# Patient Record
Sex: FEMALE | Race: BLACK OR AFRICAN AMERICAN | NOT HISPANIC OR LATINO | Employment: FULL TIME | URBAN - METROPOLITAN AREA
[De-identification: names, ages, dates, MRNs, and addresses within clinical notes are randomized per-mention and may not be internally consistent; named-entity substitution may affect disease eponyms.]

---

## 2017-06-22 ENCOUNTER — HOSPITAL ENCOUNTER (INPATIENT)
Facility: HOSPITAL | Age: 42
LOS: 1 days | Discharge: HOME | DRG: 175 | End: 2017-06-23
Attending: INTERNAL MEDICINE | Admitting: INTERNAL MEDICINE
Payer: COMMERCIAL

## 2017-06-22 DIAGNOSIS — E11.10 TYPE 2 DIABETES MELLITUS WITH KETOACIDOSIS WITHOUT COMA, WITHOUT LONG-TERM CURRENT USE OF INSULIN: Primary | ICD-10-CM

## 2017-06-22 DIAGNOSIS — R73.9 HYPERGLYCEMIA: ICD-10-CM

## 2017-06-22 DIAGNOSIS — N93.9 ABNORMAL UTERINE BLEEDING: ICD-10-CM

## 2017-06-22 DIAGNOSIS — I26.99 PULMONARY EMBOLISM: ICD-10-CM

## 2017-06-22 DIAGNOSIS — I26.99 PULMONARY EMBOLI: ICD-10-CM

## 2017-06-22 PROBLEM — E78.5 HYPERLIPIDEMIA: Status: ACTIVE | Noted: 2017-06-22

## 2017-06-22 PROBLEM — D64.9 ANEMIA: Status: ACTIVE | Noted: 2017-06-22

## 2017-06-22 PROBLEM — I10 HTN (HYPERTENSION): Status: ACTIVE | Noted: 2017-06-22

## 2017-06-22 LAB
ALBUMIN SERPL BCP-MCNC: 3 G/DL
ALP SERPL-CCNC: 59 U/L
ALT SERPL W/O P-5'-P-CCNC: 9 U/L
ANION GAP SERPL CALC-SCNC: 12 MMOL/L
ANION GAP SERPL CALC-SCNC: 12 MMOL/L
ANION GAP SERPL CALC-SCNC: 16 MMOL/L
ANION GAP SERPL CALC-SCNC: 16 MMOL/L
AST SERPL-CCNC: 13 U/L
B-OH-BUTYR BLD STRIP-SCNC: 3.1 MMOL/L
BASOPHILS # BLD AUTO: 0.01 K/UL
BASOPHILS # BLD AUTO: 0.01 K/UL
BASOPHILS NFR BLD: 0.1 %
BASOPHILS NFR BLD: 0.1 %
BILIRUB SERPL-MCNC: 0.3 MG/DL
BUN SERPL-MCNC: 7 MG/DL
BUN SERPL-MCNC: 8 MG/DL
CALCIUM SERPL-MCNC: 8.2 MG/DL
CALCIUM SERPL-MCNC: 8.2 MG/DL
CALCIUM SERPL-MCNC: 8.6 MG/DL
CALCIUM SERPL-MCNC: 8.6 MG/DL
CHLORIDE SERPL-SCNC: 100 MMOL/L
CHLORIDE SERPL-SCNC: 102 MMOL/L
CHLORIDE SERPL-SCNC: 102 MMOL/L
CHLORIDE SERPL-SCNC: 103 MMOL/L
CO2 SERPL-SCNC: 15 MMOL/L
CO2 SERPL-SCNC: 16 MMOL/L
CO2 SERPL-SCNC: 19 MMOL/L
CO2 SERPL-SCNC: 19 MMOL/L
CREAT SERPL-MCNC: 0.7 MG/DL
DIASTOLIC DYSFUNCTION: NO
DIFFERENTIAL METHOD: ABNORMAL
DIFFERENTIAL METHOD: ABNORMAL
EOSINOPHIL # BLD AUTO: 0 K/UL
EOSINOPHIL # BLD AUTO: 0 K/UL
EOSINOPHIL NFR BLD: 0 %
EOSINOPHIL NFR BLD: 0.3 %
ERYTHROCYTE [DISTWIDTH] IN BLOOD BY AUTOMATED COUNT: 14.5 %
ERYTHROCYTE [DISTWIDTH] IN BLOOD BY AUTOMATED COUNT: 14.9 %
EST. GFR  (AFRICAN AMERICAN): >60 ML/MIN/1.73 M^2
EST. GFR  (NON AFRICAN AMERICAN): >60 ML/MIN/1.73 M^2
FACT X PPP CHRO-ACNC: 0.33 IU/ML
FACT X PPP CHRO-ACNC: 0.75 IU/ML
FACT X PPP CHRO-ACNC: 1.62 IU/ML
GLUCOSE SERPL-MCNC: 263 MG/DL
GLUCOSE SERPL-MCNC: 263 MG/DL
GLUCOSE SERPL-MCNC: 282 MG/DL
GLUCOSE SERPL-MCNC: 332 MG/DL
HCT VFR BLD AUTO: 34.9 %
HCT VFR BLD AUTO: 35.4 %
HGB BLD-MCNC: 11.2 G/DL
HGB BLD-MCNC: 11.7 G/DL
LACTATE SERPL-SCNC: 1.7 MMOL/L
LYMPHOCYTES # BLD AUTO: 1.5 K/UL
LYMPHOCYTES # BLD AUTO: 3.1 K/UL
LYMPHOCYTES NFR BLD: 14.6 %
LYMPHOCYTES NFR BLD: 27.9 %
MAGNESIUM SERPL-MCNC: 1.5 MG/DL
MCH RBC QN AUTO: 26.9 PG
MCH RBC QN AUTO: 27.9 PG
MCHC RBC AUTO-ENTMCNC: 32.1 %
MCHC RBC AUTO-ENTMCNC: 33.1 %
MCV RBC AUTO: 84 FL
MCV RBC AUTO: 84 FL
MONOCYTES # BLD AUTO: 0.3 K/UL
MONOCYTES # BLD AUTO: 0.5 K/UL
MONOCYTES NFR BLD: 2.7 %
MONOCYTES NFR BLD: 4.6 %
NEUTROPHILS # BLD AUTO: 7.5 K/UL
NEUTROPHILS # BLD AUTO: 8.6 K/UL
NEUTROPHILS NFR BLD: 66.9 %
NEUTROPHILS NFR BLD: 82.4 %
PLATELET # BLD AUTO: 194 K/UL
PLATELET # BLD AUTO: 207 K/UL
PMV BLD AUTO: 10 FL
PMV BLD AUTO: 10.6 FL
POCT GLUCOSE: 194 MG/DL (ref 70–110)
POCT GLUCOSE: 222 MG/DL (ref 70–110)
POCT GLUCOSE: 234 MG/DL (ref 70–110)
POCT GLUCOSE: 246 MG/DL (ref 70–110)
POCT GLUCOSE: 257 MG/DL (ref 70–110)
POCT GLUCOSE: 276 MG/DL (ref 70–110)
POCT GLUCOSE: 288 MG/DL (ref 70–110)
POCT GLUCOSE: 311 MG/DL (ref 70–110)
POCT GLUCOSE: 321 MG/DL (ref 70–110)
POCT GLUCOSE: 333 MG/DL (ref 70–110)
POCT GLUCOSE: 356 MG/DL (ref 70–110)
POTASSIUM SERPL-SCNC: 3.9 MMOL/L
POTASSIUM SERPL-SCNC: 4 MMOL/L
POTASSIUM SERPL-SCNC: 4.2 MMOL/L
POTASSIUM SERPL-SCNC: 4.2 MMOL/L
PROT SERPL-MCNC: 7.3 G/DL
RBC # BLD AUTO: 4.16 M/UL
RBC # BLD AUTO: 4.2 M/UL
RETIRED EF AND QEF - SEE NOTES: 65 (ref 55–65)
SODIUM SERPL-SCNC: 132 MMOL/L
SODIUM SERPL-SCNC: 133 MMOL/L
SODIUM SERPL-SCNC: 133 MMOL/L
SODIUM SERPL-SCNC: 134 MMOL/L
WBC # BLD AUTO: 10.42 K/UL
WBC # BLD AUTO: 11.13 K/UL

## 2017-06-22 PROCEDURE — 80048 BASIC METABOLIC PNL TOTAL CA: CPT

## 2017-06-22 PROCEDURE — 99233 SBSQ HOSP IP/OBS HIGH 50: CPT | Mod: ,,, | Performed by: CLINICAL NURSE SPECIALIST

## 2017-06-22 PROCEDURE — 25000003 PHARM REV CODE 250: Performed by: CLINICAL NURSE SPECIALIST

## 2017-06-22 PROCEDURE — 63600175 PHARM REV CODE 636 W HCPCS

## 2017-06-22 PROCEDURE — 63600175 PHARM REV CODE 636 W HCPCS: Performed by: CLINICAL NURSE SPECIALIST

## 2017-06-22 PROCEDURE — 85520 HEPARIN ASSAY: CPT

## 2017-06-22 PROCEDURE — 11000001 HC ACUTE MED/SURG PRIVATE ROOM

## 2017-06-22 PROCEDURE — 99253 IP/OBS CNSLTJ NEW/EST LOW 45: CPT | Mod: ,,, | Performed by: OBSTETRICS & GYNECOLOGY

## 2017-06-22 PROCEDURE — 94761 N-INVAS EAR/PLS OXIMETRY MLT: CPT

## 2017-06-22 PROCEDURE — 85302 CLOT INHIBIT PROT C ANTIGEN: CPT

## 2017-06-22 PROCEDURE — 85303 CLOT INHIBIT PROT C ACTIVITY: CPT

## 2017-06-22 PROCEDURE — 85520 HEPARIN ASSAY: CPT | Mod: 91

## 2017-06-22 PROCEDURE — 93010 ELECTROCARDIOGRAM REPORT: CPT | Mod: S$GLB,,, | Performed by: INTERNAL MEDICINE

## 2017-06-22 PROCEDURE — 80053 COMPREHEN METABOLIC PANEL: CPT

## 2017-06-22 PROCEDURE — 25000003 PHARM REV CODE 250: Performed by: INTERNAL MEDICINE

## 2017-06-22 PROCEDURE — 87040 BLOOD CULTURE FOR BACTERIA: CPT

## 2017-06-22 PROCEDURE — 93005 ELECTROCARDIOGRAM TRACING: CPT

## 2017-06-22 PROCEDURE — 25000003 PHARM REV CODE 250: Performed by: STUDENT IN AN ORGANIZED HEALTH CARE EDUCATION/TRAINING PROGRAM

## 2017-06-22 PROCEDURE — 93306 TTE W/DOPPLER COMPLETE: CPT | Mod: 26,,, | Performed by: INTERNAL MEDICINE

## 2017-06-22 PROCEDURE — 36415 COLL VENOUS BLD VENIPUNCTURE: CPT

## 2017-06-22 PROCEDURE — 83605 ASSAY OF LACTIC ACID: CPT

## 2017-06-22 PROCEDURE — 85025 COMPLETE CBC W/AUTO DIFF WBC: CPT | Mod: 91

## 2017-06-22 PROCEDURE — 85305 CLOT INHIBIT PROT S TOTAL: CPT

## 2017-06-22 PROCEDURE — 82010 KETONE BODYS QUAN: CPT

## 2017-06-22 PROCEDURE — 83735 ASSAY OF MAGNESIUM: CPT

## 2017-06-22 PROCEDURE — 63600175 PHARM REV CODE 636 W HCPCS: Performed by: INTERNAL MEDICINE

## 2017-06-22 PROCEDURE — 93306 TTE W/DOPPLER COMPLETE: CPT

## 2017-06-22 PROCEDURE — 85220 BLOOC CLOT FACTOR V TEST: CPT

## 2017-06-22 RX ORDER — ONDANSETRON 2 MG/ML
INJECTION INTRAMUSCULAR; INTRAVENOUS
Status: COMPLETED
Start: 2017-06-22 | End: 2017-06-22

## 2017-06-22 RX ORDER — MAGNESIUM SULFATE HEPTAHYDRATE 40 MG/ML
2 INJECTION, SOLUTION INTRAVENOUS
Status: DISCONTINUED | OUTPATIENT
Start: 2017-06-22 | End: 2017-06-23

## 2017-06-22 RX ORDER — ONDANSETRON 4 MG/1
4 TABLET, FILM COATED ORAL ONCE
Status: COMPLETED | OUTPATIENT
Start: 2017-06-22 | End: 2017-06-22

## 2017-06-22 RX ORDER — GLUCAGON 1 MG
1 KIT INJECTION
Status: DISCONTINUED | OUTPATIENT
Start: 2017-06-22 | End: 2017-06-23

## 2017-06-22 RX ORDER — ONDANSETRON 2 MG/ML
4 INJECTION INTRAMUSCULAR; INTRAVENOUS ONCE
Status: COMPLETED | OUTPATIENT
Start: 2017-06-22 | End: 2017-06-22

## 2017-06-22 RX ORDER — OXYCODONE HYDROCHLORIDE 5 MG/1
5 TABLET ORAL EVERY 4 HOURS PRN
Status: COMPLETED | OUTPATIENT
Start: 2017-06-22 | End: 2017-06-22

## 2017-06-22 RX ORDER — PANTOPRAZOLE SODIUM 40 MG/1
40 TABLET, DELAYED RELEASE ORAL DAILY
Status: DISCONTINUED | OUTPATIENT
Start: 2017-06-22 | End: 2017-06-23 | Stop reason: HOSPADM

## 2017-06-22 RX ORDER — IBUPROFEN 200 MG
16 TABLET ORAL
Status: DISCONTINUED | OUTPATIENT
Start: 2017-06-22 | End: 2017-06-23

## 2017-06-22 RX ORDER — SODIUM CHLORIDE 0.9 % (FLUSH) 0.9 %
3 SYRINGE (ML) INJECTION EVERY 8 HOURS
Status: DISCONTINUED | OUTPATIENT
Start: 2017-06-22 | End: 2017-06-23 | Stop reason: HOSPADM

## 2017-06-22 RX ORDER — MEDROXYPROGESTERONE ACETATE 10 MG/1
10 TABLET ORAL DAILY
Status: DISCONTINUED | OUTPATIENT
Start: 2017-06-22 | End: 2017-06-22

## 2017-06-22 RX ORDER — OXYCODONE HYDROCHLORIDE 5 MG/1
5 TABLET ORAL ONCE
Status: COMPLETED | OUTPATIENT
Start: 2017-06-22 | End: 2017-06-22

## 2017-06-22 RX ORDER — HEPARIN SODIUM,PORCINE/D5W 25000/250
18 INTRAVENOUS SOLUTION INTRAVENOUS CONTINUOUS
Status: DISPENSED | OUTPATIENT
Start: 2017-06-22 | End: 2017-06-23

## 2017-06-22 RX ORDER — DEXTROSE MONOHYDRATE 100 MG/ML
1000 INJECTION, SOLUTION INTRAVENOUS
Status: DISCONTINUED | OUTPATIENT
Start: 2017-06-22 | End: 2017-06-22

## 2017-06-22 RX ORDER — LOSARTAN POTASSIUM AND HYDROCHLOROTHIAZIDE 12.5; 1 MG/1; MG/1
1 TABLET ORAL DAILY
Status: DISCONTINUED | OUTPATIENT
Start: 2017-06-22 | End: 2017-06-23 | Stop reason: HOSPADM

## 2017-06-22 RX ORDER — IBUPROFEN 200 MG
24 TABLET ORAL
Status: DISCONTINUED | OUTPATIENT
Start: 2017-06-22 | End: 2017-06-23

## 2017-06-22 RX ORDER — POTASSIUM CHLORIDE 20 MEQ/15ML
40 SOLUTION ORAL
Status: DISCONTINUED | OUTPATIENT
Start: 2017-06-22 | End: 2017-06-23

## 2017-06-22 RX ORDER — ACETAMINOPHEN 325 MG/1
650 TABLET ORAL EVERY 4 HOURS PRN
Status: DISCONTINUED | OUTPATIENT
Start: 2017-06-22 | End: 2017-06-23 | Stop reason: HOSPADM

## 2017-06-22 RX ORDER — INSULIN ASPART 100 [IU]/ML
0-5 INJECTION, SOLUTION INTRAVENOUS; SUBCUTANEOUS
Status: DISCONTINUED | OUTPATIENT
Start: 2017-06-22 | End: 2017-06-23 | Stop reason: HOSPADM

## 2017-06-22 RX ORDER — PRAVASTATIN SODIUM 40 MG/1
40 TABLET ORAL DAILY
Status: DISCONTINUED | OUTPATIENT
Start: 2017-06-22 | End: 2017-06-23 | Stop reason: HOSPADM

## 2017-06-22 RX ADMIN — ONDANSETRON 4 MG: 2 INJECTION INTRAMUSCULAR; INTRAVENOUS at 04:06

## 2017-06-22 RX ADMIN — OXYCODONE HYDROCHLORIDE 5 MG: 5 TABLET ORAL at 05:06

## 2017-06-22 RX ADMIN — LOSARTAN POTASSIUM AND HYDROCHLOROTHIAZIDE 1 TABLET: 100; 12.5 TABLET, FILM COATED ORAL at 08:06

## 2017-06-22 RX ADMIN — HEPARIN SODIUM AND DEXTROSE 14 UNITS/KG/HR: 10000; 5 INJECTION INTRAVENOUS at 07:06

## 2017-06-22 RX ADMIN — HEPARIN SODIUM AND DEXTROSE 15 UNITS/KG/HR: 10000; 5 INJECTION INTRAVENOUS at 04:06

## 2017-06-22 RX ADMIN — ACETAMINOPHEN 650 MG: 325 TABLET ORAL at 04:06

## 2017-06-22 RX ADMIN — SODIUM CHLORIDE 1000 ML: 0.9 INJECTION, SOLUTION INTRAVENOUS at 05:06

## 2017-06-22 RX ADMIN — PRAVASTATIN SODIUM 40 MG: 40 TABLET ORAL at 08:06

## 2017-06-22 RX ADMIN — SODIUM CHLORIDE, PRESERVATIVE FREE 3 ML: 5 INJECTION INTRAVENOUS at 09:06

## 2017-06-22 RX ADMIN — OXYCODONE HYDROCHLORIDE 5 MG: 5 TABLET ORAL at 01:06

## 2017-06-22 RX ADMIN — ACETAMINOPHEN 650 MG: 325 TABLET ORAL at 09:06

## 2017-06-22 RX ADMIN — MEDROXYPROGESTERONE ACETATE 10 MG: 10 TABLET ORAL at 01:06

## 2017-06-22 RX ADMIN — SODIUM CHLORIDE, PRESERVATIVE FREE 3 ML: 5 INJECTION INTRAVENOUS at 05:06

## 2017-06-22 RX ADMIN — MAGNESIUM SULFATE IN WATER 2 G: 40 INJECTION, SOLUTION INTRAVENOUS at 05:06

## 2017-06-22 RX ADMIN — ONDANSETRON 4 MG: 4 TABLET, FILM COATED ORAL at 03:06

## 2017-06-22 RX ADMIN — HEPARIN SODIUM AND DEXTROSE 18 UNITS/KG/HR: 10000; 5 INJECTION INTRAVENOUS at 02:06

## 2017-06-22 RX ADMIN — PANTOPRAZOLE SODIUM 40 MG: 40 TABLET, DELAYED RELEASE ORAL at 08:06

## 2017-06-22 RX ADMIN — INSULIN ASPART 2 UNITS: 100 INJECTION, SOLUTION INTRAVENOUS; SUBCUTANEOUS at 09:06

## 2017-06-22 NOTE — HPI
Pt is a 40 YO lady with history of HTn, DM2, abnormal uterine bleeding , history of fibroids who was in her usual state of health until yesterday when she went to ED compalining of R leg pain, she was diagnosed with DVT in R popliteal and prescribed Lovenox , she took 2 doses of the medication however next day she started to develop suprapubic and R groin pain non radiating, no alleviating or aggravating factor, no worsening of abnormal uterine bleeding however associated with urinary frequency and urgency WO any dyuries, fever or chills, or diarrhea/ constipation. Pt reported back to ED, per report pt was noted to be tachycardic and diaphoretic, cTA was done that revealed bilateral PE, pt was started on heparin drip and trasfered to [facility] for possible cardiology intervention    Upon arrival pt was resting comfortably on bed, no tachycardia, SOB, CP, satting 98% on RA, bedside Us did not reveal R strain

## 2017-06-22 NOTE — PLAN OF CARE
Mercedes Smith, FNP  [address] [facility] / [place] [address]    [id number] - [place] - [address]  [address]  [address]  [place] [address]  Phone: [phone] Fax: [phone]    Payor: BLUE CROSS BLUE SHIELD / Plan: [place] HMO / Product Type: HMO /     Future Appointments  Date Time Provider Department Center   7/10/2017 1:00 PM Lissette Law NP [facility] Wang     Extended Emergency Contact Information  Primary Emergency Contact: Yassine Pearce  Address: [address] [place] [address] [place] [address] [facility]  Home Phone: [phone]  Relation: Mother  Secondary Emergency Contact: Alexandria Gomez   [place]  Mobile Phone: [phone]  Relation: Spouse     06/22/17 1228   Discharge Assessment   Assessment Type Discharge Planning Assessment   Confirmed/corrected address and phone number on facesheet? Yes   Assessment information obtained from? Patient;Medical Record   Expected Length of Stay (days) 4   Communicated expected length of stay with patient/caregiver no   Prior to hospitilization cognitive status: Alert/Oriented   Prior to hospitalization functional status: Independent   Current cognitive status: Alert/Oriented   Current Functional Status: Needs Assistance   Arrived From [facility] hospital  ([facility])   Lives With spouse   Able to Return to Prior Arrangements yes   Is patient able to care for self after discharge? Yes   How many people do you have in your home that can help with your care after discharge? 1   Who are your caregiver(s) and their phone number(s)? Alexandria Gomez (Spouse) [phone] or Yassine Pearce (Mother) [phone]    Patient's perception of discharge disposition home or selfcare   Readmission Within The Last 30 Days no previous admission in last 30 days   Patient currently being followed by outpatient case management? No   Patient  currently receives home health services? No   Does the patient currently use HME? No   Patient currently receives private duty nursing? N/A   Patient currently receives any other outside agency services? No   Equipment Currently Used at Home none   Do you have any problems affording any of your prescribed medications? No   Is the patient taking medications as prescribed? yes   Do you have any financial concerns preventing you from receiving the healthcare you need? No   Does the patient have transportation to healthcare appointments? Yes   Transportation Available car   On Dialysis? No   Does the patient receive services at the Coumadin Clinic? No   Are there any open cases? No   Discharge Plan A Home with family   Discharge Plan B Home   Patient/Family In Agreement With Plan yes   Karuna Gonzalez RN, BSN  Case Management  [facility]  Ext. [phone]

## 2017-06-22 NOTE — ASSESSMENT & PLAN NOTE
- pt on oral hypoglycemic meds at home  -+ b hydroxy butyrate, +ketone in urin and acidosis, gap just mildly elevated  -pt symptomatic with polyuria, dypsia and nausea  -will start her on insulin drip and will transitioned her to sub cu in am when BG better controlled

## 2017-06-22 NOTE — NURSING
Pt arrives via ems to Unit. Pt comes from [facility]. Pt was recently diagnosed with DVT and sent home on xarleto. Pt states went to ED last night due to upper abd pain and after CTA of chest was diagnosed with bilateral PE. Pt also reports vaginal bleeding since 6/3 and has hx of fibroids. No bleeding upon arrival. Pt arrived hypothermic and having frequent urination. A external female cath was placed upon arrival as well as bear hugger. Pt placed on cardiac monitor, nibp and pulse ox.

## 2017-06-22 NOTE — PROGRESS NOTES
0235: Pt arrives via ems to unit from [place]    0251: Md at bedside.     0511: Pt temp 97.8, bear hugger removed

## 2017-06-22 NOTE — SUBJECTIVE & OBJECTIVE
Past Medical History:   Diagnosis Date    Anticoagulant long-term use     Diabetes mellitus     DVT, popliteal, acute     GERD (gastroesophageal reflux disease)     HLD (hyperlipidemia)     Hypertension        Past Surgical History:   Procedure Laterality Date     SECTION      UPPER GASTROINTESTINAL ENDOSCOPY      in s        Review of patient's allergies indicates:   Allergen Reactions    Pneumococcal 23-brianna ps vaccine        Family History     Problem Relation (Age of Onset)    Breast cancer Sister    Diabetes Mother, Father    Hyperlipidemia Mother, Father    Hypertension Mother, Father    Hyperthyroidism Mother    Stroke Father        Social History Main Topics    Smoking status: Never Smoker    Smokeless tobacco: Never Used    Alcohol use Yes      Comment: rare    Drug use: No    Sexual activity: Yes     Partners: Male      Review of Systems   Constitutional: Negative for chills and fever.   Respiratory: Negative for chest tightness and shortness of breath.    Cardiovascular: Negative for chest pain, palpitations and leg swelling.   Gastrointestinal: Positive for abdominal pain and nausea. Negative for vomiting.   Endocrine: Positive for polydipsia and polyuria.   Genitourinary: Positive for urgency and vaginal bleeding. Negative for dysuria.   Musculoskeletal: Negative for back pain.   Neurological: Negative for syncope and weakness.     Objective:     Vital Signs (Most Recent):  Temp: (!) 94.1 °F (34.5 °C) (17 0235)  Pulse: 97 (17 0400)  Resp: 20 (17 0400)  BP: (!) 146/75 (17 0400)  SpO2: (!) 94 % (17 0400) Vital Signs (24h Range):  Temp:  [92 °F (33.3 °C)-94.7 °F (34.8 °C)] 94.1 °F (34.5 °C)  Pulse:  [] 97  Resp:  [16-22] 20  SpO2:  [94 %-100 %] 94 %  BP: (126-173)/(72-99) 146/75   Weight: 79.4 kg (175 lb 0.7 oz)  Body mass index is 30.05 kg/m².    No intake or output data in the 24 hours ending 17 0509    Physical Exam   Constitutional: She  is oriented to person, place, and time. She appears well-developed and well-nourished. No distress.   HENT:   Head: Normocephalic and atraumatic.   Eyes: Right eye exhibits no discharge. Left eye exhibits no discharge.   Neck: No JVD present. No tracheal deviation present.   Cardiovascular: Normal rate, regular rhythm, normal heart sounds and intact distal pulses.    No murmur heard.  Pulmonary/Chest: Effort normal and breath sounds normal. No respiratory distress. She has no wheezes.   Abdominal: Soft. Bowel sounds are normal. There is no rebound and no guarding.   Suprapubic tenderness   Neurological: She is alert and oriented to person, place, and time.   Skin: Skin is warm. She is diaphoretic. No erythema.       Vents:     Lines/Drains/Airways     Peripheral Intravenous Line                 Peripheral IV - Single Lumen 06/21/17 2159 Right Antecubital less than 1 day         Peripheral IV - Single Lumen 06/22/17 0041 Left Hand less than 1 day              Significant Labs:    CBC/Anemia Profile:    Recent Labs  Lab 06/20/17  0526 06/21/17 2202 06/22/17  0250   WBC 7.93 7.86 10.42   HGB 11.5* 11.3* 11.2*   HCT 34.7* 34.0* 34.9*    205 194   MCV 85 84 84   RDW 14.9* 14.3 14.5        Chemistries:    Recent Labs  Lab 06/20/17  0526 06/21/17 2202 06/22/17  0250   * 135* 132*   K 3.8 3.5 3.9    100 100   CO2 21* 26 16*   BUN 13 9 8   CREATININE 0.52 0.51 0.7   CALCIUM 9.6 9.2 8.2*   ALBUMIN 3.5 3.7 3.0*   PROT 7.0 7.3 7.3   BILITOT 0.6 0.4 0.3   ALKPHOS 65 71 59   ALT 18 23 9*   AST 21 18 13   MG 1.6  --  1.5*       All pertinent labs within the past 24 hours have been reviewed.    Significant Imaging: I have reviewed all pertinent imaging results/findings within the past 24 hours.

## 2017-06-22 NOTE — H&P
[facility]  Critical Care Medicine  History & Physical    Patient Name: Heather Gomez  MRN: [id number]  Admission Date: 2017  Hospital Length of Stay: 0 days  Code Status: Full Code  Attending Physician: DAVE Morejon MD   Primary Care Provider: ONDINA Westbrook   Principal Problem: <principal problem not specified>    Subjective:     HPI:  Pt is a 40 YO lady with history of HTn, DM2, abnormal uterine bleeding , history of fibroids who was in her usual state of health until yesterday when she went to ED compalining of R leg pain, she was diagnosed with DVT in R popliteal and prescribed Lovenox , she took 2 doses of the medication however next day she started to develop suprapubic and R groin pain non radiating, no alleviating or aggravating factor, no worsening of abnormal uterine bleeding however associated with urinary frequency and urgency WO any dyuries, fever or chills, or diarrhea/ constipation. Pt reported back to ED, per report pt was noted to be tachycardic and diaphoretic, cTA was done that revealed bilateral PE, pt was started on heparin drip and trasfered to [facility] for possible cardiology intervention    Upon arrival pt was resting comfortably on bed, no tachycardia, SOB, CP, satting 98% on RA, bedside Us did not reveal R strain      Hospital/ICU Course:  No notes on file     Past Medical History:   Diagnosis Date    Anticoagulant long-term use     Diabetes mellitus     DVT, popliteal, acute     GERD (gastroesophageal reflux disease)     HLD (hyperlipidemia)     Hypertension        Past Surgical History:   Procedure Laterality Date     SECTION      UPPER GASTROINTESTINAL ENDOSCOPY      in 90s        Review of patient's allergies indicates:   Allergen Reactions    Pneumococcal 23-brianna ps vaccine        Family History     Problem Relation (Age of Onset)    Breast cancer Sister    Diabetes Mother, Father    Hyperlipidemia Mother, Father    Hypertension  Mother, Father    Hyperthyroidism Mother    Stroke Father        Social History Main Topics    Smoking status: Never Smoker    Smokeless tobacco: Never Used    Alcohol use Yes      Comment: rare    Drug use: No    Sexual activity: Yes     Partners: Male      Review of Systems   Constitutional: Negative for chills and fever.   Respiratory: Negative for chest tightness and shortness of breath.    Cardiovascular: Negative for chest pain, palpitations and leg swelling.   Gastrointestinal: Positive for abdominal pain and nausea. Negative for vomiting.   Endocrine: Positive for polydipsia and polyuria.   Genitourinary: Positive for urgency and vaginal bleeding. Negative for dysuria.   Musculoskeletal: Negative for back pain.   Neurological: Negative for syncope and weakness.     Objective:     Vital Signs (Most Recent):  Temp: (!) 94.1 °F (34.5 °C) (06/22/17 0235)  Pulse: 97 (06/22/17 0400)  Resp: 20 (06/22/17 0400)  BP: (!) 146/75 (06/22/17 0400)  SpO2: (!) 94 % (06/22/17 0400) Vital Signs (24h Range):  Temp:  [92 °F (33.3 °C)-94.7 °F (34.8 °C)] 94.1 °F (34.5 °C)  Pulse:  [] 97  Resp:  [16-22] 20  SpO2:  [94 %-100 %] 94 %  BP: (126-173)/(72-99) 146/75   Weight: 79.4 kg (175 lb 0.7 oz)  Body mass index is 30.05 kg/m².    No intake or output data in the 24 hours ending 06/22/17 0509    Physical Exam   Constitutional: She is oriented to person, place, and time. She appears well-developed and well-nourished. No distress.   HENT:   Head: Normocephalic and atraumatic.   Eyes: Right eye exhibits no discharge. Left eye exhibits no discharge.   Neck: No JVD present. No tracheal deviation present.   Cardiovascular: Normal rate, regular rhythm, normal heart sounds and intact distal pulses.    No murmur heard.  Pulmonary/Chest: Effort normal and breath sounds normal. No respiratory distress. She has no wheezes.   Abdominal: Soft. Bowel sounds are normal. There is no rebound and no guarding.   Suprapubic tenderness    Neurological: She is alert and oriented to person, place, and time.   Skin: Skin is warm. She is diaphoretic. No erythema.       Vents:     Lines/Drains/Airways     Peripheral Intravenous Line                 Peripheral IV - Single Lumen 06/21/17 2159 Right Antecubital less than 1 day         Peripheral IV - Single Lumen 06/22/17 0041 Left Hand less than 1 day              Significant Labs:    CBC/Anemia Profile:    Recent Labs  Lab 06/20/17 0526 06/21/17 2202 06/22/17  0250   WBC 7.93 7.86 10.42   HGB 11.5* 11.3* 11.2*   HCT 34.7* 34.0* 34.9*    205 194   MCV 85 84 84   RDW 14.9* 14.3 14.5        Chemistries:    Recent Labs  Lab 06/20/17 0526 06/21/17 2202 06/22/17  0250   * 135* 132*   K 3.8 3.5 3.9    100 100   CO2 21* 26 16*   BUN 13 9 8   CREATININE 0.52 0.51 0.7   CALCIUM 9.6 9.2 8.2*   ALBUMIN 3.5 3.7 3.0*   PROT 7.0 7.3 7.3   BILITOT 0.6 0.4 0.3   ALKPHOS 65 71 59   ALT 18 23 9*   AST 21 18 13   MG 1.6  --  1.5*       All pertinent labs within the past 24 hours have been reviewed.    Significant Imaging: I have reviewed all pertinent imaging results/findings within the past 24 hours.    Assessment/Plan:     Neuro   Heartburn    Continue PPI        Pulmonary   Pulmonary embolism    - pt diagnosed with DVT on 6/20 ,recived 2 doses of Lovenox  -CTA significant for PE  -pt started on heparin drip   -no history of tobacco use, malignancy (howver no mammograms sone with FMH of breast cancer), recent travels or trauma to   - pt reports that she has been on progesterone pills for treatment of AUB   - her father also had a DVT, but pt unsure of etiology. She denies any joint swelling, arthralgia or rashes   -no indication for card intervention at this moment, FU -FU 2 D echo and if concurs with bedside Us can transition her off of drip         Cardiac   HTN (hypertension)    -continue home dose losartan-hctz        Renal   Abnormal uterine bleeding    Pt reports dysfunctional uterine  bleeding for month  -she Denies worsening of it since starting Lovenox  -CT abdomen is down in OSH however the official report is not available in epic  -CT does reveal fibroid, per in house   -will consult obgyn and consider vaginal US          Fluids/Electrolytes/Nutrition/GI   Hyperlipidemia    -continue paravastatin        Hyperglycemia    - pt on oral hypoglycemic meds at home  -+ b hydroxy butyrate, +ketone in urin and acidosis, gap just mildly elevated  -pt symptomatic with polyuria, dypsia and nausea  -will start her on insulin drip and will transitioned her to sub cu in am when BG better controlled         Other   Anemia    -likely 2/2 blood loss from dysfunctional uterine bleeding   -FU official report of abdominal CT  -OBGYN consult  -FU CBC Q12 and decrease it to carolin if remains stable on heparin drip            Critical Care Medicine Daily Checklist:    A: Awake: RASS Goal/Actual Goal:    Actual: Villanueva Agitation Sedation Scale (RASS): Alert and calm   B: Spontaneous Breathing Trial Performed?     C: SAT & SBT Coordinated?  NA                      D: Delirium: CAM-ICU Overall CAM-ICU: Negative   E: Early Mobility Performed? No   F: Feeding Goal:    Status:     Current Diet Order   No orders of the defined types were placed in this encounter.      AS: Analgesia/Sedation NA   T: Thromboembolic Prophylaxis heparin gtt   H: HOB > 300 No   U: Stress Ulcer Prophylaxis (if needed) PPI   G: Glucose Control Insulin gtt   B: Bowel Function     I: Indwelling Catheter (Lines & Castro) Necessity Peripheral IV   D: De-escalation of Antimicrobials/Pharmacotherapies -    Plan for the day/ETD FU2 D echo    Code Status:  Family/Goals of Care: Full Code              Celine Vora MD  Critical Care Medicine  [facility]

## 2017-06-22 NOTE — ASSESSMENT & PLAN NOTE
Pt reports dysfunctional uterine bleeding for month  -she Denies worsening of it since starting Lovenox  -CT abdomen is down in OSH however the official report is not available in epic  -CT does reveal fibroid, per in house   -will consult obgyn and consider vaginal US

## 2017-06-22 NOTE — ASSESSMENT & PLAN NOTE
- pt diagnosed with DVT on 6/20 ,recived 2 doses of Lovenox  -CTA significant for PE  -pt started on heparin drip   -no history of tobacco use, malignancy (howver no mammograms sone with FM of breast cancer), recent travels or trauma to   - pt reports that she has been on progesterone pills for treatment of AUB   - her father also had a DVT, but pt unsure of etiology. She denies any joint swelling, arthralgia or rashes   -no indication for card intervention at this moment, FU -FU 2 D echo and if concurs with bedside Us can transition her off of drip

## 2017-06-22 NOTE — ASSESSMENT & PLAN NOTE
-likely 2/2 blood loss from dysfunctional uterine bleeding   -FU official report of abdominal CT  -OBGYN consult  -FU CBC Q12 and decrease it to carolin if remains stable on heparin drip

## 2017-06-22 NOTE — CONSULTS
Consult Note  Gynecology    Consult Requested By: MICU  Reason for Consult: vaginal bleeding    SUBJECTIVE:     History of Present Illness:  Patient is a 41 y.o.  with h/o DM2, HTN, and AUB currently being evaluated for bilateral pulmonary embolism with DVT. GYN services requested for abnormal uterine bleeding.    Patient was admitted overnight from [facility] for bilateral pulmonary embolism found on CT. Prior to that, on , she was diagnosed with a right popliteal vein DVT and prescribed Lovenox, which she took as prescribed. She reported to the ED complaining of increased leg pain along with shortness of breath and chest pain.    Patient has a 9 year history of abnormal uterine bleeding previously controlled by Depo Provera. She stopped Depo last year and began to develop and irregular menstrual cycles, sometimes having 2 per month with heavy bleeding. She was started on Nuvaring by her primary OBGYN in April. She was also started on Provera 10 mg PO at that time. Her vaginal bleeding improved. Provera was discontinued on . Patient reports she had been taking it as prescribed. Patient removed her Nuvaring       GYN History:  LMP 6/3/17, cycles irregular prior to that  Sexually active, uses Nuvaring for contraception      Family History:  No family history of breast, uterine, or ovarian cancer.    PMHx:   Past Medical History:   Diagnosis Date    Anticoagulant long-term use     Diabetes mellitus     DVT, popliteal, acute     GERD (gastroesophageal reflux disease)     HLD (hyperlipidemia)     Hypertension        PSHx:   Past Surgical History:   Procedure Laterality Date     SECTION      UPPER GASTROINTESTINAL ENDOSCOPY      in s        All: Review of patient's allergies indicates:  No Known Allergies    Meds:   Prescriptions Prior to Admission   Medication Sig Dispense Refill Last Dose    alprazolam (XANAX) 0.5 MG tablet Take 0.5 mg by mouth 3 (three) times daily.    6/19/2017    esomeprazole (NEXIUM) 40 MG capsule Take 40 mg by mouth before breakfast.   Unknown    fluticasone (FLONASE) 50 mcg/actuation nasal spray 1 spray by Each Nare route 2 (two) times daily as needed for Rhinitis. 15 g 0 Unknown    glimepiride (AMARYL) 1 MG tablet Take by mouth 2 (two) times daily.   6/19/2017    hydrOXYzine HCl (ATARAX) 25 MG tablet Take 25 mg by mouth 3 (three) times daily.   Unknown    irbesartan (AVAPRO) 150 MG tablet Take 150 mg by mouth every evening.   Taking    irbesartan-hydrochlorothiazide (AVALIDE) 150-12.5 mg per tablet Take 1 tablet by mouth once daily.   6/19/2017    metformin (GLUCOPHAGE) 500 MG tablet Take 500 mg by mouth 2 (two) times daily with meals.   6/19/2017    metoclopramide HCl (REGLAN) 10 MG tablet Take 1 tablet (10 mg total) by mouth every 6 (six) hours. 30 tablet 0 6/19/2017    ondansetron (ZOFRAN) 4 MG tablet Take 1 tablet (4 mg total) by mouth every 6 (six) hours. 20 tablet 0 6/19/2017    ondansetron (ZOFRAN-ODT) 4 MG TbDL Take 8 mg by mouth every 4 (four) hours as needed.   Taking    pravastatin (PRAVACHOL) 40 MG tablet Take 40 mg by mouth once daily.   6/19/2017    prochlorperazine (COMPAZINE) 25 MG suppository 1 per rectum up to every 6 hours as needed for nausea 12 suppository 0 Unknown    rivaroxaban (XARELTO) 15 mg (42)- 20 mg (9) tablet dose pack use as directed 1 Package 0     rivaroxaban (XARELTO) 20 mg Tab Take 1 tablet (20 mg total) by mouth daily with dinner or evening meal. 30 tablet 1     tramadol (ULTRAM) 50 mg tablet Take 50 mg by mouth every 6 (six) hours as needed for Pain.   6/19/2017       SH:   Social History     Social History    Marital status:      Spouse name: N/A    Number of children: N/A    Years of education: N/A     Occupational History    Not on file.     Social History Main Topics    Smoking status: Never Smoker    Smokeless tobacco: Never Used    Alcohol use Yes      Comment: rare    Drug use: No     Sexual activity: Yes     Partners: Male     Other Topics Concern    Not on file     Social History Narrative    No narrative on file       FH:   Family History   Problem Relation Age of Onset    Diabetes Mother     Hypertension Mother     Hyperlipidemia Mother     Hyperthyroidism Mother     Stroke Father     Hyperlipidemia Father     Hypertension Father     Diabetes Father     Breast cancer Sister        Review of Systems:  Constitutional: no fever or chills  Respiratory: mild shortness of shortness of breath  Cardiovascular: no chest pain or palpitations  Gastrointestinal: no nausea or vomiting, tolerating diet, negative for change in bowel habits  Genitourinary: vaginal bleeding  Hematologic/Lymphatic: no easy bruising or lymphadenopathy     OBJECTIVE:     Temp:  [92 °F (33.3 °C)-97.8 °F (36.6 °C)] 97.8 °F (36.6 °C)  Pulse:  [] 102  Resp:  [16-22] 19  SpO2:  [94 %-100 %] 96 %  BP: (126-173)/(72-99) 156/79    Recent Results (from the past 24 hour(s))   CBC auto differential    Collection Time: 06/21/17 10:02 PM   Result Value Ref Range    WBC 7.86 3.90 - 12.70 K/uL    RBC 4.03 4.00 - 5.40 M/uL    Hemoglobin 11.3 (L) 12.0 - 16.0 g/dL    Hematocrit 34.0 (L) 37.0 - 48.5 %    MCV 84 82 - 98 fL    MCH 28.0 27.0 - 31.0 pg    MCHC 33.2 32.0 - 36.0 %    RDW 14.3 11.5 - 14.5 %    Platelets 205 150 - 350 K/uL    MPV 10.4 9.2 - 12.9 fL    Gran # 3.3 1.8 - 7.7 K/uL    Lymph # 4.0 1.0 - 4.8 K/uL    Mono # 0.5 0.3 - 1.0 K/uL    Eos # 0.2 0.0 - 0.5 K/uL    Baso # 0.02 0.00 - 0.20 K/uL    Gran% 41.3 38.0 - 73.0 %    Lymph% 50.4 (H) 18.0 - 48.0 %    Mono% 6.0 4.0 - 15.0 %    Eosinophil% 2.0 0.0 - 8.0 %    Basophil% 0.3 0.0 - 1.9 %    Differential Method Automated    Comprehensive metabolic panel    Collection Time: 06/21/17 10:02 PM   Result Value Ref Range    Sodium 135 (L) 136 - 145 mmol/L    Potassium 3.5 3.5 - 5.1 mmol/L    Chloride 100 95 - 110 mmol/L    CO2 26 23 - 29 mmol/L    Glucose 271 (H) 70 - 110 mg/dL     BUN, Bld 9 7 - 17 mg/dL    Creatinine 0.51 0.50 - 1.40 mg/dL    Calcium 9.2 8.7 - 10.5 mg/dL    Total Protein 7.3 6.0 - 8.4 g/dL    Albumin 3.7 3.5 - 5.2 g/dL    Total Bilirubin 0.4 0.1 - 1.0 mg/dL    Alkaline Phosphatase 71 38 - 126 U/L    AST 18 15 - 46 U/L    ALT 23 10 - 44 U/L    Anion Gap 9 8 - 16 mmol/L    eGFR if African American >60.0 >60 mL/min/1.73 m^2    eGFR if non African American >60.0 >60 mL/min/1.73 m^2   Troponin I    Collection Time: 06/21/17 10:02 PM   Result Value Ref Range    Troponin I <0.012 0.012 - 0.034 ng/mL   NT-Pro Natriuretic Peptide    Collection Time: 06/21/17 10:02 PM   Result Value Ref Range    NT-proBNP 240 5 - 450 pg/mL   APTT    Collection Time: 06/21/17 10:02 PM   Result Value Ref Range    aPTT 25.3 21.0 - 32.0 sec   Protime-INR    Collection Time: 06/21/17 10:02 PM   Result Value Ref Range    Prothrombin Time 18.7 (H) 9.0 - 12.5 sec    INR 1.6 (H) 0.8 - 1.2   POCT glucose    Collection Time: 06/21/17 10:14 PM   Result Value Ref Range    POC Glucose 262 (A) 70 - 110 mg/dL   Urinalysis    Collection Time: 06/22/17 12:06 AM   Result Value Ref Range    Specimen UA Urine, Clean Catch     Color, UA Pink Yellow, Straw, Graciela    Appearance, UA Clear Clear    pH, UA 7.0 5.0 - 8.0    Specific Gravity, UA 1.010 1.005 - 1.030    Protein, UA Trace (A) Negative    Glucose, UA 3+ (A) Negative    Ketones, UA 3+ (A) Negative    Bilirubin (UA) Negative Negative    Occult Blood UA 3+ (A) Negative    Nitrite, UA Negative Negative    Urobilinogen, UA Negative <2.0 EU/dL    Leukocytes, UA Negative Negative   hCG, quantitative, pregnancy    Collection Time: 06/22/17 12:06 AM   Result Value Ref Range    hCG Quant <2.39 See Text mIU/mL   Urinalysis Microscopic    Collection Time: 06/22/17 12:06 AM   Result Value Ref Range    RBC, UA >100 (H) 0 - 4 /hpf    WBC, UA 5 0 - 5 /hpf    Bacteria, UA Rare None-Occ /hpf    Yeast, UA None None    Microscopic Comment SEE COMMENT    POCT glucose    Collection  Time: 06/22/17  2:32 AM   Result Value Ref Range    POCT Glucose 321 (H) 70 - 110 mg/dL   CBC auto differential    Collection Time: 06/22/17  2:50 AM   Result Value Ref Range    WBC 10.42 3.90 - 12.70 K/uL    RBC 4.16 4.00 - 5.40 M/uL    Hemoglobin 11.2 (L) 12.0 - 16.0 g/dL    Hematocrit 34.9 (L) 37.0 - 48.5 %    MCV 84 82 - 98 fL    MCH 26.9 (L) 27.0 - 31.0 pg    MCHC 32.1 32.0 - 36.0 %    RDW 14.5 11.5 - 14.5 %    Platelets 194 150 - 350 K/uL    MPV 10.6 9.2 - 12.9 fL    Gran # 8.6 (H) 1.8 - 7.7 K/uL    Lymph # 1.5 1.0 - 4.8 K/uL    Mono # 0.3 0.3 - 1.0 K/uL    Eos # 0.0 0.0 - 0.5 K/uL    Baso # 0.01 0.00 - 0.20 K/uL    Gran% 82.4 (H) 38.0 - 73.0 %    Lymph% 14.6 (L) 18.0 - 48.0 %    Mono% 2.7 (L) 4.0 - 15.0 %    Eosinophil% 0.0 0.0 - 8.0 %    Basophil% 0.1 0.0 - 1.9 %    Differential Method Automated    Comprehensive metabolic panel    Collection Time: 06/22/17  2:50 AM   Result Value Ref Range    Sodium 132 (L) 136 - 145 mmol/L    Potassium 3.9 3.5 - 5.1 mmol/L    Chloride 100 95 - 110 mmol/L    CO2 16 (L) 23 - 29 mmol/L    Glucose 332 (H) 70 - 110 mg/dL    BUN, Bld 8 6 - 20 mg/dL    Creatinine 0.7 0.5 - 1.4 mg/dL    Calcium 8.2 (L) 8.7 - 10.5 mg/dL    Total Protein 7.3 6.0 - 8.4 g/dL    Albumin 3.0 (L) 3.5 - 5.2 g/dL    Total Bilirubin 0.3 0.1 - 1.0 mg/dL    Alkaline Phosphatase 59 55 - 135 U/L    AST 13 10 - 40 U/L    ALT 9 (L) 10 - 44 U/L    Anion Gap 16 8 - 16 mmol/L    eGFR if African American >60.0 >60 mL/min/1.73 m^2    eGFR if non African American >60.0 >60 mL/min/1.73 m^2   Beta - Hydroxybutyrate, Serum    Collection Time: 06/22/17  2:50 AM   Result Value Ref Range    Beta-Hydroxybutyrate 3.1 (H) 0.0 - 0.5 mmol/L   Magnesium    Collection Time: 06/22/17  2:50 AM   Result Value Ref Range    Magnesium 1.5 (L) 1.6 - 2.6 mg/dL   Anti-Xa Heparin Monitoring    Collection Time: 06/22/17  2:50 AM   Result Value Ref Range    Heparin Anti-Xa 1.62 (H) 0.30 - 0.70 IU/mL   Lactic acid, plasma    Collection Time:  06/22/17  3:33 AM   Result Value Ref Range    Lactate (Lactic Acid) 1.7 0.5 - 2.2 mmol/L   POCT glucose    Collection Time: 06/22/17  4:35 AM   Result Value Ref Range    POCT Glucose 356 (H) 70 - 110 mg/dL   POCT glucose    Collection Time: 06/22/17  6:07 AM   Result Value Ref Range    POCT Glucose 311 (H) 70 - 110 mg/dL   Basic metabolic panel    Collection Time: 06/22/17  6:30 AM   Result Value Ref Range    Sodium 134 (L) 136 - 145 mmol/L    Potassium 4.0 3.5 - 5.1 mmol/L    Chloride 103 95 - 110 mmol/L    CO2 15 (L) 23 - 29 mmol/L    Glucose 282 (H) 70 - 110 mg/dL    BUN, Bld 7 6 - 20 mg/dL    Creatinine 0.7 0.5 - 1.4 mg/dL    Calcium 8.2 (L) 8.7 - 10.5 mg/dL    Anion Gap 16 8 - 16 mmol/L    eGFR if African American >60.0 >60 mL/min/1.73 m^2    eGFR if non African American >60.0 >60 mL/min/1.73 m^2   2D echo with color flow doppler    Collection Time: 06/22/17  7:40 AM   Result Value Ref Range    EF 65 55 - 65    Diastolic Dysfunction No    POCT glucose    Collection Time: 06/22/17  7:58 AM   Result Value Ref Range    POCT Glucose 222 (H) 70 - 110 mg/dL   POCT glucose    Collection Time: 06/22/17  9:32 AM   Result Value Ref Range    POCT Glucose 257 (H) 70 - 110 mg/dL   POCT glucose    Collection Time: 06/22/17 10:44 AM   Result Value Ref Range    POCT Glucose 276 (H) 70 - 110 mg/dL   Anti-Xa Heparin Monitoring    Collection Time: 06/22/17 10:47 AM   Result Value Ref Range    Heparin Anti-Xa 0.75 (H) 0.30 - 0.70 IU/mL   POCT glucose    Collection Time: 06/22/17 11:49 AM   Result Value Ref Range    POCT Glucose 246 (H) 70 - 110 mg/dL         Physical Exam:  GEN: No apparent distress. Alert and oriented.  NECK: No thyroid tenderness  CV: Regular rate and rhythm.   LUNGS: Clear to auscultation bilaterally. No wheezes, rales or rhonchi noted.  ABDOMEN: Soft, non tender, non-distended. Good bowel sounds. No guarding or rebound tenderness.  EXT: No erythema, no edema  EXT. GENITALIA: appear normal, no lesions  noted  VAGINA: Pink, moist, and well-rugated.Old blood present in vault with no active bleeidng, no lesions noted  CERVIX: Appears normal, no lesions noted, no cervical motion tenderness  UTERUS: 8 wks size, normal contour, mobile, mild fundal tenderness  ADNEXA: No palpable masses, no adnexal tenderness        ASSESSMENT/PLAN:     Assessment:     41 y.o.  with h/o DM2, HTN, and AUB currently being evaluated for bilateral pulmonary embolism with DVT. GYN services requested for abnormal uterine bleeding.      Plan:  1. Pulmonary Embolus with DVT  - DVT likely caused by estrogen effect of Nuvaring. Progesterone alone has minimally increased risk for clot formation  - Recommend thrombophilia workup as patient's father also has history of DVT. Consider heme/onc consult for thorough work up.  - Continue heparin drip and PE treatment per primary team    2. Abnormal uterine bleeding  - Bleeding stable at this time. Patient hemodynamically stable.  - Patient has history of AUB previously controlled with Depo  - Placed on Nuvaring and Provera by primary OB. Nuvaring discontinued by OB after DVT diagnosis  - Recommend continuing Provera 10 mg daily to prevent breakthrough bleeding.   - Transvaginal ultrasound one week ago from primary OB. Patient was being worked up for endometrial ablation. Consider requesting records. If bleeding remains stable patient can follow up with Primary OB for continued care.    Thank you for you consult. Please contact us if you have any questions in the future.      Collins Abrams MD  PGY-1 OB/GYN  [phone]    Discussed plan with Dr. Ruelas who was in agreement.

## 2017-06-22 NOTE — PLAN OF CARE
Problem: Patient Care Overview  Goal: Plan of Care Review  Outcome: Ongoing (interventions implemented as appropriate)  No acute events throughout shift, VS and assessment per flow sheet, patient progressing towards goals as tolerated, plan of care reviewed with Heather Gomez and family, all concerns addressed, will continue to monitor. Pt started on insulin gtt. Mag 1.5; replaced via IV. Heparin gtt stopped for 1 hour and restarted at 0500 at 15 units/kg/hr, recheck anti xa due at 11. Pt O2 sats in mid 90s on room air and ok to remain on room air as tolerated per CC MD.

## 2017-06-23 ENCOUNTER — TELEPHONE (OUTPATIENT)
Dept: ENDOCRINOLOGY | Facility: HOSPITAL | Age: 42
End: 2017-06-23

## 2017-06-23 VITALS
RESPIRATION RATE: 18 BRPM | WEIGHT: 175.06 LBS | HEART RATE: 132 BPM | DIASTOLIC BLOOD PRESSURE: 93 MMHG | BODY MASS INDEX: 29.89 KG/M2 | HEIGHT: 64 IN | TEMPERATURE: 99 F | SYSTOLIC BLOOD PRESSURE: 129 MMHG | OXYGEN SATURATION: 97 %

## 2017-06-23 PROBLEM — E11.65 TYPE 2 DIABETES MELLITUS WITH HYPERGLYCEMIA, WITHOUT LONG-TERM CURRENT USE OF INSULIN: Status: ACTIVE | Noted: 2017-06-23

## 2017-06-23 LAB
ALBUMIN SERPL BCP-MCNC: 2.8 G/DL
ALP SERPL-CCNC: 64 U/L
ALT SERPL W/O P-5'-P-CCNC: 11 U/L
ANION GAP SERPL CALC-SCNC: 10 MMOL/L
AST SERPL-CCNC: 12 U/L
BASOPHILS # BLD AUTO: 0.01 K/UL
BASOPHILS NFR BLD: 0.1 %
BILIRUB SERPL-MCNC: 0.4 MG/DL
BUN SERPL-MCNC: 9 MG/DL
C PEPTIDE SERPL-MCNC: 2.48 NG/ML
CALCIUM SERPL-MCNC: 8.7 MG/DL
CHLORIDE SERPL-SCNC: 99 MMOL/L
CO2 SERPL-SCNC: 22 MMOL/L
CREAT SERPL-MCNC: 0.8 MG/DL
DIFFERENTIAL METHOD: ABNORMAL
EOSINOPHIL # BLD AUTO: 0.1 K/UL
EOSINOPHIL NFR BLD: 0.7 %
ERYTHROCYTE [DISTWIDTH] IN BLOOD BY AUTOMATED COUNT: 14.9 %
EST. GFR  (AFRICAN AMERICAN): >60 ML/MIN/1.73 M^2
EST. GFR  (NON AFRICAN AMERICAN): >60 ML/MIN/1.73 M^2
FACT V ACT/NOR PPP: 106 %
FACT X PPP CHRO-ACNC: 0.22 IU/ML
FACT X PPP CHRO-ACNC: 0.24 IU/ML
FACT X PPP CHRO-ACNC: 0.24 IU/ML
GLUCOSE SERPL-MCNC: 383 MG/DL
HCT VFR BLD AUTO: 34.7 %
HCT VFR BLD AUTO: 37.2 %
HGB BLD-MCNC: 11.7 G/DL
HGB BLD-MCNC: 12.6 G/DL
LYMPHOCYTES # BLD AUTO: 3.9 K/UL
LYMPHOCYTES NFR BLD: 43.3 %
MAGNESIUM SERPL-MCNC: 2.2 MG/DL
MCH RBC QN AUTO: 28.4 PG
MCHC RBC AUTO-ENTMCNC: 33.9 %
MCV RBC AUTO: 84 FL
MONOCYTES # BLD AUTO: 0.5 K/UL
MONOCYTES NFR BLD: 5.2 %
NEUTROPHILS # BLD AUTO: 4.5 K/UL
NEUTROPHILS NFR BLD: 50.4 %
PHOSPHATE SERPL-MCNC: 2.6 MG/DL
PLATELET # BLD AUTO: 280 K/UL
PMV BLD AUTO: 10.3 FL
POCT GLUCOSE: 305 MG/DL (ref 70–110)
POCT GLUCOSE: 375 MG/DL (ref 70–110)
POCT GLUCOSE: 414 MG/DL (ref 70–110)
POTASSIUM SERPL-SCNC: 3.8 MMOL/L
PROT SERPL-MCNC: 7.2 G/DL
RBC # BLD AUTO: 4.43 M/UL
SODIUM SERPL-SCNC: 131 MMOL/L
WBC # BLD AUTO: 9.01 K/UL

## 2017-06-23 PROCEDURE — 86341 ISLET CELL ANTIBODY: CPT

## 2017-06-23 PROCEDURE — 25000003 PHARM REV CODE 250: Performed by: INTERNAL MEDICINE

## 2017-06-23 PROCEDURE — 63600175 PHARM REV CODE 636 W HCPCS: Performed by: INTERNAL MEDICINE

## 2017-06-23 PROCEDURE — 36415 COLL VENOUS BLD VENIPUNCTURE: CPT

## 2017-06-23 PROCEDURE — 84100 ASSAY OF PHOSPHORUS: CPT

## 2017-06-23 PROCEDURE — 83735 ASSAY OF MAGNESIUM: CPT

## 2017-06-23 PROCEDURE — 25000003 PHARM REV CODE 250: Performed by: CLINICAL NURSE SPECIALIST

## 2017-06-23 PROCEDURE — 85018 HEMOGLOBIN: CPT

## 2017-06-23 PROCEDURE — 85520 HEPARIN ASSAY: CPT

## 2017-06-23 PROCEDURE — 84681 ASSAY OF C-PEPTIDE: CPT

## 2017-06-23 PROCEDURE — 99239 HOSP IP/OBS DSCHRG MGMT >30: CPT | Mod: ,,, | Performed by: INTERNAL MEDICINE

## 2017-06-23 PROCEDURE — 99222 1ST HOSP IP/OBS MODERATE 55: CPT | Mod: ,,, | Performed by: INTERNAL MEDICINE

## 2017-06-23 PROCEDURE — 85014 HEMATOCRIT: CPT

## 2017-06-23 PROCEDURE — 85025 COMPLETE CBC W/AUTO DIFF WBC: CPT

## 2017-06-23 PROCEDURE — 80053 COMPREHEN METABOLIC PANEL: CPT

## 2017-06-23 PROCEDURE — 85520 HEPARIN ASSAY: CPT | Mod: 91

## 2017-06-23 RX ORDER — INSULIN ASPART 100 [IU]/ML
5 INJECTION, SOLUTION INTRAVENOUS; SUBCUTANEOUS
Status: DISCONTINUED | OUTPATIENT
Start: 2017-06-23 | End: 2017-06-23 | Stop reason: HOSPADM

## 2017-06-23 RX ORDER — INSULIN PUMP SYRINGE, 3 ML
EACH MISCELLANEOUS
Qty: 1 EACH | Refills: 0 | Status: SHIPPED | OUTPATIENT
Start: 2017-06-23 | End: 2017-09-20 | Stop reason: CLARIF

## 2017-06-23 RX ORDER — ONDANSETRON 2 MG/ML
4 INJECTION INTRAMUSCULAR; INTRAVENOUS ONCE AS NEEDED
Status: DISCONTINUED | OUTPATIENT
Start: 2017-06-23 | End: 2017-06-23 | Stop reason: HOSPADM

## 2017-06-23 RX ORDER — ALPRAZOLAM 0.5 MG/1
0.5 TABLET ORAL 3 TIMES DAILY PRN
Start: 2017-06-23 | End: 2021-07-14

## 2017-06-23 RX ORDER — MEDROXYPROGESTERONE ACETATE 10 MG/1
10 TABLET ORAL DAILY
Qty: 30 TABLET | Refills: 11 | Status: SHIPPED | OUTPATIENT
Start: 2017-06-23 | End: 2017-06-23

## 2017-06-23 RX ORDER — HYDROCODONE BITARTRATE AND ACETAMINOPHEN 5; 325 MG/1; MG/1
1 TABLET ORAL EVERY 6 HOURS PRN
Qty: 18 TABLET | Refills: 0 | Status: SHIPPED | OUTPATIENT
Start: 2017-06-23 | End: 2017-10-26

## 2017-06-23 RX ORDER — GLIMEPIRIDE 2 MG/1
2 TABLET ORAL 2 TIMES DAILY
Qty: 60 TABLET | Refills: 11 | Status: SHIPPED | OUTPATIENT
Start: 2017-06-23 | End: 2017-06-23 | Stop reason: HOSPADM

## 2017-06-23 RX ORDER — INSULIN ASPART 100 [IU]/ML
5 INJECTION, SOLUTION INTRAVENOUS; SUBCUTANEOUS 3 TIMES DAILY
Qty: 4.5 ML | Refills: 11 | Status: SHIPPED | OUTPATIENT
Start: 2017-06-23 | End: 2017-06-23

## 2017-06-23 RX ORDER — METFORMIN HYDROCHLORIDE 1000 MG/1
1000 TABLET ORAL 2 TIMES DAILY WITH MEALS
Qty: 60 TABLET | Refills: 11 | Status: SHIPPED | OUTPATIENT
Start: 2017-06-23 | End: 2017-06-23

## 2017-06-23 RX ORDER — INSULIN ASPART 100 [IU]/ML
5 INJECTION, SOLUTION INTRAVENOUS; SUBCUTANEOUS 3 TIMES DAILY
Qty: 4.5 ML | Refills: 11 | Status: SHIPPED | OUTPATIENT
Start: 2017-06-23 | End: 2017-07-10 | Stop reason: SDUPTHER

## 2017-06-23 RX ORDER — MEDROXYPROGESTERONE ACETATE 10 MG/1
10 TABLET ORAL DAILY
Qty: 30 TABLET | Refills: 11 | Status: SHIPPED | OUTPATIENT
Start: 2017-06-23 | End: 2017-06-29 | Stop reason: SINTOL

## 2017-06-23 RX ORDER — HYDROCODONE BITARTRATE AND ACETAMINOPHEN 5; 325 MG/1; MG/1
1 TABLET ORAL EVERY 6 HOURS PRN
Qty: 18 TABLET | Refills: 0 | Status: SHIPPED | OUTPATIENT
Start: 2017-06-23 | End: 2017-06-23

## 2017-06-23 RX ORDER — PEN NEEDLE, DIABETIC 33 GX5/32"
1 NEEDLE, DISPOSABLE MISCELLANEOUS
Qty: 100 EACH | Refills: 11 | Status: SHIPPED | OUTPATIENT
Start: 2017-06-23 | End: 2017-06-23

## 2017-06-23 RX ORDER — METFORMIN HYDROCHLORIDE 500 MG/1
500 TABLET ORAL 2 TIMES DAILY WITH MEALS
Qty: 60 TABLET | Refills: 11 | Status: SHIPPED | OUTPATIENT
Start: 2017-06-23 | End: 2017-06-23

## 2017-06-23 RX ORDER — OXYCODONE HYDROCHLORIDE 5 MG/1
5 TABLET ORAL EVERY 4 HOURS PRN
Status: DISCONTINUED | OUTPATIENT
Start: 2017-06-23 | End: 2017-06-23 | Stop reason: HOSPADM

## 2017-06-23 RX ORDER — PEN NEEDLE, DIABETIC 33 GX5/32"
1 NEEDLE, DISPOSABLE MISCELLANEOUS
Qty: 100 EACH | Refills: 11 | Status: SHIPPED | OUTPATIENT
Start: 2017-06-23 | End: 2017-11-13 | Stop reason: SDUPTHER

## 2017-06-23 RX ORDER — LANCETS
1 EACH MISCELLANEOUS
Qty: 100 EACH | Refills: 0 | Status: SHIPPED | OUTPATIENT
Start: 2017-06-23 | End: 2017-09-20 | Stop reason: CLARIF

## 2017-06-23 RX ORDER — IBUPROFEN 400 MG/1
400 TABLET ORAL EVERY 6 HOURS PRN
Status: DISCONTINUED | OUTPATIENT
Start: 2017-06-23 | End: 2017-06-23

## 2017-06-23 RX ORDER — INSULIN PUMP SYRINGE, 3 ML
EACH MISCELLANEOUS
Qty: 1 EACH | Refills: 0 | Status: SHIPPED | OUTPATIENT
Start: 2017-06-23 | End: 2017-06-23

## 2017-06-23 RX ORDER — METFORMIN HYDROCHLORIDE 500 MG/1
500 TABLET ORAL 2 TIMES DAILY WITH MEALS
Qty: 60 TABLET | Refills: 11 | Status: SHIPPED | OUTPATIENT
Start: 2017-06-23 | End: 2017-07-10 | Stop reason: CLARIF

## 2017-06-23 RX ADMIN — IBUPROFEN 400 MG: 400 TABLET, FILM COATED ORAL at 09:06

## 2017-06-23 RX ADMIN — INSULIN ASPART 4 UNITS: 100 INJECTION, SOLUTION INTRAVENOUS; SUBCUTANEOUS at 08:06

## 2017-06-23 RX ADMIN — ACETAMINOPHEN 650 MG: 325 TABLET ORAL at 06:06

## 2017-06-23 RX ADMIN — OXYCODONE HYDROCHLORIDE 5 MG: 5 TABLET ORAL at 05:06

## 2017-06-23 RX ADMIN — RIVAROXABAN 15 MG: 15 TABLET, FILM COATED ORAL at 01:06

## 2017-06-23 RX ADMIN — LOSARTAN POTASSIUM AND HYDROCHLOROTHIAZIDE 1 TABLET: 100; 12.5 TABLET, FILM COATED ORAL at 09:06

## 2017-06-23 RX ADMIN — SODIUM CHLORIDE, PRESERVATIVE FREE 3 ML: 5 INJECTION INTRAVENOUS at 06:06

## 2017-06-23 RX ADMIN — INSULIN ASPART 5 UNITS: 100 INJECTION, SOLUTION INTRAVENOUS; SUBCUTANEOUS at 01:06

## 2017-06-23 RX ADMIN — INSULIN ASPART 5 UNITS: 100 INJECTION, SOLUTION INTRAVENOUS; SUBCUTANEOUS at 05:06

## 2017-06-23 RX ADMIN — PRAVASTATIN SODIUM 40 MG: 40 TABLET ORAL at 09:06

## 2017-06-23 RX ADMIN — ACETAMINOPHEN 650 MG: 325 TABLET ORAL at 02:06

## 2017-06-23 NOTE — NURSING TRANSFER
Nursing Transfer Note      6/22/2017     Transfer To: 950[address]    Transfer via wheelchair    Transfer with IV infusing, No tele or oxygen required    Transported by Gary Victoria    Medicines sent: on Heraprin Gtt, no other meds sent    Chart send with patient: Yes    Notified: spouse, son    Patient reassessed at:6/22/17, 2020    Upon arrival to floor: patient oriented to room, call bell in reach and bed in lowest position. Nurse Fior at bedside.

## 2017-06-23 NOTE — PLAN OF CARE
ANGIE was informed by discharge nurse, Jessica ([id number]), that the patient was informed by her [facility] that the xarelto prescription faxed is not covered by her [employer] policy. CM informed Dr. Ibrahim of above & requested that she send the prescription to [facility] out patient [facility]. CM informed Nan ([id number]) w/[facility] of [facility]. Nan to call this CM back regarding coverage. Awaiting return call. Will continue to follow.

## 2017-06-23 NOTE — PLAN OF CARE
Patient awake & alert in bed with spouse, Alexandria Gomez ([phone]), at the bedside when CM rounded. Patient was admitted with a pulmonary embolism & was a stepdown from [facility] 6/22/17. Continuous heparin gtt in progress. Endo consult noted. Patient lives with her spouse & is independent of all ADLs. Plan to discharge patient home with support or home with home health when medically stable. Patient denied the need for assistance with transportation at time of discharge. Hospital follow up appointment scheduled for the patient with MUSA Smith (PCP) on 6/27/17 at 1000. Will continue to follow.

## 2017-06-23 NOTE — PLAN OF CARE
06/23/2017      Heather Gomez  [address]  [facility] [id number]          Hospital Medicine Dept.  [facility] [address] [place] [id number] [phone] after hours  [phone] fax Heather Gomez has been hospitalized at the [facility] since 6/22/2017.  Please excuse the patient from duties.  Patient may return on 7/3/2017.  No restrictions.     Please contact me if you have any questions.                  __________________________  Odilia Ibrahim MD  06/23/2017

## 2017-06-23 NOTE — PROGRESS NOTES
Notified by nursing at approximately 0120 that pt had some concerns over bleeding. When assessed in person, pt had moderate amount of vaginal bleeding with clots likely from fibroid involvement as previously documented. Discussed with patient that this is to be expected now that she is being therapeutically anticoagulated for pulmonary emboli. Pt was hemodynamically stable, will recheck H&H to assess for large drops (highly doubtful based on the amount I saw on crystal pad), and will continue to intermittently monitor as ordered. All questions were answered and patient/family satisfied with current plan of care.    Travis Conrad M.D.  PGY-1 Radiology  Pager# [phone]

## 2017-06-23 NOTE — HOSPITAL COURSE
Patient placed on an insulin and heparin drip. Oxygen weaned to off without desaturation or shortness of breath. Seen by gynecologist for vaginal bleeding.Hemodynamically stable for transfer to the floor

## 2017-06-23 NOTE — CONSULTS
[facility]  Endocrinology  Diabetes Consult Note    Consult Requested by: Odilia Ibrahim MD   Reason for admit: Pulmonary embolism    HISTORY OF PRESENT ILLNESS:  Reason for Consult: Management of T2DM, Hyperglycemia     Diabetes diagnosis year:     Home Diabetes Medications:  Metformin 500 mg bid, glimepiride 1mg bid, Actos     How often checking glucose at home? One   BG readings on regimen: 190-280  Hypoglycemia on the regimen?  No  Missed doses on regimen?  No    Diabetes Complications include:     Hyperglycemia    Complicating diabetes co morbidities:   None      HPI:   Patient is a 41 y.o. female with uncontrolled T2DM, HTN, HLD presents with PE, started on a heparin gtt for anticoagulation. Also found to be acidotic with a beta-hydroxybutyrate level of 3.1. She was treated with an insulin gtt and transitioned to MDI. Endocrine was consulted for diabetes management. Pt reports starting on metformin when she was first diagnosed, but developed diarrhea and was taken off the medication and switched to glimepiride. However, she continued to have elevated blood glucose levels and was put back on metformin reduced dose. Her sugars continue to be elevated and was just recently started on actos as a third medication.           Interval HPI:   Overnight events: Pt was initially on an insulin gtt in the MICU, but this has been discontinued. She is currently on a low dose ssi and her sugars are running in the 280-300 range.  Eatin%  Nausea: No  Hypoglycemia and intervention: No  Fever: No  TPN and/or TF: No      PMH, PSH, FH, SH updated and reviewed       Review of Systems   Constitutional: Negative for activity change, chills, fatigue and fever.   HENT: Negative for congestion, postnasal drip, rhinorrhea and sinus pressure.    Eyes: Negative for photophobia and visual disturbance.   Respiratory: Positive for shortness of breath. Negative for cough and wheezing.    Cardiovascular: Positive  for chest pain and leg swelling. Negative for palpitations.   Gastrointestinal: Negative for abdominal pain, constipation, diarrhea, nausea and vomiting.   Endocrine: Positive for polydipsia and polyuria.   Genitourinary: Negative for difficulty urinating and dysuria.   Musculoskeletal: Negative for arthralgias and gait problem.   Skin: Negative for color change and rash.   Neurological: Negative for dizziness, tremors, weakness, light-headedness and headaches.   Hematological: Negative for adenopathy. Does not bruise/bleed easily.   Psychiatric/Behavioral: Negative for confusion and dysphoric mood. The patient is not nervous/anxious.            PHYSICAL EXAMINATION:  Vitals:    06/23/17 0813   BP: 139/81   Pulse: 82   Resp: 20   Temp: 98 °F (36.7 °C)     Body mass index is 30.05 kg/m².    Physical Exam   Constitutional: She is oriented to person, place, and time. She appears well-developed and well-nourished. No distress.   HENT:   Head: Normocephalic.   Right Ear: External ear normal.   Left Ear: External ear normal.   Mouth/Throat: Oropharynx is clear and moist.   Eyes: EOM are normal. Pupils are equal, round, and reactive to light.   Neck: Normal range of motion. Neck supple. No thyromegaly present.   Cardiovascular: Normal rate, regular rhythm, normal heart sounds and intact distal pulses.  Exam reveals no gallop and no friction rub.    No murmur heard.  Pulmonary/Chest: Effort normal and breath sounds normal. No respiratory distress. She has no wheezes. She has no rales. She exhibits tenderness.   Abdominal: Soft. Bowel sounds are normal. She exhibits no distension. There is no tenderness.   Musculoskeletal: Normal range of motion. She exhibits no edema or deformity.   Lymphadenopathy:     She has no cervical adenopathy.   Neurological: She is alert and oriented to person, place, and time. No cranial nerve deficit.   Skin: Skin is warm and dry.   Psychiatric: She has a normal mood and affect. Thought content  normal.         Labs Reviewed and Include     Recent Labs  Lab 06/23/17  0315   *   CALCIUM 8.7   ALBUMIN 2.8*   PROT 7.2   *   K 3.8   CO2 22*   CL 99   BUN 9   CREATININE 0.8   ALKPHOS 64   ALT 11   AST 12   BILITOT 0.4     Lab Results   Component Value Date    WBC 9.01 06/23/2017    HGB 12.6 06/23/2017    HCT 37.2 06/23/2017    MCV 84 06/23/2017     06/23/2017     No results for input(s): TSH, FREET4 in the last 168 hours.  Lab Results   Component Value Date    HGBA1C 9.8 (H) 06/20/2017       Nutritional status:   Body mass index is 30.05 kg/m².  Lab Results   Component Value Date    ALBUMIN 2.8 (L) 06/23/2017    ALBUMIN 3.0 (L) 06/22/2017    ALBUMIN 3.7 06/21/2017     No results found for: PREALBUMIN    Estimated Creatinine Clearance: 93.4 mL/min (based on Cr of 0.8).    Accu-Checks  Recent Labs      06/22/17   0758  06/22/17   0932  06/22/17   1044  06/22/17   1149  06/22/17   1258  06/22/17   1508  06/22/17   2150  06/22/17   2345  06/23/17   0808  06/23/17   1154   POCTGLUCOSE  222*  257*  276*  246*  234*  194*  333*  288*  305*  414*        ASSESSMENT and PLAN    Type 2 diabetes mellitus with hyperglycemia, without long-term current use of insulin    BG goal 140-180  Agree with levemir 15 units and nov 5 ac which is weight based and would continue to titrate to goal    D/c recs:   Resume metformin.   Given A1c and failure w 3 drug po meds, would discharge on MDI (stop RAE, actos)   Insulin doses depend on her inpt requirements but anticipating levemir ~15-20, and nov 5-10.   Nursing staff to allow pt to self inject for inpt injection teaching under supervision. Refer to DM ed.    i will schedule f/u in BS clinic in 2-4 weeks post discharge and her endo clinic visit in 4-6 weeks.    To sign off call if any questions        Type 2 diabetes mellitus with ketoacidosis without coma, without long-term current use of insulin    Resolved   Unusual for dka in type 2 and given hx of hyperglycemia  on 3drug regimen, would check cpeptide with random glucose and NIK ab for completeness to assess pancreatic reserve and screen for ISMAEL/type1          Hyperlipidemia    --On pravastatin          HTN (hypertension)    --Per primary          * Pulmonary embolism    --Management per primary              Plan discussed with patient, family, and RN at bedside.     Janet Jamison MD  Endocrinology  [facility]

## 2017-06-23 NOTE — ASSESSMENT & PLAN NOTE
- pt diagnosed with DVT on 6/20 ,recived 2 doses of Lovenox  -CTA significant for PE  -pt started on heparin drip   -no history of tobacco use, malignancy (howver no mammograms sone with FM of breast cancer), recent travels or trauma to   - pt reports that she has been on progesterone pills for treatment of AUB   - her father also had a DVT, but pt unsure of etiology. She denies any joint swelling, arthralgia or rashes   -no indication for card intervention at this moment  -- 2 D echo pending

## 2017-06-23 NOTE — ASSESSMENT & PLAN NOTE
BG goal 140-180  Agree with levemir 15 units and nov 5 ac which is weight based and would continue to titrate to goal    D/c recs:   Resume metformin.   Given A1c and failure w 3 drug po meds, would discharge on MDI (stop RAE, actos)   Insulin doses depend on her inpt requirements but anticipating levemir ~15-20, and nov 5-10.   Nursing staff to allow pt to self inject for inpt injection teaching under supervision. Refer to DM ed.    i will schedule f/u in BS clinic in 2-4 weeks post discharge and her endo clinic visit in 4-6 weeks.

## 2017-06-23 NOTE — ASSESSMENT & PLAN NOTE
Pt reports dysfunctional uterine bleeding for month  -she Denies worsening of it since starting Lovenox  -CT abdomen is down in OSH however the official report is not available in epic  -CT does reveal fibroid, per in house   -Consult obgyn and consider vaginal US  -Transvaginal ultrasound one week ago from primary OB. Patient was being worked up for endometrial ablation.

## 2017-06-23 NOTE — PLAN OF CARE
CM was informed by the patient that she has a filled prescriptions for xarelto but needs quotes for her novolog & levemir prescriptions. CM informed patient of quotes obtained from [facility] (Novolog $138.43, Levemir $84.85) and [facility] (Novolog $48.03, Levemir $42.88). [facility] will not have novolog in stock until Monday 6/26/17. Quote from [facility] out patient pharmacy for Novolog was $133.00. CM obtained printed prescriptions for all medication from Dr. Ibrahim & hand delivered prescriptions to the patient. Patient stated that she spoke with [facility] & will have all of her prescriptions filled there. No additional needs noted at this time.

## 2017-06-23 NOTE — PROGRESS NOTES
[facility]  Critical Care Medicine  Progress Note    Patient Name: Heather Gomez  MRN: [id number]  Admission Date: 6/22/2017  Hospital Length of Stay: 0 days  Code Status: Full Code  Attending Provider: Tavon Todd*  Primary Care Provider: ONDINA Westbrook   Principal Problem: Pulmonary embolism    Subjective:     HPI:  Pt is a 42 YO lady with history of HTn, DM2, abnormal uterine bleeding , history of fibroids who was in her usual state of health until yesterday when she went to ED compalining of R leg pain, she was diagnosed with DVT in R popliteal and prescribed Lovenox , she took 2 doses of the medication however next day she started to develop suprapubic and R groin pain non radiating, no alleviating or aggravating factor, no worsening of abnormal uterine bleeding however associated with urinary frequency and urgency WO any dyuries, fever or chills, or diarrhea/ constipation. Pt reported back to ED, per report pt was noted to be tachycardic and diaphoretic, cTA was done that revealed bilateral PE, pt was started on heparin drip and trasfered to [facility] for possible cardiology intervention    Upon arrival pt was resting comfortably on bed, no tachycardia, SOB, CP, satting 98% on RA, bedside Us did not reveal R strain      Hospital/ICU Course:  Patient placed on an insulin and heparin drip. Oxygen weaned to off without desaturation or shortness of breath. Seen by gynecologist for vaginal bleeding.Hemodynamically stable for transfer to the floor    Interval History/Significant Events: No acute events since admission    Review of Systems   Constitutional: Positive for activity change.   HENT: Negative.    Eyes: Negative.    Respiratory: Positive for shortness of breath.         With stairs   Gastrointestinal: Positive for abdominal pain.   Endocrine: Negative.    Genitourinary: Positive for vaginal bleeding.   Musculoskeletal: Negative.    Skin: Negative.    Allergic/Immunologic:  Negative.    Neurological: Negative.    Psychiatric/Behavioral: Negative.      Objective:     Vital Signs (Most Recent):  Temp: 98.3 °F (36.8 °C) (06/22/17 1500)  Pulse: 101 (06/22/17 1800)  Resp: 18 (06/22/17 1800)  BP: 136/72 (06/22/17 1800)  SpO2: 97 % (06/22/17 1800) Vital Signs (24h Range):  Temp:  [92 °F (33.3 °C)-98.5 °F (36.9 °C)] 98.3 °F (36.8 °C)  Pulse:  [] 101  Resp:  [14-24] 18  SpO2:  [94 %-100 %] 97 %  BP: (126-173)/(72-99) 136/72   Weight: 79.4 kg (175 lb 0.7 oz)  Body mass index is 30.05 kg/m².      Intake/Output Summary (Last 24 hours) at 06/22/17 1909  Last data filed at 06/22/17 1901   Gross per 24 hour   Intake          1264.84 ml   Output             4250 ml   Net         -2985.16 ml       Physical Exam   Constitutional: She appears well-developed and well-nourished.   HENT:   Head: Normocephalic.   Eyes: Pupils are equal, round, and reactive to light.   Neck: Normal range of motion.   Cardiovascular: Normal rate and regular rhythm.    Pulmonary/Chest: Effort normal and breath sounds normal.   Abdominal: Soft. Bowel sounds are normal.   Musculoskeletal: Normal range of motion.   Neurological: She is alert.   Skin: Skin is warm and dry.   Nursing note and vitals reviewed.            Lines/Drains/Airways     Peripheral Intravenous Line                 Peripheral IV - Single Lumen 06/21/17 2159 Right Antecubital less than 1 day         Peripheral IV - Single Lumen 06/22/17 0041 Left Hand less than 1 day         Peripheral IV - Single Lumen 06/22/17 0445 Left Wrist less than 1 day              Significant Labs:    CBC/Anemia Profile:    Recent Labs  Lab 06/21/17 2202 06/22/17  0250   WBC 7.86 10.42   HGB 11.3* 11.2*   HCT 34.0* 34.9*    194   MCV 84 84   RDW 14.3 14.5        Chemistries:    Recent Labs  Lab 06/21/17 2202 06/22/17  0250 06/22/17  0630 06/22/17  1047   * 132* 134* 133*  133*   K 3.5 3.9 4.0 4.2  4.2    100 103 102  102   CO2 26 16* 15* 19*  19*   BUN 9  8 7 7  7   CREATININE 0.51 0.7 0.7 0.7  0.7   CALCIUM 9.2 8.2* 8.2* 8.6*  8.6*   ALBUMIN 3.7 3.0*  --   --    PROT 7.3 7.3  --   --    BILITOT 0.4 0.3  --   --    ALKPHOS 71 59  --   --    ALT 23 9*  --   --    AST 18 13  --   --    MG  --  1.5*  --   --        All pertinent labs within the past 24 hours have been reviewed.    Significant Imaging:  I have reviewed and interpreted all pertinent imaging results/findings within the past 24 hours.    Assessment/Plan:     Neuro   Heartburn    Continue PPI        Pulmonary   * Pulmonary embolism    - pt diagnosed with DVT on 6/20 ,recived 2 doses of Lovenox  -CTA significant for PE  -pt started on heparin drip   -no history of tobacco use, malignancy (howver no mammograms sone with [facility] of breast cancer), recent travels or trauma to   - pt reports that she has been on progesterone pills for treatment of AUB   - her father also had a DVT, but pt unsure of etiology. She denies any joint swelling, arthralgia or rashes   -no indication for card intervention at this moment  -- 2 D echo pending        Cardiac   HTN (hypertension)    -continue home dose losartan-hctz        Renal   Type 2 diabetes mellitus with ketoacidosis without coma, without long-term current use of insulin    Placed on insulin drip last night for positive beta-hydroxybuterate and hyperglycemia with slight anion gap  -- will repeat BMP and turn off insulin.  -- Transition to SSI  -- Holding metformin to prevent MIRIAM after contrast load yesterday  -- HGB AIC  -- Consult Endocrine for ongoing diabetes management        Abnormal uterine bleeding    Pt reports dysfunctional uterine bleeding for month  -she Denies worsening of it since starting Lovenox  -CT abdomen is down in OSH however the official report is not available in epic  -CT does reveal fibroid, per in house   -Consult obgyn and consider vaginal US  -Transvaginal ultrasound one week ago from primary OB. Patient was being worked up for endometrial  ablation.           Fluids/Electrolytes/Nutrition/GI   Hyperlipidemia    -continue paravastatin        Hyperglycemia    - pt on oral hypoglycemic meds at home  -+ b hydroxy butyrate, +ketone in urin and acidosis, gap just mildly elevated  -pt symptomatic with polyuria, dypsia and nausea  -will start her on insulin drip and will transitioned her to sub cu in am when BG better controlled         Other   Anemia    -likely 2/2 blood loss from dysfunctional uterine bleeding   -FU official report of abdominal CT  -OBGYN consult  -FU CBC Q12 and decrease it to carolin if remains stable on heparin drip            I spent >70 minutes reviewing patient records, examining, and counseling the patient with greater than 50% of the time spent with direct patient care and coordination.     Fiona Winterbottom, APRN, CNS  Critical Care Medicine  [facility]

## 2017-06-23 NOTE — RESIDENT HANDOFF
Handoff     Primary Team: [facility] Room Number: [id number]     Patient Name: Heather Gomez MRN: [id number]     Date of Birth: [id number] Allergies: Review of patient's allergies indicates no known allergies.     Age: 41 y.o. Admit Date: 6/22/2017     Sex: female  BMI: Body mass index is 30.05 kg/m².     Code Status: Full Code        Illness Level (current clinical status): Watcher - No    Reason for Admission: Pulmonary embolism    Brief HPI:  Pt is a 40 YO lady with history of HTn, DM2, abnormal uterine bleeding , history of fibroids who was in her usual state of health until yesterday when she went to ED compalining of R leg pain, she was diagnosed with DVT in R popliteal and prescribed Lovenox , she took 2 doses of the medication however next day she started to develop suprapubic and R groin pain non radiating, no alleviating or aggravating factor, no worsening of abnormal uterine bleeding however associated with urinary frequency and urgency WO any dyuries, fever or chills, or diarrhea/ constipation. Pt reported back to ED, per report pt was noted to be tachycardic and diaphoretic, cTA was done that revealed bilateral PE, pt was started on heparin drip and trasfered to [facility] [place] possible cardiology intervention     Upon arrival pt was resting comfortably on bed, no tachycardia, SOB, CP, satting 98% on RA, bedside Us did not reveal R strain        Hospital/ICU Course:  Patient placed on an insulin and heparin drip. Oxygen weaned to off without desaturation or shortness of breath. Seen by gynecologist for vaginal bleeding.Hemodynamically stable for transfer to the floor    Tasks (specific, using if-then statements):   Monitor vaginal bleeding and consider restarting Xarelto or other anticoagulant and dc heparin  Monitor blood glucose and consult Endocrine  Restart Progesterone if ok with GYN Attending- Concern for provoked DVT      Contingency Plan (special circumstances anticipated and plan): Call X  [phone] if patient decompensates    Estimated Discharge Date: 6/24    Discharge Disposition: Home or Self Care

## 2017-06-23 NOTE — ASSESSMENT & PLAN NOTE
Resolved   Unusual for dka in type 2 and given hx of hyperglycemia on 3drug regimen, would check cpeptide with random glucose and NIK ab for completeness to assess pancreatic reserve and screen for ISMAEL/type1

## 2017-06-23 NOTE — ASSESSMENT & PLAN NOTE
Placed on insulin drip last night for positive beta-hydroxybuterate and hyperglycemia with slight anion gap  -- will repeat BMP and turn off insulin.  -- Transition to SSI  -- Holding metformin to prevent MIRIAM after contrast load yesterday  -- HGB AIC  -- Consult Endocrine for ongoing diabetes management

## 2017-06-23 NOTE — HPI
Reason for Consult: Management of T2DM, Hyperglycemia     Diabetes diagnosis year: 2013    Home Diabetes Medications:  Metformin 500 mg bid, glimepiride 1mg bid, Actos     How often checking glucose at home? One   BG readings on regimen: 190-280  Hypoglycemia on the regimen?  No  Missed doses on regimen?  No    Diabetes Complications include:     Hyperglycemia    Complicating diabetes co morbidities:   None      HPI:   Patient is a 41 y.o. female with uncontrolled T2DM, HTN, HLD presents with PE, started on a heparin gtt for anticoagulation. Also found to be acidotic with a beta-hydroxybutyrate level of 3.1. She was treated with an insulin gtt and transitioned to MDI. Endocrine was consulted for diabetes management. Pt reports starting on metformin when she was first diagnosed, but developed diarrhea and was taken off the medication and switched to glimepiride. However, she continued to have elevated blood glucose levels and was put back on metformin reduced dose. Her sugars continue to be elevated and was just recently started on actos as a third medication.

## 2017-06-23 NOTE — SUBJECTIVE & OBJECTIVE
Interval History/Significant Events: No acute events since admission    Review of Systems   Constitutional: Positive for activity change.   HENT: Negative.    Eyes: Negative.    Respiratory: Positive for shortness of breath.         With stairs   Gastrointestinal: Positive for abdominal pain.   Endocrine: Negative.    Genitourinary: Positive for vaginal bleeding.   Musculoskeletal: Negative.    Skin: Negative.    Allergic/Immunologic: Negative.    Neurological: Negative.    Psychiatric/Behavioral: Negative.      Objective:     Vital Signs (Most Recent):  Temp: 98.3 °F (36.8 °C) (06/22/17 1500)  Pulse: 101 (06/22/17 1800)  Resp: 18 (06/22/17 1800)  BP: 136/72 (06/22/17 1800)  SpO2: 97 % (06/22/17 1800) Vital Signs (24h Range):  Temp:  [92 °F (33.3 °C)-98.5 °F (36.9 °C)] 98.3 °F (36.8 °C)  Pulse:  [] 101  Resp:  [14-24] 18  SpO2:  [94 %-100 %] 97 %  BP: (126-173)/(72-99) 136/72   Weight: 79.4 kg (175 lb 0.7 oz)  Body mass index is 30.05 kg/m².      Intake/Output Summary (Last 24 hours) at 06/22/17 1909  Last data filed at 06/22/17 1901   Gross per 24 hour   Intake          1264.84 ml   Output             4250 ml   Net         -2985.16 ml       Physical Exam   Constitutional: She appears well-developed and well-nourished.   HENT:   Head: Normocephalic.   Eyes: Pupils are equal, round, and reactive to light.   Neck: Normal range of motion.   Cardiovascular: Normal rate and regular rhythm.    Pulmonary/Chest: Effort normal and breath sounds normal.   Abdominal: Soft. Bowel sounds are normal.   Musculoskeletal: Normal range of motion.   Neurological: She is alert.   Skin: Skin is warm and dry.   Nursing note and vitals reviewed.            Lines/Drains/Airways     Peripheral Intravenous Line                 Peripheral IV - Single Lumen 06/21/17 1129 Right Antecubital less than 1 day         Peripheral IV - Single Lumen 06/22/17 0041 Left Hand less than 1 day         Peripheral IV - Single Lumen 06/22/17 0445 Left  Wrist less than 1 day              Significant Labs:    CBC/Anemia Profile:    Recent Labs  Lab 06/21/17 2202 06/22/17  0250   WBC 7.86 10.42   HGB 11.3* 11.2*   HCT 34.0* 34.9*    194   MCV 84 84   RDW 14.3 14.5        Chemistries:    Recent Labs  Lab 06/21/17 2202 06/22/17  0250 06/22/17  0630 06/22/17  1047   * 132* 134* 133*  133*   K 3.5 3.9 4.0 4.2  4.2    100 103 102  102   CO2 26 16* 15* 19*  19*   BUN 9 8 7 7  7   CREATININE 0.51 0.7 0.7 0.7  0.7   CALCIUM 9.2 8.2* 8.2* 8.6*  8.6*   ALBUMIN 3.7 3.0*  --   --    PROT 7.3 7.3  --   --    BILITOT 0.4 0.3  --   --    ALKPHOS 71 59  --   --    ALT 23 9*  --   --    AST 18 13  --   --    MG  --  1.5*  --   --        All pertinent labs within the past 24 hours have been reviewed.    Significant Imaging:  I have reviewed and interpreted all pertinent imaging results/findings within the past 24 hours.

## 2017-06-23 NOTE — PLAN OF CARE
Problem: Patient Care Overview  Goal: Plan of Care Review  Outcome: Ongoing (interventions implemented as appropriate)  AAOx4. VSS. Pt c/o of profuse bleeding and lower abd pain/cramps, vitals BP;147/83 . Called MD ROMAIN explained to pt that the s/s are related to her Hx of fibroids. Pt c/o of headache throughout shift, administered  Tylenol Q4, with mild relief. Pt stated she did not want Oxycodone anymore because of the nausea. Call IM D at 0700 and received a telephone read-back to schedule Ibupropen 400mg Q6. Will continue to monitor.        Problem: Fall Risk (Adult)  Goal: Identify Related Risk Factors and Signs and Symptoms  Related risk factors and signs and symptoms are identified upon initiation of Human Response Clinical Practice Guideline (CPG)   Outcome: Ongoing (interventions implemented as appropriate)  Pt remained free of falls/injry/trauma throughout shift. Bed in lowest position, wheels locked, call bell in-reach. Pt verbalizes understanding of calling for help before exiting the bed, if need be.     Problem: Pressure Ulcer Risk (Rigo Scale) (Adult,Obstetrics,Pediatric)  Goal: Identify Related Risk Factors and Signs and Symptoms  Related risk factors and signs and symptoms are identified upon initiation of Human Response Clinical Practice Guideline (CPG)   Outcome: Ongoing (interventions implemented as appropriate)  Skin intact. No new skin breakdown during shift. Pt reposition independently.

## 2017-06-23 NOTE — SUBJECTIVE & OBJECTIVE
Interval HPI:   Overnight events: Pt was initially on an insulin gtt in the MICU, but this has been discontinued. She is currently on a low dose ssi and her sugars are running in the 280-300 range.  Eatin%  Nausea: No  Hypoglycemia and intervention: No  Fever: No  TPN and/or TF: No      PMH, PSH, FH, SH updated and reviewed       Review of Systems   Constitutional: Negative for activity change, chills, fatigue and fever.   HENT: Negative for congestion, postnasal drip, rhinorrhea and sinus pressure.    Eyes: Negative for photophobia and visual disturbance.   Respiratory: Positive for shortness of breath. Negative for cough and wheezing.    Cardiovascular: Positive for chest pain and leg swelling. Negative for palpitations.   Gastrointestinal: Negative for abdominal pain, constipation, diarrhea, nausea and vomiting.   Endocrine: Positive for polydipsia and polyuria.   Genitourinary: Negative for difficulty urinating and dysuria.   Musculoskeletal: Negative for arthralgias and gait problem.   Skin: Negative for color change and rash.   Neurological: Negative for dizziness, tremors, weakness, light-headedness and headaches.   Hematological: Negative for adenopathy. Does not bruise/bleed easily.   Psychiatric/Behavioral: Negative for confusion and dysphoric mood. The patient is not nervous/anxious.            PHYSICAL EXAMINATION:  Vitals:    17 0813   BP: 139/81   Pulse: 82   Resp: 20   Temp: 98 °F (36.7 °C)     Body mass index is 30.05 kg/m².    Physical Exam   Constitutional: She is oriented to person, place, and time. She appears well-developed and well-nourished. No distress.   HENT:   Head: Normocephalic.   Right Ear: External ear normal.   Left Ear: External ear normal.   Mouth/Throat: Oropharynx is clear and moist.   Eyes: EOM are normal. Pupils are equal, round, and reactive to light.   Neck: Normal range of motion. Neck supple. No thyromegaly present.   Cardiovascular: Normal rate, regular  rhythm, normal heart sounds and intact distal pulses.  Exam reveals no gallop and no friction rub.    No murmur heard.  Pulmonary/Chest: Effort normal and breath sounds normal. No respiratory distress. She has no wheezes. She has no rales. She exhibits tenderness.   Abdominal: Soft. Bowel sounds are normal. She exhibits no distension. There is no tenderness.   Musculoskeletal: Normal range of motion. She exhibits no edema or deformity.   Lymphadenopathy:     She has no cervical adenopathy.   Neurological: She is alert and oriented to person, place, and time. No cranial nerve deficit.   Skin: Skin is warm and dry.   Psychiatric: She has a normal mood and affect. Thought content normal.

## 2017-06-26 ENCOUNTER — PATIENT OUTREACH (OUTPATIENT)
Dept: ADMINISTRATIVE | Facility: CLINIC | Age: 42
End: 2017-06-26

## 2017-06-26 LAB — PROT C AG ACT/NOR PPP IA: 64 % (ref 70–150)

## 2017-06-26 NOTE — PROGRESS NOTES
C3 nurse attempted to contact patient. No answer.  C3 nurse attempted to contact Heather Gomez for a TCC post hospital discharge follow up call. No answer at phone number listed and no voicemail available. The patient has a HOSFU appointment with [facility] ENDOCRINOLOGY, DIABETES & METABOLISM on 07/10/17 @ 1300. Message sent to Physician staff.

## 2017-06-27 PROBLEM — E11.65 TYPE 2 DIABETES MELLITUS WITH HYPERGLYCEMIA, WITHOUT LONG-TERM CURRENT USE OF INSULIN: Status: ACTIVE | Noted: 2017-06-27

## 2017-06-27 LAB
BACTERIA BLD CULT: NORMAL
BACTERIA BLD CULT: NORMAL
GAD65 AB SER-SCNC: 0.02 NMOL/L

## 2017-06-29 ENCOUNTER — TELEPHONE (OUTPATIENT)
Dept: HEMATOLOGY/ONCOLOGY | Facility: CLINIC | Age: 42
End: 2017-06-29

## 2017-06-29 ENCOUNTER — LAB VISIT (OUTPATIENT)
Dept: LAB | Facility: HOSPITAL | Age: 42
End: 2017-06-29
Attending: INTERNAL MEDICINE
Payer: COMMERCIAL

## 2017-06-29 ENCOUNTER — INITIAL CONSULT (OUTPATIENT)
Dept: HEMATOLOGY/ONCOLOGY | Facility: CLINIC | Age: 42
End: 2017-06-29
Payer: COMMERCIAL

## 2017-06-29 VITALS
HEIGHT: 64 IN | HEART RATE: 114 BPM | DIASTOLIC BLOOD PRESSURE: 78 MMHG | OXYGEN SATURATION: 97 % | BODY MASS INDEX: 28.85 KG/M2 | WEIGHT: 169 LBS | TEMPERATURE: 98 F | SYSTOLIC BLOOD PRESSURE: 111 MMHG

## 2017-06-29 DIAGNOSIS — Z79.01 ANTICOAGULATED: ICD-10-CM

## 2017-06-29 DIAGNOSIS — I82.431 ACUTE DEEP VEIN THROMBOSIS (DVT) OF POPLITEAL VEIN OF RIGHT LOWER EXTREMITY: ICD-10-CM

## 2017-06-29 DIAGNOSIS — I26.92 ACUTE SADDLE PULMONARY EMBOLISM WITHOUT ACUTE COR PULMONALE: Primary | ICD-10-CM

## 2017-06-29 DIAGNOSIS — I26.92 ACUTE SADDLE PULMONARY EMBOLISM WITHOUT ACUTE COR PULMONALE: ICD-10-CM

## 2017-06-29 LAB
ALBUMIN SERPL BCP-MCNC: 3.4 G/DL
ALP SERPL-CCNC: 58 U/L
ALT SERPL W/O P-5'-P-CCNC: 13 U/L
ANION GAP SERPL CALC-SCNC: 11 MMOL/L
APTT PROTEIN C ACTIVATOR+FV DP/APTT PPP: 47 %
AST SERPL-CCNC: 21 U/L
BASOPHILS # BLD AUTO: 0.01 K/UL
BASOPHILS NFR BLD: 0.2 %
BILIRUB SERPL-MCNC: 0.6 MG/DL
BUN SERPL-MCNC: 11 MG/DL
CALCIUM SERPL-MCNC: 10 MG/DL
CHLORIDE SERPL-SCNC: 101 MMOL/L
CO2 SERPL-SCNC: 24 MMOL/L
CREAT SERPL-MCNC: 0.8 MG/DL
D DIMER PPP IA.FEU-MCNC: 2.6 MG/L FEU
DIFFERENTIAL METHOD: ABNORMAL
EOSINOPHIL # BLD AUTO: 0.1 K/UL
EOSINOPHIL NFR BLD: 2.3 %
ERYTHROCYTE [DISTWIDTH] IN BLOOD BY AUTOMATED COUNT: 14.7 %
EST. GFR  (AFRICAN AMERICAN): >60 ML/MIN/1.73 M^2
EST. GFR  (NON AFRICAN AMERICAN): >60 ML/MIN/1.73 M^2
FERRITIN SERPL-MCNC: 32 NG/ML
GLUCOSE SERPL-MCNC: 242 MG/DL
HCT VFR BLD AUTO: 33.4 %
HGB BLD-MCNC: 11 G/DL
IRON SERPL-MCNC: 45 UG/DL
LYMPHOCYTES # BLD AUTO: 3.1 K/UL
LYMPHOCYTES NFR BLD: 51.4 %
MCH RBC QN AUTO: 27.5 PG
MCHC RBC AUTO-ENTMCNC: 32.9 %
MCV RBC AUTO: 84 FL
MONOCYTES # BLD AUTO: 0.6 K/UL
MONOCYTES NFR BLD: 9.8 %
NEUTROPHILS # BLD AUTO: 2.2 K/UL
NEUTROPHILS NFR BLD: 36.1 %
PLATELET # BLD AUTO: 335 K/UL
PMV BLD AUTO: 10.2 FL
POTASSIUM SERPL-SCNC: 3.5 MMOL/L
PROT S ACT/NOR PPP: 67 %
PROT SERPL-MCNC: 7.5 G/DL
RBC # BLD AUTO: 4 M/UL
SATURATED IRON: 9 %
SODIUM SERPL-SCNC: 136 MMOL/L
TOTAL IRON BINDING CAPACITY: 480 UG/DL
TRANSFERRIN SERPL-MCNC: 324 MG/DL
WBC # BLD AUTO: 6.03 K/UL

## 2017-06-29 PROCEDURE — 99999 PR PBB SHADOW E&M-EST. PATIENT-LVL III: CPT | Mod: PBBFAC,,, | Performed by: INTERNAL MEDICINE

## 2017-06-29 PROCEDURE — 85025 COMPLETE CBC W/AUTO DIFF WBC: CPT

## 2017-06-29 PROCEDURE — 83540 ASSAY OF IRON: CPT

## 2017-06-29 PROCEDURE — 36415 COLL VENOUS BLD VENIPUNCTURE: CPT

## 2017-06-29 PROCEDURE — 82728 ASSAY OF FERRITIN: CPT

## 2017-06-29 PROCEDURE — 80053 COMPREHEN METABOLIC PANEL: CPT

## 2017-06-29 PROCEDURE — 85379 FIBRIN DEGRADATION QUANT: CPT

## 2017-06-29 PROCEDURE — 99205 OFFICE O/P NEW HI 60 MIN: CPT | Mod: S$GLB,,, | Performed by: INTERNAL MEDICINE

## 2017-06-29 NOTE — PROGRESS NOTES
Subjective:       Patient ID: Heather Gomze is a 41 y.o. female.    Chief Complaint: Other (hospital f/u)    HPI     Ms. Heather Gomez is here to establish care for recent history of DVT and PE.  She was on ethinyl estradiol ring for abnormal menstrual bleeding and in April a progesterone pill (Provera 10 milligrams) was added control the bleeding.  She then started to experience pain in the back of her leg and calf which got worse to the point that she started to experience uncontrolled pain in the foot which was going on for 2 weeks and due to this she presented to the ER and on 6/20 she was noted to have a positive DVT in the right popliteal vein.  She was started on Xarelto and sent home but presented to the ER the next day because she was having pain in the abdominal area associated with dyspnea at which time a CT scan of the chest was done that revealed large burden of central pulmonary thromboembolism located at the bifurcation of the right main pulmonary artery and left main pulmonary artery and extending into all the lobes.  She was seen by OB because of her menstrual bleeding and started back on the Provera during the hospital admission.    I'm seeing her for the first time today.  She is here to establish care.  Since hospital discharge the pain in the leg is about the same.  She continues to have some difficulty breathing although her oxygen saturation is pretty normal.  She is very tired and fatigued.  She does not feel her blood clot is getting better.    She is now seeing Dr. Clayton (OB/GYN) for further evaluation of her gynecological condition. A hysterectomy is being recommended.    Review of Systems   Constitutional: Positive for fatigue. Negative for fever and unexpected weight change.   HENT: Negative for mouth sores and nosebleeds.    Respiratory: Positive for shortness of breath. Negative for wheezing.    Cardiovascular: Positive for palpitations. Negative for leg swelling.   Gastrointestinal:  Negative for abdominal pain and nausea.   Genitourinary: Positive for menstrual problem. Negative for difficulty urinating.   Neurological: Negative for seizures and headaches.   Hematological: Negative for adenopathy. Does not bruise/bleed easily.   Psychiatric/Behavioral: Negative for behavioral problems and confusion. The patient is nervous/anxious.    All other systems reviewed and are negative.        Objective:      Physical Exam   Constitutional: She is oriented to person, place, and time. She appears well-developed and well-nourished. No distress.   HENT:   Mouth/Throat: Oropharynx is clear and moist and mucous membranes are normal. Mucous membranes are not pale. No oropharyngeal exudate.   Eyes: Conjunctivae are normal. No scleral icterus.   Neck: Normal range of motion. Neck supple. No thyroid mass (Thyroid area is non-tender) and no thyromegaly present.   Cardiovascular: Normal rate and regular rhythm.  Exam reveals no friction rub.    Pulmonary/Chest: Effort normal and breath sounds normal. No accessory muscle usage or stridor. No respiratory distress. She has no wheezes.   Abdominal: She exhibits no ascites and no mass. There is no hepatosplenomegaly. There is no tenderness.   Musculoskeletal: She exhibits no edema.   No varicosities noted.   Lymphadenopathy:     She has no cervical adenopathy.        Right: No supraclavicular adenopathy present.        Left: No supraclavicular adenopathy present.   Neurological: She is alert and oriented to person, place, and time.   Psychiatric: She has a normal mood and affect. Judgment and thought content normal. Cognition and memory are normal. She exhibits normal recent memory and normal remote memory.         Assessment:       1. Acute saddle pulmonary embolism without acute cor pulmonale    2. Acute deep vein thrombosis (DVT) of popliteal vein of right lower extremity    3. Anticoagulated        Plan:   I reviewed her medical history, clinical presentation and  diagnostic studies extensively.  I feel her blood clot is not improving and it is my inference that ongoing use of progesterone is possibly contributing to this.  I hence have asked her to discontinue Provera immediately.    They're concerned about uterine bleeding.  Will check a CBC and iron studies today.  If she needs blood will arrange blood.    I will see her back in the clinic for follow-up in 2 weeks with a repeat CBC and CMP.  I informed them in no uncertain terms that if her breathing does not improve in the next couple of days or if it gets worse even after discontinuing the progesterone, then she is to go to the emergency department immediately without delay.  I would like her to hold off from straining herself or going back to work at least for the next 2 weeks until she starts to feel better and I gave her a work excuse in this regard.    Spent 60 minutes face-to-face in this encounter.

## 2017-06-29 NOTE — TELEPHONE ENCOUNTER
Called patient and relayed message.    ----- Message from Andrae Mahoney MD sent at 6/29/2017  4:33 PM CDT -----  Her blood counts are ok. She doesn't need a blood transfusion. Pls inform pt.

## 2017-07-03 NOTE — DISCHARGE SUMMARY
[facility]  Discharge Summary  [facility] Medicine      Admit Date: 6/22/2017    Discharge Date and Time: 6/23/2017  5:04 PM    Discharge Provider: Odilia Ibrahim    Reason for Admission:     Hospital Course (synopsis of major diagnoses, care, treatment, and services provided during the course of the hospital stay):   Pulmonary embolism. Developed in context in recent initiation of Nuvaring and progesterone. Patient started on lovenox therapy two days prior to admission. She was found to have bilateral PE and admitted to ICU close observation for concern of cardiac compromise. ECHO 2D showed normal ventricular function and no atrial reported abnormalities. She was placed on heparin drip and stepped to the floor the following day. Patient was started on xarelto. She will be referred to hematology for further evaluation of her thrombosis.    HTN - Continue home losartan 100 mg and HCTZ 12.5 mg once daily    Abnormal uterine bleeding  Fibroids.  Not worsened while on heparin drip. OBGyn consulted. Recommended continue progresterone as at low dosing, it is not associated with development of venous thromboses. Patient to follow up with primary gyn for alternatives treatments to dysfunctional urterine bleeding.     Hyperlipidemia - continue pravastatin 40 mg daily    DM II with ketoacidosis without coma  HgbA1c 9.8%. Patient on metformin 500 mg BID, glimepride 2 mg BID and actos. Briefly on insulin drip in ICU. Endocrine consulted. C-peptide, random glycose, and NIK ab ordered just prior to discharge. Recommended starting levemir 15 units subq qhs and novolog 5 units subq qAC and continue metformin 500 mg BID and discontinuing actos and glipizide.    Iron deficiency anemia due to continued dysfunction uterine bleeding  Patient has hematology follow up to assist with iron repletion.     Consults: Endocrinology, Obstetrics/Gynecology and critical care    Final Diagnoses:    Principal Problem: Pulmonary  embolism   Secondary Diagnoses:   Active Hospital Problems    Diagnosis  POA    *Pulmonary embolism [I26.99]  Yes    Type 2 diabetes mellitus with hyperglycemia, without long-term current use of insulin [E11.65]  No    Abnormal uterine bleeding [N93.9]  Unknown    Hyperglycemia [R73.9]  Unknown    Anemia [D64.9]  Unknown    HTN (hypertension) [I10]  Unknown    Hyperlipidemia [E78.5]  Unknown    Type 2 diabetes mellitus with ketoacidosis without coma, without long-term current use of insulin [E13.10]  Yes    Heartburn [R12]  Yes      Resolved Hospital Problems    Diagnosis Date Resolved POA   No resolved problems to display.       Discharged Condition: stable    Disposition: Home or Self Care    Follow Up/Patient Instructions:     Medications:  Reconciled Home Medications:   Discharge Medication List as of 6/23/2017  4:06 PM      START taking these medications    Details   lancets (ACCU-CHEK MULTICLIX LANCET) Misc 1 lancet by Misc.(Non-Drug; Combo Route) route 3 (three) times daily before meals., Starting Fri 6/23/2017, Normal         CONTINUE these medications which have CHANGED    Details   acetaminophen-caff-pyrilamine (MIDOL COMPLETE) 500-60-15 mg Tab Take 1 tablet by mouth every 6 (six) hours as needed (menstrual cramping)., Starting Fri 6/23/2017, OTC      alprazolam (XANAX) 0.5 MG tablet Take 1 tablet (0.5 mg total) by mouth 3 (three) times daily as needed for Anxiety., Starting Fri 6/23/2017, No Print      blood sugar diagnostic Strp 1 strip by Misc.(Non-Drug; Combo Route) route 4 (four) times daily before meals and nightly., Starting Fri 6/23/2017, Print      blood-glucose meter kit Use as instructed, Print      hydrocodone-acetaminophen 5-325mg (NORCO) 5-325 mg per tablet Take 1 tablet by mouth every 6 (six) hours as needed for Pain (use for breakthrough pain after taking midol complete)., Starting Fri 6/23/2017, Print      insulin aspart (NOVOLOG) 100 unit/mL InPn pen Inject 5 Units into the skin  "3 (three) times daily., Starting Fri 6/23/2017, Until Sat 6/23/2018, Print      insulin detemir (LEVEMIR FLEXTOUCH) 100 unit/mL (3 mL) SubQ InPn pen Inject 15 Units into the skin every evening., Starting Fri 6/23/2017, Until Sat 6/23/2018, Print      metformin (GLUCOPHAGE) 500 MG tablet Take 1 tablet (500 mg total) by mouth 2 (two) times daily with meals., Starting Fri 6/23/2017, Until Sat 6/23/2018, Print      pen needle, diabetic 33 gauge x 3/16" Ndle 1 application by Misc.(Non-Drug; Combo Route) route 3 (three) times daily before meals., Starting Fri 6/23/2017, Print      rivaroxaban (XARELTO) 15 mg (42)- 20 mg (9) tablet dose pack use as directed, Normal      rivaroxaban (XARELTO) 20 mg Tab Take 1 tablet (20 mg total) by mouth daily with dinner or evening meal., Starting Fri 6/23/2017, Normal      medroxyPROGESTERone (PROVERA) 10 MG tablet Take 1 tablet (10 mg total) by mouth once daily., Starting Fri 6/23/2017, Until Sat 6/23/2018, Print         CONTINUE these medications which have NOT CHANGED    Details   esomeprazole (NEXIUM) 40 MG capsule Take 40 mg by mouth before breakfast., Until Discontinued, Historical Med      fluticasone (FLONASE) 50 mcg/actuation nasal spray 1 spray by Each Nare route 2 (two) times daily as needed for Rhinitis., Starting 3/31/2017, Until Discontinued, Print      hydrOXYzine HCl (ATARAX) 25 MG tablet Take 25 mg by mouth 3 (three) times daily., Until Discontinued, Historical Med      irbesartan-hydrochlorothiazide (AVALIDE) 150-12.5 mg per tablet Take 1 tablet by mouth once daily., Historical Med      metoclopramide HCl (REGLAN) 10 MG tablet Take 1 tablet (10 mg total) by mouth every 6 (six) hours., Starting 3/31/2017, Until Discontinued, Print      pravastatin (PRAVACHOL) 40 MG tablet Take 40 mg by mouth once daily., Until Discontinued, Historical Med         STOP taking these medications       PIOGLITAZONE HCL (ACTOS ORAL) Comments:   Reason for Stopping:         glimepiride " (AMARYL) 2 MG tablet Comments:   Reason for Stopping:         irbesartan (AVAPRO) 150 MG tablet Comments:   Reason for Stopping:         ondansetron (ZOFRAN) 4 MG tablet Comments:   Reason for Stopping:         ondansetron (ZOFRAN-ODT) 4 MG TbDL Comments:   Reason for Stopping:         prochlorperazine (COMPAZINE) 25 MG suppository Comments:   Reason for Stopping:         tramadol (ULTRAM) 50 mg tablet Comments:   Reason for Stopping:               Discharge Procedure Orders  Ambulatory consult to Hematology   Referral Priority: Routine Referral Type: Consultation   Referral Reason: Specialty Services Required    Requested Specialty: Hematology    Number of Visits Requested: 1      Ambulatory consult to Endocrinology   Referral Priority: Routine Referral Type: Consultation   Requested Specialty: Endocrinology    Number of Visits Requested: 1      Ambulatory consult to Diabetic Education   Referral Priority: Routine Referral Type: Consultation   Referral Reason: Specialty Services Required    Requested Specialty: Diabetes    Number of Visits Requested: 1      Diet general     Activity as tolerated     Call MD for:  temperature >100.4     Call MD for:  persistent nausea and vomiting or diarrhea     Call MD for:  severe uncontrolled pain     Call MD for:  redness, tenderness, or signs of infection (pain, swelling, redness, odor or green/yellow discharge around incision site)     Call MD for:  difficulty breathing or increased cough     Call MD for:  severe persistent headache     Call MD for:  worsening rash     Call MD for:  persistent dizziness, light-headedness, or visual disturbances     Call MD for:  increased confusion or weakness       Follow-up Information     Schedule an appointment as soon as possible for a visit with Your OB-Gyn.    Why:  hospital follow up           Rehan Lino/Diab/Metab.    Specialty:  Endocrinology  Why:  Department will call you mariano appointment  Contact information:  [address]  [facility] [address]  [phone]  Additional information:  [address]th Floor           ONDINA [place] On 6/27/2017.    Specialty:  Family Medicine  Why:  at 10:00 AM  Contact information:  [address]  [facility]  [place] [id number] [phone]                   I spent more than 30 minutes total on this discharge including seeing and examining patient, note writing, reconcilling medications, and discussion with other health providers on team.

## 2017-07-10 ENCOUNTER — OFFICE VISIT (OUTPATIENT)
Dept: ENDOCRINOLOGY | Facility: CLINIC | Age: 42
End: 2017-07-10
Payer: COMMERCIAL

## 2017-07-10 ENCOUNTER — TELEPHONE (OUTPATIENT)
Dept: ENDOCRINOLOGY | Facility: CLINIC | Age: 42
End: 2017-07-10

## 2017-07-10 VITALS
DIASTOLIC BLOOD PRESSURE: 76 MMHG | WEIGHT: 170.19 LBS | SYSTOLIC BLOOD PRESSURE: 115 MMHG | RESPIRATION RATE: 16 BRPM | BODY MASS INDEX: 29.06 KG/M2 | HEART RATE: 87 BPM | HEIGHT: 64 IN

## 2017-07-10 DIAGNOSIS — I26.92 ACUTE SADDLE PULMONARY EMBOLISM WITHOUT ACUTE COR PULMONALE: ICD-10-CM

## 2017-07-10 DIAGNOSIS — I82.431 ACUTE DEEP VEIN THROMBOSIS (DVT) OF POPLITEAL VEIN OF RIGHT LOWER EXTREMITY: ICD-10-CM

## 2017-07-10 DIAGNOSIS — I10 ESSENTIAL HYPERTENSION: ICD-10-CM

## 2017-07-10 DIAGNOSIS — E11.65 TYPE 2 DIABETES MELLITUS WITH HYPERGLYCEMIA, WITHOUT LONG-TERM CURRENT USE OF INSULIN: Primary | ICD-10-CM

## 2017-07-10 DIAGNOSIS — E78.5 HYPERLIPIDEMIA, UNSPECIFIED HYPERLIPIDEMIA TYPE: ICD-10-CM

## 2017-07-10 PROBLEM — R73.9 HYPERGLYCEMIA: Status: RESOLVED | Noted: 2017-06-22 | Resolved: 2017-07-10

## 2017-07-10 PROCEDURE — 99214 OFFICE O/P EST MOD 30 MIN: CPT | Mod: S$GLB,,, | Performed by: INTERNAL MEDICINE

## 2017-07-10 PROCEDURE — 99999 PR PBB SHADOW E&M-EST. PATIENT-LVL IV: CPT | Mod: PBBFAC,,, | Performed by: INTERNAL MEDICINE

## 2017-07-10 PROCEDURE — 3046F HEMOGLOBIN A1C LEVEL >9.0%: CPT | Mod: S$GLB,,, | Performed by: INTERNAL MEDICINE

## 2017-07-10 PROCEDURE — 4010F ACE/ARB THERAPY RXD/TAKEN: CPT | Mod: S$GLB,,, | Performed by: INTERNAL MEDICINE

## 2017-07-10 RX ORDER — METFORMIN HYDROCHLORIDE 500 MG/1
500 TABLET, EXTENDED RELEASE ORAL 2 TIMES DAILY WITH MEALS
Qty: 180 TABLET | Refills: 3 | Status: SHIPPED | OUTPATIENT
Start: 2017-07-10 | End: 2017-07-17

## 2017-07-10 RX ORDER — INSULIN ASPART 100 [IU]/ML
7 INJECTION, SOLUTION INTRAVENOUS; SUBCUTANEOUS
Qty: 1 BOX | Refills: 6 | Status: SHIPPED | OUTPATIENT
Start: 2017-07-10 | End: 2017-10-26 | Stop reason: CLARIF

## 2017-07-10 NOTE — PROGRESS NOTES
Chief Complaint: Diabetes Mellitus      HPI:  Heather Gomez is 41 y.o. female here for the first time for diabetes management    Recently hospitalized in 2017 for bilateral PE in context with recent initiation of Nuvaring and progesterone. She was also found to be acidotic with a beta hydroxybutyrate level of 3.1. She was treated with an insulin gtt and transitioned to MDI. Endocrine was consulted for diabetes management     The patient state she was diagnosed with Gestational diabetes in    Treated with diet and exercise     Converted to T2DM in    Initially started treatment with oral medications   Insulin recently added during hospital admission     Current DM Regimen:   Metformin 500 mg - 1 tab bid   Levemir 27 units at bedtime   Novolog  , no correction     Reports occasional missed doses of Novolog     Other medications tried:   invokana - discontinued due to abdominal pain, nausea   actos - discontinued during hospital stay   januvia - discontinued during hospital stay     Currently, she denies nocturia, polyuria, unexplained weight loss or blurred vision.     Hypoglycemia: denies recent episodes or symptoms   Denies hospital admission related to diabetes.    Known complications: none     Last diabetic eye exam: within the past 6 moths, Dr. Castaneda in [place], [place]     Does not follow with podiatry   Denies neuropathy     Denies symptomatic CAD or CVD     24 hour dietary recall:   Breakfast: skipped   Lunch: green salad with crispy chicken   Dinner: fried chicken   Snacks: no snacks yesterday   Mainly drinks water  Does not drink sodas     SMBG: tests BG 3 times daily   Fastin, 272, 220, 349, 246, 187, 132  AC lunch: 229, 242, 146, 311, 140  AC supper: 297, 220, 132  Bedtime: 270, 225    Diabetes education: Yes, at initial diagnosis in    Exercise: not currently     Not having regular periods - LMP: 2017, heavy menses, then spotting     ADA STANDARDS OF CARE:   ACE inhibitor or  angiotension II receptor blocker: irbesartan   Statin drug: pravachol   Low dose ASA: denies    Review of Systems   Constitutional: Positive for appetite change (decreased appetite ). Negative for unexpected weight change.   HENT: Negative for sore throat, trouble swallowing and voice change.    Eyes: Negative for visual disturbance.   Respiratory: Negative for cough and shortness of breath.    Cardiovascular: Negative for chest pain, palpitations and leg swelling.   Gastrointestinal: Positive for abdominal pain and nausea. Negative for constipation, diarrhea and vomiting.   Endocrine: Negative for polydipsia, polyphagia and polyuria.   Genitourinary: Positive for menstrual problem. Negative for dysuria.   Musculoskeletal: Negative for myalgias.   Skin: Negative for rash and wound.   Allergic/Immunologic: Negative for immunocompromised state.   Neurological: Negative for headaches.   Hematological: Negative for adenopathy. Does not bruise/bleed easily.   Psychiatric/Behavioral: Negative for confusion, decreased concentration and sleep disturbance. The patient is not nervous/anxious.      Physical Exam   Constitutional: She is oriented to person, place, and time. She appears well-developed. No distress.   HENT:   Right Ear: External ear normal.   Left Ear: External ear normal.   Nose: Nose normal.   Hearing normal  Dentition good    Neck: No tracheal deviation present. No thyromegaly present.   Cardiovascular: Normal rate and regular rhythm.    No murmur heard.  Pulses:       Dorsalis pedis pulses are 1+ on the right side, and 1+ on the left side.   Pulmonary/Chest: Effort normal and breath sounds normal.   Abdominal: Soft. There is no tenderness. No hernia.   Musculoskeletal: She exhibits no edema.        Right foot: There is no deformity.        Left foot: There is no deformity.   Gait normal  No clubbing or cyanosis noted   Feet:   Right Foot:   Protective Sensation: 10 sites tested. 10 sites sensed.   Skin  Integrity: Negative for ulcer, blister, skin breakdown, callus or dry skin.   Left Foot:   Protective Sensation: 10 sites tested. 10 sites sensed.   Skin Integrity: Negative for ulcer, blister, skin breakdown, callus or dry skin.   Lymphadenopathy:     She has no cervical adenopathy.   Neurological: She is alert and oriented to person, place, and time. No cranial nerve deficit.   Feet -sensation intact to vibration and monofilament     Skin: Skin is warm and dry. No rash noted.   No nodules  injection sites are ok. No lipo hypertropthy or atrophy     Psychiatric: She has a normal mood and affect. Judgment normal.   Nursing note and vitals reviewed.    Labs:  Hemoglobin A1C   Date Value Ref Range Status   06/20/2017 9.8 (H) 4.0 - 5.6 % Final     Comment:     According to ADA guidelines, hemoglobin A1c <7.0% represents  optimal control in non-pregnant diabetic patients. Different  metrics may apply to specific patient populations.   Standards of Medical Care in Diabetes-2016.  For the purpose of screening for the presence of diabetes:  <5.7%     Consistent with the absence of diabetes  5.7-6.4%  Consistent with increasing risk for diabetes   (prediabetes)  >or=6.5%  Consistent with diabetes  Currently, no consensus exists for use of hemoglobin A1c  for diagnosis of diabetes for children.  This Hemoglobin A1c assay has significant interference with fetal   hemoglobin   (HbF). The results are invalid for patients with abnormal amounts of   HbF,   including those with known Hereditary Persistence   of Fetal Hemoglobin. Heterozygous hemoglobin variants (HbAS, HbAC,   HbAD, HbAE, HbA2) do not significantly interfere with this assay;   however, presence of multiple variants in a sample may impact the %   interference.     09/16/2016 6.9 (H) 4.5 - 6.2 % Final     Comment:     According to ADA guidelines, hemoglobin A1C <7.0% represents  optimal control in non-pregnant diabetic patients.  Different  metrics may apply to  specific populations.   Standards of Medical Care in Diabetes - 2016.  For the purpose of screening for the presence of diabetes:  <5.7%     Consistent with the absence of diabetes  5.7-6.4%  Consistent with increasing risk for diabetes   (prediabetes)  >or=6.5%  Consistent with diabetes  Currently no consensus exists for use of hemoglobin A1C  for diagnosis of diabetes for children.       Lab Results   Component Value Date    CHOL 147 09/16/2016    HDL 56 09/16/2016    LDLCALC 54.6 (L) 09/16/2016    TRIG 182 (H) 09/16/2016    CHOLHDL 38.1 09/16/2016     Lab Results   Component Value Date     06/29/2017    K 3.5 06/29/2017     06/29/2017    CO2 24 06/29/2017     (H) 06/29/2017    BUN 11 06/29/2017    CREATININE 0.8 06/29/2017    CALCIUM 10.0 06/29/2017    PROT 7.5 06/29/2017    ALBUMIN 3.4 (L) 06/29/2017    BILITOT 0.6 06/29/2017    ALKPHOS 58 06/29/2017    AST 21 06/29/2017    ALT 13 06/29/2017    ANIONGAP 11 06/29/2017    ESTGFRAFRICA >60 06/29/2017    EGFRNONAA >60 06/29/2017       Assessment and Plan:  1. Type 2 diabetes mellitus with hyperglycemia, without long-term current use of insulin  metformin (GLUCOPHAGE-XR) 500 MG 24 hr tablet    insulin detemir (LEVEMIR FLEXTOUCH) 100 unit/mL (3 mL) SubQ InPn pen    insulin aspart (NOVOLOG) 100 unit/mL InPn pen    Ambulatory Referral to Diabetes Education    TSH    Hemoglobin A1c    Comprehensive metabolic panel    Microalbumin/creatinine urine ratio   2. Acute saddle pulmonary embolism without acute cor pulmonale     3. Acute deep vein thrombosis (DVT) of popliteal vein of right lower extremity     4. Hyperlipidemia, unspecified hyperlipidemia type  Lipid panel   5. Essential hypertension  Microalbumin/creatinine urine ratio       1. Type 2 diabetes with hyperglycemia without long term current use of insulin   - reviewed risks and complications of uncontrolled diabetes   - reviewed BG goals   - emphasized medication compliance   - increase Levemir  to 29 units at bedtime   - continue Novolog 7/7/7 + low dose correction starting at 150 mg/dL, ISF 25   - smbg 4 x daily and send logs in 2 weeks for review and adjustments   - reviewed signs and symptoms of hyper and hypoglycemia along with appropriate treatment protocol   - refer to diabetes education     2. Pulmonary embolism   - on Xarelot   - followed by Hematology     3. DVT   - see #2     4. HLD   - on statin therapy per ADA recommendations   - encouraged to follow a low fat, low chol diet     5. HTN   - stable with current regimen   - encouraged to follow a low salt diet       RTC in 3 months with labs prior   Send logs in 2 weeks for review and adjustments

## 2017-07-10 NOTE — LETTER
July 10, 2017      Mercedes Smith, MEDARDO  [address]  [place] [address]           Rehan Wang - Endo/Diab/Metab  [address]  [facility] [address]  Phone: [phone]  Fax: [phone]          Patient: Heather Gomez   MR Number: [id number]   YOB: 1975   Date of Visit: 7/10/2017       Dear Mercedes Smith:    Thank you for referring Heather Gomez to me for evaluation. Attached you will find relevant portions of my assessment and plan of care.    If you have questions, please do not hesitate to call me. I look forward to following Heather Gomez along with you.    Sincerely,    Lissette Law, NP    Enclosure  CC:  No Recipients    If you would like to receive this communication electronically, please contact [email or web address] or [phone] to request more information on [employer] access.    For providers and/or their staff who would like to refer a patient to [facility], please contact us through our one-stop-shop provider referral line, [facility], at [phone].    If you feel you have received this communication in error or would no longer like to receive these types of communications, please e-mail [email or web address]

## 2017-07-17 ENCOUNTER — OFFICE VISIT (OUTPATIENT)
Dept: HEMATOLOGY/ONCOLOGY | Facility: CLINIC | Age: 42
End: 2017-07-17
Payer: COMMERCIAL

## 2017-07-17 VITALS
SYSTOLIC BLOOD PRESSURE: 132 MMHG | HEART RATE: 105 BPM | OXYGEN SATURATION: 98 % | WEIGHT: 173.94 LBS | TEMPERATURE: 98 F | HEIGHT: 61 IN | DIASTOLIC BLOOD PRESSURE: 78 MMHG | BODY MASS INDEX: 32.84 KG/M2

## 2017-07-17 DIAGNOSIS — I26.92 ACUTE SADDLE PULMONARY EMBOLISM WITHOUT ACUTE COR PULMONALE: Primary | ICD-10-CM

## 2017-07-17 DIAGNOSIS — I82.431 ACUTE DEEP VEIN THROMBOSIS (DVT) OF POPLITEAL VEIN OF RIGHT LOWER EXTREMITY: ICD-10-CM

## 2017-07-17 DIAGNOSIS — Z79.01 ANTICOAGULATED: ICD-10-CM

## 2017-07-17 PROCEDURE — 99213 OFFICE O/P EST LOW 20 MIN: CPT | Mod: PBBFAC,PO | Performed by: INTERNAL MEDICINE

## 2017-07-17 PROCEDURE — 99214 OFFICE O/P EST MOD 30 MIN: CPT | Mod: S$GLB,,, | Performed by: INTERNAL MEDICINE

## 2017-07-17 PROCEDURE — 99999 PR PBB SHADOW E&M-EST. PATIENT-LVL III: CPT | Mod: PBBFAC,,, | Performed by: INTERNAL MEDICINE

## 2017-07-17 NOTE — PROGRESS NOTES
Subjective:       Patient ID: Heather Gomez is a 41 y.o. female.    Chief Complaint: No chief complaint on file.    HPI     Ms.Sarah Gomez is here for f/u of recent diagnosis of DVT and PE. She was on ethinyl estradiol ring for abnormal menstrual bleeding and in April a progesterone pill (Provera 10 milligrams) was added control the bleeding.  She then started to experience pain in the back of her leg and calf which got worse to the point that she started to experience uncontrolled pain in the foot which was going on for 2 weeks and due to this she presented to the ER and on 6/20 she was noted to have a positive DVT in the right popliteal vein.  She was started on Xarelto and sent home but presented to the ER the next day because she was having pain in the abdominal area associated with dyspnea at which time a CT scan of the chest was done that revealed large burden of central pulmonary thromboembolism located at the bifurcation of the right main pulmonary artery and left main pulmonary artery and extending into all the lobes.    She is now seeing Dr. Clayton (OB/GYN) for further evaluation of her gynecological condition. A hysterectomy is being recommended.    She is now on Xarelto.  She is here for follow-up.  She is continuing to have pain in the posterior aspect of the right thigh especially when she tries to walk or stand up for long periods of time.  She also has difficulty breathing on activity/ambulation but that is better when compared to last month.    She is off her Provera.    Review of Systems   Constitutional: Positive for fatigue. Negative for fever and unexpected weight change.   HENT: Negative for mouth sores and nosebleeds.    Respiratory: Positive for shortness of breath. Negative for wheezing.    Cardiovascular: Positive for palpitations. Negative for leg swelling.   Gastrointestinal: Negative for abdominal pain and nausea.   Genitourinary: Positive for menstrual problem. Negative for  difficulty urinating.   Neurological: Negative for seizures and headaches.   Hematological: Negative for adenopathy. Does not bruise/bleed easily.   Psychiatric/Behavioral: Negative for behavioral problems and confusion. The patient is nervous/anxious.    All other systems reviewed and are negative.        Objective:      Physical Exam   Constitutional: She is oriented to person, place, and time. She appears well-developed and well-nourished. No distress.   HENT:   Mouth/Throat: Oropharynx is clear and moist and mucous membranes are normal. Mucous membranes are not pale. No oropharyngeal exudate.   Eyes: Conjunctivae are normal. No scleral icterus.   Neck: Normal range of motion. Neck supple. No thyroid mass (Thyroid area is non-tender) and no thyromegaly present.   Cardiovascular: Normal rate and regular rhythm.  Exam reveals no friction rub.    Pulmonary/Chest: Effort normal and breath sounds normal. No accessory muscle usage or stridor. No respiratory distress. She has no wheezes.   Abdominal: She exhibits no ascites and no mass. There is no hepatosplenomegaly. There is no tenderness.   Musculoskeletal: She exhibits no edema.   No varicosities noted.   Lymphadenopathy:     She has no cervical adenopathy.        Right: No supraclavicular adenopathy present.        Left: No supraclavicular adenopathy present.   Neurological: She is alert and oriented to person, place, and time.   Psychiatric: She has a normal mood and affect. Judgment and thought content normal. Cognition and memory are normal. She exhibits normal recent memory and normal remote memory.         Assessment:       1. Acute saddle pulmonary embolism without acute cor pulmonale    2. Acute deep vein thrombosis (DVT) of popliteal vein of right lower extremity    3. Anticoagulated        Plan:   It's been nearly a month since diagnosis of DVT and PE.  Her symptoms have only mildly improved - they certainly are not any worse.    I recommended a stat  ultrasound of the right leg to make sure her DVT is improving and not worsening. Will also repeat a CT of the chest in approximately one month and see her back for follow-up.  We will also check her chemistries and blood counts at that time.    If the lower extremity ultrasound shows worsening DVT then will have to change her anticoagulation.  I explained the rationale for these tests and their timing - she understands and is agreeable.    I asked her to continue to take off from work until her symptoms improve.  She understands.

## 2017-07-18 ENCOUNTER — TELEPHONE (OUTPATIENT)
Dept: HEMATOLOGY/ONCOLOGY | Facility: CLINIC | Age: 42
End: 2017-07-18

## 2017-07-18 NOTE — TELEPHONE ENCOUNTER
Pt given Dr. Mahoney's message re: u/s results negative for blood clot in right leg and that previous blood clot has resolved completely so any leg pain she is having is not clot-related. Pt verbalized understanding.    ----- Message from Andrae Mahoney MD sent at 7/18/2017 11:51 AM CDT -----  There is no blood clot in the right leg.  The previous blood clot has resolved completely.  Please inform patient.

## 2017-07-20 ENCOUNTER — CLINICAL SUPPORT (OUTPATIENT)
Dept: DIABETES | Facility: CLINIC | Age: 42
End: 2017-07-20
Payer: COMMERCIAL

## 2017-07-20 DIAGNOSIS — E11.65 TYPE 2 DIABETES MELLITUS WITH HYPERGLYCEMIA, WITHOUT LONG-TERM CURRENT USE OF INSULIN: ICD-10-CM

## 2017-07-20 PROCEDURE — G0108 DIAB MANAGE TRN  PER INDIV: HCPCS | Mod: S$GLB,,, | Performed by: DIETITIAN, REGISTERED

## 2017-07-20 NOTE — PROGRESS NOTES
"Diabetes Education  Author: Mary Bradley RD, CDE  Date: 7/20/2017    Diabetes Education Visit  Diabetes Education Record Assessment/Progress: Initial    Diabetes Type  Diabetes Type : Type II    Diabetes History  Diabetes Diagnosis: 3-5 years    Nutrition  Meal Planning: water, eats out often  Meal Plan 24 Hour Recall - Breakfast: used to skip, but now that she is taking insulin will have at least a slice of toast  Meal Plan 24 Hour Recall - Lunch: salad usually  Meal Plan 24 Hour Recall - Dinner: steak with potatoes and salad  Meal Plan 24 Hour Recall - Snack: denies snacking    Monitoring   Blood Glucose Logs: No (No logs present at visit today, reports SMBG ac/hs, sometimes forgets and will check the BG mid meal. Reports fasting in the low 100's, before lunch 150-170's, before dinner 130's, before bed low 100's)    Exercise   Frequency: Never    Current Diabetes Treatment   Current Treatment: Insulin (Taking Novolog 7 ac plus ISF 50 starting at 150 mg/dL, Levemir 29 units in the evening - does not take it at the same time every day)    Social History  Preferred Learning Method: Face to Face, Demonstration, Hands On, Reading Materials  Primary Support: Self, Son  Educational Level: GED  Occupation:  - is currently out of work for health reasons and has been out for about the last month and will not return until next month  Smoking Status: Never a Smoker  Alcohol Use: Never    Barriers to Change  Barriers to Change: None  Learning Challenges : None    Readiness to Learn   Readiness to Learn : Acceptance    Diabetes Education Assessment/Progress  Acute Complications (preventing, detecting, and treating acute complications): Discussion, Individual Session, Written Materials Provided, Instructed, Competent (verbalizes/demonstrates) (Instructed on causes, s/s and proper tx of hypo with "rule of 15")    Chronic Complications (preventing, detecting, and treating chronic complications): Discussion, " Individual Session, Written Materials Provided, Competent (verbalizes/demonstrates) (Discussed DM care schedule)    Diabetes Disease Process (diabetes disease process and treatment options): Discussion, Individual Session, Written Materials Provided    Nutrition (Incorporating nutritional management into one's lifestyle): Discussion, Individual Session, Written Materials Provided, Instructed, Competent (verbalizes/demonstrates) (Instructed on carb foods and importance of including carbs at all meals when taking insulin)    Physical Activity (incorporating physical activity into one's lifestyle): Discussion, Individual Session, Written Materials Provided, Competent (verbalizes/demonstrates) (Discussed goals and benefits)    Medications (states correct name, dose, onset, peak, duration, side effects & timing of meds): Discussion, Individual Session, Written Materials Provided, Competent (verbalizes/demonstrates) (Discussed timing and MOA of both types of insulin. Stressed importance of taking Levemir at the same time every day and taking Novolog before meals always)    Monitoring (monitoring blood glucose/other parameters & using results): Discussion, Individual Session, Written Materials Provided, Instructed, Competent (verbalizes/demonstrates) (Provided BG logs and instructed to SMBG ac/hs and to send through [facility] in 2 weeks)    Goal Setting and Problem Solving (verbalizes behavior change strategies & sets realistic goals): Discussion, Individual Session, Competent (verbalizes/demonstrates)    Behavior Change (developing personal strategies to health & behavior change): Discussion, Individual Session, Comnpetent (verbalizes/demonstrates)    Goals  Healthy Eating: Set (Will eat consistent carb source at every meal)  Start Date: 07/20/17  Target Date: 10/19/17    Diabetes Care Plan/Intervention  Education Plan/Intervention: Individual Follow-Up DSMT, Endocrine Provider Visit Set Up    Diabetes Meal  Plan  Carbohydrate Per Meal: 30-45g    Education Units of Time   Time Spent: 60 min      Health Maintenance Topics with due status: Not Due       Topic Last Completion Date    Lipid Panel 09/16/2016    Hemoglobin A1c 06/20/2017    Foot Exam 07/10/2017    Influenza Vaccine Not Due     Health Maintenance Due   Topic Date Due    Eye Exam  08/04/1984    TETANUS VACCINE  08/04/1992    Pneumococcal PPSV23 (Medium Risk) (1) 08/04/1992    Pap Smear with HPV Cotest  08/04/1995    Mammogram  08/04/2014

## 2017-08-21 ENCOUNTER — TELEPHONE (OUTPATIENT)
Dept: HEMATOLOGY/ONCOLOGY | Facility: CLINIC | Age: 42
End: 2017-08-21

## 2017-08-21 NOTE — TELEPHONE ENCOUNTER
----- Message from Yuliana Braxton sent at 8/21/2017  9:22 AM CDT -----  No. [phone]   Patient has an appointment today at 1:30.   She would like to come in this morning.    Please call.

## 2017-08-22 ENCOUNTER — LAB VISIT (OUTPATIENT)
Dept: LAB | Facility: HOSPITAL | Age: 42
End: 2017-08-22
Attending: INTERNAL MEDICINE
Payer: MEDICAID

## 2017-08-22 ENCOUNTER — OFFICE VISIT (OUTPATIENT)
Dept: HEMATOLOGY/ONCOLOGY | Facility: CLINIC | Age: 42
End: 2017-08-22
Payer: MEDICAID

## 2017-08-22 VITALS
BODY MASS INDEX: 32.85 KG/M2 | OXYGEN SATURATION: 99 % | HEIGHT: 61 IN | HEART RATE: 99 BPM | DIASTOLIC BLOOD PRESSURE: 72 MMHG | SYSTOLIC BLOOD PRESSURE: 130 MMHG | WEIGHT: 174 LBS

## 2017-08-22 DIAGNOSIS — N93.9 ABNORMAL UTERINE BLEEDING: ICD-10-CM

## 2017-08-22 DIAGNOSIS — D50.0 IRON DEFICIENCY ANEMIA DUE TO CHRONIC BLOOD LOSS: ICD-10-CM

## 2017-08-22 DIAGNOSIS — I26.02 ACUTE SADDLE PULMONARY EMBOLISM WITH ACUTE COR PULMONALE: Primary | ICD-10-CM

## 2017-08-22 PROBLEM — D64.9 ANEMIA: Status: RESOLVED | Noted: 2017-06-22 | Resolved: 2017-08-22

## 2017-08-22 LAB
FERRITIN SERPL-MCNC: 8 NG/ML
IRON SERPL-MCNC: 18 UG/DL
SATURATED IRON: 3 %
TOTAL IRON BINDING CAPACITY: 530 UG/DL
TRANSFERRIN SERPL-MCNC: 358 MG/DL

## 2017-08-22 PROCEDURE — 3078F DIAST BP <80 MM HG: CPT | Mod: ,,, | Performed by: INTERNAL MEDICINE

## 2017-08-22 PROCEDURE — 83540 ASSAY OF IRON: CPT

## 2017-08-22 PROCEDURE — 36415 COLL VENOUS BLD VENIPUNCTURE: CPT

## 2017-08-22 PROCEDURE — 99999 PR PBB SHADOW E&M-EST. PATIENT-LVL II: CPT | Mod: PBBFAC,,, | Performed by: INTERNAL MEDICINE

## 2017-08-22 PROCEDURE — 99212 OFFICE O/P EST SF 10 MIN: CPT | Mod: PBBFAC,PO | Performed by: INTERNAL MEDICINE

## 2017-08-22 PROCEDURE — 3075F SYST BP GE 130 - 139MM HG: CPT | Mod: ,,, | Performed by: INTERNAL MEDICINE

## 2017-08-22 PROCEDURE — 82728 ASSAY OF FERRITIN: CPT

## 2017-08-22 PROCEDURE — 3008F BODY MASS INDEX DOCD: CPT | Mod: ,,, | Performed by: INTERNAL MEDICINE

## 2017-08-22 PROCEDURE — 99214 OFFICE O/P EST MOD 30 MIN: CPT | Mod: S$PBB,,, | Performed by: INTERNAL MEDICINE

## 2017-08-22 RX ORDER — SODIUM CHLORIDE 0.9 % (FLUSH) 0.9 %
10 SYRINGE (ML) INJECTION
Status: CANCELLED | OUTPATIENT
Start: 2017-08-31

## 2017-08-22 RX ORDER — HEPARIN 100 UNIT/ML
500 SYRINGE INTRAVENOUS
Status: CANCELLED | OUTPATIENT
Start: 2017-08-31

## 2017-08-22 RX ORDER — HEPARIN 100 UNIT/ML
500 SYRINGE INTRAVENOUS
Status: CANCELLED | OUTPATIENT
Start: 2017-08-22

## 2017-08-22 RX ORDER — SODIUM CHLORIDE 0.9 % (FLUSH) 0.9 %
10 SYRINGE (ML) INJECTION
Status: CANCELLED | OUTPATIENT
Start: 2017-08-22

## 2017-08-23 ENCOUNTER — TELEPHONE (OUTPATIENT)
Dept: INFUSION THERAPY | Facility: HOSPITAL | Age: 42
End: 2017-08-23

## 2017-08-30 ENCOUNTER — DOCUMENTATION ONLY (OUTPATIENT)
Dept: INFUSION THERAPY | Facility: HOSPITAL | Age: 42
End: 2017-08-30

## 2017-08-30 NOTE — PROGRESS NOTES
Pt cancelled her Injecta[facility] appts via [facility] as she wants care closer to home. Dr. Mahoney notified.

## 2017-09-20 RX ORDER — INSULIN PUMP SYRINGE, 3 ML
EACH MISCELLANEOUS
Qty: 1 EACH | Refills: 0 | Status: SHIPPED | OUTPATIENT
Start: 2017-09-20 | End: 2021-03-17

## 2017-09-20 RX ORDER — LANCETS
EACH MISCELLANEOUS
Qty: 200 EACH | Refills: 11 | Status: SHIPPED | OUTPATIENT
Start: 2017-09-20 | End: 2021-07-14

## 2017-10-03 ENCOUNTER — OFFICE VISIT (OUTPATIENT)
Dept: HEMATOLOGY/ONCOLOGY | Facility: CLINIC | Age: 42
End: 2017-10-03
Payer: MEDICAID

## 2017-10-03 VITALS
HEIGHT: 64 IN | BODY MASS INDEX: 30.38 KG/M2 | OXYGEN SATURATION: 99 % | SYSTOLIC BLOOD PRESSURE: 128 MMHG | HEART RATE: 100 BPM | WEIGHT: 177.94 LBS | DIASTOLIC BLOOD PRESSURE: 68 MMHG

## 2017-10-03 DIAGNOSIS — R07.2 PRECORDIAL PAIN: ICD-10-CM

## 2017-10-03 DIAGNOSIS — Z79.01 ANTICOAGULATED: Primary | ICD-10-CM

## 2017-10-03 DIAGNOSIS — E11.65 TYPE 2 DIABETES MELLITUS WITH HYPERGLYCEMIA, WITHOUT LONG-TERM CURRENT USE OF INSULIN: ICD-10-CM

## 2017-10-03 DIAGNOSIS — D50.0 IRON DEFICIENCY ANEMIA DUE TO CHRONIC BLOOD LOSS: ICD-10-CM

## 2017-10-03 DIAGNOSIS — I26.02 ACUTE SADDLE PULMONARY EMBOLISM WITH ACUTE COR PULMONALE: ICD-10-CM

## 2017-10-03 PROCEDURE — 99213 OFFICE O/P EST LOW 20 MIN: CPT | Mod: PBBFAC,PO | Performed by: INTERNAL MEDICINE

## 2017-10-03 PROCEDURE — 99999 PR PBB SHADOW E&M-EST. PATIENT-LVL III: CPT | Mod: PBBFAC,,, | Performed by: INTERNAL MEDICINE

## 2017-10-03 PROCEDURE — 99214 OFFICE O/P EST MOD 30 MIN: CPT | Mod: S$PBB,,, | Performed by: INTERNAL MEDICINE

## 2017-10-03 NOTE — PROGRESS NOTES
Subjective:       Patient ID: Heather Gomez is a 42 y.o. female.    Chief Complaint: No chief complaint on file.    HPI     Ms.Sarah Gomez is here for f/u of DVT and PE.     She was on ethinyl estradiol ring for abnormal menstrual bleeding and in April a progesterone pill (Provera 10 milligrams) was added control the bleeding.  She then started to experience pain in the back of her leg and calf which got worse to the point that she started to experience uncontrolled pain in the foot which was going on for 2 weeks and due to this she presented to the ER and on 6/20/17 she was noted to have a positive DVT in the right popliteal vein.  She was started on Xarelto and sent home but presented to the ER the next day because she was having pain in the abdominal area associated with dyspnea at which time a CT scan of the chest was done that revealed large burden of central pulmonary thromboembolism located at the bifurcation of the right main pulmonary artery and left main pulmonary artery and extending into all the lobes.    She is now seeing Dr. Clayton (OB/GYN) for further evaluation of her gynecological condition. A hysterectomy is being recommended.    She is now on Xarelto.  She is here for follow-up.      She is off her Provera.    Due to iron deficiency anemia she received 2 doses of IV iron therapy in August.    She continues to have chest discomfort with minimal activity although it is better than before.    Review of Systems   Constitutional: Negative for fatigue, fever and unexpected weight change.   HENT: Negative for mouth sores and nosebleeds.    Respiratory: Positive for chest tightness. Negative for shortness of breath and wheezing.    Cardiovascular: Negative for palpitations and leg swelling.   Gastrointestinal: Negative for abdominal pain and nausea.   Genitourinary: Positive for menstrual problem. Negative for difficulty urinating.   Neurological: Negative for seizures and headaches.   Hematological:  Negative for adenopathy. Does not bruise/bleed easily.   Psychiatric/Behavioral: Negative for behavioral problems and confusion. The patient is nervous/anxious.    All other systems reviewed and are negative.        Objective:      Physical Exam   Constitutional: She is oriented to person, place, and time. She appears well-developed and well-nourished. No distress.   HENT:   Mouth/Throat: Oropharynx is clear and moist and mucous membranes are normal. Mucous membranes are not pale. No oropharyngeal exudate.   Eyes: Conjunctivae are normal. No scleral icterus.   Neck: Normal range of motion. Neck supple. No thyroid mass (Thyroid area is non-tender) and no thyromegaly present.   Cardiovascular: Normal rate and regular rhythm.  Exam reveals no friction rub.    Pulmonary/Chest: Effort normal and breath sounds normal. No accessory muscle usage or stridor. No respiratory distress. She has no wheezes.   Abdominal: She exhibits no ascites and no mass. There is no hepatosplenomegaly. There is no tenderness.   Musculoskeletal: She exhibits no edema.   No varicosities noted.   Lymphadenopathy:     She has no cervical adenopathy.        Right: No supraclavicular adenopathy present.        Left: No supraclavicular adenopathy present.   Neurological: She is alert and oriented to person, place, and time.   Psychiatric: She has a normal mood and affect. Judgment and thought content normal. Cognition and memory are normal. She exhibits normal recent memory and normal remote memory.         Assessment:       1. Anticoagulated    2. Precordial pain    3. Iron deficiency anemia due to chronic blood loss    4. Type 2 diabetes mellitus with hyperglycemia, without long-term current use of insulin    5. Acute saddle pulmonary embolism with acute cor pulmonale        Plan:   CT chest shows near complete resolution of her pulmonary embolism.    Lower extremity DVT has resolved.    Blood counts and iron studies better.    She still having  chest pain with activity.  Will refer to cardiology for eval.    Will check CBC, CMP, iron studies and see her back for follow-up in 4 months.

## 2017-10-06 ENCOUNTER — TELEPHONE (OUTPATIENT)
Dept: HEMATOLOGY/ONCOLOGY | Facility: CLINIC | Age: 42
End: 2017-10-06

## 2017-10-16 ENCOUNTER — TELEPHONE (OUTPATIENT)
Dept: HEMATOLOGY/ONCOLOGY | Facility: CLINIC | Age: 42
End: 2017-10-16

## 2017-10-16 DIAGNOSIS — I82.431 ACUTE DEEP VEIN THROMBOSIS (DVT) OF POPLITEAL VEIN OF RIGHT LOWER EXTREMITY: Primary | ICD-10-CM

## 2017-10-16 NOTE — TELEPHONE ENCOUNTER
Pt came to office c paperwork from [facility]. This nurse made MD aware, paper work signed and faxed.     ----- Message from Amber Hwang sent at 10/16/2017  9:51 AM CDT -----  Contact: self, [phone]  Patient called in requesting to speak with you regarding her insurance changing and her pharmacy needing a pre authorization for her Xarelto medication.Please advise.

## 2017-10-16 NOTE — TELEPHONE ENCOUNTER
[facility] rep, initiated PA for Xarelto.    ----- Message from Kim Asencio sent at 10/16/2017  3:25 PM CDT -----  Contact: [facility] [phone] fax# [phone]  Pharmacy would like to speak with you about prior authorization of  rivaroxaban (XARELTO) 20 mg Tab. Please advise.

## 2017-10-19 ENCOUNTER — TELEPHONE (OUTPATIENT)
Dept: ENDOCRINOLOGY | Facility: CLINIC | Age: 42
End: 2017-10-19

## 2017-10-19 NOTE — TELEPHONE ENCOUNTER
----- Message from Gabriela Mclaughlin sent at 10/16/2017  4:07 PM CDT -----  Contact: Pt   [phone]  Jada  -   Pt calling to get an  Prior authorization on medication  insulin aspart (NOVOLOG) 100 unit/mL InPn pen    insulin detemir (LEVEMIR FLEXTOUCH     Due to insurance change , please call the pt back to verify   Call back number [phone]  Thanks,

## 2017-10-26 ENCOUNTER — OFFICE VISIT (OUTPATIENT)
Dept: ENDOCRINOLOGY | Facility: CLINIC | Age: 42
End: 2017-10-26
Payer: MEDICAID

## 2017-10-26 VITALS
WEIGHT: 177.69 LBS | DIASTOLIC BLOOD PRESSURE: 78 MMHG | BODY MASS INDEX: 30.34 KG/M2 | HEIGHT: 64 IN | HEART RATE: 86 BPM | RESPIRATION RATE: 18 BRPM | SYSTOLIC BLOOD PRESSURE: 118 MMHG

## 2017-10-26 DIAGNOSIS — E11.65 TYPE 2 DIABETES MELLITUS WITH HYPERGLYCEMIA, WITHOUT LONG-TERM CURRENT USE OF INSULIN: Primary | ICD-10-CM

## 2017-10-26 DIAGNOSIS — I10 ESSENTIAL HYPERTENSION: ICD-10-CM

## 2017-10-26 DIAGNOSIS — I26.02 SADDLE EMBOLUS OF PULMONARY ARTERY WITH ACUTE COR PULMONALE, UNSPECIFIED CHRONICITY: ICD-10-CM

## 2017-10-26 DIAGNOSIS — D50.0 IRON DEFICIENCY ANEMIA DUE TO CHRONIC BLOOD LOSS: ICD-10-CM

## 2017-10-26 DIAGNOSIS — E78.2 MIXED HYPERLIPIDEMIA: ICD-10-CM

## 2017-10-26 PROCEDURE — 99999 PR PBB SHADOW E&M-EST. PATIENT-LVL III: CPT | Mod: PBBFAC,,, | Performed by: INTERNAL MEDICINE

## 2017-10-26 PROCEDURE — 99214 OFFICE O/P EST MOD 30 MIN: CPT | Mod: S$PBB,,, | Performed by: INTERNAL MEDICINE

## 2017-10-26 PROCEDURE — 99213 OFFICE O/P EST LOW 20 MIN: CPT | Mod: PBBFAC | Performed by: INTERNAL MEDICINE

## 2017-10-26 RX ORDER — INSULIN LISPRO 100 [IU]/ML
9 INJECTION, SOLUTION INTRAVENOUS; SUBCUTANEOUS
Qty: 1 BOX | Refills: 3 | Status: SHIPPED | OUTPATIENT
Start: 2017-10-26 | End: 2017-10-26 | Stop reason: SDUPTHER

## 2017-10-26 RX ORDER — INSULIN LISPRO 100 [IU]/ML
9 INJECTION, SOLUTION INTRAVENOUS; SUBCUTANEOUS
Qty: 1 BOX | Refills: 3 | Status: SHIPPED | OUTPATIENT
Start: 2017-10-26 | End: 2018-03-09 | Stop reason: SDUPTHER

## 2017-10-26 NOTE — PROGRESS NOTES
Chief Complaint: No chief complaint on file.      HPI:  Heather Gomez is 42 y.o. female here today for T2DM follow up     She was hospitalized in 2017 for bilateral PE in context with recent initiation of Nuvaring and progesterone. She was also found to be acidotic with a beta hydroxybutyrate level of 3.1. She was treated with an insulin gtt and transitioned to MDI. Endocrine was consulted during hospitalization for diabetes management     The patient state she was diagnosed with Gestational diabetes in    Treated with diet and exercise     Converted to T2DM in    Initially started treatment with oral medications   Insulin recently added during hospital admission     Current DM Regimen:   Levemir 29 units at bedtime   Novolog  + low dose correction starting at 150 mg/dL     Other medications tried:   Metformin - unable to tolerate due to GI upset   invokana - discontinued due to abdominal pain, nausea   actos - discontinued during hospital stay   januvia - discontinued during hospital stay     Currently, she denies nocturia, polyuria, unexplained weight loss or blurred vision.     Hypoglycemia: denies recent episodes or symptoms   Denies hospital admission related to diabetes    Known complications: none     Last diabetic eye exam: UTD,earlier this year with Dr. Gong on the [facility]     She does not follow with podiatry   Denies neuropathy     Denies symptomatic CAD or CVD     24 hour dietary recall:   Breakfast: skipped   Lunch: boiled shrimp, toast   Dinner: hot dog, regular soda   Snacks: no snacks yesterday   Mainly drinks water    SMBG: tests BG 3 times daily   Fastin, 217, 164, 168, 176, 189, 166  After lunch: 173, 205  AC supper: 206, 199, 149  Bedtime: 294, 244, 152    Diabetes education: 2017   Exercise: not currently due to shortness of breath with exertion. Has upcoming appointment with Cardiology     She is having regular periods - LMP: 10/23/2017     ADA STANDARDS OF CARE:    ACE inhibitor or angiotension II receptor blocker: irbesartan   Statin drug: pravachol   Low dose ASA: denies    Review of Systems   Constitutional: Positive for fatigue. Negative for unexpected weight change.   HENT: Negative for sore throat, trouble swallowing and voice change.    Eyes: Negative for visual disturbance.   Respiratory: Positive for shortness of breath (with exertion ). Negative for cough.    Cardiovascular: Negative for chest pain, palpitations and leg swelling.   Gastrointestinal: Positive for abdominal pain and nausea. Negative for constipation, diarrhea and vomiting.   Endocrine: Negative for polydipsia, polyphagia and polyuria.   Genitourinary: Negative for dysuria and menstrual problem.   Musculoskeletal: Negative for myalgias.   Skin: Negative for rash and wound.   Hematological: Negative for adenopathy. Does not bruise/bleed easily.   Psychiatric/Behavioral: Negative for confusion, decreased concentration and sleep disturbance. The patient is not nervous/anxious.      Physical Exam   Constitutional: She is oriented to person, place, and time. She appears well-developed. No distress.   Neck: No tracheal deviation present. No thyromegaly present.   Cardiovascular: Normal rate and regular rhythm.    No murmur heard.  Pulmonary/Chest: Effort normal and breath sounds normal.   Musculoskeletal: She exhibits no edema.   Feet:   Right Foot:   Skin Integrity: Negative for ulcer, blister, skin breakdown, callus or dry skin.   Left Foot:   Skin Integrity: Negative for ulcer, blister, skin breakdown, callus or dry skin.   Lymphadenopathy:     She has no cervical adenopathy.   Neurological: She is alert and oriented to person, place, and time.        Skin: Skin is warm and dry.   No nodules  injection sites are ok. No lipo hypertropthy or atrophy     Psychiatric: She has a normal mood and affect. Judgment normal.   Nursing note and vitals reviewed.    Labs:  Hemoglobin A1C   Date Value Ref Range Status    10/23/2017 6.5 (H) 4.0 - 5.6 % Final     Comment:     According to ADA guidelines, hemoglobin A1c <7.0% represents  optimal control in non-pregnant diabetic patients. Different  metrics may apply to specific patient populations.   Standards of Medical Care in Diabetes-2016.  For the purpose of screening for the presence of diabetes:  <5.7%     Consistent with the absence of diabetes  5.7-6.4%  Consistent with increasing risk for diabetes   (prediabetes)  >or=6.5%  Consistent with diabetes  Currently, no consensus exists for use of hemoglobin A1c  for diagnosis of diabetes for children.  This Hemoglobin A1c assay has significant interference with fetal   hemoglobin   (HbF). The results are invalid for patients with abnormal amounts of   HbF,   including those with known Hereditary Persistence   of Fetal Hemoglobin. Heterozygous hemoglobin variants (HbAS, HbAC,   HbAD, HbAE, HbA2) do not significantly interfere with this assay;   however, presence of multiple variants in a sample may impact the %   interference.     06/20/2017 9.8 (H) 4.0 - 5.6 % Final     Comment:     According to ADA guidelines, hemoglobin A1c <7.0% represents  optimal control in non-pregnant diabetic patients. Different  metrics may apply to specific patient populations.   Standards of Medical Care in Diabetes-2016.  For the purpose of screening for the presence of diabetes:  <5.7%     Consistent with the absence of diabetes  5.7-6.4%  Consistent with increasing risk for diabetes   (prediabetes)  >or=6.5%  Consistent with diabetes  Currently, no consensus exists for use of hemoglobin A1c  for diagnosis of diabetes for children.  This Hemoglobin A1c assay has significant interference with fetal   hemoglobin   (HbF). The results are invalid for patients with abnormal amounts of   HbF,   including those with known Hereditary Persistence   of Fetal Hemoglobin. Heterozygous hemoglobin variants (HbAS, HbAC,   HbAD, HbAE, HbA2) do not significantly  interfere with this assay;   however, presence of multiple variants in a sample may impact the %   interference.     09/16/2016 6.9 (H) 4.5 - 6.2 % Final     Comment:     According to ADA guidelines, hemoglobin A1C <7.0% represents  optimal control in non-pregnant diabetic patients.  Different  metrics may apply to specific populations.   Standards of Medical Care in Diabetes - 2016.  For the purpose of screening for the presence of diabetes:  <5.7%     Consistent with the absence of diabetes  5.7-6.4%  Consistent with increasing risk for diabetes   (prediabetes)  >or=6.5%  Consistent with diabetes  Currently no consensus exists for use of hemoglobin A1C  for diagnosis of diabetes for children.       Lab Results   Component Value Date    CHOL 187 10/23/2017    HDL 56 10/23/2017    LDLCALC 98.4 10/23/2017    TRIG 163 (H) 10/23/2017    CHOLHDL 29.9 10/23/2017     Lab Results   Component Value Date     10/23/2017    K 3.7 10/23/2017     10/23/2017    CO2 29 10/23/2017     (H) 10/23/2017    BUN 12 10/23/2017    CREATININE 0.63 10/23/2017    CALCIUM 9.3 10/23/2017    PROT 7.5 10/23/2017    ALBUMIN 4.1 10/23/2017    BILITOT 0.7 10/23/2017    ALKPHOS 60 10/23/2017    AST 25 10/23/2017    ALT 30 10/23/2017    ANIONGAP 7 (L) 10/23/2017    ESTGFRAFRICA >60.0 10/23/2017    EGFRNONAA >60.0 10/23/2017       Assessment and Plan:  1. Type 2 diabetes mellitus with hyperglycemia, without long-term current use of insulin  Comprehensive metabolic panel    Fructosamine    Hemoglobin A1c    insulin lispro (HUMALOG KWIKPEN) 100 unit/mL InPn pen    DISCONTINUED: insulin lispro (HUMALOG KWIKPEN) 100 unit/mL InPn pen   2. Mixed hyperlipidemia     3. Essential hypertension     4. Iron deficiency anemia due to chronic blood loss     5. Saddle embolus of pulmonary artery with acute cor pulmonale, unspecified chronicity       1. Type 2 diabetes with hyperglycemia without long term current use of insulin   -- reviewed risks  and complications of uncontrolled diabetes   -- reviewed BG goals   -- emphasized medication compliance   -- continue dietary and lifestyle modifications   -- increase Levemir to 30 units at bedtime   -- increase Novolog 9/9/9 + low dose correction starting at 150 mg/dL, ISF 25   -- smbg 4 x daily and send logs in 2 weeks for review and adjustments   -- reviewed signs and symptoms of hyper and hypoglycemia along with appropriate treatment protocol   -- A1c may be falsely low due to history of anemia   -- will check Fructosamine with next set of labs     2. HLD   -- on statin therapy, tolerating   -- encouraged to follow a low fat, low chol diet     3. HTN   -- stable with current regimen   -- encouraged to follow a low salt diet     4. Iron deficiency anemia   -- followed by Hematology     5. Pulmonary embolus   -- on Xarelto  -- followed by Hematology       RTC in 6 months with labs prior   Send logs in 2 weeks for review and adjustments

## 2017-11-13 ENCOUNTER — NURSE TRIAGE (OUTPATIENT)
Dept: ADMINISTRATIVE | Facility: CLINIC | Age: 42
End: 2017-11-13

## 2017-11-13 DIAGNOSIS — E11.65 TYPE 2 DIABETES MELLITUS WITH HYPERGLYCEMIA, WITHOUT LONG-TERM CURRENT USE OF INSULIN: ICD-10-CM

## 2017-11-13 RX ORDER — PEN NEEDLE, DIABETIC 33 GX5/32"
1 NEEDLE, DISPOSABLE MISCELLANEOUS
Qty: 100 EACH | Refills: 11 | Status: SHIPPED | OUTPATIENT
Start: 2017-11-13 | End: 2022-06-21

## 2017-11-13 RX ORDER — INSULIN LISPRO 100 [IU]/ML
9 INJECTION, SOLUTION INTRAVENOUS; SUBCUTANEOUS
Qty: 1 BOX | Refills: 3 | Status: CANCELLED | OUTPATIENT
Start: 2017-11-13 | End: 2018-11-13

## 2017-11-13 NOTE — TELEPHONE ENCOUNTER
Reason for Disposition   Blood glucose > 400 mg/dl (22 mmol/l)    Protocols used: ST DIABETES - HIGH BLOOD SUGAR-A-OH    Patient states he blood sugar is 453. She is requesting a prior authorization for her insulin for her endocrinologist.  Ms. Gomez states she feels fine, but is sweating. Patient states she has a sore throat and ear pain. Patient advised to that she should go to ED or be seen by PCP.

## 2017-11-14 RX ORDER — PEN NEEDLE, DIABETIC 31 GX5/16"
NEEDLE, DISPOSABLE MISCELLANEOUS
Qty: 100 EACH | Refills: 4 | Status: SHIPPED | OUTPATIENT
Start: 2017-11-14 | End: 2021-07-14

## 2017-11-17 ENCOUNTER — TELEPHONE (OUTPATIENT)
Dept: ENDOCRINOLOGY | Facility: CLINIC | Age: 42
End: 2017-11-17

## 2017-11-20 DIAGNOSIS — E11.65 TYPE 2 DIABETES MELLITUS WITH HYPERGLYCEMIA, WITHOUT LONG-TERM CURRENT USE OF INSULIN: ICD-10-CM

## 2017-11-20 NOTE — TELEPHONE ENCOUNTER
Spoke with pt. Pt states that the pharmacy won't refill from the old script and she thinks she may need a PA. Informed pt that I will send a new script and start a PA just in case.

## 2017-11-22 ENCOUNTER — TELEPHONE (OUTPATIENT)
Dept: ENDOCRINOLOGY | Facility: CLINIC | Age: 42
End: 2017-11-22

## 2017-11-22 NOTE — TELEPHONE ENCOUNTER
----- Message from Sushma Loomis sent at 11/22/2017 12:18 PM CST -----  Contact: [facility] [phone]  ..Medication question / problems.  PT's insurance is not covering insulin detemir (LEVEMIR FLEXTOUCH) 100 unit/mL (3 mL) SubQ InPn pen - they will cover Basaglar     ..    [facility] - [address]  [address]  [place] [address]  Phone: [phone] Fax: [phone]

## 2017-11-24 RX ORDER — INSULIN GLARGINE 100 [IU]/ML
30 INJECTION, SOLUTION SUBCUTANEOUS NIGHTLY
Qty: 1 BOX | Refills: 6 | Status: SHIPPED | OUTPATIENT
Start: 2017-11-24 | End: 2021-09-25 | Stop reason: ALTCHOICE

## 2017-11-24 NOTE — TELEPHONE ENCOUNTER
----- Message from Eloise Duke sent at 11/24/2017  9:50 AM CST -----  Contact: [facility]   [place] [place]  [facility]    Called  11/22    Checking on TAMARA perez      Knows  Insurance covers;  basaglar       Please call with a status on PA      Ph [phone]    Thanks

## 2017-12-08 DIAGNOSIS — I82.431 ACUTE DEEP VEIN THROMBOSIS (DVT) OF POPLITEAL VEIN OF RIGHT LOWER EXTREMITY: ICD-10-CM

## 2017-12-08 RX ORDER — RIVAROXABAN 20 MG/1
20 TABLET, FILM COATED ORAL
Qty: 30 TABLET | Refills: 1 | Status: SHIPPED | OUTPATIENT
Start: 2017-12-08 | End: 2018-02-08 | Stop reason: SDUPTHER

## 2017-12-28 ENCOUNTER — TELEPHONE (OUTPATIENT)
Dept: HEMATOLOGY/ONCOLOGY | Facility: CLINIC | Age: 42
End: 2017-12-28

## 2018-01-18 ENCOUNTER — HOSPITAL ENCOUNTER (OUTPATIENT)
Dept: PREADMISSION TESTING | Facility: HOSPITAL | Age: 43
Discharge: HOME | End: 2018-01-18
Attending: OBSTETRICS & GYNECOLOGY
Payer: MEDICAID

## 2018-01-18 ENCOUNTER — ANESTHESIA EVENT (OUTPATIENT)
Dept: SURGERY | Facility: HOSPITAL | Age: 43
End: 2018-01-18
Payer: MEDICAID

## 2018-01-22 ENCOUNTER — HOSPITAL ENCOUNTER (OUTPATIENT)
Dept: PREADMISSION TESTING | Facility: HOSPITAL | Age: 43
Discharge: HOME | End: 2018-01-22
Attending: OBSTETRICS & GYNECOLOGY
Payer: MEDICAID

## 2018-01-22 ENCOUNTER — CLINICAL SUPPORT (OUTPATIENT)
Dept: LAB | Facility: HOSPITAL | Age: 43
End: 2018-01-22
Attending: OBSTETRICS & GYNECOLOGY
Payer: MEDICAID

## 2018-01-22 ENCOUNTER — TELEPHONE (OUTPATIENT)
Dept: HEMATOLOGY/ONCOLOGY | Facility: CLINIC | Age: 43
End: 2018-01-22

## 2018-01-22 VITALS
RESPIRATION RATE: 14 BRPM | SYSTOLIC BLOOD PRESSURE: 131 MMHG | WEIGHT: 181 LBS | DIASTOLIC BLOOD PRESSURE: 82 MMHG | HEART RATE: 101 BPM | OXYGEN SATURATION: 99 % | HEIGHT: 64 IN | BODY MASS INDEX: 30.9 KG/M2

## 2018-01-22 DIAGNOSIS — N92.0: ICD-10-CM

## 2018-01-22 DIAGNOSIS — Z01.818 PRE-OP TESTING: Primary | ICD-10-CM

## 2018-01-22 DIAGNOSIS — I26.02 SADDLE EMBOLUS OF PULMONARY ARTERY WITH ACUTE COR PULMONALE, UNSPECIFIED CHRONICITY: ICD-10-CM

## 2018-01-22 DIAGNOSIS — N92.0 EXCESSIVE OR FREQUENT MENSTRUATION: ICD-10-CM

## 2018-01-22 DIAGNOSIS — I82.431 ACUTE DEEP VEIN THROMBOSIS (DVT) OF POPLITEAL VEIN OF RIGHT LOWER EXTREMITY: ICD-10-CM

## 2018-01-22 DIAGNOSIS — E11.9 CONTROLLED TYPE 2 DIABETES MELLITUS WITHOUT COMPLICATION, WITH LONG-TERM CURRENT USE OF INSULIN: ICD-10-CM

## 2018-01-22 DIAGNOSIS — Z79.4 CONTROLLED TYPE 2 DIABETES MELLITUS WITHOUT COMPLICATION, WITH LONG-TERM CURRENT USE OF INSULIN: ICD-10-CM

## 2018-01-22 DIAGNOSIS — Z86.711 HISTORY OF PULMONARY EMBOLUS (PE): ICD-10-CM

## 2018-01-22 DIAGNOSIS — N92.0 EXCESSIVE OR FREQUENT MENSTRUATION: Primary | ICD-10-CM

## 2018-01-22 PROBLEM — R07.2 PRECORDIAL PAIN: Status: RESOLVED | Noted: 2017-10-03 | Resolved: 2018-01-22

## 2018-01-22 PROCEDURE — 93005 ELECTROCARDIOGRAM TRACING: CPT

## 2018-01-22 RX ORDER — LIDOCAINE HYDROCHLORIDE 10 MG/ML
1 INJECTION, SOLUTION EPIDURAL; INFILTRATION; INTRACAUDAL; PERINEURAL ONCE
Status: CANCELLED | OUTPATIENT
Start: 2018-01-22 | End: 2018-01-22

## 2018-01-22 RX ORDER — CEFAZOLIN SODIUM 1 G/3ML
2 INJECTION, POWDER, FOR SOLUTION INTRAMUSCULAR; INTRAVENOUS
Status: CANCELLED | OUTPATIENT
Start: 2018-01-23

## 2018-01-22 RX ORDER — SODIUM CHLORIDE, SODIUM LACTATE, POTASSIUM CHLORIDE, CALCIUM CHLORIDE 600; 310; 30; 20 MG/100ML; MG/100ML; MG/100ML; MG/100ML
INJECTION, SOLUTION INTRAVENOUS CONTINUOUS
Status: CANCELLED | OUTPATIENT
Start: 2018-01-22

## 2018-01-22 RX ORDER — TRAMADOL HYDROCHLORIDE 50 MG/1
50 TABLET ORAL EVERY 6 HOURS PRN
COMMUNITY
End: 2018-05-29

## 2018-01-22 NOTE — PRE-PROCEDURE INSTRUCTIONS
[facility] [phone]    Allergies, medical, surgical, family and psychosocial histories reviewed with patient. Periop plan of care reviewed. Preop instructions given, including medications to take and to hold. Time allotted for questions to be addressed.  Patient verbalized understanding.

## 2018-01-22 NOTE — DISCHARGE INSTRUCTIONS
Your surgery is scheduled for 1/23/18.    Please report to Outpatient Surgery Intake Office on the 2nd FLOOR at 5:30 a.m.          INSTRUCTIONS IMPORTANT!!!  ¨ Do not eat or drink after 12 midnight-including water. OK to brush teeth, no   gum, candy or mints!    ¨ Take only these medicines with a small swallow of water-morning of surgery; Irbesartan and ranitidine        ____  Proceed to [facility] on 1/22/18 for additional blood test.        ____  Do not wear makeup, including mascara.  ____  No powder, lotions or creams to surgical area.  ____  Please remove all jewelry, including piercings and leave at home.  ____  No money or valuables needed. Please leave at home.  ____  Please bring any documents given by your doctor.  ____  If going home the same day, arrange for a ride home. You will not be able to             drive if Anesthesia was used.  ____  Wear loose fitting clothing. Allow for dressings, bandages.  ____  Stop Aspirin, Ibuprofen, Motrin and Aleve at least 3-5 days before surgery, unless otherwise instructed by your doctor, or the nurse.   You MAY use Tylenol/acetaminophen until day of surgery.  ____  If you take diabetic medication, do not take am of surgery unless instructed by Doctor.  ____  Call MD for temperature above 101 degrees.  ____ Stop taking any Fish Oil supplement or any Vitamins that contain Vitamin E at least 5 days prior to surgery.  ____ Do Not wear your contact lenses the day of your procedure.  You may wear your glasses.        I have read or had read and explained to me, and understand the above information.  Additional comments or instructions:  For additional questions call [phone]        Hysteroscopy    Hysteroscopy is a procedure that is done to see inside your uterus. It can help find the cause of problems in the uterus. This helps your health care provider decide on the best treatment. In some cases, it can be used to perform treatment. Hysteroscopy may be  done in your health care provider's office or in the hospital.  Why might I need hysteroscopy?  Hysteroscopy may be done based on the results of other tests. It can help find the cause of problems. These can include:  · Unusually heavy or long menstrual periods  · Bleeding between periods  · Postmenopausal bleeding  · Trouble becoming pregnant (infertility) or carrying a pregnancy to term  · To locate an intrauterine device (IUD)  · To perform sterilization  What are the risks and complications of hysteroscopy?  Problems with the procedure are rare. But all procedures have risks. Risks of hysteroscopy include:  · Infection  · Bleeding  · Tearing of the uterine wall  · Damage to internal organs  · Scarring of the uterus  · Fluid overload  · Problems with anesthesia (the medication that prevents pain during the procedure)  How do I get ready for hysteroscopy?  · Tell your health care provider if you have any health problems. These include diabetes, heart disease, or bleeding problems.  · Tell your health care provider about all the medicines you take. This includes any over-the-counter medications, herbs, or supplements.  · You may be told not to use vaginal creams or medication. And you may be told not to have sex or douche.  · You may be told not to eat or drink the night before the procedure.  · You may be tested for pregnancy and infection.  · You may be asked to sign a consent form.  · You may be given a pain reliever to take an hour before the procedure. This helps relieve cramping that may occur.  What happens during a hysteroscopy?  · Youll lie on an exam table with your feet in stirrups.  · You may be given general anesthesia or medicines to help you relax or sleep. In some cases, an IV line will be put into a vein in your arm or hand. This line is then used to give fluids and medicines.  · A tool called a speculum is inserted into the vagina to hold it open. A tool called a dilator may be used to widen the  cervix.  · Numbing medicine may be applied to the cervix.  · The hysteroscope (a long, thin lighted tube) is inserted through the vagina and into the uterus. It is used to see inside the uterus. Images of the uterus are viewed on a monitor.  · A gas or fluid may be injected into the uterus to expand it.  · Other tools may be put through the hysteroscope. These are used to take tissue samples, remove growths, or place implants for the purpose of sterilization.  What happens after hysteroscopy?  · You may have cramps and bleeding for 24 hours after the procedure. This is normal. Use pads instead of tampons.  · Do not douche or use tampons until your health care provider says its OK.  · Do not use any vaginal medicines until you are told its OK.  · Ask your health care provider when its OK to have sex again.  When should I call my health care provider?  Call your health care provider if you have:  · Heavy bleeding (more than 1 pad an hour for 2 or more hours)  · A fever over 100.4°F (38.0°C)  · Increasing abdominal pain or tenderness  · Foul-smelling discharge   Follow-up care  Schedule a follow-up visit with your health care provider. Based on the results of your test, you may need more treatment. Be sure to follow instructions and keep your appointments.  Date Last Reviewed: 5/12/2015  © 2222-8798 [employer]. [address] [place], [place] [address]. All rights reserved. This information is not intended as a substitute for professional medical care. Always follow your healthcare professional's instructions.        D&C     After the cervical canal is dilated, a curette is inserted into the uterus to take tissue samples.     Your healthcare provider has recommended you have a D&C (dilation and curettage). This common procedure helps your healthcare provider learn more about problems inside your uterus or is done to treat a miscarriage. During a D&C, the cervix (opening of the uterus) is widened, or  dilated. Tissue samples are then removed from the endometrium (lining of the uterus) with an instrument called a curette or with suction. In many cases, D&C is done to find the cause of abnormal vaginal bleeding. Or you may need a D&C as a form of treatment.  A hysteroscopy is usually done along with the D&C for a gynecological problem. A hysteroscopy uses a small instrument to see the inside of the uterus. Hysteroscopy and D&C can be done in the operating room or in the healthcare provider's office, depending on the healthcare provider who does it.  Preparing for D&C  · Arrange for an adult family member or friend to drive you home.  · Dont eat or drink anything after the midnight before your D&C, unless told otherwise by your healthcare provider.  During your D&C  Just before your D&C, you may get medicine to prevent pain. This may be given through an IV. You may be awake but relaxed during the procedure. Or you may be completely asleep. The type of anesthesia used is different depending on where the procedure takes place. The procedure will not begin until the pain medicine has taken effect. During your D&C:  · Instruments are used to hold the vagina open and to steady the uterus. The cervical canal is widened using tapered instruments called dilators.  · Usually a thin, rigid, or flexible telescope (hysteroscope) is inserted into the vagina to take images of the inside of the uterus. This allows your healthcare provider to see into the uterus.  · The curette or suction is inserted into the uterus. Tissue samples are taken from several areas. These samples are sent to a lab to be studied.  After your D&C  · You will rest for a while in a recovery area.  · You can expect some cramping for a few hours after the D&C. This can be controlled with an over-the-counter pain reliever.  · You may have some light bleeding for a few weeks. Use pads instead of tampons.  · Take showers instead of baths for about a week. Ask  your healthcare provider if you should avoid exercising or having sex for a period of time.  Risks and complications  D&C rarely causes complications. But as with any procedure, D&C has some risks. Before your D&C, your healthcare provider will discuss these with you. You will be asked to sign a consent form. Risks may include:  · Infection  · Heavy bleeding  · Perforation of the uterine wall or damage to nearby organs  · Scar tissue may form causing the lining of the uterus to adhere to itself. This can cause problems with menstrual flow or difficulty getting pregnant in the future. This is called Asherman syndrome.  · The need for additional tests or procedures  · Risks associated with anesthesia (the medicine that makes you sleep during surgery)   Call your healthcare provider   Contact your healthcare provider if you have:  · Heavy bleeding (more than 1 pad an hour)  · A fever of 100.4°F (38°C) or higher, or as directed by your healthcare provider  · Increasing belly pain, tenderness, or cramping  · Foul-smelling discharge   Date Last Reviewed: 12/1/2016 © 2000-2017 [employer]. [address] [place] [address]. All rights reserved. This information is not intended as a substitute for professional medical care. Always follow your healthcare professional's instructions.        Endometrial Ablation  Endometrial ablation is an outpatient surgery that can reduce or stop heavy menstrual bleeding. Ablation destroys the lining of the uterus. This surgery is for women who do not want to have any more children and who have not yet entered menopause. It should not be used by women with endometrial hyperplasia or cancer of the uterus.  Treatment takes less than an hour, and you can go home later that day.  Preparing for surgery  · You may be given medicine by mouth or injection for a few weeks or months before your ablation. This thins the lining of the uterus and reduces bleeding.  · Your health  care provider may recommend other procedures to check the inside of your uterus before the ablation is done.   · The day before surgery, you may be given medicine or a special substance (laminaria) may be put into your cervix (the opening to the uterus). This widens the opening.  · To help prevent problems with anesthesia, do not eat or drink anything 10 hours before surgery.  Your surgery     Destroying the lining with heat, freezing, or electric current prevents the lining from growing back.      · Youll be given anesthesia so you stay comfortable and relaxed and feel no pain during surgery.  · Then, your uterus may be filled with fluid. This puts pressure on the lining to help reduce bleeding. It also allows your health care provider to see inside your uterus.  · Next your health care provider puts a small telescope-like instrument through the cervix. This scope may be connected to a video monitor. This helps your health care provider see and control the ablation process. At the end of the scope, a device using heat, freezing, or electric current destroys the uterine lining. Instead of the scope, your health care provider may use a device that both expands and destroys the uterine lining. After being inserted into your uterus, it also uses heat or other energy to destroy the lining. Your health care provider will choose the device thats best for you.  Your recovery  · You may have cramping or aching in your abdomen after surgery. Your health care provider can give you pain medicine.  · You may also have a bloody or watery discharge or bleeding for days or weeks. Use sanitary pads, not tampons.  · Dont have sexual intercourse or play active sports for 2 weeks after surgery.  · You can likely return to work in 2 days.  · Ask your health care provider about using contraception after an ablation.  · Your health care provider will see you in about 6 weeks to be sure youre healing well.  Call your health care  provider if you have any of the following after surgery:  · Persistent or increased abdominal pain  · Shortness of breath  · Heavy vaginal bleeding  · Fever over 100.4°F (38°C) or chills  · Nausea  · Frequent urination for 24 hours   Date Last Reviewed: 5/10/2015  © 5611-3317 [employer]. [address] [place] [address], [place] [address]. All rights reserved. This information is not intended as a substitute for professional medical care. Always follow your healthcare professional's instructions.

## 2018-01-22 NOTE — PRE-PROCEDURE INSTRUCTIONS
Message sent to Dr. Mahoney's nurse to verify if the pt is ok to be off of her Xarelto for upcoming surgery and place a note in epic regarding this matter.

## 2018-01-22 NOTE — ANESTHESIA PREPROCEDURE EVALUATION
2018  Heather Gomez is a 42 y.o., female is scheduled for hysteroscopic endometrial ablation under [facility] on 2018.    HemOnc recommendations for holding xarelto in pt with PE/DVT 2017 in [facility] on 2018.  Pt ok to hold xarelto prior to procedure and resume 24 hours after procedure.      Past Surgical History:   Procedure Laterality Date     SECTION      UPPER GASTROINTESTINAL ENDOSCOPY      in 90s          Anesthesia Evaluation    I have reviewed the Patient Summary Reports.    I have reviewed the Nursing Notes.   I have reviewed the Medications.     Review of Systems  Anesthesia Hx:  No problems with previous Anesthesia  History of prior surgery of interest to airway management or planning: Previous anesthesia: Epidural, MAC Denies Family Hx of Anesthesia complications.   Denies Personal Hx of Anesthesia complications.   Social:  Non-Smoker, No Alcohol Use    Hematology/Oncology:        Hematology Comments: Pt on xarelto for PE/DVT; pt instructed to hold today per Dr. Mosquera   EENT/Dental:EENT/Dental Normal   Cardiovascular:   Exercise tolerance: good Hypertension, well controlled Denies Dysrhythmias.   Denies Angina. hyperlipidemia     ECG has been reviewed.  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 Mahoney), right lower extremity    Pulmonary:   Denies Shortness of breath. Near complete resolution saddle PE seen on CTA chest 2017; pt c/o intermittent dyspnea with heavy activity Pulmonary Embolism (2017 likely r/t nuvaring use and on xarelto; pt followed by Dr. Mahoney [facility]), > 6 months ago, saddle embolus, thrombotic embolus    Renal/:  Renal/ Normal     Hepatic/GI:   GERD, well controlled    OB/GYN/PEDS:  Heavy uterine bleeding   Neurological:  Neurology Normal    Endocrine:   Diabetes, well controlled, type 2, using insulin   Diabetes, Type 2 Diabetes , controlled by insulin. , most recent HgA1c value was 6.5 on 10/2017.    Psych:   anxiety          Physical Exam  General:  Obesity    Airway/Jaw/Neck:  Airway Findings: Mouth Opening: Normal Tongue: Normal  General Airway Assessment: Adult  Mallampati: II  TM Distance: Normal, at least 6 cm        Eyes/Ears/Nose:  EYES/EARS/NOSE FINDINGS: Normal   Dental:  Dental Findings: Periodontal disease, Mild   Chest/Lungs:  Chest/Lungs Clear    Heart/Vascular:  Heart Findings: Normal Heart murmur: negative    Abdomen:  Abdomen Findings: Normal      Mental Status:  Mental Status Findings: Normal        2D Echo w/CFD 6/2017  CONCLUSIONS     1 - Normal left ventricular systolic function (EF 60-65%).     2 - Concentric remodeling.     3 - No wall motion abnormalities.     4 - Normal left ventricular diastolic function.     5 - Normal right ventricular systolic function .   This document has been electronically    SIGNED BY: Murray Bernal MD On: 06/22/2017 09:32    CTA Chest 7/2017  Impression    Near-complete resolution of previously described pulmonary emboli with only a single nonocclusive punctate filling defect within a segmental right lower lobe artery consistent with chronic embolus.  Electronically signed by: SURJIT WEISS MD  Date: 08/17/17  Time: 15:26          Anesthesia Plan  Type of Anesthesia, risks & benefits discussed:  Anesthesia Type:  general  Patient's Preference:   Intra-op Monitoring Plan:   Intra-op Monitoring Plan Comments:   Post Op Pain Control Plan:   Post Op Pain Control Plan Comments:   Induction:   IV  Beta Blocker:  Patient is not currently on a Beta-Blocker (No further documentation required).       Informed Consent: Patient understands risks and agrees with Anesthesia plan.  Questions answered.   ASA Score: 3     Day of Surgery Review of History & Physical:        Anesthesia Plan Notes: Anesthesia consent will be obtained prior to procedure on 1/23/2018.    [facility]  recommendations for holding xarelto in pt with PE/DVT 6/2017 in [facility] on 1/22/2018.        Ready For Surgery From Anesthesia Perspective.

## 2018-01-23 ENCOUNTER — ANESTHESIA (OUTPATIENT)
Dept: SURGERY | Facility: HOSPITAL | Age: 43
End: 2018-01-23
Payer: MEDICAID

## 2018-01-23 ENCOUNTER — NURSE TRIAGE (OUTPATIENT)
Dept: ADMINISTRATIVE | Facility: CLINIC | Age: 43
End: 2018-01-23

## 2018-01-23 ENCOUNTER — HOSPITAL ENCOUNTER (OUTPATIENT)
Facility: HOSPITAL | Age: 43
Discharge: HOME | End: 2018-01-23
Attending: OBSTETRICS & GYNECOLOGY | Admitting: OBSTETRICS & GYNECOLOGY
Payer: MEDICAID

## 2018-01-23 VITALS
BODY MASS INDEX: 30.9 KG/M2 | HEART RATE: 79 BPM | DIASTOLIC BLOOD PRESSURE: 59 MMHG | HEIGHT: 64 IN | SYSTOLIC BLOOD PRESSURE: 104 MMHG | WEIGHT: 181 LBS | OXYGEN SATURATION: 98 % | TEMPERATURE: 98 F | RESPIRATION RATE: 18 BRPM

## 2018-01-23 DIAGNOSIS — Z01.818 PRE-OP TESTING: ICD-10-CM

## 2018-01-23 LAB
ABO + RH BLD: NORMAL
BLD GP AB SCN CELLS X3 SERPL QL: NORMAL
POCT GLUCOSE: 137 MG/DL (ref 70–110)

## 2018-01-23 PROCEDURE — 71000015 HC POSTOP RECOV 1ST HR: Performed by: OBSTETRICS & GYNECOLOGY

## 2018-01-23 PROCEDURE — 36000707: Performed by: OBSTETRICS & GYNECOLOGY

## 2018-01-23 PROCEDURE — 37000008 HC ANESTHESIA 1ST 15 MINUTES: Performed by: OBSTETRICS & GYNECOLOGY

## 2018-01-23 PROCEDURE — 88305 TISSUE EXAM BY PATHOLOGIST: CPT | Performed by: PATHOLOGY

## 2018-01-23 PROCEDURE — 71000016 HC POSTOP RECOV ADDL HR: Performed by: OBSTETRICS & GYNECOLOGY

## 2018-01-23 PROCEDURE — 71000033 HC RECOVERY, INTIAL HOUR: Performed by: OBSTETRICS & GYNECOLOGY

## 2018-01-23 PROCEDURE — 25000003 PHARM REV CODE 250: Performed by: STUDENT IN AN ORGANIZED HEALTH CARE EDUCATION/TRAINING PROGRAM

## 2018-01-23 PROCEDURE — 88305 TISSUE EXAM BY PATHOLOGIST: CPT | Mod: 26,,, | Performed by: PATHOLOGY

## 2018-01-23 PROCEDURE — 36000706: Performed by: OBSTETRICS & GYNECOLOGY

## 2018-01-23 PROCEDURE — 63600175 PHARM REV CODE 636 W HCPCS: Performed by: STUDENT IN AN ORGANIZED HEALTH CARE EDUCATION/TRAINING PROGRAM

## 2018-01-23 PROCEDURE — 63600175 PHARM REV CODE 636 W HCPCS: Performed by: OBSTETRICS & GYNECOLOGY

## 2018-01-23 PROCEDURE — 27201423 OPTIME MED/SURG SUP & DEVICES STERILE SUPPLY: Performed by: OBSTETRICS & GYNECOLOGY

## 2018-01-23 PROCEDURE — 86850 RBC ANTIBODY SCREEN: CPT

## 2018-01-23 PROCEDURE — 37000009 HC ANESTHESIA EA ADD 15 MINS: Performed by: OBSTETRICS & GYNECOLOGY

## 2018-01-23 PROCEDURE — 25000003 PHARM REV CODE 250: Performed by: OBSTETRICS & GYNECOLOGY

## 2018-01-23 RX ORDER — HYDROMORPHONE HYDROCHLORIDE 2 MG/ML
0.5 INJECTION, SOLUTION INTRAMUSCULAR; INTRAVENOUS; SUBCUTANEOUS EVERY 5 MIN PRN
Status: DISCONTINUED | OUTPATIENT
Start: 2018-01-23 | End: 2018-01-23 | Stop reason: HOSPADM

## 2018-01-23 RX ORDER — SODIUM CHLORIDE 0.9 % (FLUSH) 0.9 %
3 SYRINGE (ML) INJECTION
Status: DISCONTINUED | OUTPATIENT
Start: 2018-01-23 | End: 2018-01-23 | Stop reason: HOSPADM

## 2018-01-23 RX ORDER — LIDOCAINE HYDROCHLORIDE 10 MG/ML
1 INJECTION, SOLUTION EPIDURAL; INFILTRATION; INTRACAUDAL; PERINEURAL ONCE
Status: DISCONTINUED | OUTPATIENT
Start: 2018-01-23 | End: 2018-01-23

## 2018-01-23 RX ORDER — ONDANSETRON 8 MG/1
8 TABLET, ORALLY DISINTEGRATING ORAL EVERY 8 HOURS PRN
Status: DISCONTINUED | OUTPATIENT
Start: 2018-01-23 | End: 2018-01-23 | Stop reason: HOSPADM

## 2018-01-23 RX ORDER — PROPOFOL 10 MG/ML
VIAL (ML) INTRAVENOUS
Status: DISCONTINUED | OUTPATIENT
Start: 2018-01-23 | End: 2018-01-23

## 2018-01-23 RX ORDER — MIDAZOLAM HYDROCHLORIDE 1 MG/ML
INJECTION INTRAMUSCULAR; INTRAVENOUS
Status: DISCONTINUED | OUTPATIENT
Start: 2018-01-23 | End: 2018-01-23

## 2018-01-23 RX ORDER — CEFAZOLIN SODIUM 1 G/3ML
2 INJECTION, POWDER, FOR SOLUTION INTRAMUSCULAR; INTRAVENOUS
Status: DISCONTINUED | OUTPATIENT
Start: 2018-01-23 | End: 2018-01-23 | Stop reason: HOSPADM

## 2018-01-23 RX ORDER — DIPHENHYDRAMINE HCL 25 MG
25 CAPSULE ORAL EVERY 4 HOURS PRN
Status: DISCONTINUED | OUTPATIENT
Start: 2018-01-23 | End: 2018-01-23 | Stop reason: HOSPADM

## 2018-01-23 RX ORDER — DIPHENHYDRAMINE HYDROCHLORIDE 50 MG/ML
25 INJECTION INTRAMUSCULAR; INTRAVENOUS EVERY 4 HOURS PRN
Status: DISCONTINUED | OUTPATIENT
Start: 2018-01-23 | End: 2018-01-23 | Stop reason: HOSPADM

## 2018-01-23 RX ORDER — ACETAMINOPHEN 10 MG/ML
INJECTION, SOLUTION INTRAVENOUS
Status: DISCONTINUED | OUTPATIENT
Start: 2018-01-23 | End: 2018-01-23

## 2018-01-23 RX ORDER — HYDROCODONE BITARTRATE AND ACETAMINOPHEN 5; 325 MG/1; MG/1
1 TABLET ORAL EVERY 4 HOURS PRN
Status: DISCONTINUED | OUTPATIENT
Start: 2018-01-23 | End: 2018-01-23 | Stop reason: HOSPADM

## 2018-01-23 RX ORDER — DEXAMETHASONE SODIUM PHOSPHATE 4 MG/ML
INJECTION, SOLUTION INTRA-ARTICULAR; INTRALESIONAL; INTRAMUSCULAR; INTRAVENOUS; SOFT TISSUE
Status: DISCONTINUED | OUTPATIENT
Start: 2018-01-23 | End: 2018-01-23

## 2018-01-23 RX ORDER — ONDANSETRON HYDROCHLORIDE 2 MG/ML
INJECTION, SOLUTION INTRAMUSCULAR; INTRAVENOUS
Status: DISCONTINUED | OUTPATIENT
Start: 2018-01-23 | End: 2018-01-23

## 2018-01-23 RX ORDER — KETOROLAC TROMETHAMINE 30 MG/ML
INJECTION, SOLUTION INTRAMUSCULAR; INTRAVENOUS
Status: DISCONTINUED | OUTPATIENT
Start: 2018-01-23 | End: 2018-01-23

## 2018-01-23 RX ORDER — SUCCINYLCHOLINE CHLORIDE 20 MG/ML
INJECTION INTRAMUSCULAR; INTRAVENOUS
Status: DISCONTINUED | OUTPATIENT
Start: 2018-01-23 | End: 2018-01-23

## 2018-01-23 RX ORDER — HYDROMORPHONE HYDROCHLORIDE 2 MG/ML
0.2 INJECTION, SOLUTION INTRAMUSCULAR; INTRAVENOUS; SUBCUTANEOUS EVERY 5 MIN PRN
Status: DISCONTINUED | OUTPATIENT
Start: 2018-01-23 | End: 2018-01-23 | Stop reason: HOSPADM

## 2018-01-23 RX ORDER — LIDOCAINE HCL/PF 100 MG/5ML
SYRINGE (ML) INTRAVENOUS
Status: DISCONTINUED | OUTPATIENT
Start: 2018-01-23 | End: 2018-01-23

## 2018-01-23 RX ORDER — FENTANYL CITRATE 50 UG/ML
INJECTION, SOLUTION INTRAMUSCULAR; INTRAVENOUS
Status: DISCONTINUED | OUTPATIENT
Start: 2018-01-23 | End: 2018-01-23

## 2018-01-23 RX ORDER — SILVER NITRATE 38.21; 12.74 MG/1; MG/1
STICK TOPICAL
Status: DISCONTINUED | OUTPATIENT
Start: 2018-01-23 | End: 2018-01-23 | Stop reason: HOSPADM

## 2018-01-23 RX ORDER — SODIUM CHLORIDE, SODIUM LACTATE, POTASSIUM CHLORIDE, CALCIUM CHLORIDE 600; 310; 30; 20 MG/100ML; MG/100ML; MG/100ML; MG/100ML
INJECTION, SOLUTION INTRAVENOUS CONTINUOUS
Status: DISCONTINUED | OUTPATIENT
Start: 2018-01-23 | End: 2018-01-23 | Stop reason: HOSPADM

## 2018-01-23 RX ORDER — PHENYLEPHRINE HYDROCHLORIDE 10 MG/ML
INJECTION INTRAVENOUS
Status: DISCONTINUED | OUTPATIENT
Start: 2018-01-23 | End: 2018-01-23

## 2018-01-23 RX ORDER — SODIUM CHLORIDE, SODIUM LACTATE, POTASSIUM CHLORIDE, CALCIUM CHLORIDE 600; 310; 30; 20 MG/100ML; MG/100ML; MG/100ML; MG/100ML
INJECTION, SOLUTION INTRAVENOUS CONTINUOUS PRN
Status: DISCONTINUED | OUTPATIENT
Start: 2018-01-23 | End: 2018-01-23

## 2018-01-23 RX ADMIN — HYDROMORPHONE HYDROCHLORIDE 0.5 MG: 2 INJECTION INTRAMUSCULAR; INTRAVENOUS; SUBCUTANEOUS at 02:01

## 2018-01-23 RX ADMIN — PHENYLEPHRINE HYDROCHLORIDE 100 MCG: 10 INJECTION INTRAVENOUS at 12:01

## 2018-01-23 RX ADMIN — PHENYLEPHRINE HYDROCHLORIDE 50 MCG: 10 INJECTION INTRAVENOUS at 12:01

## 2018-01-23 RX ADMIN — MIDAZOLAM HYDROCHLORIDE 2 MG: 1 INJECTION, SOLUTION INTRAMUSCULAR; INTRAVENOUS at 12:01

## 2018-01-23 RX ADMIN — SODIUM CHLORIDE, SODIUM LACTATE, POTASSIUM CHLORIDE, AND CALCIUM CHLORIDE: .6; .31; .03; .02 INJECTION, SOLUTION INTRAVENOUS at 12:01

## 2018-01-23 RX ADMIN — LIDOCAINE HYDROCHLORIDE 100 MG: 20 INJECTION, SOLUTION INTRAVENOUS at 12:01

## 2018-01-23 RX ADMIN — KETOROLAC TROMETHAMINE 30 MG: 30 INJECTION, SOLUTION INTRAMUSCULAR; INTRAVENOUS at 01:01

## 2018-01-23 RX ADMIN — ONDANSETRON 8 MG: 2 INJECTION, SOLUTION INTRAMUSCULAR; INTRAVENOUS at 01:01

## 2018-01-23 RX ADMIN — DEXAMETHASONE SODIUM PHOSPHATE 8 MG: 4 INJECTION, SOLUTION INTRAMUSCULAR; INTRAVENOUS at 01:01

## 2018-01-23 RX ADMIN — PROMETHAZINE HYDROCHLORIDE 12.5 MG: 25 INJECTION INTRAMUSCULAR; INTRAVENOUS at 03:01

## 2018-01-23 RX ADMIN — ACETAMINOPHEN 1000 MG: 10 INJECTION, SOLUTION INTRAVENOUS at 01:01

## 2018-01-23 RX ADMIN — DEXTROSE 2 G: 50 INJECTION, SOLUTION INTRAVENOUS at 12:01

## 2018-01-23 RX ADMIN — PROPOFOL 150 MG: 10 INJECTION, EMULSION INTRAVENOUS at 12:01

## 2018-01-23 RX ADMIN — ONDANSETRON 8 MG: 8 TABLET, ORALLY DISINTEGRATING ORAL at 03:01

## 2018-01-23 RX ADMIN — SUCCINYLCHOLINE CHLORIDE 120 MG: 20 INJECTION, SOLUTION INTRAMUSCULAR; INTRAVENOUS at 12:01

## 2018-01-23 RX ADMIN — PHENYLEPHRINE HYDROCHLORIDE 150 MCG: 10 INJECTION INTRAVENOUS at 01:01

## 2018-01-23 RX ADMIN — FENTANYL CITRATE 150 MCG: 50 INJECTION, SOLUTION INTRAMUSCULAR; INTRAVENOUS at 12:01

## 2018-01-23 NOTE — TRANSFER OF CARE
"Anesthesia Transfer of Care Note    Patient: Heather Gomez    Procedure(s) Performed: Procedure(s) (LRB):  ABLATION-ENDOMETRIAL (N/A)  HYSTEROSCOPY (N/A)    Patient location: PACU    Anesthesia Type: general    Transport from OR: Transported from OR on 6-10 L/min O2 by face mask with adequate spontaneous ventilation    Post pain: adequate analgesia    Post assessment: no apparent anesthetic complications and tolerated procedure well    Post vital signs: stable    Level of consciousness: awake and responds to stimulation    Nausea/Vomiting: no nausea/vomiting    Complications: none    Transfer of care protocol was followed      Last vitals:   Visit Vitals  /81 (BP Location: Left arm, Patient Position: Lying)   Pulse 89   Temp 36.8 °C (98.2 °F) (Oral)   Resp 18   Ht 5' 4" (1.626 m)   Wt 82.1 kg (181 lb)   LMP 01/18/2018   SpO2 98%   Breastfeeding? No   BMI 31.07 kg/m²     "

## 2018-01-23 NOTE — PLAN OF CARE
Pt up to bathroom. Urinated clear yellow with no difficulty. Pt c/o nausea. zofran ODT given, per md order. Cont to monitor.

## 2018-01-23 NOTE — ANESTHESIA POSTPROCEDURE EVALUATION
"Anesthesia Post Evaluation    Patient: Heather Gomez    Procedure(s) Performed: Procedure(s) (LRB):  ABLATION-ENDOMETRIAL (N/A)  HYSTEROSCOPY (N/A)    Final Anesthesia Type: general  Patient location during evaluation: PACU  Patient participation: Yes- Able to Participate  Level of consciousness: awake and alert  Post-procedure vital signs: reviewed and stable  Pain management: adequate  Airway patency: patent  PONV status at discharge: No PONV  Anesthetic complications: no      Cardiovascular status: blood pressure returned to baseline and hemodynamically stable  Respiratory status: unassisted  Hydration status: euvolemic  Follow-up not needed.        Visit Vitals  BP (!) 104/59   Pulse 79   Temp 36.5 °C (97.7 °F) (Oral)   Resp 18   Ht 5' 4" (1.626 m)   Wt 82.1 kg (181 lb)   LMP 01/18/2018   SpO2 98%   Breastfeeding? No   BMI 31.07 kg/m²       Pain/Aguilar Score: Pain Assessment Performed: Yes (1/23/2018  2:50 PM)  Presence of Pain: denies (1/23/2018  2:50 PM)  Pain Rating Prior to Med Admin: 6 (1/23/2018  2:10 PM)  Pain Rating Post Med Admin: 0 (1/23/2018  2:20 PM)  Aguilar Score: 10 (1/23/2018  2:50 PM)      "

## 2018-01-23 NOTE — BRIEF OP NOTE
"[facility]  Brief Operative Note     SUMMARY     Surgery Date: 1/23/2018     Surgeon(s) and Role:     * Dheeraj Mosquera MD - Primary    Assisting Surgeon: None    Pre-op Diagnosis:  Menorrhagia [N92.0]    Post-op Diagnosis:  Post-Op Diagnosis Codes:     * Menorrhagia [N92.0]    Procedure(s) (LRB):  ABLATION-ENDOMETRIAL (N/A)  HYSTEROSCOPY (N/A)    Anesthesia: General    Description of the findings of the procedure: nl uterus    Findings/Key Components: nl uterus    Estimated Blood Loss: * No values recorded between 1/23/2018  1:08 PM and 1/23/2018  1:24 PM *         Specimens:   Specimen (12h ago through future)    Start     Ordered    01/23/18 1319  Specimen to Pathology - Surgery  Once     Comments:  1. Endometrial currettings      01/23/18 1318          Discharge Note    SUMMARY     Admit Date: 1/23/2018    Discharge Date and Time:  01/23/2018 1:24 PM    Hospital Course (synopsis of major diagnoses, care, treatment, and services provided during the course of the hospital stay): nl post op     Final Diagnosis: Post-Op Diagnosis Codes:     * Menorrhagia [N92.0]    Disposition: Home or Self Care    Follow Up/Patient Instructions:     Medications:  Reconciled Home Medications:   Current Discharge Medication List      CONTINUE these medications which have NOT CHANGED    Details   acetaminophen-caff-pyrilamine (MIDOL COMPLETE) 500-60-15 mg Tab Take 1 tablet by mouth every 6 (six) hours as needed (menstrual cramping).  Refills: 0      alprazolam (XANAX) 0.5 MG tablet Take 1 tablet (0.5 mg total) by mouth 3 (three) times daily as needed for Anxiety.      blood sugar diagnostic Strp To check BG 4 times daily, to use with insurance preferred meter  Qty: 200 each, Refills: 11      blood-glucose meter kit To check BG 4 times daily, to use with insurance preferred meter  Qty: 1 each, Refills: 0      EASY TOUCH 31 gauge x 5/16" Ndle USE AS DIRECTED  Qty: 100 each, Refills: 4      hydrOXYzine HCl (ATARAX) 25 MG " "tablet Take 25 mg by mouth 3 (three) times daily.      insulin glargine (BASAGLAR KWIKPEN) 100 unit/mL (3 mL) InPn pen Inject 30 Units into the skin every evening.  Qty: 1 Box, Refills: 6      insulin lispro (HUMALOG KWIKPEN) 100 unit/mL InPn pen Inject 9 Units into the skin 3 (three) times daily before meals.  Qty: 1 Box, Refills: 3    Associated Diagnoses: Type 2 diabetes mellitus with hyperglycemia, without long-term current use of insulin      irbesartan-hydrochlorothiazide (AVALIDE) 150-12.5 mg per tablet Take 1 tablet by mouth once daily.      lancets Misc To check BG 4 times daily, to use with insurance preferred meter  Qty: 200 each, Refills: 11      pen needle, diabetic 33 gauge x 3/16" Ndle 1 application by Misc.(Non-Drug; Combo Route) route 3 (three) times daily before meals.  Qty: 100 each, Refills: 11      pravastatin (PRAVACHOL) 40 MG tablet Take 40 mg by mouth once daily.      ranitidine (ZANTAC) 150 MG tablet Take 150 mg by mouth 2 (two) times daily.      traMADol (ULTRAM) 50 mg tablet Take 50 mg by mouth every 6 (six) hours as needed for Pain.      XARELTO 20 mg Tab TAKE 1 TABLET (20 MG TOTAL) BY MOUTH DAILY WITH DINNER OR EVENING MEAL.  Qty: 30 tablet, Refills: 1    Associated Diagnoses: Acute deep vein thrombosis (DVT) of popliteal vein of right lower extremity             Discharge Procedure Orders  Diet general       Follow-up Information     Dheeraj Mosquera MD In 1 month.    Specialty:  Obstetrics  Contact information:  [address] [place] [id number] [phone]                 "

## 2018-01-23 NOTE — DISCHARGE INSTRUCTIONS
***  BATHING:                   You may shower after your dressing is removed, but no tub baths, hot tubs, saunas or swimming until you see the doctor.    DRESSING:  ? Remove your bandage ________________. If there are skin tapes over the incision, leave them in place. They will start to come off in 5-7 days.  ACTIVITY LEVEL: If you have received sedation or an anesthetic, you may feel sleepy for   several hours. Rest until you are more awake. Gradually resume your normal activities  ? No heavy lifting or straining, nothing over 10 lbs., like a gallon of milk.  ? Pelvic rest- no sex, tampons or douching until follow up or instructed by doctor.  DIET:  You may resume your home diet. If nausea is present, increase your diet gradually with fluids and bland foods.    Medications:  Pain medication should be taken only if needed and as directed. If antibiotics are prescribed, the medication should be taken until completed. You will be given an updated list of you medications.  ? No driving, alcoholic beverages or signing legal documents for next 24 hours or while taking pain medication    CALL THE DOCTOR:    For any obvious bleeding (some dried blood over the incision is normal).      Redness, swelling, foul smell around incision or fever over 101.   Shortness of breath, Coughing up Bloody Sputum or Pains or Swelling in your calves.   Persistent pain or nausea not relieved by medication.   If vaginal bleeding is in excess of a normal period.   Problems urinating    If any unusual problems or difficulties occur contact your doctor. If you cannot contact your doctor but feel your signs and symptoms warrant a physicians attention return to the emergency room.        Discharge Instructions: After Your Surgery  Youve just had surgery. During surgery you were given medicine called anesthesia to keep you relaxed and free of pain. After surgery you may have some pain or nausea. This is common. Here are some tips for  feeling better and getting well after surgery.     Stay on schedule with your medication.   Going home  Your doctor or nurse will show you how to take care of yourself when you go home. He or she will also answer your questions. Have an adult family member or friend drive you home. For the first 24 hours after your surgery:  · Do not drive or use heavy equipment.  · Do not make important decisions or sign legal papers.  · Do not drink alcohol.  · Have someone stay with you, if needed. He or she can watch for problems and help keep you safe.  Be sure to go to all follow-up visits with your doctor. And rest after your surgery for as long as your doctor tells you to.  Coping with pain  If you have pain after surgery, pain medicine will help you feel better. Take it as told, before pain becomes severe. Also, ask your doctor or pharmacist about other ways to control pain. This might be with heat, ice, or relaxation. And follow any other instructions your surgeon or nurse gives you.  Tips for taking pain medicine  To get the best relief possible, remember these points:  · Pain medicines can upset your stomach. Taking them with a little food may help.  · Most pain relievers taken by mouth need at least 20 to 30 minutes to start to work.  · Taking medicine on a schedule can help you remember to take it. Try to time your medicine so that you can take it before starting an activity. This might be before you get dressed, go for a walk, or sit down for dinner.  · Constipation is a common side effect of pain medicines. Call your doctor before taking any medicines such as laxatives or stool softeners to help ease constipation. Also ask if you should skip any foods. Drinking lots of fluids and eating foods such as fruits and vegetables that are high in fiber can also help. Remember, do not take laxatives unless your surgeon has prescribed them.  · Drinking alcohol and taking pain medicine can cause dizziness and slow your  breathing. It can even be deadly. Do not drink alcohol while taking pain medicine.  · Pain medicine can make you react more slowly to things. Do not drive or run machinery while taking pain medicine.  Your health care provider may tell you to take acetaminophen to help ease your pain. Ask him or her how much you are supposed to take each day. Acetaminophen or other pain relievers may interact with your prescription medicines or other over-the-counter (OTC) drugs. Some prescription medicines have acetaminophen and other ingredients. Using both prescription and OTC acetaminophen for pain can cause you to overdose. Read the labels on your OTC medicines with care. This will help you to clearly know the list of ingredients, how much to take, and any warnings. It may also help you not take too much acetaminophen. If you have questions or do not understand the information, ask your pharmacist or health care provider to explain it to you before you take the OTC medicine.  Managing nausea  Some people have an upset stomach after surgery. This is often because of anesthesia, pain, or pain medicine, or the stress of surgery. These tips will help you handle nausea and eat healthy foods as you get better. If you were on a special food plan before surgery, ask your doctor if you should follow it while you get better. These tips may help:  · Do not push yourself to eat. Your body will tell you when to eat and how much.  · Start off with clear liquids and soup. They are easier to digest.  · Next try semi-solid foods, such as mashed potatoes, applesauce, and gelatin, as you feel ready.  · Slowly move to solid foods. Dont eat fatty, rich, or spicy foods at first.  · Do not force yourself to have 3 large meals a day. Instead eat smaller amounts more often.  · Take pain medicines with a small amount of solid food, such as crackers or toast, to avoid nausea.     Call your surgeon if  · You still have pain an hour after taking  medicine. The medicine may not be strong enough.  · You feel too sleepy, dizzy, or groggy. The medicine may be too strong.  · You have side effects like nausea, vomiting, or skin changes, such as rash, itching, or hives.       If you have obstructive sleep apnea  You were given anesthesia medicine during surgery to keep you comfortable and free of pain. After surgery, you may have more apnea spells because of this medicine and other medicines you were given. The spells may last longer than usual.   At home:  · Keep using the continuous positive airway pressure (CPAP) device when you sleep. Unless your health care provider tells you not to, use it when you sleep, day or night. CPAP is a common device used to treat obstructive sleep apnea.  · Talk with your provider before taking any pain medicine, muscle relaxants, or sedatives. Your provider will tell you about the possible dangers of taking these medicines.  © 9589-6749 [employer]. [address] [place] [address]. All rights reserved. This information is not intended as a substitute for professional medical care. Always follow your healthcare professional's instructions.            Acetaminophen; Oxycodone tablets  What is this medicine?  ACETAMINOPHEN; OXYCODONE (a set a SCAR yelena fen; ox i KOE done) is a pain reliever. It is used to treat moderate to severe pain.  How should I use this medicine?  Take this medicine by mouth with a full glass of water. Follow the directions on the prescription label. You can take it with or without food. If it upsets your stomach, take it with food. Take your medicine at regular intervals. Do not take it more often than directed.  A special MedGuide will be given to you by the pharmacist with each prescription and refill. Be sure to read this information carefully each time.  Talk to your pediatrician regarding the use of this medicine in children. Special care may be needed.  What side effects may I notice  from receiving this medicine?  Side effects that you should report to your doctor or health care professional as soon as possible:  · allergic reactions like skin rash, itching or hives, swelling of the face, lips, or tongue  · breathing problems  · confusion  · redness, blistering, peeling or loosening of the skin, including inside the mouth  · signs and symptoms of liver injury like dark yellow or brown urine; general ill feeling or flu-like symptoms; light-colored stools; loss of appetite; nausea; right upper belly pain; unusually weak or tired; yellowing of the eyes or skin  · signs and symptoms of low blood pressure like dizziness; feeling faint or lightheaded, falls; unusually weak or tired  · trouble passing urine or change in the amount of urine  Side effects that usually do not require medical attention (report to your doctor or health care professional if they continue or are bothersome):  · constipation  · dry mouth  · nausea, vomiting  · tiredness  What may interact with this medicine?  This medicine may interact with the following medications:  · alcohol  · antihistamines for allergy, cough and cold  · antiviral medicines for HIV or AIDS  · atropine  · certain antibiotics like clarithromycin, erythromycin, linezolid, rifampin  · certain medicines for anxiety or sleep  · certain medicines for bladder problems like oxybutynin, tolterodine  · certain medicines for depression like amitriptyline, fluoxetine, sertraline  · certain medicines for fungal infections like ketoconazole, itraconazole, voriconazole  · certain medicines for migraine headache like almotriptan, eletriptan, frovatriptan, naratriptan, rizatriptan, sumatriptan, zolmitriptan  · certain medicines for nausea or vomiting like dolasetron, ondansetron, palonosetron  · certain medicines for Parkinson's disease like benztropine, trihexyphenidyl  · certain medicines for seizures like phenobarbital, phenytoin, primidone  · certain medicines for  stomach problems like dicyclomine, hyoscyamine  · certain medicines for travel sickness like scopolamine  · diuretics  · general anesthetics like halothane, isoflurane, methoxyflurane, propofol  · ipratropium  · local anesthetics like lidocaine, pramoxine, tetracaine  · MAOIs like Carbex, Eldepryl, Marplan, Nardil, and Parnate  · medicines that relax muscles for surgery  · methylene blue  · nilotinib  · other medicines with acetaminophen  · other narcotic medicines for pain or cough  · phenothiazines like chlorpromazine, mesoridazine, prochlorperazine, thioridazine  What if I miss a dose?  If you miss a dose, take it as soon as you can. If it is almost time for your next dose, take only that dose. Do not take double or extra doses.  Where should I keep my medicine?  Keep out of the reach of children. This medicine can be abused. Keep your medicine in a safe place to protect it from theft. Do not share this medicine with anyone. Selling or giving away this medicine is dangerous and against the law.  This medicine may cause accidental overdose and death if it taken by other adults, children, or pets. Mix any unused medicine with a substance like cat litter or coffee grounds. Then throw the medicine away in a sealed container like a sealed bag or a coffee can with a lid. Do not use the medicine after the expiration date.  Store at room temperature between 20 and 25 degrees C (68 and 77 degrees F).  What should I tell my health care provider before I take this medicine?  They need to know if you have any of these conditions:  · brain tumor  · Crohn's disease, inflammatory bowel disease, or ulcerative colitis  · drug abuse or addiction  · head injury  · heart or circulation problems  · if you often drink alcohol  · kidney disease or problems going to the bathroom  · liver disease  · lung disease, asthma, or breathing problems  · an unusual or allergic reaction to acetaminophen, oxycodone, other opioid analgesics, other  medicines, foods, dyes, or preservatives  · pregnant or trying to get pregnant  · breast-feeding  What should I watch for while using this medicine?  Tell your doctor or health care professional if your pain does not go away, if it gets worse, or if you have new or a different type of pain. You may develop tolerance to the medicine. Tolerance means that you will need a higher dose of the medication for pain relief. Tolerance is normal and is expected if you take this medicine for a long time.  Do not suddenly stop taking your medicine because you may develop a severe reaction. Your body becomes used to the medicine. This does NOT mean you are addicted. Addiction is a behavior related to getting and using a drug for a non-medical reason. If you have pain, you have a medical reason to take pain medicine. Your doctor will tell you how much medicine to take. If your doctor wants you to stop the medicine, the dose will be slowly lowered over time to avoid any side effects.  There are different types of narcotic medicines (opiates). If you take more than one type at the same time or if you are taking another medicine that also causes drowsiness, you may have more side effects. Give your health care provider a list of all medicines you use. Your doctor will tell you how much medicine to take. Do not take more medicine than directed. Call emergency for help if you have problems breathing or unusual sleepiness.  Do not take other medicines that contain acetaminophen with this medicine. Always read labels carefully. If you have questions, ask your doctor or pharmacist.  If you take too much acetaminophen get medical help right away. Too much acetaminophen can be very dangerous and cause liver damage. Even if you do not have symptoms, it is important to get help right away.  You may get drowsy or dizzy. Do not drive, use machinery, or do anything that needs mental alertness until you know how this medicine affects you. Do not  stand or sit up quickly, especially if you are an older patient. This reduces the risk of dizzy or fainting spells. Alcohol may interfere with the effect of this medicine. Avoid alcoholic drinks.  The medicine will cause constipation. Try to have a bowel movement at least every 2 to 3 days. If you do not have a bowel movement for 3 days, call your doctor or health care professional.  Your mouth may get dry. Chewing sugarless gum or sucking hard candy, and drinking plenty or water may help. Contact your doctor if the problem does not go away or is severe.  NOTE:This sheet is a summary. It may not cover all possible information. If you have questions about this medicine, talk to your doctor, pharmacist, or health care provider. Copyright© 2017 [employer]

## 2018-01-23 NOTE — PLAN OF CARE
Pt states she still has nausea. Dry heaving. Phenergan given IV, per md order. Family remains @ bs. Cont to monitor.

## 2018-01-23 NOTE — PLAN OF CARE
Pt denies pain or discomfort @ this time. Pt states she is ready to be discharged home @ this time. PIV dc'd with tip intact. Dressing placed. Discharge instructions reviewed with patient and family, with time allotted for questions. Pt and family verbalize understanding of instructions and state they have no further questions @ this time. Wheeled out to family's car.

## 2018-01-23 NOTE — PLAN OF CARE
OR called with a delay. I went into room. I spoke with patient and son. I explained that it will most likely be 2.5 hours before she goes to surgery. Pt and son expressed understanding. I gave a meal voucher to son and apologized to both of them for the delay.

## 2018-01-24 NOTE — TELEPHONE ENCOUNTER
"Patient stated that she had surgery today and wants to know if she can use a heating pad. Advised per protocol and she verbalized understanding. Instructed to call back with any additional problems and/or concerns    Reason for Disposition   Other post-op symptom or question (all triage questions negative)    Answer Assessment - Initial Assessment Questions  1. SYMPTOM: "What's the main symptom you're concerned about?" (e.g., pain, fever, vomiting)      pain  2. ONSET: "When did ________  start?"      today  3. SURGERY: "What surgery was performed?"      Endometrial ablation  4. DATE of SURGERY: "When was surgery performed?"       today  5. ANESTHESIA: " What type of anesthesia did you have?" (e.g., general, spinal, epidural, local)      general  6. PAIN: "Is there any pain?" If so, ask: "How bad is it?"  (Scale 1-10; or mild, moderate, severe)      yes  7. FEVER: "Do you have a fever?" If so, ask: "What is your temperature, how was it measured, and when did it start?"      No  8. VOMITING: "Is there any vomiting?" If yes, ask: "How many times?"      Yes but prior to the call  9. BLEEDING: "Is there any bleeding?" If so, ask: "How much?" and "Where?"      Did not ask  10. OTHER SYMPTOMS: "Do you have any other symptoms?" (e.g., drainage from wound, painful urination, constipation)        No    Protocols used: ST POST-OP SYMPTOMS AND CREPIAUBQ-C-CO      "

## 2018-01-24 NOTE — OP NOTE
DATE OF PROCEDURE:  01/23/2018    PREOPERATIVE DIAGNOSIS:  Menometrorrhagia.    POSTOPERATIVE DIAGNOSIS:  Menometrorrhagia.    OPERATIVE PROCEDURE:  D and C, hysteroscopy, endometrial ablation.    SURGEON:  Dheeraj Mosquera M.D.    ANESTHESIA:  General.    PROCEDURE IN DETAIL:  The patient was taken to the Operating Room, prepped and   draped in sterile fashion, placed in dorsal lithotomy position.  Cervix was   grasped with single-tooth tenaculum.  The cervix was dilated using graduated   dilators.  Diagnostic hysteroscopy was performed.  Uterus appeared within normal   limits.  A sharp curettage was performed followed by endometrial ablation using   the NovaSure device as per 's instructions.  The procedure was   uncomplicated.  Procedure was completed.   All instruments were removed from the   cervix.  Good hemostasis noted.  The patient was extubated in the Operating   Room and taken to Recovery Room in stable condition.  There were no   complications.  EBL for the case was minimal.      JARRET/MIGUEL  dd: 01/23/2018 13:32:04 (CST)  td: 01/23/2018 18:04:59 (CST)  Doc ID   #[id number]  Job ID #[id number]    CC:

## 2018-01-26 ENCOUNTER — OFFICE VISIT (OUTPATIENT)
Dept: CARDIOLOGY | Facility: CLINIC | Age: 43
End: 2018-01-26
Payer: MEDICAID

## 2018-01-26 ENCOUNTER — HOSPITAL ENCOUNTER (OUTPATIENT)
Dept: CARDIOLOGY | Facility: CLINIC | Age: 43
Discharge: HOME | End: 2018-01-26
Attending: STUDENT IN AN ORGANIZED HEALTH CARE EDUCATION/TRAINING PROGRAM
Payer: MEDICAID

## 2018-01-26 VITALS
BODY MASS INDEX: 31.39 KG/M2 | OXYGEN SATURATION: 98 % | SYSTOLIC BLOOD PRESSURE: 130 MMHG | HEART RATE: 103 BPM | WEIGHT: 183.88 LBS | DIASTOLIC BLOOD PRESSURE: 65 MMHG | HEIGHT: 64 IN

## 2018-01-26 DIAGNOSIS — I10 HYPERTENSION, UNSPECIFIED TYPE: ICD-10-CM

## 2018-01-26 DIAGNOSIS — I10 ESSENTIAL HYPERTENSION: ICD-10-CM

## 2018-01-26 DIAGNOSIS — R07.9 CHEST PAIN, UNSPECIFIED TYPE: Primary | ICD-10-CM

## 2018-01-26 DIAGNOSIS — R07.2 PRECORDIAL PAIN: ICD-10-CM

## 2018-01-26 DIAGNOSIS — E78.2 MIXED HYPERLIPIDEMIA: ICD-10-CM

## 2018-01-26 DIAGNOSIS — R00.2 HEART PALPITATIONS: ICD-10-CM

## 2018-01-26 LAB
RETIRED EF AND QEF - SEE NOTES: 65 (ref 55–65)
TRICUSPID VALVE REGURGITATION: NORMAL

## 2018-01-26 PROCEDURE — 99214 OFFICE O/P EST MOD 30 MIN: CPT | Mod: PBBFAC,25 | Performed by: STUDENT IN AN ORGANIZED HEALTH CARE EDUCATION/TRAINING PROGRAM

## 2018-01-26 PROCEDURE — 93306 TTE W/DOPPLER COMPLETE: CPT | Mod: PBBFAC | Performed by: INTERNAL MEDICINE

## 2018-01-26 PROCEDURE — 99999 PR PBB SHADOW E&M-EST. PATIENT-LVL IV: CPT | Mod: PBBFAC,,, | Performed by: STUDENT IN AN ORGANIZED HEALTH CARE EDUCATION/TRAINING PROGRAM

## 2018-01-26 PROCEDURE — 99205 OFFICE O/P NEW HI 60 MIN: CPT | Mod: S$PBB,,, | Performed by: STUDENT IN AN ORGANIZED HEALTH CARE EDUCATION/TRAINING PROGRAM

## 2018-01-26 NOTE — PROGRESS NOTES
Subjective:    Patient ID:  Heather Gomez is a 42 y.o. female who presents for evaluation of Chest Pain (tightness); Shortness of Breath (with exertion ); and Atrial Flutter      HPI  I had the pleasure to meet Ms Gomez today in the office who came in for evaluation of SOB and palpitations.     She has medical history significant for HTN, DLD, DM2 and DVT/ bilateral PE in June 2017 in context with recent initiation of Nuvaring and progesterone, uterine fibroids s/p ablation who comes today for evaluation of palpitations and SOB.     Upon interview, patient reported chronic RIZO and sharp-like chest pain upon moderate exertion and NYHA class III symptoms upon less than one block. She reports that symptoms are residual from the PE episode; more recently she started to notice fluttering sensation of her chest mainly related to change in position. Reports intermittent nauseas with the symptoms of angina and palpitations.     Patient reports early CAD in her family; father had heart attack at the age of 50's. He also had DVT at early age too. She denies previous cardiac history. Denies tobacco or illicit drugs.     She is currently followed by her hematologist for the PE episode. She will complete 6 months of treatment and will follow up with him again next month for evaluation of prolonged therapy.       Review of Systems   Constitution: Negative.   HENT: Negative.    Eyes: Negative.    Cardiovascular: Positive for chest pain, dyspnea on exertion, irregular heartbeat and palpitations. Negative for claudication, cyanosis, leg swelling, near-syncope, orthopnea, paroxysmal nocturnal dyspnea and syncope.   Respiratory: Positive for shortness of breath. Negative for cough, hemoptysis, sleep disturbances due to breathing, snoring, sputum production and wheezing.    Endocrine: Negative.    Genitourinary: Negative.         Objective:    Physical Exam   Constitutional: She is oriented to person, place, and time. She appears  well-developed and well-nourished.   HENT:   Head: Normocephalic and atraumatic.   Eyes: Conjunctivae are normal.   Neck: Neck supple. No JVD present. No thyromegaly present.   Cardiovascular: Normal rate, regular rhythm, normal heart sounds and intact distal pulses.  Exam reveals no gallop and no friction rub.    No murmur heard.  Pulmonary/Chest: Effort normal and breath sounds normal. No respiratory distress. She has no wheezes. She has no rales.   Abdominal: Soft. Bowel sounds are normal. She exhibits no distension. There is no tenderness. There is no rebound.   Musculoskeletal: Normal range of motion.   Neurological: She is oriented to person, place, and time.   Skin: Skin is warm.   Nursing note and vitals reviewed.        Assessment:       1. Essential hypertension    2. Mixed hyperlipidemia    3. Heart palpitations    4. Precordial pain         Plan:       HTN (hypertension)  -Well controlled today  -should continue current medical therapy   -instructed keep bp log at home and bring it for the next appointment  -low salt diet    Hyperlipidemia  -Should continue current medical therapy   -Check lipids in 6 weeks    Heart palpitations  -Will obtain 48 hour holter monitor    Precordial pain  -Will get NM pharmacological stress test   -Will obtain 2D echo with CFD to evaluate RV and LV function.     Navdeep Ybarra MD  Cardiology Fellow, PGY VI  Pager: [phone]  1/26/2018 3:17 PM

## 2018-01-26 NOTE — PROGRESS NOTES
I have personally interviewed and examined the patient. I have reviewed the notes, assessments and plans performed by Dr Ybarra and I CONCUR with his documentation of Heather Gomez.

## 2018-01-26 NOTE — ASSESSMENT & PLAN NOTE
-Will get NM pharmacological stress test   -Will obtain 2D echo with CFD to evaluate RV and LV function.

## 2018-01-26 NOTE — ASSESSMENT & PLAN NOTE
-Well controlled today  -should continue current medical therapy   -instructed keep bp log at home and bring it for the next appointment  -low salt diet

## 2018-01-31 ENCOUNTER — CLINICAL SUPPORT (OUTPATIENT)
Dept: CARDIOLOGY | Facility: CLINIC | Age: 43
End: 2018-01-31
Attending: STUDENT IN AN ORGANIZED HEALTH CARE EDUCATION/TRAINING PROGRAM
Payer: MEDICAID

## 2018-01-31 DIAGNOSIS — I10 ESSENTIAL HYPERTENSION: ICD-10-CM

## 2018-01-31 DIAGNOSIS — R07.2 PRECORDIAL PAIN: ICD-10-CM

## 2018-01-31 PROCEDURE — 93226 XTRNL ECG REC<48 HR SCAN A/R: CPT | Mod: PBBFAC,PO | Performed by: INTERNAL MEDICINE

## 2018-01-31 PROCEDURE — 78452 HT MUSCLE IMAGE SPECT MULT: CPT | Mod: PBBFAC | Performed by: INTERNAL MEDICINE

## 2018-01-31 PROCEDURE — 93016 CV STRESS TEST SUPVJ ONLY: CPT | Mod: S$PBB,,, | Performed by: INTERNAL MEDICINE

## 2018-01-31 PROCEDURE — 93227 XTRNL ECG REC<48 HR R&I: CPT | Mod: S$PBB,,, | Performed by: INTERNAL MEDICINE

## 2018-01-31 PROCEDURE — 93018 CV STRESS TEST I&R ONLY: CPT | Mod: S$PBB,,, | Performed by: INTERNAL MEDICINE

## 2018-02-06 ENCOUNTER — OFFICE VISIT (OUTPATIENT)
Dept: HEMATOLOGY/ONCOLOGY | Facility: CLINIC | Age: 43
End: 2018-02-06
Payer: MEDICAID

## 2018-02-06 VITALS
OXYGEN SATURATION: 98 % | HEART RATE: 103 BPM | HEIGHT: 61 IN | DIASTOLIC BLOOD PRESSURE: 92 MMHG | SYSTOLIC BLOOD PRESSURE: 142 MMHG | BODY MASS INDEX: 34.39 KG/M2 | WEIGHT: 182.13 LBS

## 2018-02-06 DIAGNOSIS — Z79.01 ANTICOAGULATED: ICD-10-CM

## 2018-02-06 DIAGNOSIS — D50.0 IRON DEFICIENCY ANEMIA DUE TO CHRONIC BLOOD LOSS: Primary | ICD-10-CM

## 2018-02-06 DIAGNOSIS — N93.9 ABNORMAL UTERINE BLEEDING: ICD-10-CM

## 2018-02-06 DIAGNOSIS — I82.431 ACUTE DEEP VEIN THROMBOSIS (DVT) OF POPLITEAL VEIN OF RIGHT LOWER EXTREMITY: ICD-10-CM

## 2018-02-06 DIAGNOSIS — I26.92 ACUTE SADDLE PULMONARY EMBOLISM WITHOUT ACUTE COR PULMONALE: ICD-10-CM

## 2018-02-06 PROCEDURE — 3008F BODY MASS INDEX DOCD: CPT | Mod: ,,, | Performed by: INTERNAL MEDICINE

## 2018-02-06 PROCEDURE — 99999 PR PBB SHADOW E&M-EST. PATIENT-LVL III: CPT | Mod: PBBFAC,,, | Performed by: INTERNAL MEDICINE

## 2018-02-06 PROCEDURE — 99213 OFFICE O/P EST LOW 20 MIN: CPT | Mod: PBBFAC,PO | Performed by: INTERNAL MEDICINE

## 2018-02-06 PROCEDURE — 99214 OFFICE O/P EST MOD 30 MIN: CPT | Mod: S$PBB,,, | Performed by: INTERNAL MEDICINE

## 2018-02-06 RX ORDER — SODIUM CHLORIDE 0.9 % (FLUSH) 0.9 %
10 SYRINGE (ML) INJECTION
Status: CANCELLED | OUTPATIENT
Start: 2018-02-21

## 2018-02-06 RX ORDER — HEPARIN 100 UNIT/ML
500 SYRINGE INTRAVENOUS
Status: CANCELLED | OUTPATIENT
Start: 2018-02-21

## 2018-02-06 RX ORDER — SODIUM CHLORIDE 0.9 % (FLUSH) 0.9 %
10 SYRINGE (ML) INJECTION
Status: CANCELLED | OUTPATIENT
Start: 2018-03-01

## 2018-02-06 RX ORDER — HEPARIN 100 UNIT/ML
500 SYRINGE INTRAVENOUS
Status: CANCELLED | OUTPATIENT
Start: 2018-03-01

## 2018-02-06 NOTE — PROGRESS NOTES
Subjective:       Patient ID: Heather Gomez is a 42 y.o. female.    Chief Complaint: Anemia    Anemia   There has been no abdominal pain, bruising/bleeding easily, confusion, fever or palpitations.        Ms.Sarah Gomez is here for f/u of DVT and PE.     She was on ethinyl estradiol ring for abnormal menstrual bleeding and in April a progesterone pill (Provera 10 milligrams) was added control the bleeding.  She then started to experience pain in the back of her leg and calf which got worse to the point that she started to experience uncontrolled pain in the foot which was going on for 2 weeks and due to this she presented to the ER and on 6/20/17 she was noted to have a positive DVT in the right popliteal vein.  She was started on Xarelto and sent home but presented to the ER the next day because she was having pain in the abdominal area associated with dyspnea at which time a CT scan of the chest was done that revealed large burden of central pulmonary thromboembolism located at the bifurcation of the right main pulmonary artery and left main pulmonary artery and extending into all the lobes.    She is now seeing Dr. lCayton (OB/GYN) for further evaluation of her gynecological condition. A hysterectomy is being recommended.    She is now on Xarelto.  She is here for follow-up.      She is off her Provera.    Due to iron deficiency anemia she received 2 doses of IV iron therapy in August 2017    She continues to have atypical chest discomfort with minimal activity - cardiac etiology ruled out    Underwent D&C, endometrial ablation on 1/23/18    Review of Systems   Constitutional: Negative for fatigue, fever and unexpected weight change.   HENT: Negative for mouth sores and nosebleeds.    Respiratory: Positive for chest tightness. Negative for shortness of breath and wheezing.    Cardiovascular: Negative for palpitations and leg swelling.   Gastrointestinal: Negative for abdominal pain and nausea.   Genitourinary:  Positive for menstrual problem. Negative for difficulty urinating.   Neurological: Negative for seizures and headaches.   Hematological: Negative for adenopathy. Does not bruise/bleed easily.   Psychiatric/Behavioral: Negative for behavioral problems and confusion. The patient is nervous/anxious.    All other systems reviewed and are negative.        Objective:      Physical Exam   Constitutional: She is oriented to person, place, and time. She appears well-developed and well-nourished. No distress.   HENT:   Mouth/Throat: Oropharynx is clear and moist and mucous membranes are normal. Mucous membranes are not pale. No oropharyngeal exudate.   Eyes: Conjunctivae are normal. No scleral icterus.   Neck: Normal range of motion. Neck supple. No thyroid mass (Thyroid area is non-tender) and no thyromegaly present.   Cardiovascular: Normal rate and regular rhythm.  Exam reveals no friction rub.    Pulmonary/Chest: Effort normal and breath sounds normal. No accessory muscle usage or stridor. No respiratory distress. She has no wheezes.   Abdominal: She exhibits no ascites and no mass. There is no hepatosplenomegaly. There is no tenderness.   Musculoskeletal: She exhibits no edema.   No varicosities noted.   Lymphadenopathy:     She has no cervical adenopathy.        Right: No supraclavicular adenopathy present.        Left: No supraclavicular adenopathy present.   Neurological: She is alert and oriented to person, place, and time.   Psychiatric: She has a normal mood and affect. Judgment and thought content normal. Cognition and memory are normal. She exhibits normal recent memory and normal remote memory.         Assessment:       1. Iron deficiency anemia due to chronic blood loss    2. Anticoagulated    3. Acute saddle pulmonary embolism without acute cor pulmonale    4. Acute deep vein thrombosis (DVT) of popliteal vein of right lower extremity    5. Abnormal uterine bleeding        Plan:   Iron deficiency noted.  She  recently underwent endometrial ablation.  Due to ongoing deficiency will administer 2 doses of weekly INJECTAFER infusions.    Follow up in 3 months with CBC and iron studies.    With regards to anticoagulation, she is tolerating it well, will have her continue it at least for 2 years TOTAL given the severity of her presentation and then discuss with her about the pros and cons of discontinuing gait at that time.

## 2018-02-08 DIAGNOSIS — I82.431 ACUTE DEEP VEIN THROMBOSIS (DVT) OF POPLITEAL VEIN OF RIGHT LOWER EXTREMITY: ICD-10-CM

## 2018-02-08 RX ORDER — RIVAROXABAN 20 MG/1
20 TABLET, FILM COATED ORAL
Qty: 30 TABLET | Refills: 1 | Status: SHIPPED | OUTPATIENT
Start: 2018-02-08 | End: 2018-04-12 | Stop reason: SDUPTHER

## 2018-02-15 ENCOUNTER — TELEPHONE (OUTPATIENT)
Dept: HEMATOLOGY/ONCOLOGY | Facility: CLINIC | Age: 43
End: 2018-02-15

## 2018-02-15 NOTE — TELEPHONE ENCOUNTER
Spoke to [facility]. This nurse did not receive previous PA request, will re-send.     ----- Message from Valentine Johnson sent at 2/15/2018  3:08 PM CST -----  Contact: [facility] ([facility]  x_  1st Request  _  2nd Request  _  3rd Request    Who: [facility] ([facility])    Why: [facility]  checking on the status of the prior authorization for XARELTO 20 mg Tab    What Number to Call Back:[phone]    When to Expect a call back: (Within 24 hours)    Please return the call at earliest convenience. Thanks!

## 2018-02-19 ENCOUNTER — TELEPHONE (OUTPATIENT)
Dept: INFUSION THERAPY | Facility: HOSPITAL | Age: 43
End: 2018-02-19

## 2018-02-19 NOTE — TELEPHONE ENCOUNTER
Called and spoke to Ms. Gomez as she was scheduled to start Injectafer today and did not show up. She told me the infusions were supposed to be set up at [facility] as that is where she wants to go. I cancelled ehr appts here in [place], and notified Criselda Rollins MA in Dr. Mahoney's office of patient's request and asked her to facilitate Ms. Gomez getting ehr infusions at [facility].

## 2018-03-09 DIAGNOSIS — E11.65 TYPE 2 DIABETES MELLITUS WITH HYPERGLYCEMIA, WITHOUT LONG-TERM CURRENT USE OF INSULIN: ICD-10-CM

## 2018-03-09 RX ORDER — INSULIN LISPRO 100 [IU]/ML
INJECTION, SOLUTION INTRAVENOUS; SUBCUTANEOUS
Qty: 15 ML | Status: CANCELLED | OUTPATIENT
Start: 2018-03-09

## 2018-03-09 RX ORDER — INSULIN ASPART 100 [IU]/ML
9 INJECTION, SOLUTION INTRAVENOUS; SUBCUTANEOUS 3 TIMES DAILY
Qty: 1 VIAL | Refills: 6 | Status: SHIPPED | OUTPATIENT
Start: 2018-03-09 | End: 2018-04-12 | Stop reason: CLARIF

## 2018-04-05 RX ORDER — INSULIN LISPRO 100 [IU]/ML
INJECTION, SOLUTION INTRAVENOUS; SUBCUTANEOUS
Qty: 15 ML | OUTPATIENT
Start: 2018-04-05

## 2018-04-10 RX ORDER — INSULIN LISPRO 100 [IU]/ML
INJECTION, SOLUTION INTRAVENOUS; SUBCUTANEOUS
Qty: 15 ML | OUTPATIENT
Start: 2018-04-10

## 2018-04-12 ENCOUNTER — TELEPHONE (OUTPATIENT)
Dept: ENDOCRINOLOGY | Facility: CLINIC | Age: 43
End: 2018-04-12

## 2018-04-12 DIAGNOSIS — I82.431 ACUTE DEEP VEIN THROMBOSIS (DVT) OF POPLITEAL VEIN OF RIGHT LOWER EXTREMITY: ICD-10-CM

## 2018-04-12 RX ORDER — INSULIN LISPRO 100 [IU]/ML
15 INJECTION, SOLUTION INTRAVENOUS; SUBCUTANEOUS 3 TIMES DAILY
Qty: 15 ML | Refills: 3 | Status: SHIPPED | OUTPATIENT
Start: 2018-04-12 | End: 2019-07-09 | Stop reason: CLARIF

## 2018-04-12 NOTE — TELEPHONE ENCOUNTER
Patient notified via phone to discuss prescription refills. The patient stated she is taking Humalog 15 units tid ac meals. She also stated that she self adjusted Basaglar and is now taking 30 units bid. Informed patient that insulin therapy is mismatched. Informed patient that we need to see her back in clinic to adjust regimen. Patient denies hypoglycemia at this time. Informed patient to smbg 4 x daily and send logs in 2 weeks for review and adjustments. We will also arrange office followup appt.

## 2018-05-04 ENCOUNTER — TELEPHONE (OUTPATIENT)
Dept: HEMATOLOGY/ONCOLOGY | Facility: CLINIC | Age: 43
End: 2018-05-04

## 2018-05-29 ENCOUNTER — OFFICE VISIT (OUTPATIENT)
Dept: HEMATOLOGY/ONCOLOGY | Facility: CLINIC | Age: 43
End: 2018-05-29
Payer: MEDICAID

## 2018-05-29 VITALS
DIASTOLIC BLOOD PRESSURE: 82 MMHG | SYSTOLIC BLOOD PRESSURE: 137 MMHG | RESPIRATION RATE: 20 BRPM | WEIGHT: 191.81 LBS | BODY MASS INDEX: 32.74 KG/M2 | HEIGHT: 64 IN | OXYGEN SATURATION: 95 % | HEART RATE: 113 BPM | TEMPERATURE: 98 F

## 2018-05-29 DIAGNOSIS — Z79.01 ANTICOAGULATED: ICD-10-CM

## 2018-05-29 DIAGNOSIS — I26.02 SADDLE EMBOLUS OF PULMONARY ARTERY WITH ACUTE COR PULMONALE, UNSPECIFIED CHRONICITY: ICD-10-CM

## 2018-05-29 DIAGNOSIS — D50.0 IRON DEFICIENCY ANEMIA DUE TO CHRONIC BLOOD LOSS: Primary | ICD-10-CM

## 2018-05-29 PROCEDURE — 99214 OFFICE O/P EST MOD 30 MIN: CPT | Mod: S$PBB,,, | Performed by: INTERNAL MEDICINE

## 2018-05-29 PROCEDURE — 99213 OFFICE O/P EST LOW 20 MIN: CPT | Mod: PBBFAC,PO | Performed by: INTERNAL MEDICINE

## 2018-05-29 PROCEDURE — 99999 PR PBB SHADOW E&M-EST. PATIENT-LVL III: CPT | Mod: PBBFAC,,, | Performed by: INTERNAL MEDICINE

## 2018-05-29 RX ORDER — BLOOD-GLUCOSE CONTROL, NORMAL
EACH MISCELLANEOUS
Refills: 11 | COMMUNITY
Start: 2018-02-20 | End: 2022-02-09

## 2018-05-29 RX ORDER — ONDANSETRON 4 MG/1
TABLET, ORALLY DISINTEGRATING ORAL
Refills: 0 | COMMUNITY
Start: 2018-05-09 | End: 2021-09-25 | Stop reason: SDUPTHER

## 2018-05-29 RX ORDER — LORATADINE 10 MG/1
TABLET ORAL
COMMUNITY
Start: 2018-04-05 | End: 2019-07-09

## 2018-05-29 RX ORDER — TRIAMCINOLONE ACETONIDE 1 MG/G
CREAM TOPICAL
Refills: 2 | COMMUNITY
Start: 2018-04-19 | End: 2021-09-25 | Stop reason: ALTCHOICE

## 2018-05-29 RX ORDER — VITAMIN A 3000 MCG
CAPSULE ORAL
Refills: 0 | COMMUNITY
Start: 2018-04-05 | End: 2019-07-09

## 2018-05-29 RX ORDER — LACTULOSE 10 G/15ML
SOLUTION ORAL; RECTAL
COMMUNITY
Start: 2018-04-05 | End: 2021-07-14

## 2018-05-29 NOTE — PROGRESS NOTES
Subjective:       Patient ID: Heather Gomez is a 42 y.o. female.    Chief Complaint: No chief complaint on file.    Anemia   There has been no abdominal pain, bruising/bleeding easily, confusion, fever or palpitations.        Ms.Sarah Gomez is here for f/u of DVT and PE.     She was on ethinyl estradiol ring for abnormal menstrual bleeding and in April a progesterone pill (Provera 10 milligrams) was added control the bleeding.  She then started to experience pain in the back of her leg and calf which got worse to the point that she started to experience uncontrolled pain in the foot which was going on for 2 weeks and due to this she presented to the ER and on 6/20/17 she was noted to have a positive DVT in the right popliteal vein.  She was started on Xarelto and sent home but presented to the ER the next day because she was having pain in the abdominal area associated with dyspnea at which time a CT scan of the chest was done that revealed large burden of central pulmonary thromboembolism located at the bifurcation of the right main pulmonary artery and left main pulmonary artery and extending into all the lobes.    She is now seeing Dr. Clayton (OB/GYN) for further evaluation of her gynecological condition. A hysterectomy is being recommended.    She is now on Xarelto.  She is here for follow-up.      She is off her Provera.    Due to iron deficiency anemia she received 2 doses of IV iron therapy in August 2017 and 2 more in march 2018    She continues to have atypical chest discomfort with minimal activity - cardiac etiology ruled out    Underwent D&C, endometrial ablation on 1/23/18    Review of Systems   Constitutional: Negative for fatigue, fever and unexpected weight change.   HENT: Negative for mouth sores and nosebleeds.    Respiratory: Positive for chest tightness. Negative for shortness of breath and wheezing.    Cardiovascular: Negative for palpitations and leg swelling.   Gastrointestinal: Negative  for abdominal pain and nausea.   Genitourinary: Negative for difficulty urinating and menstrual problem.   Neurological: Negative for seizures and headaches.   Hematological: Negative for adenopathy. Does not bruise/bleed easily.   Psychiatric/Behavioral: Negative for behavioral problems and confusion. The patient is nervous/anxious.    All other systems reviewed and are negative.        Objective:      Physical Exam   Constitutional: She is oriented to person, place, and time. She appears well-developed and well-nourished. No distress.   HENT:   Mouth/Throat: Oropharynx is clear and moist and mucous membranes are normal. Mucous membranes are not pale. No oropharyngeal exudate.   Eyes: Conjunctivae are normal. No scleral icterus.   Neck: Normal range of motion. Neck supple. No thyroid mass (Thyroid area is non-tender) and no thyromegaly present.   Cardiovascular: Normal rate and regular rhythm.  Exam reveals no friction rub.    Pulmonary/Chest: Effort normal and breath sounds normal. No accessory muscle usage or stridor. No respiratory distress. She has no wheezes.   Abdominal: She exhibits no ascites and no mass. There is no hepatosplenomegaly. There is no tenderness.   Musculoskeletal: She exhibits no edema.   No varicosities noted.   Lymphadenopathy:     She has no cervical adenopathy.        Right: No supraclavicular adenopathy present.        Left: No supraclavicular adenopathy present.   Neurological: She is alert and oriented to person, place, and time.   Psychiatric: She has a normal mood and affect. Judgment and thought content normal. Cognition and memory are normal. She exhibits normal recent memory and normal remote memory.         Assessment:       1. Iron deficiency anemia due to chronic blood loss    2. Anticoagulated    3. Saddle embolus of pulmonary artery with acute cor pulmonale, unspecified chronicity        Plan:   Labs reviewed.  Iron levels are good.  No need further IV iron therapy for  now.    Due to ongoing anxiety and on/off chest discomfort, we will recheck CT chest to rule out recurrence of PE.    Follow up in 6 months with CBC and iron studies.    With regards to anticoagulation, she is tolerating it well, will have her continue it at least for 2 years TOTAL given the severity of her presentation and then discuss with her about the pros and cons of discontinuing gait at that time.

## 2018-05-30 ENCOUNTER — TELEPHONE (OUTPATIENT)
Dept: ENDOCRINOLOGY | Facility: CLINIC | Age: 43
End: 2018-05-30

## 2018-05-30 NOTE — TELEPHONE ENCOUNTER
----- Message from Sushma Loomis sent at 5/30/2018  9:47 AM CDT -----  Contact: [facility] - [phone]  .Medication question / problems.  PT needs a prior authorization completed for insulin lispro (HUMALOG KWIKPEN INSULIN) 100 unit/mL InPn pen    ..  [facility] [place] - [address]  [address]  [place] [address]  Phone: [phone] Fax: [phone]

## 2018-07-23 ENCOUNTER — TELEPHONE (OUTPATIENT)
Dept: HEMATOLOGY/ONCOLOGY | Facility: CLINIC | Age: 43
End: 2018-07-23

## 2018-07-23 NOTE — TELEPHONE ENCOUNTER
Pt states she has new insurance, and wants to schedule CTA after insurance updated. Pt will fax this nurse her insurance information tomorrow, 7/24/18 to update.

## 2018-07-25 ENCOUNTER — TELEPHONE (OUTPATIENT)
Dept: HEMATOLOGY/ONCOLOGY | Facility: CLINIC | Age: 43
End: 2018-07-25

## 2018-08-01 ENCOUNTER — TELEPHONE (OUTPATIENT)
Dept: HEMATOLOGY/ONCOLOGY | Facility: CLINIC | Age: 43
End: 2018-08-01

## 2018-08-01 ENCOUNTER — HOSPITAL ENCOUNTER (OUTPATIENT)
Dept: RADIOLOGY | Facility: HOSPITAL | Age: 43
Discharge: HOME | End: 2018-08-01
Attending: INTERNAL MEDICINE
Payer: COMMERCIAL

## 2018-08-01 DIAGNOSIS — I26.02 SADDLE EMBOLUS OF PULMONARY ARTERY WITH ACUTE COR PULMONALE, UNSPECIFIED CHRONICITY: ICD-10-CM

## 2018-08-01 PROCEDURE — 71275 CT ANGIOGRAPHY CHEST: CPT | Mod: 26,,, | Performed by: RADIOLOGY

## 2018-08-01 PROCEDURE — 71275 CT ANGIOGRAPHY CHEST: CPT | Mod: TC

## 2018-08-01 PROCEDURE — 25500020 PHARM REV CODE 255: Performed by: INTERNAL MEDICINE

## 2018-08-01 RX ADMIN — IOHEXOL 100 ML: 350 INJECTION, SOLUTION INTRAVENOUS at 11:08

## 2018-08-01 NOTE — TELEPHONE ENCOUNTER
Pt made aware. Verbalized understanding.     ----- Message from Andrae Mahoney MD sent at 8/1/2018  1:45 PM CDT -----  Her CT chest came out fine. No blood clot. Pls inform pt.

## 2018-08-15 DIAGNOSIS — I82.431 ACUTE DEEP VEIN THROMBOSIS (DVT) OF POPLITEAL VEIN OF RIGHT LOWER EXTREMITY: ICD-10-CM

## 2019-03-11 ENCOUNTER — PATIENT MESSAGE (OUTPATIENT)
Dept: HEMATOLOGY/ONCOLOGY | Facility: CLINIC | Age: 44
End: 2019-03-11

## 2019-04-04 ENCOUNTER — OFFICE VISIT (OUTPATIENT)
Dept: HEMATOLOGY/ONCOLOGY | Facility: CLINIC | Age: 44
End: 2019-04-04
Payer: COMMERCIAL

## 2019-04-04 VITALS
HEART RATE: 106 BPM | RESPIRATION RATE: 16 BRPM | TEMPERATURE: 97 F | DIASTOLIC BLOOD PRESSURE: 69 MMHG | SYSTOLIC BLOOD PRESSURE: 120 MMHG | BODY MASS INDEX: 31.14 KG/M2 | OXYGEN SATURATION: 96 % | WEIGHT: 181.44 LBS

## 2019-04-04 DIAGNOSIS — Z79.01 ANTICOAGULATED: ICD-10-CM

## 2019-04-04 DIAGNOSIS — I26.02 SADDLE EMBOLUS OF PULMONARY ARTERY WITH ACUTE COR PULMONALE, UNSPECIFIED CHRONICITY: Primary | ICD-10-CM

## 2019-04-04 DIAGNOSIS — D50.0 IRON DEFICIENCY ANEMIA DUE TO CHRONIC BLOOD LOSS: ICD-10-CM

## 2019-04-04 DIAGNOSIS — E55.9 VITAMIN D DEFICIENCY: ICD-10-CM

## 2019-04-04 DIAGNOSIS — Z12.39 SCREENING BREAST EXAMINATION: Primary | ICD-10-CM

## 2019-04-04 PROCEDURE — 3074F PR MOST RECENT SYSTOLIC BLOOD PRESSURE < 130 MM HG: ICD-10-PCS | Mod: CPTII,S$GLB,, | Performed by: INTERNAL MEDICINE

## 2019-04-04 PROCEDURE — 99214 PR OFFICE/OUTPT VISIT, EST, LEVL IV, 30-39 MIN: ICD-10-PCS | Mod: S$GLB,,, | Performed by: INTERNAL MEDICINE

## 2019-04-04 PROCEDURE — 3078F PR MOST RECENT DIASTOLIC BLOOD PRESSURE < 80 MM HG: ICD-10-PCS | Mod: CPTII,S$GLB,, | Performed by: INTERNAL MEDICINE

## 2019-04-04 PROCEDURE — 99999 PR PBB SHADOW E&M-EST. PATIENT-LVL III: ICD-10-PCS | Mod: PBBFAC,,, | Performed by: INTERNAL MEDICINE

## 2019-04-04 PROCEDURE — 3078F DIAST BP <80 MM HG: CPT | Mod: CPTII,S$GLB,, | Performed by: INTERNAL MEDICINE

## 2019-04-04 PROCEDURE — 3074F SYST BP LT 130 MM HG: CPT | Mod: CPTII,S$GLB,, | Performed by: INTERNAL MEDICINE

## 2019-04-04 PROCEDURE — 3008F PR BODY MASS INDEX (BMI) DOCUMENTED: ICD-10-PCS | Mod: CPTII,S$GLB,, | Performed by: INTERNAL MEDICINE

## 2019-04-04 PROCEDURE — 99214 OFFICE O/P EST MOD 30 MIN: CPT | Mod: S$GLB,,, | Performed by: INTERNAL MEDICINE

## 2019-04-04 PROCEDURE — 99999 PR PBB SHADOW E&M-EST. PATIENT-LVL III: CPT | Mod: PBBFAC,,, | Performed by: INTERNAL MEDICINE

## 2019-04-04 PROCEDURE — 3008F BODY MASS INDEX DOCD: CPT | Mod: CPTII,S$GLB,, | Performed by: INTERNAL MEDICINE

## 2019-04-04 RX ORDER — INSULIN ASPART 100 [IU]/ML
INJECTION, SOLUTION INTRAVENOUS; SUBCUTANEOUS
Refills: 5 | COMMUNITY
Start: 2019-04-02 | End: 2022-02-09

## 2019-04-04 RX ORDER — DULAGLUTIDE 0.75 MG/.5ML
INJECTION, SOLUTION SUBCUTANEOUS
Refills: 5 | COMMUNITY
Start: 2019-03-08 | End: 2022-02-09

## 2019-04-04 RX ORDER — CHOLECALCIFEROL (VITAMIN D3) 1250 MCG
50000 TABLET ORAL WEEKLY
Qty: 12 TABLET | Refills: 3 | Status: SHIPPED | OUTPATIENT
Start: 2019-04-04 | End: 2021-03-17

## 2019-04-18 ENCOUNTER — HOSPITAL ENCOUNTER (OUTPATIENT)
Dept: RADIOLOGY | Facility: HOSPITAL | Age: 44
Discharge: HOME | End: 2019-04-18
Attending: SPECIALIST
Payer: COMMERCIAL

## 2019-04-18 DIAGNOSIS — R92.8 ABNORMAL MAMMOGRAM: ICD-10-CM

## 2019-04-18 PROCEDURE — 76642 ULTRASOUND BREAST LIMITED: CPT | Mod: TC,RT

## 2019-04-18 PROCEDURE — 77061 BREAST TOMOSYNTHESIS UNI: CPT | Mod: TC

## 2019-04-18 PROCEDURE — 77065 MAMMO DIGITAL DIAGNOSTIC RIGHT WITH TOMOSYNTHESIS_CAD: ICD-10-PCS | Mod: 26,,, | Performed by: RADIOLOGY

## 2019-04-18 PROCEDURE — 77061 BREAST TOMOSYNTHESIS UNI: CPT | Mod: 26,,, | Performed by: RADIOLOGY

## 2019-04-18 PROCEDURE — 77065 DX MAMMO INCL CAD UNI: CPT | Mod: TC

## 2019-04-18 PROCEDURE — 77061 MAMMO DIGITAL DIAGNOSTIC RIGHT WITH TOMOSYNTHESIS_CAD: ICD-10-PCS | Mod: 26,,, | Performed by: RADIOLOGY

## 2019-04-18 PROCEDURE — 76642 ULTRASOUND BREAST LIMITED: CPT | Mod: 26,RT,, | Performed by: RADIOLOGY

## 2019-04-18 PROCEDURE — 76642 US BREAST RIGHT LIMITED: ICD-10-PCS | Mod: 26,RT,, | Performed by: RADIOLOGY

## 2019-04-18 PROCEDURE — 77065 DX MAMMO INCL CAD UNI: CPT | Mod: 26,,, | Performed by: RADIOLOGY

## 2019-04-22 ENCOUNTER — HOSPITAL ENCOUNTER (OUTPATIENT)
Dept: RADIOLOGY | Facility: HOSPITAL | Age: 44
Discharge: HOME | End: 2019-04-22
Attending: SPECIALIST
Payer: COMMERCIAL

## 2019-04-22 DIAGNOSIS — R92.8 ABNORMAL FINDING ON BREAST IMAGING: ICD-10-CM

## 2019-04-22 PROCEDURE — 19083 US BREAST BIOPSY WITH IMAGING 1ST SITE RIGHT: ICD-10-PCS | Mod: RT,,, | Performed by: RADIOLOGY

## 2019-04-22 PROCEDURE — 88305 TISSUE EXAM BY PATHOLOGIST: CPT | Mod: 26,,, | Performed by: PATHOLOGY

## 2019-04-22 PROCEDURE — 88342 TISSUE SPECIMEN TO PATHOLOGY, RADIOLOGY: ICD-10-PCS | Mod: 26,,, | Performed by: PATHOLOGY

## 2019-04-22 PROCEDURE — 19083 BX BREAST 1ST LESION US IMAG: CPT | Mod: RT,,, | Performed by: RADIOLOGY

## 2019-04-22 PROCEDURE — 88341 TISSUE SPECIMEN TO PATHOLOGY, RADIOLOGY: ICD-10-PCS | Mod: 26,,, | Performed by: PATHOLOGY

## 2019-04-22 PROCEDURE — 88342 IMHCHEM/IMCYTCHM 1ST ANTB: CPT | Mod: 26,,, | Performed by: PATHOLOGY

## 2019-04-22 PROCEDURE — 88305 TISSUE EXAM BY PATHOLOGIST: CPT | Performed by: PATHOLOGY

## 2019-04-22 PROCEDURE — 77065 DX MAMMO INCL CAD UNI: CPT | Mod: 26,RT,, | Performed by: RADIOLOGY

## 2019-04-22 PROCEDURE — 88305 TISSUE SPECIMEN TO PATHOLOGY, RADIOLOGY: ICD-10-PCS | Mod: 26,,, | Performed by: PATHOLOGY

## 2019-04-22 PROCEDURE — 88341 IMHCHEM/IMCYTCHM EA ADD ANTB: CPT | Mod: 26,,, | Performed by: PATHOLOGY

## 2019-04-22 PROCEDURE — 77065 MAMMO DIGITAL DIAGNOSTIC RIGHT WITH CAD: ICD-10-PCS | Mod: 26,RT,, | Performed by: RADIOLOGY

## 2019-04-22 PROCEDURE — 77065 DX MAMMO INCL CAD UNI: CPT | Mod: TC,RT

## 2019-04-22 PROCEDURE — 19083 BX BREAST 1ST LESION US IMAG: CPT | Mod: TC,RT

## 2019-04-22 PROCEDURE — 27201044 US BREAST BIOPSY WITH IMAGING 1ST SITE RIGHT

## 2019-04-26 ENCOUNTER — TELEPHONE (OUTPATIENT)
Dept: RADIOLOGY | Facility: HOSPITAL | Age: 44
End: 2019-04-26

## 2019-07-08 ENCOUNTER — ANESTHESIA EVENT (OUTPATIENT)
Dept: SURGERY | Facility: HOSPITAL | Age: 44
End: 2019-07-08
Payer: COMMERCIAL

## 2019-07-09 ENCOUNTER — OFFICE VISIT (OUTPATIENT)
Dept: HEMATOLOGY/ONCOLOGY | Facility: CLINIC | Age: 44
End: 2019-07-09
Payer: COMMERCIAL

## 2019-07-09 ENCOUNTER — HOSPITAL ENCOUNTER (OUTPATIENT)
Dept: PREADMISSION TESTING | Facility: HOSPITAL | Age: 44
Discharge: HOME | End: 2019-07-09
Attending: SPECIALIST
Payer: COMMERCIAL

## 2019-07-09 VITALS
HEIGHT: 64 IN | OXYGEN SATURATION: 95 % | RESPIRATION RATE: 16 BRPM | HEART RATE: 85 BPM | TEMPERATURE: 98 F | DIASTOLIC BLOOD PRESSURE: 78 MMHG | WEIGHT: 184.88 LBS | SYSTOLIC BLOOD PRESSURE: 121 MMHG | BODY MASS INDEX: 31.56 KG/M2

## 2019-07-09 DIAGNOSIS — Z86.711 HISTORY OF PULMONARY EMBOLUS (PE): ICD-10-CM

## 2019-07-09 DIAGNOSIS — Z01.818 PREOP TESTING: Primary | ICD-10-CM

## 2019-07-09 PROCEDURE — 3074F PR MOST RECENT SYSTOLIC BLOOD PRESSURE < 130 MM HG: ICD-10-PCS | Mod: CPTII,S$GLB,, | Performed by: INTERNAL MEDICINE

## 2019-07-09 PROCEDURE — 3008F PR BODY MASS INDEX (BMI) DOCUMENTED: ICD-10-PCS | Mod: CPTII,S$GLB,, | Performed by: INTERNAL MEDICINE

## 2019-07-09 PROCEDURE — 99214 OFFICE O/P EST MOD 30 MIN: CPT | Mod: S$GLB,,, | Performed by: INTERNAL MEDICINE

## 2019-07-09 PROCEDURE — 3074F SYST BP LT 130 MM HG: CPT | Mod: CPTII,S$GLB,, | Performed by: INTERNAL MEDICINE

## 2019-07-09 PROCEDURE — 3078F DIAST BP <80 MM HG: CPT | Mod: CPTII,S$GLB,, | Performed by: INTERNAL MEDICINE

## 2019-07-09 PROCEDURE — 99999 PR PBB SHADOW E&M-EST. PATIENT-LVL IV: ICD-10-PCS | Mod: PBBFAC,,, | Performed by: INTERNAL MEDICINE

## 2019-07-09 PROCEDURE — 3078F PR MOST RECENT DIASTOLIC BLOOD PRESSURE < 80 MM HG: ICD-10-PCS | Mod: CPTII,S$GLB,, | Performed by: INTERNAL MEDICINE

## 2019-07-09 PROCEDURE — 99214 PR OFFICE/OUTPT VISIT, EST, LEVL IV, 30-39 MIN: ICD-10-PCS | Mod: S$GLB,,, | Performed by: INTERNAL MEDICINE

## 2019-07-09 PROCEDURE — 99999 PR PBB SHADOW E&M-EST. PATIENT-LVL IV: CPT | Mod: PBBFAC,,, | Performed by: INTERNAL MEDICINE

## 2019-07-09 PROCEDURE — 3008F BODY MASS INDEX DOCD: CPT | Mod: CPTII,S$GLB,, | Performed by: INTERNAL MEDICINE

## 2019-07-09 RX ORDER — CEFAZOLIN SODIUM 2 G/50ML
2 SOLUTION INTRAVENOUS
Status: CANCELLED | OUTPATIENT
Start: 2019-07-23

## 2019-07-09 RX ORDER — SODIUM CHLORIDE, SODIUM LACTATE, POTASSIUM CHLORIDE, CALCIUM CHLORIDE 600; 310; 30; 20 MG/100ML; MG/100ML; MG/100ML; MG/100ML
INJECTION, SOLUTION INTRAVENOUS CONTINUOUS
Status: CANCELLED | OUTPATIENT
Start: 2019-07-09

## 2019-07-09 NOTE — ANESTHESIA PREPROCEDURE EVALUATION
2019  Heather Gomez is a 43 y.o., female is scheduled for laparoscopy-assisted vaginal hysterectomy on 19.    Xarelto stoped 2019.  Per Beena, post-op DVT prophylaxis with Xarelto.      Past Surgical History:   Procedure Laterality Date    ABLATION-ENDOMETRIAL N/A 2018    Performed [facility] at [facility]     SECTION      x2    ENDOMETRIAL ABLATION      HYSTEROSCOPY N/A 2018    Performed [facility] at [facility] OR    UPPER GASTROINTESTINAL ENDOSCOPY                Anesthesia Evaluation    I have reviewed the Patient Summary Reports.    I have reviewed the Nursing Notes.   I have reviewed the Medications.     Review of Systems  Anesthesia Hx:  No problems with previous Anesthesia  History of prior surgery of interest to airway management or planning: Previous anesthesia: Epidural, MAC, General 18 with general anesthesia.  Procedure performed at an [facility]. Denies Family Hx of Anesthesia complications.    Social:  Non-Smoker, No Alcohol Use    EENT/Dental:EENT/Dental Normal   Cardiovascular:   Exercise tolerance: good Hypertension, well controlled Denies Dysrhythmias.   Denies Angina. hyperlipidemia     ECG has been reviewed.  Deep Venous Thrombosis (DVT) (2017), right lower extremity    Pulmonary:  Pulmonary Normal  Denies Shortness of breath.  Pulmonary Embolism (2017; pt followed by Dr. Denzel Hargrove), saddle embolus, thrombotic embolus    Renal/:  Renal/ Normal     Hepatic/GI:   GERD, well controlled    OB/GYN/PEDS:  Heavy uterine bleeding   Neurological:  Neurology Normal    Endocrine:   Diabetes, well controlled, type 2, using insulin  Diabetes, Type 2 Diabetes , controlled by insulin. , most recent HgA1c value was 6.5 on 10/2017.    Psych:   anxiety          Physical Exam  General:  Obesity    Airway/Jaw/Neck:  Airway  Findings: Mouth Opening: Normal Tongue: Normal  General Airway Assessment: Adult  Mallampati: II  TM Distance: Normal, at least 6 cm         Dental:  Dental Findings: Periodontal disease, Mild   Chest/Lungs:  Chest/Lungs Findings: Clear to auscultation, Normal Respiratory Rate     Heart/Vascular:  Heart Findings: Rate: Normal  Rhythm: Regular Rhythm  Sounds: Normal  Heart murmur: negative       Mental Status:  Mental Status Findings:  Cooperative, Alert and Oriented         2D Echo w/CFD 6/2017  CONCLUSIONS     1 - Normal left ventricular systolic function (EF 60-65%).     2 - Concentric remodeling.     3 - No wall motion abnormalities.     4 - Normal left ventricular diastolic function.     5 - Normal right ventricular systolic function .   T  Stress Test 1/31/18  Impression: NORMAL MYOCARDIAL PERFUSION  1. The perfusion scan is free of evidence for myocardial ischemia or injury.   2. Resting wall motion is physiologic.   3. Visually estimated LV function is normal.   4. The ventricular volumes are normal at rest and stress.   5. The extracardiac distribution of radioactivity is normal.   6. There was no previous study available to compare.    EKG 1/22/18  Normal sinus rhythm  Normal ECG  When compared with ECG of 22-JUN-2017 03:16, No significant change was found      Anesthesia Plan  Type of Anesthesia, risks & benefits discussed:  Anesthesia Type:  general  Patient's Preference:   Intra-op Monitoring Plan: standard ASA monitors  Intra-op Monitoring Plan Comments:   Post Op Pain Control Plan: per primary service following discharge from PACU  Post Op Pain Control Plan Comments:   Induction:   IV  Beta Blocker:         Informed Consent: Patient understands risks and agrees with Anesthesia plan.  Questions answered. Anesthesia consent signed with patient.  ASA Score: 3     Day of Surgery Review of History & Physical:  There are no significant changes.      Anesthesia Plan Notes: Anesthesia consent will be obtained  prior to procedure on 7/23/19        Ready For Surgery From Anesthesia Perspective.

## 2019-07-09 NOTE — ASSESSMENT & PLAN NOTE
-secondary to use of Provera  -currently not on any anticoagulation  -scheduled for laparoscopic vaginal hysterectomy with Dr. Clayton 7/23    -recommend postoperative DVT prophylaxis with Xarelto 10 mg daily for 2 weeks or Eliquis 2.5 mg b.i.d. for 2 weeks starting the day after surgery, if okay with Dr. Clayton  -gave prescription for Xarelto

## 2019-07-09 NOTE — DISCHARGE INSTRUCTIONS
Your surgery is scheduled for 7/23/19.    Please report to Outpatient Surgery Intake Office on the 2nd FLOOR at 6:45a.m.          INSTRUCTIONS IMPORTANT!!!  ¨ Do not eat or drink after 12 midnight-including water. OK to brush teeth, no   gum, candy or mints!    ¨ Take only these medicines with a small swallow of water-morning of surgery: irbesartan        ____  Proceed to [facility] on 7/19 or 7/22 for additional blood test.        ____  Do not wear makeup, including mascara.  ____  No powder, lotions or creams to surgical area.  ____  Please remove all jewelry, including piercings and leave at home.  ____  No money or valuables needed. Please leave at home.  ____  Please bring any documents given by your doctor.  ____  If going home the same day, arrange for a ride home. You will not be able to             drive if Anesthesia was used.  ____  Wear loose fitting clothing. Allow for dressings, bandages.  ____  Stop Aspirin, Ibuprofen, Motrin and Aleve at least 3-5 days before surgery, unless otherwise instructed by your doctor, or the nurse.   You MAY use Tylenol/acetaminophen until day of surgery.  ____  Wash the surgical area with Hibiclens the night before surgery, and again the             morning of surgery.  Be sure to rinse hibiclens off completely (if instructed by   nurse).  ____  If you take diabetic medication, do not take am of surgery unless instructed by Doctor.  ____  Call MD for temperature above 101 degrees or any other signs of infection such as Urinary (bladder) infection, Upper respiratory infection, skin boils, etc.  ____ Stop taking any Fish Oil supplement or any Vitamins that contain Vitamin E at least 5 days prior to surgery.  ____ Do Not wear your contact lenses the day of your procedure.  You may wear your glasses.      ____Do not shave surgical site for 3 days prior to surgery.  ____ Practice Good hand washing before, during, and after procedure.      I have read or had read  and explained to me, and understand the above information.  Additional comments or instructions:  For additional questions call [phone]      ANESTHESIA SIDE EFFECTS  -For the first 24 hours after surgery:  Do not drive, use heavy equipment, make important decisions, or drink alcohol  -It is normal to feel sleepy for several hours.  Rest until you are more awake.  -Have someone stay with you, if needed.  They can watch for problems and help keep you safe.  -Some possible post anesthesia side effects include: nausea and vomiting, sore throat and hoarseness, sleepiness, and dizziness.        Pre-Op Bathing Instructions    Before surgery, you can play an important role in your own health.    Because skin is not sterile, we need to be sure that your skin is as free of germs as possible. By following the instructions below, you can reduce the number of germs on your skin before surgery.    IMPORTANT: You will need to shower with a special soap called Hibiclens*, available at any pharmacy.  If you are allergic to Chlorhexidine (the antiseptic in Hibiclens), use an antibacterial soap such as Dial Soap for your preoperative shower.  You will shower with Hibiclens both the night before your surgery and the morning of your surgery.  Do not use Hibiclens on the head, face or genitals to avoid injury to those areas.    STEP #1: THE NIGHT BEFORE YOUR SURGERY     1. Do not shave the area of your body where your surgery will be performed.  2. Shower and wash your hair and body as usual with your normal soap and shampoo.  3. Rinse your hair and body thoroughly after you shower to remove all soap residue.  4. With your hand, apply one packet of Hibiclens soap to the surgical site.   5. Wash the site gently for five (5) minutes. Do not scrub your skin too hard.   6. Do not wash with your regular soap after Hibiclens is used.  7. Rinse your body thoroughly.  8. Pat yourself dry with a clean, soft towel.  9. Do not use lotion, cream,  or powder.  10. Wear clean clothes.    STEP #2: THE MORNING OF YOUR SURGERY     1. Repeat Step #1.    * Not to be used by people allergic to Chlorhexidine.        [facility] Pre-Op Instructions (Hysterectomy)  Getting Ready for Surgery: What You Need to Know!    The Day Before Surgery   Have someone drive you to the hospital or surgery center  o You cannot drive for at least 24 hours.  Please arrange for someone to drive you home after surgery.     Look over your medication with your doctor  o Certain medications such as Aspirin, blood thinners, and herbal medications, such as Aidens Wort must be stopped up to 7 days before surgery.  Make sure your doctors are aware of ALL medications that you take including over-the-counter and herbal medications.  Some medications will need to be taken the morning of surgery with a sip of water.     Remove nail polish and/or artificial nails before the surgery  o During surgery, we will need to track your vitals with a monitor that will be placed on your finger. The monitor will not work properly if you are wearing nail polish or artificial nails   Do not eat AFTER 9 pm the day before your surgery  o In general, you can eat your normal foods the day before surgery.  However, you should not eat or drink after 9 pm the day before your surgery unless otherwise instructed by your doctor.     Do your part to prevent a wound infection  o Do not shave the area of your body where surgery will be performed 5 DAYS prior to your surgery.  When you shave, tiny cuts can happen and allow bacteria to enter into the cuts  o Do shower the evening before and morning of procedure with antibacterial soap.  o Bring clean clothing to wear home after your surgery.  o Stop smoking.  Smoking cessation can prevent wound infections.  If you currently smoke and are interested in quitting, we can provide resources for you.  The Morning of Surgery...  o Do not use lotions, creams or deodorant.  o Do not  wear jewelry, makeup, hair clips or contact lenses.  Remember to remove all piercings.  If you wear glasses, dentures, or hearing aids, please bring a container to place them in during your surgery and give to a family member for safekeeping.    If you have questions prior to surgery, please contact your doctors office or the pre-op clinic at [phone].

## 2019-07-09 NOTE — PROGRESS NOTES
PATIENT: Heather Gomez  MRN: [id number]  DATE: 7/9/2019    Diagnosis:   1. History of pulmonary embolus (PE)      Chief Complaint: Saddle embolus of pulmonary artery with acute cor pulmonale,    Subjective:     Pertinent Medical History:      was on ethinyl estradiol ring for abnormal menstrual bleeding and in April 2017 a progesterone pill (Provera 10 milligrams) was added control the bleeding.  She then started to experience pain in the back of her leg and calf which got worse to the point that she started to experience uncontrolled pain in the foot which was going on for 2 weeks and due to this she presented to the ER and on 6/20/17 she was noted to have a positive DVT in the right popliteal vein.  She was started on Xarelto and sent home but presented to the ER the next day because she was having pain in the abdominal area associated with dyspnea at which time a CT scan of the chest was done that revealed large burden of central pulmonary thromboembolism located at the bifurcation of the right main pulmonary artery and left main pulmonary artery and extending into all the lobes.     OB - Dr. Clayton (OB/GYN)     Xarelto was stopped April 2019     Due to iron deficiency anemia she received 2 doses of IV iron therapy in August 2017 and 2 more in march 2018     She continues to have atypical chest discomfort with minimal activity - cardiac etiology ruled out     Underwent D&C, endometrial ablation on 1/23/18    Interval History:   -she is here for f/u of h/o DVT and PE  -scheduled for laparoscopic vaginal hysterectomy 07/23/2019  -denies shortness of breath or leg swelling    Past Medical, Surgical, Family, and Social histories reviewed.    Medication and Allergies reviewed.    Review of Systems  CONSTITUTIONAL: Weight stable. Appetite good. No fevers.  RESPIRATORY: No cough or congestion.  CARDIOVASCULAR: No chest pain or difficulty breathing.  GASTROINTESTINAL: No abdominal pain or nausea. No  "change in bowel habits.    Objective:     Vitals:    07/09/19 0732   BP: 121/78   Pulse: 85   Resp: 16   Temp: 98.2 °F (36.8 °C)   SpO2: 95%   Weight: 83.9 kg (184 lb 14.4 oz)   Height: 5' 4" (1.626 m)     BMI: Body mass index is 31.74 kg/m².    Physical Exam  General appearance: no acute distress. conversant  Ear, Nose, Mouth, Throat: oropharynx clear. mucous membranes moist. no oral ulcers. no gum bleeding.  Neck: no masses or enlarged lymph nodes  Lungs: clear to auscultation. no rales. breathing nonlabored  Heart: rhythm regular. no gallops. no edema.  Abdomen: soft and nontender. no ascites. no hepatosplenomegaly.  Neurologic/Psychiatric: alert. oriented to time, place and person. no anxiety or agitation.    Assessment:       1. History of pulmonary embolus (PE)      Plan:     History of pulmonary embolus (PE)  -secondary to use of Provera  -currently not on any anticoagulation  -scheduled for laparoscopic vaginal hysterectomy with Dr. Clayton 7/23    -recommend postoperative DVT prophylaxis with Xarelto 10 mg daily for 2 weeks or Eliquis 2.5 mg b.i.d. for 2 weeks starting the day after surgery, if okay with Dr. Clayton  -also recommended using compression stockings for both lower extremities soon after surgery  -gave prescription for Xarelto    CC:   "

## 2019-07-22 ENCOUNTER — LAB VISIT (OUTPATIENT)
Dept: LAB | Facility: HOSPITAL | Age: 44
End: 2019-07-22
Attending: SPECIALIST
Payer: COMMERCIAL

## 2019-07-22 DIAGNOSIS — Z01.818 PREOP TESTING: ICD-10-CM

## 2019-07-22 LAB — B-HCG UR QL: NEGATIVE

## 2019-07-22 PROCEDURE — 81025 URINE PREGNANCY TEST: CPT

## 2019-07-23 ENCOUNTER — ANESTHESIA (OUTPATIENT)
Dept: SURGERY | Facility: HOSPITAL | Age: 44
End: 2019-07-23
Payer: COMMERCIAL

## 2019-07-23 ENCOUNTER — HOSPITAL ENCOUNTER (OUTPATIENT)
Facility: HOSPITAL | Age: 44
Discharge: HOME | End: 2019-07-24
Attending: SPECIALIST | Admitting: SPECIALIST
Payer: COMMERCIAL

## 2019-07-23 DIAGNOSIS — Z01.818 PREOP TESTING: ICD-10-CM

## 2019-07-23 PROBLEM — R10.2 PELVIC PAIN IN FEMALE: Status: ACTIVE | Noted: 2019-07-23

## 2019-07-23 LAB
ESTIMATED AVG GLUCOSE: 140 MG/DL (ref 68–131)
GLUCOSE SERPL-MCNC: 136 MG/DL (ref 70–110)
HBA1C MFR BLD HPLC: 6.5 % (ref 4–5.6)
POCT GLUCOSE: 136 MG/DL (ref 70–110)
POCT GLUCOSE: 182 MG/DL (ref 70–110)
POCT GLUCOSE: 205 MG/DL (ref 70–110)

## 2019-07-23 PROCEDURE — 63600175 PHARM REV CODE 636 W HCPCS: Performed by: ORTHOPAEDIC SURGERY

## 2019-07-23 PROCEDURE — 63600175 PHARM REV CODE 636 W HCPCS: Performed by: SPECIALIST

## 2019-07-23 PROCEDURE — 25000003 PHARM REV CODE 250: Performed by: NURSE PRACTITIONER

## 2019-07-23 PROCEDURE — 25000003 PHARM REV CODE 250: Performed by: SPECIALIST

## 2019-07-23 PROCEDURE — 36000711: Performed by: SPECIALIST

## 2019-07-23 PROCEDURE — 88307 TISSUE EXAM BY PATHOLOGIST: CPT | Performed by: PATHOLOGY

## 2019-07-23 PROCEDURE — 25000003 PHARM REV CODE 250: Performed by: ORTHOPAEDIC SURGERY

## 2019-07-23 PROCEDURE — 88307 TISSUE SPECIMEN TO PATHOLOGY - SURGERY: ICD-10-PCS | Mod: 26,,, | Performed by: PATHOLOGY

## 2019-07-23 PROCEDURE — 27000221 HC OXYGEN, UP TO 24 HOURS

## 2019-07-23 PROCEDURE — S5571 INSULIN DISPOS PEN 3 ML: HCPCS | Performed by: SPECIALIST

## 2019-07-23 PROCEDURE — 71000033 HC RECOVERY, INTIAL HOUR: Performed by: SPECIALIST

## 2019-07-23 PROCEDURE — 36000710: Performed by: SPECIALIST

## 2019-07-23 PROCEDURE — 88307 TISSUE EXAM BY PATHOLOGIST: CPT | Mod: 26,,, | Performed by: PATHOLOGY

## 2019-07-23 PROCEDURE — 71000039 HC RECOVERY, EACH ADD'L HOUR: Performed by: SPECIALIST

## 2019-07-23 PROCEDURE — 27201423 OPTIME MED/SURG SUP & DEVICES STERILE SUPPLY: Performed by: SPECIALIST

## 2019-07-23 PROCEDURE — 37000008 HC ANESTHESIA 1ST 15 MINUTES: Performed by: SPECIALIST

## 2019-07-23 PROCEDURE — C2628 CATHETER, OCCLUSION: HCPCS | Performed by: SPECIALIST

## 2019-07-23 PROCEDURE — 94761 N-INVAS EAR/PLS OXIMETRY MLT: CPT

## 2019-07-23 PROCEDURE — 37000009 HC ANESTHESIA EA ADD 15 MINS: Performed by: SPECIALIST

## 2019-07-23 PROCEDURE — 83036 HEMOGLOBIN GLYCOSYLATED A1C: CPT

## 2019-07-23 PROCEDURE — 82962 GLUCOSE BLOOD TEST: CPT | Performed by: SPECIALIST

## 2019-07-23 PROCEDURE — 36415 COLL VENOUS BLD VENIPUNCTURE: CPT

## 2019-07-23 RX ORDER — MIDAZOLAM HYDROCHLORIDE 1 MG/ML
INJECTION, SOLUTION INTRAMUSCULAR; INTRAVENOUS
Status: DISCONTINUED | OUTPATIENT
Start: 2019-07-23 | End: 2019-07-23

## 2019-07-23 RX ORDER — IBUPROFEN 200 MG
24 TABLET ORAL
Status: DISCONTINUED | OUTPATIENT
Start: 2019-07-23 | End: 2019-07-24 | Stop reason: HOSPADM

## 2019-07-23 RX ORDER — INSULIN ASPART 100 [IU]/ML
1-10 INJECTION, SOLUTION INTRAVENOUS; SUBCUTANEOUS
Status: DISCONTINUED | OUTPATIENT
Start: 2019-07-23 | End: 2019-07-24 | Stop reason: HOSPADM

## 2019-07-23 RX ORDER — SODIUM CHLORIDE 0.9 % (FLUSH) 0.9 %
10 SYRINGE (ML) INJECTION
Status: DISCONTINUED | OUTPATIENT
Start: 2019-07-23 | End: 2019-07-23 | Stop reason: HOSPADM

## 2019-07-23 RX ORDER — INSULIN ASPART 100 [IU]/ML
15 INJECTION, SOLUTION INTRAVENOUS; SUBCUTANEOUS
Status: DISCONTINUED | OUTPATIENT
Start: 2019-07-23 | End: 2019-07-24 | Stop reason: HOSPADM

## 2019-07-23 RX ORDER — ESMOLOL HYDROCHLORIDE 10 MG/ML
INJECTION INTRAVENOUS
Status: DISCONTINUED | OUTPATIENT
Start: 2019-07-23 | End: 2019-07-23

## 2019-07-23 RX ORDER — SODIUM CHLORIDE 9 MG/ML
INJECTION, SOLUTION INTRAVENOUS CONTINUOUS PRN
Status: DISCONTINUED | OUTPATIENT
Start: 2019-07-23 | End: 2019-07-23

## 2019-07-23 RX ORDER — PHENYLEPHRINE HYDROCHLORIDE 10 MG/ML
INJECTION INTRAVENOUS
Status: DISCONTINUED | OUTPATIENT
Start: 2019-07-23 | End: 2019-07-23

## 2019-07-23 RX ORDER — IBUPROFEN 200 MG
16 TABLET ORAL
Status: DISCONTINUED | OUTPATIENT
Start: 2019-07-23 | End: 2019-07-24 | Stop reason: HOSPADM

## 2019-07-23 RX ORDER — SODIUM CHLORIDE, SODIUM LACTATE, POTASSIUM CHLORIDE, CALCIUM CHLORIDE 600; 310; 30; 20 MG/100ML; MG/100ML; MG/100ML; MG/100ML
INJECTION, SOLUTION INTRAVENOUS CONTINUOUS
Status: DISCONTINUED | OUTPATIENT
Start: 2019-07-23 | End: 2019-07-24 | Stop reason: HOSPADM

## 2019-07-23 RX ORDER — OXYCODONE HYDROCHLORIDE 5 MG/1
5 TABLET ORAL
Status: DISCONTINUED | OUTPATIENT
Start: 2019-07-23 | End: 2019-07-23 | Stop reason: HOSPADM

## 2019-07-23 RX ORDER — OXYCODONE AND ACETAMINOPHEN 10; 325 MG/1; MG/1
1 TABLET ORAL EVERY 4 HOURS PRN
Status: DISCONTINUED | OUTPATIENT
Start: 2019-07-23 | End: 2019-07-24 | Stop reason: HOSPADM

## 2019-07-23 RX ORDER — GLUCAGON 1 MG
1 KIT INJECTION
Status: DISCONTINUED | OUTPATIENT
Start: 2019-07-23 | End: 2019-07-24 | Stop reason: HOSPADM

## 2019-07-23 RX ORDER — HYDROMORPHONE HYDROCHLORIDE 2 MG/ML
0.5 INJECTION, SOLUTION INTRAMUSCULAR; INTRAVENOUS; SUBCUTANEOUS EVERY 30 MIN PRN
Status: DISCONTINUED | OUTPATIENT
Start: 2019-07-23 | End: 2019-07-23 | Stop reason: HOSPADM

## 2019-07-23 RX ORDER — GLYCOPYRROLATE 0.2 MG/ML
INJECTION INTRAMUSCULAR; INTRAVENOUS
Status: DISCONTINUED | OUTPATIENT
Start: 2019-07-23 | End: 2019-07-23

## 2019-07-23 RX ORDER — MUPIROCIN 20 MG/G
1 OINTMENT TOPICAL 2 TIMES DAILY
Status: DISCONTINUED | OUTPATIENT
Start: 2019-07-23 | End: 2019-07-24 | Stop reason: HOSPADM

## 2019-07-23 RX ORDER — DIPHENHYDRAMINE HCL 25 MG
25 CAPSULE ORAL EVERY 4 HOURS PRN
Status: DISCONTINUED | OUTPATIENT
Start: 2019-07-23 | End: 2019-07-24 | Stop reason: HOSPADM

## 2019-07-23 RX ORDER — OXYCODONE AND ACETAMINOPHEN 5; 325 MG/1; MG/1
1 TABLET ORAL EVERY 4 HOURS PRN
Status: DISCONTINUED | OUTPATIENT
Start: 2019-07-23 | End: 2019-07-24 | Stop reason: HOSPADM

## 2019-07-23 RX ORDER — ONDANSETRON 2 MG/ML
4 INJECTION INTRAMUSCULAR; INTRAVENOUS DAILY PRN
Status: DISCONTINUED | OUTPATIENT
Start: 2019-07-23 | End: 2019-07-23 | Stop reason: HOSPADM

## 2019-07-23 RX ORDER — CEFAZOLIN SODIUM 1 G/3ML
INJECTION, POWDER, FOR SOLUTION INTRAMUSCULAR; INTRAVENOUS
Status: DISCONTINUED | OUTPATIENT
Start: 2019-07-23 | End: 2019-07-23

## 2019-07-23 RX ORDER — ACETAMINOPHEN 10 MG/ML
INJECTION, SOLUTION INTRAVENOUS
Status: DISCONTINUED | OUTPATIENT
Start: 2019-07-23 | End: 2019-07-23

## 2019-07-23 RX ORDER — EPHEDRINE SULFATE 50 MG/ML
INJECTION, SOLUTION INTRAVENOUS
Status: DISCONTINUED | OUTPATIENT
Start: 2019-07-23 | End: 2019-07-23

## 2019-07-23 RX ORDER — HYDROMORPHONE HYDROCHLORIDE 2 MG/ML
0.2 INJECTION, SOLUTION INTRAMUSCULAR; INTRAVENOUS; SUBCUTANEOUS EVERY 5 MIN PRN
Status: DISCONTINUED | OUTPATIENT
Start: 2019-07-23 | End: 2019-07-23 | Stop reason: HOSPADM

## 2019-07-23 RX ORDER — FENTANYL CITRATE 50 UG/ML
INJECTION, SOLUTION INTRAMUSCULAR; INTRAVENOUS
Status: DISCONTINUED | OUTPATIENT
Start: 2019-07-23 | End: 2019-07-23

## 2019-07-23 RX ORDER — PROPOFOL 10 MG/ML
VIAL (ML) INTRAVENOUS
Status: DISCONTINUED | OUTPATIENT
Start: 2019-07-23 | End: 2019-07-23

## 2019-07-23 RX ORDER — BISACODYL 10 MG
10 SUPPOSITORY, RECTAL RECTAL DAILY PRN
Status: DISCONTINUED | OUTPATIENT
Start: 2019-07-23 | End: 2019-07-24 | Stop reason: HOSPADM

## 2019-07-23 RX ORDER — HYDROMORPHONE HYDROCHLORIDE 1 MG/ML
1 INJECTION, SOLUTION INTRAMUSCULAR; INTRAVENOUS; SUBCUTANEOUS EVERY 4 HOURS PRN
Status: DISCONTINUED | OUTPATIENT
Start: 2019-07-23 | End: 2019-07-24 | Stop reason: HOSPADM

## 2019-07-23 RX ORDER — SUCCINYLCHOLINE CHLORIDE 20 MG/ML
INJECTION INTRAMUSCULAR; INTRAVENOUS
Status: DISCONTINUED | OUTPATIENT
Start: 2019-07-23 | End: 2019-07-23

## 2019-07-23 RX ORDER — DIPHENHYDRAMINE HYDROCHLORIDE 50 MG/ML
25 INJECTION INTRAMUSCULAR; INTRAVENOUS EVERY 4 HOURS PRN
Status: DISCONTINUED | OUTPATIENT
Start: 2019-07-23 | End: 2019-07-24 | Stop reason: HOSPADM

## 2019-07-23 RX ORDER — CEFAZOLIN SODIUM 2 G/50ML
2 SOLUTION INTRAVENOUS
Status: DISCONTINUED | OUTPATIENT
Start: 2019-07-23 | End: 2019-07-23 | Stop reason: HOSPADM

## 2019-07-23 RX ORDER — ONDANSETRON 8 MG/1
8 TABLET, ORALLY DISINTEGRATING ORAL EVERY 8 HOURS PRN
Status: DISCONTINUED | OUTPATIENT
Start: 2019-07-23 | End: 2019-07-24 | Stop reason: HOSPADM

## 2019-07-23 RX ORDER — NEOSTIGMINE METHYLSULFATE 1 MG/ML
INJECTION, SOLUTION INTRAVENOUS
Status: DISCONTINUED | OUTPATIENT
Start: 2019-07-23 | End: 2019-07-23

## 2019-07-23 RX ORDER — KETOROLAC TROMETHAMINE 30 MG/ML
INJECTION, SOLUTION INTRAMUSCULAR; INTRAVENOUS
Status: DISCONTINUED | OUTPATIENT
Start: 2019-07-23 | End: 2019-07-23

## 2019-07-23 RX ORDER — ROCURONIUM BROMIDE 10 MG/ML
INJECTION, SOLUTION INTRAVENOUS
Status: DISCONTINUED | OUTPATIENT
Start: 2019-07-23 | End: 2019-07-23

## 2019-07-23 RX ADMIN — PHENYLEPHRINE HYDROCHLORIDE 200 MCG: 10 INJECTION INTRAVENOUS at 11:07

## 2019-07-23 RX ADMIN — INSULIN ASPART 15 UNITS: 100 INJECTION, SOLUTION INTRAVENOUS; SUBCUTANEOUS at 05:07

## 2019-07-23 RX ADMIN — RIVAROXABAN 10 MG: 10 TABLET, FILM COATED ORAL at 07:07

## 2019-07-23 RX ADMIN — SODIUM CHLORIDE, SODIUM LACTATE, POTASSIUM CHLORIDE, AND CALCIUM CHLORIDE: .6; .31; .03; .02 INJECTION, SOLUTION INTRAVENOUS at 01:07

## 2019-07-23 RX ADMIN — OXYCODONE AND ACETAMINOPHEN 1 TABLET: 5; 325 TABLET ORAL at 07:07

## 2019-07-23 RX ADMIN — SUCCINYLCHOLINE CHLORIDE 140 MG: 20 INJECTION, SOLUTION INTRAMUSCULAR; INTRAVENOUS at 09:07

## 2019-07-23 RX ADMIN — MUPIROCIN 1 G: 20 OINTMENT TOPICAL at 07:07

## 2019-07-23 RX ADMIN — PHENYLEPHRINE HYDROCHLORIDE 100 MCG: 10 INJECTION INTRAVENOUS at 10:07

## 2019-07-23 RX ADMIN — PHENYLEPHRINE HYDROCHLORIDE 200 MCG: 10 INJECTION INTRAVENOUS at 10:07

## 2019-07-23 RX ADMIN — EPHEDRINE SULFATE 5 MG: 50 INJECTION, SOLUTION INTRAMUSCULAR; INTRAVENOUS; SUBCUTANEOUS at 11:07

## 2019-07-23 RX ADMIN — MIDAZOLAM 2 MG: 1 INJECTION INTRAMUSCULAR; INTRAVENOUS at 09:07

## 2019-07-23 RX ADMIN — SODIUM CHLORIDE: 0.9 INJECTION, SOLUTION INTRAVENOUS at 11:07

## 2019-07-23 RX ADMIN — OXYCODONE HYDROCHLORIDE AND ACETAMINOPHEN 1 TABLET: 10; 325 TABLET ORAL at 11:07

## 2019-07-23 RX ADMIN — ESMOLOL HYDROCHLORIDE 100 MG: 10 INJECTION, SOLUTION INTRAVENOUS at 09:07

## 2019-07-23 RX ADMIN — EPHEDRINE SULFATE 5 MG: 50 INJECTION, SOLUTION INTRAMUSCULAR; INTRAVENOUS; SUBCUTANEOUS at 10:07

## 2019-07-23 RX ADMIN — GLYCOPYRROLATE 0.8 MG: 0.2 INJECTION, SOLUTION INTRAMUSCULAR; INTRAVENOUS at 11:07

## 2019-07-23 RX ADMIN — SODIUM CHLORIDE: 0.9 INJECTION, SOLUTION INTRAVENOUS at 09:07

## 2019-07-23 RX ADMIN — PROMETHAZINE HYDROCHLORIDE 6.25 MG: 25 INJECTION INTRAMUSCULAR; INTRAVENOUS at 12:07

## 2019-07-23 RX ADMIN — HYDROMORPHONE HYDROCHLORIDE 0.5 MG: 2 INJECTION INTRAMUSCULAR; INTRAVENOUS; SUBCUTANEOUS at 12:07

## 2019-07-23 RX ADMIN — ROCURONIUM BROMIDE 10 MG: 10 INJECTION, SOLUTION INTRAVENOUS at 10:07

## 2019-07-23 RX ADMIN — HYDROMORPHONE HYDROCHLORIDE 1 MG: 1 INJECTION, SOLUTION INTRAMUSCULAR; INTRAVENOUS; SUBCUTANEOUS at 04:07

## 2019-07-23 RX ADMIN — ACETAMINOPHEN 1000 MG: 10 INJECTION, SOLUTION INTRAVENOUS at 10:07

## 2019-07-23 RX ADMIN — FENTANYL CITRATE 50 MCG: 50 INJECTION, SOLUTION INTRAMUSCULAR; INTRAVENOUS at 10:07

## 2019-07-23 RX ADMIN — PROMETHAZINE HYDROCHLORIDE 12.5 MG: 25 INJECTION INTRAMUSCULAR; INTRAVENOUS at 06:07

## 2019-07-23 RX ADMIN — FENTANYL CITRATE 50 MCG: 50 INJECTION, SOLUTION INTRAMUSCULAR; INTRAVENOUS at 09:07

## 2019-07-23 RX ADMIN — ONDANSETRON 4 MG: 2 INJECTION, SOLUTION INTRAMUSCULAR; INTRAVENOUS at 11:07

## 2019-07-23 RX ADMIN — NEOSTIGMINE METHYLSULFATE 5 MG: 1 INJECTION INTRAVENOUS at 11:07

## 2019-07-23 RX ADMIN — KETOROLAC TROMETHAMINE 30 MG: 30 INJECTION, SOLUTION INTRAMUSCULAR; INTRAVENOUS at 11:07

## 2019-07-23 RX ADMIN — ONDANSETRON 8 MG: 8 TABLET, ORALLY DISINTEGRATING ORAL at 04:07

## 2019-07-23 RX ADMIN — ROCURONIUM BROMIDE 30 MG: 10 INJECTION, SOLUTION INTRAVENOUS at 09:07

## 2019-07-23 RX ADMIN — CEFAZOLIN 2 G: 330 INJECTION, POWDER, FOR SOLUTION INTRAMUSCULAR; INTRAVENOUS at 10:07

## 2019-07-23 RX ADMIN — INSULIN DETEMIR 15 UNITS: 100 INJECTION, SOLUTION SUBCUTANEOUS at 09:07

## 2019-07-23 RX ADMIN — ROCURONIUM BROMIDE 10 MG: 10 INJECTION, SOLUTION INTRAVENOUS at 11:07

## 2019-07-23 RX ADMIN — PROPOFOL 160 MG: 10 INJECTION, EMULSION INTRAVENOUS at 09:07

## 2019-07-23 RX ADMIN — OXYCODONE HYDROCHLORIDE AND ACETAMINOPHEN 1 TABLET: 10; 325 TABLET ORAL at 01:07

## 2019-07-23 RX ADMIN — SODIUM CHLORIDE: 0.9 INJECTION, SOLUTION INTRAVENOUS at 10:07

## 2019-07-23 RX ADMIN — HYDROMORPHONE HYDROCHLORIDE 0.5 MG: 2 INJECTION INTRAMUSCULAR; INTRAVENOUS; SUBCUTANEOUS at 01:07

## 2019-07-23 NOTE — NURSING
1445 - spoke to MD Clayton.  Pt takes insulin at home.  No sliding scale/insulin orders written.  MD Clayton to write sliding scale/insulin orders.

## 2019-07-23 NOTE — TRANSFER OF CARE
"Anesthesia Transfer of Care Note    Patient: Heather Gomez    Procedure(s) Performed: Procedure(s) (LRB):  HYSTERECTOMY, VAGINAL, LAPAROSCOPY-ASSISTED (Bilateral)  CYSTOSCOPY (N/A)    Patient location: PACU    Anesthesia Type: general    Transport from OR: Transported from OR on 6-10 L/min O2 by face mask with adequate spontaneous ventilation    Post pain: adequate analgesia    Post assessment: no apparent anesthetic complications    Post vital signs: stable    Level of consciousness: awake and sedated    Nausea/Vomiting: no nausea/vomiting    Complications: none    Transfer of care protocol was followed      Last vitals:   Visit Vitals  /82 (BP Location: Right arm, Patient Position: Lying)   Pulse 105   Temp 37.2 °C (99 °F) (Skin)   Resp 16   Ht 5' 4" (1.626 m)   Wt 82.6 kg (182 lb)   LMP 07/16/2019   SpO2 97%   Breastfeeding? No   BMI 31.24 kg/m²     "

## 2019-07-23 NOTE — OP NOTE
DATE OF PROCEDURE:  07/23/2019    PREOPERATIVE DIAGNOSIS:  Pelvic pain.    POSTOPERATIVE DIAGNOSIS:  Pelvic pain.    PROCEDURES:  Laparoscopic-assisted vaginal hysterectomy and bilateral   salpingectomy and cystoscopy.    SURGEON:  Lashell Clayton M.D.    ASSISTANT:  Dheeraj Mosquera M.D.    ANESTHESIA:  General endotracheal.    COMPLICATIONS:  None.    ESTIMATED BLOOD LOSS:  100 mL.    DRAINS:  Castro to gravity.    HISTORY:  Ms. Gomez is a 43-year-old G2, P2, with pelvic pain after an   endometrial ablation.  She stated her pain is worse during her period, but it is   happening all the time.  She has a history of 2 previous C-sections.  The   risks, benefits and alternatives were explained to her prior to the procedure.    She also has a history of a pulmonary embolism and we will get anticoagulation   after the procedure.    PROCEDURE IN DETAIL:  The patient was taken to the Operating Room, placed on the   table in dorsal supine position.  General anesthesia was administered per the   Anesthesia Service without complication.  She was sterilely prepped and draped   in the usual fashion and a Castro was placed.  Stay sutures were placed on the   cervix at the 3 o'clock and 9 o'clock positions on her cervix.  The uterus was   sounded to 9 cm.  The DEIRDRE retractor was inserted and the vaginal occluder was   inserted and a Castro was placed.  Gloves and gowns were changed and attention   was turned to the abdomen.  A 1 cm infraumbilical incision was made with a   scalpel.  The 11 mm trocar was inserted under direct visualization.  The abdomen   was insufflated.  Two 5 mm trocars were placed, one in the suprapubic area and   one in the left lower quadrant.  The pelvic contents were moved about the left   side of the uterus was adhered to the pelvic wall and there appeared to be scar   tissue at the bladder also.  It was also noted that the DEIRDRE had perforated her   uterus, but was hemostatic.  The left side was turned to  and some fatty   adhesions were taken down with the LigaSure device.  Hemostasis was achieved.    The left ureter was visualized and noted to be peristalsing and away from the   field of dissection.  It had been discussed with the patient that she wanted   ovarian conservation and this was done.  The left portions of the tube were   removed with the LigaSure device and hemostasis was achieved.  The round   ligament and the uteroovarian pedicles were cauterized with scar tissue in the   left side and hemostasis was achieved.  The bladder flap was created with the   LigaSure and hemostasis was achieved.  The left uterine artery was cauterized   and cut with the LigaSure device and bites of the cardinal ligaments were taken   down with the LigaSure on the left.  On the right, the ureter was visualized.    The tube was removed from the ovary with the LigaSure.  The uteroovarian pedicle   was cauterized.  The round ligament was cauterized with the LigaSure device.    The bladder flap was better created with the LigaSure.  The uterine vessels on   the right were cauterized and hemostasis was achieved.  The bladder flap was   better created and hemostasis was achieved.  This was done until the ring could   be palpated under the field of dissection.  The unipolar cautery was then used   to cut a circumferential colpotomy around the blue ring of the DEIRDRE.  Hemostasis   was achieved.  The specimen was then pulled through the vagina.  Attention was   turned to the vagina.  At this point, the angles of the cuff were grasped with   Zeenat's.  One small arterial bleed was noted and this was tied off with   figure-of-eight suture of 0 Vicryl.  The cuff was closed with #1 Vicryl in a   running interlocking stitch.  Hemostasis was achieved.  The Castro was then   removed.  Cystoscopy was done.  There appeared to be some irritation or bruising   of the bladder, but no violation of the bladder.  Both ureters were spewing   clear urine.   The cystoscopy was aborted.  The Castro was replaced.  Gloves and   gowns were changed again and attention was turned to the abdomen.  The pelvis   was then reinspected and irrigated.  A vial of Peter was placed and all areas   of dissection were noted to be hemostatic.  The trocars were then removed.  The   abdomen was deflated.  The skin was closed with Dermabond.  The vaginal sweep   was done and nothing was in the vagina and the vagina was hemostatic.  The   patient was returned to the dorsal supine position.  She was awakened without   incident.  She tolerated the procedure well.  Sponge, lap and needle counts were   correct x2 and she was taken to the Recovery Room awake and alert, in stable   condition.      LGC/IN  dd: 07/23/2019 13:20:12 (CDT)  td: 07/23/2019 15:06:48 (CDT)  Doc ID   #[id number]  Job ID #[id number]    CC: Dheeraj Mosquera M.D.

## 2019-07-23 NOTE — H&P
DATE OF ADMIT:  07/22/2019.    CHIEF COMPLAINT:  Pelvic pain.    HISTORY OF PRESENT ILLNESS:  Ms. Gomez is a 43-year-old G2, P2 with pelvic   pain after an endometrial ablation.  She stated that her pain is worse during   her period, but is happening all the time.  It stays longer and is more   frequent.  She had an endometrial ablation in June of 2018.  She does not have   bleeding as much anymore.  Pain is worse in bleeding.    PAST MEDICAL HISTORY:  Pertinent for a DVT and a pulmonary embolism in 2012   while she was on NuvaRing and Provera.  She was anticoagulated at that time, but   she has not been on any anticoagulation for over a year.  Other past medical   history is pertinent for chronic hypertension, hypercholesterolemia and type 2   diabetes.    MEDICATIONS:  Xanax 0.5 mg p.r.n., hydroxyzine 25 mg 3 times a day, Humalog   insulin 15 units three times a day, Avalide 150/12.5 mg every day and lactulose   10 g a day ____.      DICTATION ENDS ABRUPTLY      LGC/HN  dd: 07/22/2019 23:28:50 (CDT)  td: 07/22/2019 23:44:09 (CDT)  Doc ID   #[id number]  Job ID #[id number]    CC:

## 2019-07-23 NOTE — PLAN OF CARE
Problem: Adult Inpatient Plan of Care  Goal: Plan of Care Review  Outcome: Ongoing (interventions implemented as appropriate)  1400 - Pt arrived to unit.  Vss, nad, pt c/o abdominal pain - pt medicated prior to transfer. IV came out while transferring to bed.  Ge draining clear yellow urine.  Poc: pain management as needed, bedrest, LR @ 125 ml/hr, d/c ge tomorrow morning, advance diet as tolerated, continue to monitor.  Reviewed poc w/pt and family.  Pt verbalized understanding.    1430 - restarted IV.  LR @ 125 ml/hr.    1500 - called to bedside.  Pt c/o pain in R arm.  Pt reports having carpal tunnel in both hands/arms.  LR rate decreased to 100 ml/hr.  Will continue to monitor.

## 2019-07-23 NOTE — H&P
H&P ADDENDUM  Discussed with patient  And her  today.  Wants ovarian conservation at surgery.  Questions answered, proceed with surgery.

## 2019-07-23 NOTE — H&P
DATE OF ADMIT:  2019.    CHIEF COMPLAINT:  Pelvic pain.    HISTORY OF PRESENT ILLNESS:  Ms. Gomez is a 43-year-old G2, P2 with pelvic   pain after an endometrial ablation.  She stated that her pain is worse during   her period, but is happening all the time.  It stays longer and is more   frequent.  She had an endometrial ablation in 2018.  She does not have   bleeding as much anymore.  Pain is worse in bleeding.    PAST MEDICAL HISTORY:  Pertinent for a DVT and a pulmonary embolism in    while she was on NuvaRing and Provera.  She was anticoagulated at that time, but   she has not been on any anticoagulation for over a year.  Other past medical   history is pertinent for chronic hypertension, hypercholesterolemia and type 2   diabetes.    MEDICATIONS:  Xanax 0.5 mg p.r.n., hydroxyzine 25 mg three times a day, Humalog   insulin 15 units three times a day, Avalide 150/12.5 mg every day and lactulose   10 g a day, NovoLog insulin 15 units three times a day, Pravachol 40 mg every   day, Zantac 150 mg twice a day ago, glargine insulin 30 units every night.    ALLERGIES:  She has no known drug allergies.    ADDENDUM    PAST SURGICAL HISTORY:  Pertinent for endometrial ablation.  She had a    x2.  Her medications were previously listed.    FAMILY HISTORY:  She had a sister with breast cancer.    REVIEW OF SYSTEMS:  She has no shortness of breath.  No chest pain.  No vaginal   bleeding at this time and she has no leg pains, specifically.  Her pain is worse   than the bleeding in her abdomen.    PHYSICAL EXAMINATION:  VITAL SIGNS:  Her blood pressure is 124/71, BMI is 33.  GENERAL:  No distress, alert and oriented.  HEART:  Regular rate and rhythm without murmur.  CHEST:  Clear to auscultation bilaterally.  PELVIC:  Her uterus is six-week size, retroverted, tender, mobile and no masses   were palpable.    LABORATORY DATA:  Her pregnancy test is negative.  Her CMP is normal.  Her blood   type is  B positive, antibody screen negative, and her hemoglobin is 12.6,   hematocrit 37.6, platelet count is 275,000 and her PT and PTT are normal.  She   had a Hematology consult and she will be anticoagulated postoperatively.  She   had endocervical curettage, which was negative.  She had a recent breast biopsy   that was negative.  Her ultrasound shows that her uterus is 7.7 x 6.1 x 6.1 cm   with a 4 mm endometrial stripe.  Her ovaries are normal except for a simple cyst   on the right ovary.  She has a posterior fibroid versus adenomyosis that is 3.9   x 3.3 x 4 cm.    IMPRESSION:  1. Pelvic pain, post-endometrial ablation, probable adenomyosis.  2. History of pulmonary embolism and DVT.    PLAN:  Laparoscopic-assisted vaginal hysterectomy and bilateral   salpingo-oophorectomy.  The risks, benefits and alternatives were explained to   the patient prior and her .        LGC/HN  dd: 07/22/2019 23:28:50 (CDT)  td: 07/22/2019 23:44:09 (CDT)  Doc ID   #[id number]  Job ID #[id number]    CC:

## 2019-07-23 NOTE — NURSING
1840 - Pt still feeling nauseous after zofran 8mg ODT.  Phenergan 12.5 mg IV given.  Was about to give pt her xarelto around 1615 but pt requested to take her Xarelto later this evening once her nausea subsides.  Pt is afraid that if she takes it now she may throw it up.  Re-ordered Xarelto from pharmacy.  Informed pt that she has to take her Xarelto tonight.  Pt verbalized understanding.  Will re-assess after Phenergan is complete.

## 2019-07-24 VITALS
BODY MASS INDEX: 31.07 KG/M2 | TEMPERATURE: 98 F | WEIGHT: 182 LBS | RESPIRATION RATE: 18 BRPM | OXYGEN SATURATION: 98 % | HEIGHT: 64 IN | DIASTOLIC BLOOD PRESSURE: 76 MMHG | HEART RATE: 99 BPM | SYSTOLIC BLOOD PRESSURE: 118 MMHG

## 2019-07-24 LAB
BASOPHILS # BLD AUTO: 0.01 K/UL (ref 0–0.2)
BASOPHILS NFR BLD: 0.1 % (ref 0–1.9)
DIFFERENTIAL METHOD: ABNORMAL
EOSINOPHIL # BLD AUTO: 0.1 K/UL (ref 0–0.5)
EOSINOPHIL NFR BLD: 0.9 % (ref 0–8)
ERYTHROCYTE [DISTWIDTH] IN BLOOD BY AUTOMATED COUNT: 14 % (ref 11.5–14.5)
HCT VFR BLD AUTO: 34 % (ref 37–48.5)
HGB BLD-MCNC: 11 G/DL (ref 12–16)
LYMPHOCYTES # BLD AUTO: 2.6 K/UL (ref 1–4.8)
LYMPHOCYTES NFR BLD: 27 % (ref 18–48)
MCH RBC QN AUTO: 29.9 PG (ref 27–31)
MCHC RBC AUTO-ENTMCNC: 32.4 G/DL (ref 32–36)
MCV RBC AUTO: 92 FL (ref 82–98)
MONOCYTES # BLD AUTO: 0.8 K/UL (ref 0.3–1)
MONOCYTES NFR BLD: 8.3 % (ref 4–15)
NEUTROPHILS # BLD AUTO: 6.1 K/UL (ref 1.8–7.7)
NEUTROPHILS NFR BLD: 63.7 % (ref 38–73)
PLATELET # BLD AUTO: 204 K/UL (ref 150–350)
PMV BLD AUTO: 9.6 FL (ref 9.2–12.9)
POCT GLUCOSE: 159 MG/DL (ref 70–110)
POCT GLUCOSE: 181 MG/DL (ref 70–110)
RBC # BLD AUTO: 3.68 M/UL (ref 4–5.4)
WBC # BLD AUTO: 9.66 K/UL (ref 3.9–12.7)

## 2019-07-24 PROCEDURE — 25000003 PHARM REV CODE 250: Performed by: SPECIALIST

## 2019-07-24 PROCEDURE — 85025 COMPLETE CBC W/AUTO DIFF WBC: CPT

## 2019-07-24 PROCEDURE — 63600175 PHARM REV CODE 636 W HCPCS: Performed by: SPECIALIST

## 2019-07-24 PROCEDURE — 36415 COLL VENOUS BLD VENIPUNCTURE: CPT

## 2019-07-24 RX ORDER — HYDROCODONE BITARTRATE AND IBUPROFEN 7.5; 2 MG/1; MG/1
1 TABLET, FILM COATED ORAL EVERY 6 HOURS PRN
Status: DISCONTINUED | OUTPATIENT
Start: 2019-07-24 | End: 2019-07-24 | Stop reason: HOSPADM

## 2019-07-24 RX ADMIN — MUPIROCIN 1 G: 20 OINTMENT TOPICAL at 09:07

## 2019-07-24 RX ADMIN — ONDANSETRON 8 MG: 8 TABLET, ORALLY DISINTEGRATING ORAL at 06:07

## 2019-07-24 RX ADMIN — PROMETHAZINE HYDROCHLORIDE 12.5 MG: 25 INJECTION INTRAMUSCULAR; INTRAVENOUS at 09:07

## 2019-07-24 RX ADMIN — INSULIN ASPART 15 UNITS: 100 INJECTION, SOLUTION INTRAVENOUS; SUBCUTANEOUS at 07:07

## 2019-07-24 RX ADMIN — INSULIN ASPART 15 UNITS: 100 INJECTION, SOLUTION INTRAVENOUS; SUBCUTANEOUS at 11:07

## 2019-07-24 RX ADMIN — OXYCODONE HYDROCHLORIDE AND ACETAMINOPHEN 1 TABLET: 10; 325 TABLET ORAL at 04:07

## 2019-07-24 NOTE — PROGRESS NOTES
POD#1  Post lavh, feels fine, able to urinate, tole diet, but had some nausea with pain  meds  VSS AF  HCT 34  gen- no distress, walking around room.  abd- soft non tender, incisions clean dry and intact with dermabond in place  IMP  Post lavh doing well  PLAN  Routine, discharge today.

## 2019-07-24 NOTE — PLAN OF CARE
Problem: Adult Inpatient Plan of Care  Goal: Plan of Care Review  Outcome: Ongoing (interventions implemented as appropriate)  POC reviewed with pt around 0735; verbalized acceptance and understanding.  Pt's VS stable.  Remains free from falls and injury.  Pain controlled with ordered meds.  Family at bedside to offer support.     Discharge instructions given verbally and in writing at 1415.  Verbalized understanding.  Prescriptions given with explanation for use.  Verbalized understanding.  Discharged to home in stable condition via wheelchair.

## 2019-07-24 NOTE — ANESTHESIA POSTPROCEDURE EVALUATION
Anesthesia Post Evaluation    Patient: Heather Gomez    Procedure(s) Performed: Procedure(s) (LRB):  HYSTERECTOMY, VAGINAL, LAPAROSCOPY-ASSISTED (Bilateral)  CYSTOSCOPY (N/A)    Final Anesthesia Type: general  Patient location during evaluation: PACU  Patient participation: Yes- Able to Participate  Level of consciousness: awake and alert, oriented and awake  Post-procedure vital signs: reviewed and stable  Pain management: adequate  Airway patency: patent  PONV status at discharge: No PONV  Anesthetic complications: no      Cardiovascular status: blood pressure returned to baseline  Respiratory status: unassisted and room air  Hydration status: euvolemic  Follow-up not needed.          Vitals Value Taken Time   /68 7/24/2019  4:00 AM   Temp 36.6 °C (97.8 °F) 7/24/2019  4:00 AM   Pulse 101 7/24/2019  4:00 AM   Resp 18 7/24/2019  4:00 AM   SpO2 98 % 7/23/2019  8:24 PM         Event Time     Out of Recovery 13:45:52          Pain/Aguilar Score: Pain Rating Prior to Med Admin: 9 (7/24/2019  4:08 AM)  Pain Rating Post Med Admin: 0 (7/24/2019  5:00 AM)  Aguilar Score: 8 (7/23/2019  1:40 PM)

## 2019-08-06 NOTE — DISCHARGE SUMMARY
DATE OF ADMISSION:  07/23/2019.    DATE OF DISCHARGE:  07/24/2019.    HOSPITAL COURSE:  Ms. Randall is a 44-year-old who had a severe abdominal pain.    She was admitted for definitive therapy.  She had a laparoscopic-assisted   vaginal hysterectomy and bilateral salpingectomy, and cystoscopy on the day of   admit.  Her postoperative course has been uneventful.  She is now tolerating a   regular diet.  Her labs are stable.  Her vitals are stable.  She has been   afebrile throughout her hospital stay.  Her incisions are clean, dry and intact.    She has minimal vaginal bleeding.  She will be discharged in stable condition.    DISCHARGE DATA:  Discharged to home.    DIAGNOSIS:  Status post laparoscopic-assisted vaginal hysterectomy and   salpingectomy and cystoscopy.    CONDITION:  Stable.    DIET:  Regular.    ACTIVITY:  Pelvic rest and frequent rest.    MEDICATIONS:  Percocet 5 mg p.o. q. 4 hours p.r.n. pain and Motrin 800 mg p.o.   q. 8 hours p.r.n. pain.    INSTRUCTIONS:  The patient was instructed to follow up in the office in 2 weeks   and to return or call for any problems.      [facility]  dd: 08/05/2019 20:27:54 (CDT)  td: 08/06/2019 09:44:19 (CDT)  Doc ID   #[id number]  Job ID #[id number]    CC:

## 2019-10-07 ENCOUNTER — TELEPHONE (OUTPATIENT)
Dept: RADIOLOGY | Facility: HOSPITAL | Age: 44
End: 2019-10-07

## 2019-10-09 ENCOUNTER — TELEPHONE (OUTPATIENT)
Dept: RADIOLOGY | Facility: HOSPITAL | Age: 44
End: 2019-10-09

## 2019-11-04 ENCOUNTER — TELEPHONE (OUTPATIENT)
Dept: RADIOLOGY | Facility: HOSPITAL | Age: 44
End: 2019-11-04

## 2019-12-05 ENCOUNTER — OFFICE VISIT (OUTPATIENT)
Dept: URGENT CARE | Facility: CLINIC | Age: 44
End: 2019-12-05
Payer: COMMERCIAL

## 2019-12-05 ENCOUNTER — TELEPHONE (OUTPATIENT)
Dept: URGENT CARE | Facility: CLINIC | Age: 44
End: 2019-12-05

## 2019-12-05 VITALS
OXYGEN SATURATION: 96 % | RESPIRATION RATE: 18 BRPM | WEIGHT: 182 LBS | TEMPERATURE: 98 F | HEIGHT: 64 IN | BODY MASS INDEX: 31.07 KG/M2 | SYSTOLIC BLOOD PRESSURE: 134 MMHG | HEART RATE: 92 BPM | DIASTOLIC BLOOD PRESSURE: 65 MMHG

## 2019-12-05 DIAGNOSIS — H66.91 RIGHT OTITIS MEDIA, UNSPECIFIED OTITIS MEDIA TYPE: ICD-10-CM

## 2019-12-05 DIAGNOSIS — B34.9 VIRAL SYNDROME: Primary | ICD-10-CM

## 2019-12-05 LAB
CTP QC/QA: YES
S PYO RRNA THROAT QL PROBE: NEGATIVE

## 2019-12-05 PROCEDURE — 87880 POCT RAPID STREP A: ICD-10-PCS | Mod: QW,S$GLB,, | Performed by: NURSE PRACTITIONER

## 2019-12-05 PROCEDURE — 87880 STREP A ASSAY W/OPTIC: CPT | Mod: QW,S$GLB,, | Performed by: NURSE PRACTITIONER

## 2019-12-05 PROCEDURE — 99214 PR OFFICE/OUTPT VISIT, EST, LEVL IV, 30-39 MIN: ICD-10-PCS | Mod: S$GLB,,, | Performed by: NURSE PRACTITIONER

## 2019-12-05 PROCEDURE — 99214 OFFICE O/P EST MOD 30 MIN: CPT | Mod: S$GLB,,, | Performed by: NURSE PRACTITIONER

## 2019-12-05 RX ORDER — ONDANSETRON 4 MG/1
4 TABLET, ORALLY DISINTEGRATING ORAL EVERY 8 HOURS PRN
Qty: 12 TABLET | Refills: 0 | Status: SHIPPED | OUTPATIENT
Start: 2019-12-05 | End: 2021-03-17

## 2019-12-05 RX ORDER — FLUCONAZOLE 150 MG/1
150 TABLET ORAL DAILY
Qty: 1 TABLET | Refills: 0 | Status: SHIPPED | OUTPATIENT
Start: 2019-12-05 | End: 2019-12-06

## 2019-12-05 RX ORDER — AMOXICILLIN AND CLAVULANATE POTASSIUM 875; 125 MG/1; MG/1
1 TABLET, FILM COATED ORAL 2 TIMES DAILY
Qty: 20 TABLET | Refills: 0 | Status: SHIPPED | OUTPATIENT
Start: 2019-12-05 | End: 2019-12-15

## 2019-12-05 NOTE — TELEPHONE ENCOUNTER
Patient wanted to ask for numbing drops for ear.  Discussed difference between otitis externa and media.  Advised to take OTC ibuprofen or tylenol as needed and use warm compresses to ear for comfort.  Verbalized understanding.

## 2019-12-05 NOTE — PATIENT INSTRUCTIONS
"Please follow up with your Primary care provider within 2-5 days if your signs and symptoms have not resolved or worsen.  The usual course of cold symptoms are 10-14 days.     If your condition worsens or fails to improve we recommend that you receive another evaluation at the emergency room immediately or contact your primary medical clinic to discuss your concerns.     You must understand that you have received an Urgent Care treatment only and that you may be released before all of your medical problems are known or treated.   You, the patient, will arrange for follow up care as instructed.     Tylenol or Ibuprofen can also be used as directed for pain/fever unless you have an allergy to them or medical condition such as stomach ulcers, kidney or liver disease or blood thinners etc for which you should not be taking these type of medications.     Take over the counter cough medication as directed as needed for cough.  You should avoid medications with pseudoephedrine or phenylephrine (any medication with "D") if you have high blood pressure as this can cause an elevation in your blood pressure. Instead consider Corcidin HBP as needed to prevent an elevated blood pressure.     Natural remedies of symptoms (as needed) include humidification, saline nasal sprays, and/or steamy showers.  Increase fluids, warm tea with honey, cough drops as needed.  You may also use salt water gargles for sore throat.    IF you received a steroid shot today - As discussed, this can elevate your blood pressure, elevate your blood sugar, water weight gain, nervous energy, redness to the face and dimpling of the skin at the injection site.       You have been given Augmentin for an infection today.  Please take all the antibiotic as prescribed on the bottle.      Augmentin is hard on the stomach and should always be taken with food.      Augmentin can cause diarrhea.  As with any antibiotics, use probiotics and/or high culture yogurt " "about 2 hours apart from the antibiotic and about 1 week after the antibiotic to replace the gut remigio lost with antibiotic use.      If you are female and on BCP use additional methods to prevent pregnancy while on antibiotics and for one cycle after.     Nausea/Vomiting/Diarrhea    Increase fluids and advance your diet as tolerated.      Start with liquids, soft foods, and progress into regular diet.  Please drink Pedialyte or use Pedialyte ice pops for electrolyte replacement daily as needed for fluid loss from diarrhea or vomiting.      Give probiotics such as Culturelle once daily    Keep hydrated by drinking fluids regularly, can start with ice chips or sips.      Monitor hydration through urine output, urination at least every 6 hours.    Take over the counter Imodium as directed/ as needed for diarrhea.    Take probiotics or yogurt to replace lost gut remigio from GI issues.     If you were given Zofran, please wait 15-30 minutes after taking it to attempt fluid or food consumption.     - Consume more "binding" foods low in fiber such as bananas, rice, white pastas, bread, etc if diarrhea is present.    - Return to school once active vomiting has ceased, diarrhea is manageable, and no fever for 24 hours (without the use of fever reducer).    - Follow up with PCP or ER if no improvement within 3-5 days, if there are concerns of dehydration, there is blood in the stool, and/or if there is green or bloody vomit.    Watch for any increase pain, fever, localized pain to right lower abdomen or continued vomiting or diarrhea. Follow up in ER for these symptoms.          Middle Ear Infection (Adult)  You have an infection of the middle ear, the space behind the eardrum. This is also called acute otitis media (AOM). Sometimes it is caused by the common cold. This is because congestion can block the internal passage (eustachian tube) that drains fluid from the middle ear. When the middle ear fills with fluid, bacteria " "can grow there and cause an infection. Oral antibiotics are used to treat this illness, not ear drops. Symptoms usually start to improve within 1 to 2 days of treatment.    Home care  The following are general care guidelines:  · Finish all of the antibiotic medicine given, even though you may feel better after the first few days.  · You may use over-the-counter medicine, such as acetaminophen or ibuprofen, to control pain and fever, unless something else was prescribed. If you have chronic liver or kidney disease or have ever had a stomach ulcer or gastrointestinal bleeding, talk with your healthcare provider before using these medicines. Do not give aspirin to anyone under 18 years of age who has a fever. It may cause severe illness or death.  Follow-up care  Follow up with your healthcare provider, or as advised, in 2 weeks if all symptoms have not gotten better, or if hearing doesn't go back to normal within 1 month.  When to seek medical advice  Call your healthcare provider right away if any of these occur:  · Ear pain gets worse or does not improve after 3 days of treatment  · Unusual drowsiness or confusion  · Neck pain, stiff neck, or headache  · Fluid or blood draining from the ear canal  · Fever of 100.4°F (38°C) or as advised   · Seizure  Date Last Reviewed: 6/1/2016  © 8336-1447 [employer]. [address] [place] [address], [place] [address]. All rights reserved. This information is not intended as a substitute for professional medical care. Always follow your healthcare professional's instructions.        Viral Syndrome (Adult)  A viral illness may cause a number of symptoms. The symptoms depend on the part of the body that the virus affects. If it settles in your nose, throat, and lungs, it may cause cough, sore throat, congestion, and sometimes headache. If it settles in your stomach and intestinal tract, it may cause vomiting and diarrhea. Sometimes it causes vague symptoms like "aching all " "over," feeling tired, loss of appetite, or fever.  A viral illness usually lasts 1 to 2 weeks, but sometimes it lasts longer. In some cases, a more serious infection can look like a viral syndrome in the first few days of the illness. You may need another exam and additional tests to know the difference. Watch for the warning signs listed below.  Home care  Follow these guidelines for taking care of yourself at home:  · If symptoms are severe, rest at home for the first 2 to 3 days.  · Stay away from cigarette smoke - both your smoke and the smoke from others.  · You may use over-the-counter acetaminophen or ibuprofen for fever, muscle aching, and headache, unless another medicine was prescribed for this. If you have chronic liver or kidney disease or ever had a stomach ulcer or GI bleeding, talk with your doctor before using these medicines. No one who is younger than 18 and ill with a fever should take aspirin. It may cause severe disease or death.  · Your appetite may be poor, so a light diet is fine. Avoid dehydration by drinking 8 to 12 8-ounce glasses of fluids each day. This may include water; orange juice; lemonade; apple, grape, and cranberry juice; clear fruit drinks; electrolyte replacement and sports drinks; and decaffeinated teas and coffee. If you have been diagnosed with a kidney disease, ask your doctor how much and what types of fluids you should drink to prevent dehydration. If you have kidney disease, drinking too much fluid can cause it build up in the your body and be dangerous to your health.  · Over-the-counter remedies won't shorten the length of the illness but may be helpful for cough, sore throat; and nasal and sinus congestion. Don't use decongestants if you have high blood pressure.  Follow-up care  Follow up with your healthcare provider if you do not improve over the next week.  Call 911  Get emergency medical care if any of the following occur:  · Convulsion  · Feeling weak, dizzy, or " like you are going to faint  · Chest pain, shortness of breath, wheezing, or difficulty breathing  When to seek medical advice  Call your healthcare provider right away if any of these occur:  · Cough with lots of colored sputum (mucus) or blood in your sputum  · Chest pain, shortness of breath, wheezing, or difficulty breathing  · Severe headache; face, neck, or ear pain  · Severe, constant pain in the lower right side of your belly (abdominal)  · Continued vomiting (cant keep liquids down)  · Frequent diarrhea (more than 5 times a day); blood (red or black color) or mucus in diarrhea  · Feeling weak, dizzy, or like you are going to faint  · Extreme thirst  · Fever of 100.4°F (38°C) or higher, or as directed by your healthcare provider  Date Last Reviewed: 9/25/2015  © 9055-6290 [employer]. [address] [place] [address]. All rights reserved. This information is not intended as a substitute for professional medical care. Always follow your healthcare professional's instructions.

## 2019-12-05 NOTE — PROGRESS NOTES
"Subjective:       Patient ID: Heather Gomez is a 44 y.o. female.    Vitals:  height is 5' 4" (1.626 m) and weight is 82.6 kg (182 lb). Her temperature is 97.8 °F (36.6 °C). Her blood pressure is 134/65 and her pulse is 92. Her respiration is 18 and oxygen saturation is 96%.     Chief Complaint: Sore Throat and Nausea    This is a 44 y.o. female who presents today with a chief complaint of sore throat/ right ear pain that started today & nausea for 3 days.      Sore Throat    This is a new problem. The current episode started today. The problem has been unchanged. There has been no fever. Associated symptoms include ear pain, swollen glands and trouble swallowing. Pertinent negatives include no congestion, coughing, shortness of breath, stridor or vomiting. She has had no exposure to strep or mono. Treatments tried: cough drops. The treatment provided mild relief.   Nausea   This is a new problem. The current episode started in the past 7 days. The problem occurs constantly. The problem has been gradually worsening. Associated symptoms include nausea, a sore throat and swollen glands. Pertinent negatives include no chills, congestion, coughing, diaphoresis, fatigue, fever, myalgias, rash or vomiting. Treatments tried: peptobismol. The treatment provided mild relief.       Constitution: Negative for chills, sweating, fatigue and fever.   HENT: Positive for ear pain, postnasal drip, sore throat and trouble swallowing. Negative for congestion, sinus pain, sinus pressure and voice change.    Neck: Positive for painful lymph nodes.   Eyes: Negative for eye redness.   Respiratory: Negative for chest tightness, cough, sputum production, bloody sputum, COPD, shortness of breath, stridor, wheezing and asthma.    Gastrointestinal: Positive for nausea. Negative for vomiting.   Musculoskeletal: Negative for muscle ache.   Skin: Negative for rash.   Allergic/Immunologic: Negative for seasonal allergies and asthma. "   Hematologic/Lymphatic: Positive for swollen lymph nodes.       Objective:      Physical Exam   Constitutional: She is oriented to person, place, and time. She appears well-developed and well-nourished. She is cooperative.  Non-toxic appearance. She does not have a sickly appearance. She does not appear ill. No distress.   HENT:   Head: Normocephalic and atraumatic.   Right Ear: Hearing, external ear and ear canal normal. Tympanic membrane is injected.   Left Ear: Hearing, tympanic membrane, external ear and ear canal normal.   Nose: Mucosal edema and rhinorrhea present. No nasal deformity. No epistaxis. Right sinus exhibits maxillary sinus tenderness and frontal sinus tenderness. Left sinus exhibits maxillary sinus tenderness and frontal sinus tenderness.   Mouth/Throat: Uvula is midline, oropharynx is clear and moist and mucous membranes are normal. No trismus in the jaw. Normal dentition. No uvula swelling. No oropharyngeal exudate, posterior oropharyngeal edema or posterior oropharyngeal erythema.   Eyes: Conjunctivae and lids are normal. No scleral icterus.   Neck: Trachea normal, full passive range of motion without pain and phonation normal. Neck supple. No neck rigidity. No edema and no erythema present.   Cardiovascular: Normal rate, regular rhythm, normal heart sounds, intact distal pulses and normal pulses.   Pulmonary/Chest: Effort normal and breath sounds normal. No respiratory distress. She has no decreased breath sounds. She has no rhonchi.   Musculoskeletal: Normal range of motion. She exhibits no edema or deformity.   Lymphadenopathy:     She has no cervical adenopathy.        Right cervical: No superficial cervical, no deep cervical and no posterior cervical adenopathy present.       Left cervical: No superficial cervical, no deep cervical and no posterior cervical adenopathy present.   Neurological: She is alert and oriented to person, place, and time. She exhibits normal muscle tone.  Coordination normal.   Skin: Skin is warm, dry, intact, not diaphoretic and not pale.   Psychiatric: She has a normal mood and affect. Her speech is normal and behavior is normal. Judgment and thought content normal. Cognition and memory are normal.   Nursing note and vitals reviewed.        Assessment:       1. Viral syndrome    2. Right otitis media, unspecified otitis media type        Plan:       Results for orders placed or performed in visit on 12/05/19   POCT rapid strep A   Result Value Ref Range    Rapid Strep A Screen Negative Negative     Acceptable Yes        Viral syndrome  -     POCT rapid strep A  -     ondansetron (ZOFRAN-ODT) 4 MG TbDL; Take 1 tablet (4 mg total) by mouth every 8 (eight) hours as needed (nausea).  Dispense: 12 tablet; Refill: 0    Right otitis media, unspecified otitis media type    Other orders  -     amoxicillin-clavulanate 875-125mg (AUGMENTIN) 875-125 mg per tablet; Take 1 tablet by mouth 2 (two) times daily. for 10 days  Dispense: 20 tablet; Refill: 0

## 2019-12-08 ENCOUNTER — TELEPHONE (OUTPATIENT)
Dept: URGENT CARE | Facility: CLINIC | Age: 44
End: 2019-12-08

## 2019-12-08 NOTE — TELEPHONE ENCOUNTER
Call back DOS 12/05/19 - Spoke with patient. She stated that she is still not feeling well; her ear is feeling better but she has been having nausea. I asked if she was eating with antibiotics, she said she has been trying to eat something with them but may not be eating enough. I advised to follow up with primary if not feeling better when finishing antibiotics.

## 2020-09-11 ENCOUNTER — TELEPHONE (OUTPATIENT)
Dept: SPORTS MEDICINE | Facility: CLINIC | Age: 45
End: 2020-09-11
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
## 2020-09-14 ENCOUNTER — OFFICE VISIT (OUTPATIENT)
Dept: SPORTS MEDICINE | Facility: CLINIC | Age: 45
End: 2020-09-14
Payer: COMMERCIAL

## 2020-09-14 ENCOUNTER — HOSPITAL ENCOUNTER (OUTPATIENT)
Dept: RADIOLOGY | Facility: HOSPITAL | Age: 45
Discharge: HOME | End: 2020-09-14
Attending: PHYSICIAN ASSISTANT
Payer: COMMERCIAL

## 2020-09-14 VITALS
WEIGHT: 187.69 LBS | HEART RATE: 117 BPM | SYSTOLIC BLOOD PRESSURE: 147 MMHG | DIASTOLIC BLOOD PRESSURE: 86 MMHG | HEIGHT: 64 IN | BODY MASS INDEX: 32.04 KG/M2

## 2020-09-14 DIAGNOSIS — M25.512 LEFT SHOULDER PAIN, UNSPECIFIED CHRONICITY: ICD-10-CM

## 2020-09-14 DIAGNOSIS — M25.512 LEFT SHOULDER PAIN, UNSPECIFIED CHRONICITY: Primary | ICD-10-CM

## 2020-09-14 DIAGNOSIS — M67.912 TENDINOPATHY OF ROTATOR CUFF, LEFT: ICD-10-CM

## 2020-09-14 DIAGNOSIS — M75.52 SUBACROMIAL BURSITIS OF LEFT SHOULDER JOINT: ICD-10-CM

## 2020-09-14 PROCEDURE — 3008F PR BODY MASS INDEX (BMI) DOCUMENTED: ICD-10-PCS | Mod: CPTII,S$GLB,, | Performed by: PHYSICIAN ASSISTANT

## 2020-09-14 PROCEDURE — 73030 X-RAY EXAM OF SHOULDER: CPT | Mod: TC,LT

## 2020-09-14 PROCEDURE — 3008F BODY MASS INDEX DOCD: CPT | Mod: CPTII,S$GLB,, | Performed by: PHYSICIAN ASSISTANT

## 2020-09-14 PROCEDURE — 99999 PR PBB SHADOW E&M-EST. PATIENT-LVL V: CPT | Mod: PBBFAC,,, | Performed by: PHYSICIAN ASSISTANT

## 2020-09-14 PROCEDURE — 73030 XR SHOULDER COMPLETE 2 OR MORE VIEWS LEFT: ICD-10-PCS | Mod: 26,LT,, | Performed by: RADIOLOGY

## 2020-09-14 PROCEDURE — 3077F SYST BP >= 140 MM HG: CPT | Mod: CPTII,S$GLB,, | Performed by: PHYSICIAN ASSISTANT

## 2020-09-14 PROCEDURE — 99999 PR PBB SHADOW E&M-EST. PATIENT-LVL V: ICD-10-PCS | Mod: PBBFAC,,, | Performed by: PHYSICIAN ASSISTANT

## 2020-09-14 PROCEDURE — 3079F PR MOST RECENT DIASTOLIC BLOOD PRESSURE 80-89 MM HG: ICD-10-PCS | Mod: CPTII,S$GLB,, | Performed by: PHYSICIAN ASSISTANT

## 2020-09-14 PROCEDURE — 99204 OFFICE O/P NEW MOD 45 MIN: CPT | Mod: 25,S$GLB,, | Performed by: PHYSICIAN ASSISTANT

## 2020-09-14 PROCEDURE — 99204 PR OFFICE/OUTPT VISIT, NEW, LEVL IV, 45-59 MIN: ICD-10-PCS | Mod: 25,S$GLB,, | Performed by: PHYSICIAN ASSISTANT

## 2020-09-14 PROCEDURE — 20610 DRAIN/INJ JOINT/BURSA W/O US: CPT | Mod: LT,S$GLB,, | Performed by: PHYSICIAN ASSISTANT

## 2020-09-14 PROCEDURE — 73030 X-RAY EXAM OF SHOULDER: CPT | Mod: 26,LT,, | Performed by: RADIOLOGY

## 2020-09-14 PROCEDURE — 20610 LARGE JOINT ASPIRATION/INJECTION: L SUBACROMIAL BURSA: ICD-10-PCS | Mod: LT,S$GLB,, | Performed by: PHYSICIAN ASSISTANT

## 2020-09-14 PROCEDURE — 3079F DIAST BP 80-89 MM HG: CPT | Mod: CPTII,S$GLB,, | Performed by: PHYSICIAN ASSISTANT

## 2020-09-14 PROCEDURE — 3077F PR MOST RECENT SYSTOLIC BLOOD PRESSURE >= 140 MM HG: ICD-10-PCS | Mod: CPTII,S$GLB,, | Performed by: PHYSICIAN ASSISTANT

## 2020-09-14 RX ORDER — TRIAMCINOLONE ACETONIDE 40 MG/ML
40 INJECTION, SUSPENSION INTRA-ARTICULAR; INTRAMUSCULAR
Status: DISCONTINUED | OUTPATIENT
Start: 2020-09-14 | End: 2020-09-14 | Stop reason: HOSPADM

## 2020-09-14 RX ORDER — MELOXICAM 15 MG/1
15 TABLET ORAL DAILY
Qty: 30 TABLET | Refills: 2 | Status: SHIPPED | OUTPATIENT
Start: 2020-09-14 | End: 2021-03-17

## 2020-09-14 RX ADMIN — TRIAMCINOLONE ACETONIDE 40 MG: 40 INJECTION, SUSPENSION INTRA-ARTICULAR; INTRAMUSCULAR at 03:09

## 2020-09-14 NOTE — PROCEDURES
Large Joint Aspiration/Injection: L subacromial bursa    Date/Time: 9/14/2020 3:30 PM  Performed by: Brett Blanco PA-C  Authorized by: Brett Blanco PA-C     Consent Done?:  Yes (Verbal)  Indications:  Pain  Site marked: the procedure site was marked    Timeout: prior to procedure the correct patient, procedure, and site was verified    Prep: patient was prepped and draped in usual sterile fashion      Local anesthesia used?: Yes    Local anesthetic:  Topical anesthetic    Details:  Needle Size:  22 G  Ultrasonic Guidance for needle placement?: No    Approach:  Posterior  Location:  Shoulder  Site:  L subacromial bursa  Medications:  40 mg triamcinolone acetonide 40 mg/mL  Patient tolerance:  Patient tolerated the procedure well with no immediate complications    Injection Procedure  A time out was performed, including verification of patient ID, procedure, site and side, availability of information and equipment, review of safety issues, and agreement with consent, the procedure site was marked.    After time out was performed, the patient was prepped aseptically with povidone-iodine swabsticks. A diagnostic and therapeutic injection of 1:3cc Kenalog/Marcaine was given under sterile technique using a 22g x 1.5 needle from the Posterior  aspect of the left Subacromial in the sitting position.      Heather Gomez had no adverse reactions to the medication. Pain decreased. She was instructed to apply ice to the joint for 20 minutes and avoid strenuous activities for 24-36 hours following the injection. She was warned of possible blood sugar and/or blood pressure changes during that time. Following that time, she can resume regular activities.    She was reminded to call the clinic immediately for any adverse side effects as explained in clinic today.    Exp:  3/31/2022  Lot:  [id number]
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
## 2020-10-12 ENCOUNTER — OFFICE VISIT (OUTPATIENT)
Dept: OBSTETRICS AND GYNECOLOGY | Facility: CLINIC | Age: 45
End: 2020-10-12
Payer: COMMERCIAL

## 2020-10-12 VITALS
WEIGHT: 187.75 LBS | DIASTOLIC BLOOD PRESSURE: 86 MMHG | HEIGHT: 64 IN | SYSTOLIC BLOOD PRESSURE: 140 MMHG | BODY MASS INDEX: 32.05 KG/M2

## 2020-10-12 DIAGNOSIS — N89.8 VAGINAL DISCHARGE: ICD-10-CM

## 2020-10-12 DIAGNOSIS — Z01.419 WELL WOMAN EXAM WITH ROUTINE GYNECOLOGICAL EXAM: Primary | ICD-10-CM

## 2020-10-12 DIAGNOSIS — Z12.31 SCREENING MAMMOGRAM FOR HIGH-RISK PATIENT: ICD-10-CM

## 2020-10-12 PROCEDURE — 3077F SYST BP >= 140 MM HG: CPT | Mod: CPTII,S$GLB,, | Performed by: OBSTETRICS & GYNECOLOGY

## 2020-10-12 PROCEDURE — 3079F PR MOST RECENT DIASTOLIC BLOOD PRESSURE 80-89 MM HG: ICD-10-PCS | Mod: CPTII,S$GLB,, | Performed by: OBSTETRICS & GYNECOLOGY

## 2020-10-12 PROCEDURE — 99999 PR PBB SHADOW E&M-EST. PATIENT-LVL V: ICD-10-PCS | Mod: PBBFAC,,, | Performed by: OBSTETRICS & GYNECOLOGY

## 2020-10-12 PROCEDURE — 99386 PR PREVENTIVE VISIT,NEW,40-64: ICD-10-PCS | Mod: S$GLB,,, | Performed by: OBSTETRICS & GYNECOLOGY

## 2020-10-12 PROCEDURE — 3008F BODY MASS INDEX DOCD: CPT | Mod: CPTII,S$GLB,, | Performed by: OBSTETRICS & GYNECOLOGY

## 2020-10-12 PROCEDURE — 3008F PR BODY MASS INDEX (BMI) DOCUMENTED: ICD-10-PCS | Mod: CPTII,S$GLB,, | Performed by: OBSTETRICS & GYNECOLOGY

## 2020-10-12 PROCEDURE — 99999 PR PBB SHADOW E&M-EST. PATIENT-LVL V: CPT | Mod: PBBFAC,,, | Performed by: OBSTETRICS & GYNECOLOGY

## 2020-10-12 PROCEDURE — 3077F PR MOST RECENT SYSTOLIC BLOOD PRESSURE >= 140 MM HG: ICD-10-PCS | Mod: CPTII,S$GLB,, | Performed by: OBSTETRICS & GYNECOLOGY

## 2020-10-12 PROCEDURE — 3079F DIAST BP 80-89 MM HG: CPT | Mod: CPTII,S$GLB,, | Performed by: OBSTETRICS & GYNECOLOGY

## 2020-10-12 PROCEDURE — 99386 PREV VISIT NEW AGE 40-64: CPT | Mod: S$GLB,,, | Performed by: OBSTETRICS & GYNECOLOGY

## 2020-10-12 RX ORDER — GLIPIZIDE 5 MG/1
TABLET ORAL
COMMUNITY
Start: 2020-10-01 | End: 2021-09-25 | Stop reason: ALTCHOICE

## 2020-10-12 RX ORDER — FLUCONAZOLE 150 MG/1
150 TABLET ORAL
Qty: 6 TABLET | Refills: 0 | Status: SHIPPED | OUTPATIENT
Start: 2020-10-12 | End: 2021-03-17

## 2020-10-12 RX ORDER — METRONIDAZOLE 500 MG/1
500 TABLET ORAL 2 TIMES DAILY PRN
Qty: 28 TABLET | Refills: 0 | Status: SHIPPED | OUTPATIENT
Start: 2020-10-12 | End: 2021-03-17

## 2020-10-12 RX ORDER — ZOLPIDEM TARTRATE 10 MG/1
10 TABLET ORAL DAILY
COMMUNITY
Start: 2020-09-11 | End: 2023-01-27

## 2020-10-12 RX ORDER — SITAGLIPTIN 100 MG/1
100 TABLET, FILM COATED ORAL DAILY
COMMUNITY
Start: 2020-10-01 | End: 2021-09-25 | Stop reason: ALTCHOICE

## 2020-10-12 NOTE — PROGRESS NOTES
"[facility]  Ambulatory Clinic  Obstetrics & Gynecology    Visit Date:  10/12/2020    Chief Complaint:  Annual GYN exam    History of Present Illness:      Heather Gomez is a 45 y.o. , new pt to me, here for a gynecologic exam with c/o vaginal discharge.    Pt described discharge as clear, fishy, non bloody, without pelvic pain or abnormal vaginal bleeding for past few days.      Pt reports h/o hysterectomy with ovarian conservation for uterine fibroids.    Pt denies recent h/o abnormal pap    Pt denies active sexually transmitted infections.    Pt has h/o right breat bx .    Pt performs monthly self breast examination, non-smoker, uses seat belts, and denies abuse.     Pt denies vaginal bleeding, dyspareunia, pelvic pain, bloating, early satiety, unintentional weight loss, breast mass/skin changes, incontinence, GI or urinary complaints.      Otherwise, the pt is in her usual state of health.    Past History:  Gynecologic history as noted above.    Review of Systems:      GENERAL:  No fever, fatigue, excessive weight gain or loss  HEENT:  No headaches, hearing changes, visual disturbance  RESPIRATORY:  No cough, shortness of breath  CARDIOVASCULAR:  No chest pain, heart palpitations, leg swelling  BREAST:  No lump, pain, nipple discharge, skin changes  GASTROINTESTINAL:  No nausea, vomiting, constipation, diarrhea, abd pain, rectal bleeding   GENITOURINARY:  See HPI  ENDOCRINE:  No heat or cold intolerance  HEMATOLOGIC:  No easy bruisability or bleeding   LYMPHATICS:  No enlarged nodes  MUSCULOSKELETAL:  No acute joint pain or swelling  SKIN:  No rash, lesions, jaundice  NEUROLOGIC:  No dizziness, weakness, syncope  PSYCHIATRIC:  No significant mood changes, homicidal/suicidal ideations    Physical Exam:     BP (!) 140/86   Ht 5' 4" (1.626 m)   Wt 85.2 kg (187 lb 11.6 oz)   LMP 2019   BMI 32.22 kg/m²   Pulse 70, Resp rate 16     GENERAL:  No acute distress, " well-nourished  HEENT:  Atraumatic, anicteric, moist mucus membranes. Neck supple w/o masses.  BREAST:  Symmetric, nontender, no obvious masses, adenopathy, skin changes or nipple discharge.  LUNGS:  Clear to auscultation  HEART:  Regular rate and rhythm, no murmurs, gallops, or rubs  ABDOMEN:  Soft, non-tender, non-distended, normoactive bowel sounds, no obvious organomegaly  EXT:  Symmetric w/o cramping, claudication, or edema. +2 distal pulses.  SKIN:  No rashes or bruising  PSYCH:  Mood and affect appropriate  NEURO:  Grossly intact bilaterally    GENITOURINARY:  VULVAR:  Female external genitalia w/o any obvious lesions. Female hair distribution. Adequate perineal body. Normal urethral meatus. No gross lymphadenopathy.   VAGINA:   Pink, moist, well-rugated. Adequate support. No significant cystocele or rectocele. No obvious lesion. Mild clear fishy discharge, +BV.  CERVIX:   Surgically absent. No cuff lesions or tenderness.     UTERUS:  Surgically absent.   ADNEXA:  No masses, non-tender   RECTAL:  Declined. No obvious external lesions    Chaperone present for exam.    Assessment:     45 y.o.  h/o hysterectomy with ovarian conservation for uterine fibroids and h/o right breast bx:    1. Well woman gynecologic exam  2. Bacterial vaginosis    Plan:    A gynecologic health assessment was performed with age appropriate counseling.    Cervical cancer screening - we discussed the role of pap smears after hysterectomy for benign indications in pt without h/o cervical dysplasia >/= GILBERTO 2.  According to ASCCP Consensus Guidelines, pap testing may be discontinued, pt agreeable.  Pt was instructed to bring/fax in copies of her pap tests for review.      STI screening - pt declined.      Screening mammogram ordered, pt advised to call to schedule.    Encourage healthy lifestyle modifications, monthly self breast exams, Ca/Vit D, screening mammogram, and colonoscopy.    F/u with PCP for health maintenance.    Return 1  year for gynecologic exam, or sooner as needed.  All questions answered, pt voiced understanding.        London Bishop MD

## 2020-10-13 ENCOUNTER — TELEPHONE (OUTPATIENT)
Dept: OBSTETRICS AND GYNECOLOGY | Facility: CLINIC | Age: 45
End: 2020-10-13

## 2020-10-13 NOTE — TELEPHONE ENCOUNTER
Spoke with pt concerns solved     ----- Message from Maya Memo sent at 10/12/2020  4:29 PM CDT -----  Regarding: pt  Type: Patient Call Back    Who called:pt    What is the request in detail:pt following up on prescriptions she was supposed to get today and she doesn't know what they are called.  Call pt    Can the clinic reply by [facility]?    Would the patient rather a call back or a response via [facility]? call    Best call back number:[phone] (home)       Additional Information:

## 2020-10-17 ENCOUNTER — HOSPITAL ENCOUNTER (OUTPATIENT)
Dept: RADIOLOGY | Facility: HOSPITAL | Age: 45
Discharge: HOME | End: 2020-10-17
Attending: OBSTETRICS & GYNECOLOGY
Payer: COMMERCIAL

## 2020-10-17 VITALS — BODY MASS INDEX: 31.92 KG/M2 | WEIGHT: 187 LBS | HEIGHT: 64 IN

## 2020-10-17 DIAGNOSIS — Z12.31 SCREENING MAMMOGRAM FOR HIGH-RISK PATIENT: ICD-10-CM

## 2020-10-17 PROCEDURE — 77067 MAMMO DIGITAL SCREENING BILAT WITH TOMO: ICD-10-PCS | Mod: 26,,, | Performed by: INTERNAL MEDICINE

## 2020-10-17 PROCEDURE — 77067 SCR MAMMO BI INCL CAD: CPT | Mod: TC

## 2020-10-17 PROCEDURE — 77063 BREAST TOMOSYNTHESIS BI: CPT | Mod: 26,,, | Performed by: INTERNAL MEDICINE

## 2020-10-17 PROCEDURE — 77067 SCR MAMMO BI INCL CAD: CPT | Mod: 26,,, | Performed by: INTERNAL MEDICINE

## 2020-10-17 PROCEDURE — 77063 MAMMO DIGITAL SCREENING BILAT WITH TOMO: ICD-10-PCS | Mod: 26,,, | Performed by: INTERNAL MEDICINE

## 2020-12-14 ENCOUNTER — LAB VISIT (OUTPATIENT)
Dept: PRIMARY CARE CLINIC | Facility: OTHER | Age: 45
End: 2020-12-14
Attending: INTERNAL MEDICINE
Payer: COMMERCIAL

## 2020-12-14 DIAGNOSIS — Z03.818 ENCOUNTER FOR OBSERVATION FOR SUSPECTED EXPOSURE TO OTHER BIOLOGICAL AGENTS RULED OUT: ICD-10-CM

## 2020-12-14 PROCEDURE — U0003 INFECTIOUS AGENT DETECTION BY NUCLEIC ACID (DNA OR RNA); SEVERE ACUTE RESPIRATORY SYNDROME CORONAVIRUS 2 (SARS-COV-2) (CORONAVIRUS DISEASE [COVID-19]), AMPLIFIED PROBE TECHNIQUE, MAKING USE OF HIGH THROUGHPUT TECHNOLOGIES AS DESCRIBED BY CMS-2020-01-R: HCPCS

## 2020-12-15 LAB — SARS-COV-2 RNA RESP QL NAA+PROBE: NOT DETECTED

## 2020-12-16 ENCOUNTER — PATIENT MESSAGE (OUTPATIENT)
Dept: ADMINISTRATIVE | Facility: OTHER | Age: 45
End: 2020-12-16

## 2021-01-29 ENCOUNTER — TELEPHONE (OUTPATIENT)
Dept: ADMINISTRATIVE | Facility: OTHER | Age: 46
End: 2021-01-29

## 2021-03-17 ENCOUNTER — OFFICE VISIT (OUTPATIENT)
Dept: ORTHOPEDICS | Facility: CLINIC | Age: 46
End: 2021-03-17
Payer: COMMERCIAL

## 2021-03-17 VITALS
WEIGHT: 187 LBS | HEART RATE: 88 BPM | BODY MASS INDEX: 32.1 KG/M2 | SYSTOLIC BLOOD PRESSURE: 124 MMHG | DIASTOLIC BLOOD PRESSURE: 78 MMHG | RESPIRATION RATE: 18 BRPM

## 2021-03-17 DIAGNOSIS — M17.11 PRIMARY OSTEOARTHRITIS OF RIGHT KNEE: Primary | ICD-10-CM

## 2021-03-17 DIAGNOSIS — M25.569 KNEE PAIN, UNSPECIFIED CHRONICITY, UNSPECIFIED LATERALITY: Primary | ICD-10-CM

## 2021-03-17 DIAGNOSIS — M25.531 RIGHT WRIST PAIN: ICD-10-CM

## 2021-03-17 PROCEDURE — 3074F SYST BP LT 130 MM HG: CPT | Mod: CPTII,S$GLB,, | Performed by: PHYSICIAN ASSISTANT

## 2021-03-17 PROCEDURE — 1125F AMNT PAIN NOTED PAIN PRSNT: CPT | Mod: S$GLB,,, | Performed by: PHYSICIAN ASSISTANT

## 2021-03-17 PROCEDURE — 20610 DRAIN/INJ JOINT/BURSA W/O US: CPT | Mod: RT,S$GLB,, | Performed by: PHYSICIAN ASSISTANT

## 2021-03-17 PROCEDURE — 99999 PR PBB SHADOW E&M-EST. PATIENT-LVL V: ICD-10-PCS | Mod: PBBFAC,,, | Performed by: PHYSICIAN ASSISTANT

## 2021-03-17 PROCEDURE — 3078F PR MOST RECENT DIASTOLIC BLOOD PRESSURE < 80 MM HG: ICD-10-PCS | Mod: CPTII,S$GLB,, | Performed by: PHYSICIAN ASSISTANT

## 2021-03-17 PROCEDURE — 20610 PR DRAIN/INJECT LARGE JOINT/BURSA: ICD-10-PCS | Mod: RT,S$GLB,, | Performed by: PHYSICIAN ASSISTANT

## 2021-03-17 PROCEDURE — 1125F PR PAIN SEVERITY QUANTIFIED, PAIN PRESENT: ICD-10-PCS | Mod: S$GLB,,, | Performed by: PHYSICIAN ASSISTANT

## 2021-03-17 PROCEDURE — 3078F DIAST BP <80 MM HG: CPT | Mod: CPTII,S$GLB,, | Performed by: PHYSICIAN ASSISTANT

## 2021-03-17 PROCEDURE — 99204 OFFICE O/P NEW MOD 45 MIN: CPT | Mod: 25,S$GLB,, | Performed by: PHYSICIAN ASSISTANT

## 2021-03-17 PROCEDURE — 3008F PR BODY MASS INDEX (BMI) DOCUMENTED: ICD-10-PCS | Mod: CPTII,S$GLB,, | Performed by: PHYSICIAN ASSISTANT

## 2021-03-17 PROCEDURE — 99204 PR OFFICE/OUTPT VISIT, NEW, LEVL IV, 45-59 MIN: ICD-10-PCS | Mod: 25,S$GLB,, | Performed by: PHYSICIAN ASSISTANT

## 2021-03-17 PROCEDURE — 3008F BODY MASS INDEX DOCD: CPT | Mod: CPTII,S$GLB,, | Performed by: PHYSICIAN ASSISTANT

## 2021-03-17 PROCEDURE — 3074F PR MOST RECENT SYSTOLIC BLOOD PRESSURE < 130 MM HG: ICD-10-PCS | Mod: CPTII,S$GLB,, | Performed by: PHYSICIAN ASSISTANT

## 2021-03-17 PROCEDURE — 99999 PR PBB SHADOW E&M-EST. PATIENT-LVL V: CPT | Mod: PBBFAC,,, | Performed by: PHYSICIAN ASSISTANT

## 2021-03-17 RX ORDER — INDOMETHACIN 50 MG/1
50 CAPSULE ORAL 2 TIMES DAILY WITH MEALS
Qty: 60 CAPSULE | Refills: 2 | Status: SHIPPED | OUTPATIENT
Start: 2021-03-17 | End: 2021-07-14 | Stop reason: SDUPTHER

## 2021-03-17 RX ORDER — TRIAMCINOLONE ACETONIDE 40 MG/ML
40 INJECTION, SUSPENSION INTRA-ARTICULAR; INTRAMUSCULAR
Status: DISCONTINUED | OUTPATIENT
Start: 2021-03-17 | End: 2021-03-17 | Stop reason: HOSPADM

## 2021-03-17 RX ADMIN — TRIAMCINOLONE ACETONIDE 40 MG: 40 INJECTION, SUSPENSION INTRA-ARTICULAR; INTRAMUSCULAR at 07:03

## 2021-05-04 ENCOUNTER — PATIENT MESSAGE (OUTPATIENT)
Dept: RESEARCH | Facility: HOSPITAL | Age: 46
End: 2021-05-04

## 2021-05-10 ENCOUNTER — PATIENT MESSAGE (OUTPATIENT)
Dept: RESEARCH | Facility: HOSPITAL | Age: 46
End: 2021-05-10

## 2021-07-14 ENCOUNTER — OFFICE VISIT (OUTPATIENT)
Dept: ORTHOPEDICS | Facility: CLINIC | Age: 46
End: 2021-07-14
Payer: COMMERCIAL

## 2021-07-14 VITALS
DIASTOLIC BLOOD PRESSURE: 76 MMHG | SYSTOLIC BLOOD PRESSURE: 116 MMHG | BODY MASS INDEX: 32.1 KG/M2 | HEART RATE: 74 BPM | RESPIRATION RATE: 20 BRPM | WEIGHT: 187 LBS

## 2021-07-14 DIAGNOSIS — M17.11 PRIMARY OSTEOARTHRITIS OF RIGHT KNEE: Primary | ICD-10-CM

## 2021-07-14 DIAGNOSIS — M25.531 RIGHT WRIST PAIN: ICD-10-CM

## 2021-07-14 PROCEDURE — 99999 PR PBB SHADOW E&M-EST. PATIENT-LVL IV: ICD-10-PCS | Mod: PBBFAC,,, | Performed by: PHYSICIAN ASSISTANT

## 2021-07-14 PROCEDURE — 20610 LARGE JOINT ASPIRATION/INJECTION: R KNEE: ICD-10-PCS | Mod: RT,S$GLB,, | Performed by: PHYSICIAN ASSISTANT

## 2021-07-14 PROCEDURE — 99999 PR PBB SHADOW E&M-EST. PATIENT-LVL IV: CPT | Mod: PBBFAC,,, | Performed by: PHYSICIAN ASSISTANT

## 2021-07-14 PROCEDURE — 1125F PR PAIN SEVERITY QUANTIFIED, PAIN PRESENT: ICD-10-PCS | Mod: S$GLB,,, | Performed by: PHYSICIAN ASSISTANT

## 2021-07-14 PROCEDURE — 99214 PR OFFICE/OUTPT VISIT, EST, LEVL IV, 30-39 MIN: ICD-10-PCS | Mod: 25,S$GLB,, | Performed by: PHYSICIAN ASSISTANT

## 2021-07-14 PROCEDURE — 3008F BODY MASS INDEX DOCD: CPT | Mod: CPTII,S$GLB,, | Performed by: PHYSICIAN ASSISTANT

## 2021-07-14 PROCEDURE — 99214 OFFICE O/P EST MOD 30 MIN: CPT | Mod: 25,S$GLB,, | Performed by: PHYSICIAN ASSISTANT

## 2021-07-14 PROCEDURE — 20610 DRAIN/INJ JOINT/BURSA W/O US: CPT | Mod: RT,S$GLB,, | Performed by: PHYSICIAN ASSISTANT

## 2021-07-14 PROCEDURE — 3008F PR BODY MASS INDEX (BMI) DOCUMENTED: ICD-10-PCS | Mod: CPTII,S$GLB,, | Performed by: PHYSICIAN ASSISTANT

## 2021-07-14 PROCEDURE — 1125F AMNT PAIN NOTED PAIN PRSNT: CPT | Mod: S$GLB,,, | Performed by: PHYSICIAN ASSISTANT

## 2021-07-14 RX ORDER — TRIAMCINOLONE ACETONIDE 40 MG/ML
40 INJECTION, SUSPENSION INTRA-ARTICULAR; INTRAMUSCULAR
Status: DISCONTINUED | OUTPATIENT
Start: 2021-07-14 | End: 2021-07-14 | Stop reason: HOSPADM

## 2021-07-14 RX ORDER — INDOMETHACIN 50 MG/1
50 CAPSULE ORAL 2 TIMES DAILY WITH MEALS
Qty: 60 CAPSULE | Refills: 2 | Status: SHIPPED | OUTPATIENT
Start: 2021-07-14 | End: 2021-08-24 | Stop reason: SDUPTHER

## 2021-07-14 RX ADMIN — TRIAMCINOLONE ACETONIDE 40 MG: 40 INJECTION, SUSPENSION INTRA-ARTICULAR; INTRAMUSCULAR at 10:07

## 2021-08-17 ENCOUNTER — OFFICE VISIT (OUTPATIENT)
Dept: ORTHOPEDICS | Facility: CLINIC | Age: 46
End: 2021-08-17
Payer: COMMERCIAL

## 2021-08-17 VITALS
HEIGHT: 64 IN | WEIGHT: 189.5 LBS | DIASTOLIC BLOOD PRESSURE: 80 MMHG | SYSTOLIC BLOOD PRESSURE: 110 MMHG | BODY MASS INDEX: 32.35 KG/M2

## 2021-08-17 DIAGNOSIS — M17.11 PRIMARY OSTEOARTHRITIS OF RIGHT KNEE: Primary | ICD-10-CM

## 2021-08-17 PROCEDURE — 99499 NO LOS: ICD-10-PCS | Mod: S$GLB,,, | Performed by: PHYSICIAN ASSISTANT

## 2021-08-17 PROCEDURE — 3044F PR MOST RECENT HEMOGLOBIN A1C LEVEL <7.0%: ICD-10-PCS | Mod: CPTII,S$GLB,, | Performed by: PHYSICIAN ASSISTANT

## 2021-08-17 PROCEDURE — 1160F PR REVIEW ALL MEDS BY PRESCRIBER/CLIN PHARMACIST DOCUMENTED: ICD-10-PCS | Mod: CPTII,S$GLB,, | Performed by: PHYSICIAN ASSISTANT

## 2021-08-17 PROCEDURE — 1126F PR PAIN SEVERITY QUANTIFIED, NO PAIN PRESENT: ICD-10-PCS | Mod: CPTII,S$GLB,, | Performed by: PHYSICIAN ASSISTANT

## 2021-08-17 PROCEDURE — 3079F DIAST BP 80-89 MM HG: CPT | Mod: CPTII,S$GLB,, | Performed by: PHYSICIAN ASSISTANT

## 2021-08-17 PROCEDURE — 3074F SYST BP LT 130 MM HG: CPT | Mod: CPTII,S$GLB,, | Performed by: PHYSICIAN ASSISTANT

## 2021-08-17 PROCEDURE — 1159F MED LIST DOCD IN RCRD: CPT | Mod: CPTII,S$GLB,, | Performed by: PHYSICIAN ASSISTANT

## 2021-08-17 PROCEDURE — 3008F PR BODY MASS INDEX (BMI) DOCUMENTED: ICD-10-PCS | Mod: CPTII,S$GLB,, | Performed by: PHYSICIAN ASSISTANT

## 2021-08-17 PROCEDURE — 1126F AMNT PAIN NOTED NONE PRSNT: CPT | Mod: CPTII,S$GLB,, | Performed by: PHYSICIAN ASSISTANT

## 2021-08-17 PROCEDURE — 99999 PR PBB SHADOW E&M-EST. PATIENT-LVL III: ICD-10-PCS | Mod: PBBFAC,,, | Performed by: PHYSICIAN ASSISTANT

## 2021-08-17 PROCEDURE — 20610 PR DRAIN/INJECT LARGE JOINT/BURSA: ICD-10-PCS | Mod: RT,S$GLB,, | Performed by: PHYSICIAN ASSISTANT

## 2021-08-17 PROCEDURE — 3079F PR MOST RECENT DIASTOLIC BLOOD PRESSURE 80-89 MM HG: ICD-10-PCS | Mod: CPTII,S$GLB,, | Performed by: PHYSICIAN ASSISTANT

## 2021-08-17 PROCEDURE — 99499 UNLISTED E&M SERVICE: CPT | Mod: S$GLB,,, | Performed by: PHYSICIAN ASSISTANT

## 2021-08-17 PROCEDURE — 1160F RVW MEDS BY RX/DR IN RCRD: CPT | Mod: CPTII,S$GLB,, | Performed by: PHYSICIAN ASSISTANT

## 2021-08-17 PROCEDURE — 3074F PR MOST RECENT SYSTOLIC BLOOD PRESSURE < 130 MM HG: ICD-10-PCS | Mod: CPTII,S$GLB,, | Performed by: PHYSICIAN ASSISTANT

## 2021-08-17 PROCEDURE — 3008F BODY MASS INDEX DOCD: CPT | Mod: CPTII,S$GLB,, | Performed by: PHYSICIAN ASSISTANT

## 2021-08-17 PROCEDURE — 20610 DRAIN/INJ JOINT/BURSA W/O US: CPT | Mod: RT,S$GLB,, | Performed by: PHYSICIAN ASSISTANT

## 2021-08-17 PROCEDURE — 99999 PR PBB SHADOW E&M-EST. PATIENT-LVL III: CPT | Mod: PBBFAC,,, | Performed by: PHYSICIAN ASSISTANT

## 2021-08-17 PROCEDURE — 3044F HG A1C LEVEL LT 7.0%: CPT | Mod: CPTII,S$GLB,, | Performed by: PHYSICIAN ASSISTANT

## 2021-08-17 PROCEDURE — 1159F PR MEDICATION LIST DOCUMENTED IN MEDICAL RECORD: ICD-10-PCS | Mod: CPTII,S$GLB,, | Performed by: PHYSICIAN ASSISTANT

## 2021-08-24 ENCOUNTER — OFFICE VISIT (OUTPATIENT)
Dept: ORTHOPEDICS | Facility: CLINIC | Age: 46
End: 2021-08-24
Payer: COMMERCIAL

## 2021-08-24 VITALS
BODY MASS INDEX: 32.44 KG/M2 | DIASTOLIC BLOOD PRESSURE: 90 MMHG | WEIGHT: 189 LBS | SYSTOLIC BLOOD PRESSURE: 146 MMHG | RESPIRATION RATE: 20 BRPM | HEART RATE: 86 BPM

## 2021-08-24 DIAGNOSIS — M17.11 PRIMARY OSTEOARTHRITIS OF RIGHT KNEE: Primary | ICD-10-CM

## 2021-08-24 DIAGNOSIS — M25.531 RIGHT WRIST PAIN: ICD-10-CM

## 2021-08-24 PROCEDURE — 20610 PR DRAIN/INJECT LARGE JOINT/BURSA: ICD-10-PCS | Mod: RT,S$GLB,, | Performed by: PHYSICIAN ASSISTANT

## 2021-08-24 PROCEDURE — 3077F SYST BP >= 140 MM HG: CPT | Mod: CPTII,S$GLB,, | Performed by: PHYSICIAN ASSISTANT

## 2021-08-24 PROCEDURE — 3044F HG A1C LEVEL LT 7.0%: CPT | Mod: CPTII,S$GLB,, | Performed by: PHYSICIAN ASSISTANT

## 2021-08-24 PROCEDURE — 1160F RVW MEDS BY RX/DR IN RCRD: CPT | Mod: CPTII,S$GLB,, | Performed by: PHYSICIAN ASSISTANT

## 2021-08-24 PROCEDURE — 3080F DIAST BP >= 90 MM HG: CPT | Mod: CPTII,S$GLB,, | Performed by: PHYSICIAN ASSISTANT

## 2021-08-24 PROCEDURE — 3044F PR MOST RECENT HEMOGLOBIN A1C LEVEL <7.0%: ICD-10-PCS | Mod: CPTII,S$GLB,, | Performed by: PHYSICIAN ASSISTANT

## 2021-08-24 PROCEDURE — 1159F MED LIST DOCD IN RCRD: CPT | Mod: CPTII,S$GLB,, | Performed by: PHYSICIAN ASSISTANT

## 2021-08-24 PROCEDURE — 20610 DRAIN/INJ JOINT/BURSA W/O US: CPT | Mod: RT,S$GLB,, | Performed by: PHYSICIAN ASSISTANT

## 2021-08-24 PROCEDURE — 3008F BODY MASS INDEX DOCD: CPT | Mod: CPTII,S$GLB,, | Performed by: PHYSICIAN ASSISTANT

## 2021-08-24 PROCEDURE — 99499 UNLISTED E&M SERVICE: CPT | Mod: S$GLB,,, | Performed by: PHYSICIAN ASSISTANT

## 2021-08-24 PROCEDURE — 1159F PR MEDICATION LIST DOCUMENTED IN MEDICAL RECORD: ICD-10-PCS | Mod: CPTII,S$GLB,, | Performed by: PHYSICIAN ASSISTANT

## 2021-08-24 PROCEDURE — 3077F PR MOST RECENT SYSTOLIC BLOOD PRESSURE >= 140 MM HG: ICD-10-PCS | Mod: CPTII,S$GLB,, | Performed by: PHYSICIAN ASSISTANT

## 2021-08-24 PROCEDURE — 1126F PR PAIN SEVERITY QUANTIFIED, NO PAIN PRESENT: ICD-10-PCS | Mod: CPTII,S$GLB,, | Performed by: PHYSICIAN ASSISTANT

## 2021-08-24 PROCEDURE — 1126F AMNT PAIN NOTED NONE PRSNT: CPT | Mod: CPTII,S$GLB,, | Performed by: PHYSICIAN ASSISTANT

## 2021-08-24 PROCEDURE — 99999 PR PBB SHADOW E&M-EST. PATIENT-LVL III: CPT | Mod: PBBFAC,,, | Performed by: PHYSICIAN ASSISTANT

## 2021-08-24 PROCEDURE — 99999 PR PBB SHADOW E&M-EST. PATIENT-LVL III: ICD-10-PCS | Mod: PBBFAC,,, | Performed by: PHYSICIAN ASSISTANT

## 2021-08-24 PROCEDURE — 3008F PR BODY MASS INDEX (BMI) DOCUMENTED: ICD-10-PCS | Mod: CPTII,S$GLB,, | Performed by: PHYSICIAN ASSISTANT

## 2021-08-24 PROCEDURE — 3080F PR MOST RECENT DIASTOLIC BLOOD PRESSURE >= 90 MM HG: ICD-10-PCS | Mod: CPTII,S$GLB,, | Performed by: PHYSICIAN ASSISTANT

## 2021-08-24 PROCEDURE — 1160F PR REVIEW ALL MEDS BY PRESCRIBER/CLIN PHARMACIST DOCUMENTED: ICD-10-PCS | Mod: CPTII,S$GLB,, | Performed by: PHYSICIAN ASSISTANT

## 2021-08-24 PROCEDURE — 99499 NO LOS: ICD-10-PCS | Mod: S$GLB,,, | Performed by: PHYSICIAN ASSISTANT

## 2021-08-24 RX ORDER — INDOMETHACIN 50 MG/1
50 CAPSULE ORAL 2 TIMES DAILY WITH MEALS
Qty: 60 CAPSULE | Refills: 2 | Status: SHIPPED | OUTPATIENT
Start: 2021-08-24 | End: 2022-05-16 | Stop reason: SDUPTHER

## 2021-09-25 ENCOUNTER — OFFICE VISIT (OUTPATIENT)
Dept: FAMILY MEDICINE | Facility: CLINIC | Age: 46
End: 2021-09-25
Payer: COMMERCIAL

## 2021-09-25 DIAGNOSIS — R11.0 NAUSEA: ICD-10-CM

## 2021-09-25 DIAGNOSIS — R09.82 POST-NASAL DRIP: Primary | ICD-10-CM

## 2021-09-25 DIAGNOSIS — I15.2 HYPERTENSION ASSOCIATED WITH DIABETES: ICD-10-CM

## 2021-09-25 DIAGNOSIS — E11.59 HYPERTENSION ASSOCIATED WITH DIABETES: ICD-10-CM

## 2021-09-25 PROCEDURE — 3044F HG A1C LEVEL LT 7.0%: CPT | Mod: CPTII,95,, | Performed by: FAMILY MEDICINE

## 2021-09-25 PROCEDURE — 1160F RVW MEDS BY RX/DR IN RCRD: CPT | Mod: CPTII,95,, | Performed by: FAMILY MEDICINE

## 2021-09-25 PROCEDURE — 1160F PR REVIEW ALL MEDS BY PRESCRIBER/CLIN PHARMACIST DOCUMENTED: ICD-10-PCS | Mod: CPTII,95,, | Performed by: FAMILY MEDICINE

## 2021-09-25 PROCEDURE — 99214 OFFICE O/P EST MOD 30 MIN: CPT | Mod: 95,,, | Performed by: FAMILY MEDICINE

## 2021-09-25 PROCEDURE — 99214 PR OFFICE/OUTPT VISIT, EST, LEVL IV, 30-39 MIN: ICD-10-PCS | Mod: 95,,, | Performed by: FAMILY MEDICINE

## 2021-09-25 PROCEDURE — 1159F PR MEDICATION LIST DOCUMENTED IN MEDICAL RECORD: ICD-10-PCS | Mod: CPTII,95,, | Performed by: FAMILY MEDICINE

## 2021-09-25 PROCEDURE — 3044F PR MOST RECENT HEMOGLOBIN A1C LEVEL <7.0%: ICD-10-PCS | Mod: CPTII,95,, | Performed by: FAMILY MEDICINE

## 2021-09-25 PROCEDURE — 1159F MED LIST DOCD IN RCRD: CPT | Mod: CPTII,95,, | Performed by: FAMILY MEDICINE

## 2021-09-25 RX ORDER — ONDANSETRON 4 MG/1
4 TABLET, ORALLY DISINTEGRATING ORAL EVERY 8 HOURS PRN
Qty: 20 TABLET | Refills: 1 | Status: SHIPPED | OUTPATIENT
Start: 2021-09-25 | End: 2021-10-29

## 2021-09-30 ENCOUNTER — OFFICE VISIT (OUTPATIENT)
Dept: ORTHOPEDICS | Facility: CLINIC | Age: 46
End: 2021-09-30
Payer: COMMERCIAL

## 2021-09-30 VITALS
RESPIRATION RATE: 18 BRPM | HEART RATE: 86 BPM | WEIGHT: 190 LBS | SYSTOLIC BLOOD PRESSURE: 144 MMHG | DIASTOLIC BLOOD PRESSURE: 84 MMHG | BODY MASS INDEX: 32.61 KG/M2

## 2021-09-30 DIAGNOSIS — M17.11 PRIMARY OSTEOARTHRITIS OF RIGHT KNEE: Primary | ICD-10-CM

## 2021-09-30 PROCEDURE — 99999 PR PBB SHADOW E&M-EST. PATIENT-LVL III: CPT | Mod: PBBFAC,,, | Performed by: PHYSICIAN ASSISTANT

## 2021-09-30 PROCEDURE — 3044F HG A1C LEVEL LT 7.0%: CPT | Mod: CPTII,S$GLB,, | Performed by: PHYSICIAN ASSISTANT

## 2021-09-30 PROCEDURE — 3079F PR MOST RECENT DIASTOLIC BLOOD PRESSURE 80-89 MM HG: ICD-10-PCS | Mod: CPTII,S$GLB,, | Performed by: PHYSICIAN ASSISTANT

## 2021-09-30 PROCEDURE — 1159F PR MEDICATION LIST DOCUMENTED IN MEDICAL RECORD: ICD-10-PCS | Mod: CPTII,S$GLB,, | Performed by: PHYSICIAN ASSISTANT

## 2021-09-30 PROCEDURE — 3079F DIAST BP 80-89 MM HG: CPT | Mod: CPTII,S$GLB,, | Performed by: PHYSICIAN ASSISTANT

## 2021-09-30 PROCEDURE — 3008F BODY MASS INDEX DOCD: CPT | Mod: CPTII,S$GLB,, | Performed by: PHYSICIAN ASSISTANT

## 2021-09-30 PROCEDURE — 1159F MED LIST DOCD IN RCRD: CPT | Mod: CPTII,S$GLB,, | Performed by: PHYSICIAN ASSISTANT

## 2021-09-30 PROCEDURE — 20610 DRAIN/INJ JOINT/BURSA W/O US: CPT | Mod: RT,S$GLB,, | Performed by: PHYSICIAN ASSISTANT

## 2021-09-30 PROCEDURE — 99499 NO LOS: ICD-10-PCS | Mod: S$GLB,,, | Performed by: PHYSICIAN ASSISTANT

## 2021-09-30 PROCEDURE — 3008F PR BODY MASS INDEX (BMI) DOCUMENTED: ICD-10-PCS | Mod: CPTII,S$GLB,, | Performed by: PHYSICIAN ASSISTANT

## 2021-09-30 PROCEDURE — 3077F PR MOST RECENT SYSTOLIC BLOOD PRESSURE >= 140 MM HG: ICD-10-PCS | Mod: CPTII,S$GLB,, | Performed by: PHYSICIAN ASSISTANT

## 2021-09-30 PROCEDURE — 99999 PR PBB SHADOW E&M-EST. PATIENT-LVL III: ICD-10-PCS | Mod: PBBFAC,,, | Performed by: PHYSICIAN ASSISTANT

## 2021-09-30 PROCEDURE — 20610 PR DRAIN/INJECT LARGE JOINT/BURSA: ICD-10-PCS | Mod: RT,S$GLB,, | Performed by: PHYSICIAN ASSISTANT

## 2021-09-30 PROCEDURE — 1160F RVW MEDS BY RX/DR IN RCRD: CPT | Mod: CPTII,S$GLB,, | Performed by: PHYSICIAN ASSISTANT

## 2021-09-30 PROCEDURE — 99499 UNLISTED E&M SERVICE: CPT | Mod: S$GLB,,, | Performed by: PHYSICIAN ASSISTANT

## 2021-09-30 PROCEDURE — 1160F PR REVIEW ALL MEDS BY PRESCRIBER/CLIN PHARMACIST DOCUMENTED: ICD-10-PCS | Mod: CPTII,S$GLB,, | Performed by: PHYSICIAN ASSISTANT

## 2021-09-30 PROCEDURE — 3044F PR MOST RECENT HEMOGLOBIN A1C LEVEL <7.0%: ICD-10-PCS | Mod: CPTII,S$GLB,, | Performed by: PHYSICIAN ASSISTANT

## 2021-09-30 PROCEDURE — 3077F SYST BP >= 140 MM HG: CPT | Mod: CPTII,S$GLB,, | Performed by: PHYSICIAN ASSISTANT

## 2021-12-23 NOTE — PROGRESS NOTES
"Subjective:      Patient ID: Heather Gomez is a 46 y.o. female.    Chief Complaint: No chief complaint on file.      HPI  (Steve/French)    Last seen by me on 9/30/21 for right knee euflexxa injections. she has known known moderate/severe medial joint space narrowing right knee.     Overall, her right knee pain is better since finishing euflexxa injections, but pain has been worse with recent cold weather. She has intermittent right knee pain along with catching, popping, and giving way. She rates her pain as a 3 on a scale of 1-10. Pain is aching. She has intermittent swelling. She is on indocin and uses knee brace prn.       Past Medical History:   Diagnosis Date    Anticoagulant long-term use     DVT, popliteal, acute     GERD (gastroesophageal reflux disease)     History of pulmonary embolus (PE)     HLD (hyperlipidemia)     Hypertension     Type 2 diabetes mellitus, controlled          Current Outpatient Medications:     hydrOXYzine HCl (ATARAX) 25 MG tablet, Take 25 mg by mouth 3 (three) times daily., Disp: , Rfl:     indomethacin (INDOCIN) 50 MG capsule, Take 1 capsule (50 mg total) by mouth 2 (two) times daily with meals., Disp: 60 capsule, Rfl: 2    irbesartan-hydrochlorothiazide (AVALIDE) 150-12.5 mg per tablet, Take 1 tablet by mouth once daily., Disp: , Rfl:     lancets 30 gauge Misc, TO CHECK BG 4 TIMES DAILY, TO USE WITH INSURANCE PREFERRED METER, Disp: , Rfl: 11    NOVOLOG FLEXPEN U-100 INSULIN 100 unit/mL InPn pen, INJECT 15 UNITS SUBCUTANEOUSLY THREE TIMES DAILY FOR 30 DAYS, Disp: , Rfl: 5    ondansetron (ZOFRAN-ODT) 4 MG TbDL, DISSOLVE 1 TABLET IN MOUTH EVERY 8 HOURS AS NEEDED FOR NAUSEA, Disp: 20 tablet, Rfl: 0    pen needle, diabetic 33 gauge x 3/16" Ndle, 1 application by Misc.(Non-Drug; Combo Route) route 3 (three) times daily before meals., Disp: 100 each, Rfl: 11    pravastatin (PRAVACHOL) 40 MG tablet, Take 40 mg by mouth once daily., Disp: , Rfl:     TRULICITY " 0.75 mg/0.5 mL PnIj, INJECT 0.75 MG SUBCUTANEOUSLY EVERY WEEK FOR 30 DAYS, Disp: , Rfl: 5    zolpidem (AMBIEN) 10 mg Tab, Take 10 mg by mouth once daily., Disp: , Rfl:     Review of patient's allergies indicates:  No Known Allergies    Review of Systems   Constitutional: Negative for chills, fever, night sweats and weight gain.   Gastrointestinal: Negative for bowel incontinence, nausea and vomiting.   Genitourinary: Negative for bladder incontinence.   Neurological: Negative for disturbances in coordination and loss of balance.         Objective:        Resp 18   Wt 86.2 kg (190 lb)   LMP 07/16/2019   BMI 32.61 kg/m²     Ortho/SPM Exam     Body habitus is obese.   The patient walks without a limp.    RIGHT KNEE EXAM:    Resisted SLR negative.   The skin over the knee is intact.  Knee effusion none   Tendernes is located medial  Range of motion- Flexion 120 deg, Extension 0 deg,     Ligament exam:   MCL 1+ laxity   Lachman intact              Post sag intact    LCL intact    Patellar apprehension negative.  Popliteal cyst negative  Patellar crepitation present.  Flexion/pinch negative.    Motor normal 5/5 strength in all tested muscle groups.   Sensory normal.        Assessment:       Encounter Diagnosis   Name Primary?    Primary osteoarthritis of right knee Yes          Plan:       Diagnoses and all orders for this visit:    Primary osteoarthritis of right knee  -     Large Joint Aspiration/Injection: R knee      Overall, her right knee pain is better since finishing euflexxa injections, but pain has been worse with recent cold weather. She has intermittent right knee pain along with catching, popping, and giving way.     Known moderate/severe medial joint space narrowing right knee.     Treatment options reviewed with patient and following plan made:     - Repeat RIGHT knee injection done without complication. See procedure note. Blood sugars went up after last injection, but she was not eating well and was  not on insulin. She has been in good control lately. She will call PCP if any issues.   - Discussed PT- previous insurance had high copay. Will order and see if new insurance is better.    - Continue indocin. Reviewed dosing and side effects. Take with food.   - Continue with hinged knee brace prn.   - Can repeat euflexxa injections every 6 months as needed.     Follow up in about 3 months (around 4/4/2022).

## 2022-01-04 ENCOUNTER — OFFICE VISIT (OUTPATIENT)
Dept: ORTHOPEDICS | Facility: CLINIC | Age: 47
End: 2022-01-04
Payer: COMMERCIAL

## 2022-01-04 VITALS — RESPIRATION RATE: 18 BRPM | WEIGHT: 190 LBS | BODY MASS INDEX: 32.61 KG/M2

## 2022-01-04 DIAGNOSIS — M17.11 PRIMARY OSTEOARTHRITIS OF RIGHT KNEE: Primary | ICD-10-CM

## 2022-01-04 PROCEDURE — 99999 PR PBB SHADOW E&M-EST. PATIENT-LVL III: CPT | Mod: PBBFAC,,, | Performed by: PHYSICIAN ASSISTANT

## 2022-01-04 PROCEDURE — 20610 LARGE JOINT ASPIRATION/INJECTION: R KNEE: ICD-10-PCS | Mod: RT,S$GLB,, | Performed by: PHYSICIAN ASSISTANT

## 2022-01-04 PROCEDURE — 1160F PR REVIEW ALL MEDS BY PRESCRIBER/CLIN PHARMACIST DOCUMENTED: ICD-10-PCS | Mod: CPTII,S$GLB,, | Performed by: PHYSICIAN ASSISTANT

## 2022-01-04 PROCEDURE — 99999 PR PBB SHADOW E&M-EST. PATIENT-LVL III: ICD-10-PCS | Mod: PBBFAC,,, | Performed by: PHYSICIAN ASSISTANT

## 2022-01-04 PROCEDURE — 1159F PR MEDICATION LIST DOCUMENTED IN MEDICAL RECORD: ICD-10-PCS | Mod: CPTII,S$GLB,, | Performed by: PHYSICIAN ASSISTANT

## 2022-01-04 PROCEDURE — 20610 DRAIN/INJ JOINT/BURSA W/O US: CPT | Mod: RT,S$GLB,, | Performed by: PHYSICIAN ASSISTANT

## 2022-01-04 PROCEDURE — 1160F RVW MEDS BY RX/DR IN RCRD: CPT | Mod: CPTII,S$GLB,, | Performed by: PHYSICIAN ASSISTANT

## 2022-01-04 PROCEDURE — 3008F BODY MASS INDEX DOCD: CPT | Mod: CPTII,S$GLB,, | Performed by: PHYSICIAN ASSISTANT

## 2022-01-04 PROCEDURE — 99213 PR OFFICE/OUTPT VISIT, EST, LEVL III, 20-29 MIN: ICD-10-PCS | Mod: 25,S$GLB,, | Performed by: PHYSICIAN ASSISTANT

## 2022-01-04 PROCEDURE — 3008F PR BODY MASS INDEX (BMI) DOCUMENTED: ICD-10-PCS | Mod: CPTII,S$GLB,, | Performed by: PHYSICIAN ASSISTANT

## 2022-01-04 PROCEDURE — 99213 OFFICE O/P EST LOW 20 MIN: CPT | Mod: 25,S$GLB,, | Performed by: PHYSICIAN ASSISTANT

## 2022-01-04 PROCEDURE — 1159F MED LIST DOCD IN RCRD: CPT | Mod: CPTII,S$GLB,, | Performed by: PHYSICIAN ASSISTANT

## 2022-01-04 RX ORDER — TRIAMCINOLONE ACETONIDE 40 MG/ML
40 INJECTION, SUSPENSION INTRA-ARTICULAR; INTRAMUSCULAR
Status: DISCONTINUED | OUTPATIENT
Start: 2022-01-04 | End: 2022-01-04 | Stop reason: HOSPADM

## 2022-01-04 RX ADMIN — TRIAMCINOLONE ACETONIDE 40 MG: 40 INJECTION, SUSPENSION INTRA-ARTICULAR; INTRAMUSCULAR at 09:01

## 2022-01-04 NOTE — PROCEDURES
Large Joint Aspiration/Injection: R knee    Date/Time: 1/4/2022 9:00 AM  Performed by: Marley Finch PA-C  Authorized by: Marley Finch PA-C     Consent Done?:  Yes (Verbal)  Timeout: prior to procedure the correct patient, procedure, and site was verified    Location:  Knee  Site:  R knee  Medications:  40 mg triamcinolone acetonide 40 mg/mL     PROCEDURE NOTE:  RIGHT KNEE INJECTION    I have explained the risks, benefits, and alternatives of the procedure in detail.  The patient voices understanding and all questions have been answered.  The patient agrees to proceed as planned.    After a sterile prep of the skin using chloraprep one step, the area was sprayed with local topical anesthetic and then cleaned with alcohol. The RIGHT knee was injected through an inferior lateral approach with a combination of 2 cc 1% plain xylocaine and 40mg triamcinolone.  The patient is cautioned that immediate relief of pain is secondary to the local anesthetic and will be temporary. After the anesthetic wears off there may be a increase in pain that may last for a few hours or a few days and they should use ice to help alleviate this this pain.     If patient is diabetic, post injection elevation of blood sugar was discussed. Patient is to check blood sugar regularly and call PCP with any issues.     Patient tolerated the procedure well.

## 2022-01-04 NOTE — PATIENT INSTRUCTIONS
It was nice to see you again today! I am sorry that you are hurting so much.     You have some wear and tear in your right knee (arthritis) and this is likely what is causing your pain.     The injection that I did today should give you some good relief of pain. It is normal to have some increased soreness over the next few days after an injection. Put ice on it and elevate. This will get better.     Remember that injection can make your blood sugar go up for a week or so. Check it regularly and call your PCP with any concerns.     Wear the knee brace as needed for prolonged walking. This should give you added support and help with pain.     Continue on indomethacin to help with pain/inflammation. Take as directed with food.      I put in orders for physical therapy. They should call you to schedule or you can call [phone].     Please get your covid booster!    I will see you back in 3 months, but please stay in touch and call me if you need anything. You can also send me a message in [facility].     Marley   [phone]

## 2022-02-07 NOTE — PROGRESS NOTES
Subjective:      Patient ID: Heather Gomez is a 46 y.o. female.    Chief Complaint:  Diabetes    History of Present Illness  Heather Gomez is here for evaluation and management of DM.  Previously seen by TRINIDAD Law NP.  Last seen 2017.  This is their first visit with me.      Previously managed by Dr. Beltran.   Transitioning back to [facility].     Patient arrives with her .     With regards to diabetes:    Diagnosed: 2013 4/2021 C peptide 3.12 with glucose of 180    FH of DM:   Mother, father, 4 sisters   Has 2 children - no history of DM     DM Education: 2017    Known complications:  DKA : None  RN - Denies  - Eye Exam: > 1 year   PN : Denies   Nephropathy   - Podiatry: None  CAD : Denies    Denies history of pancreatitis & personal/family history of medullary thyroid cancer.     Current regimen:  Trulicity 3mg weekly - Sunday  Lantus 20 units twice a day   Novolog 15units twice a day - before meals     Not always taking Novolog.     Other medications tried:  Metformin - unable to tolerate due to GI upset   Invokana - discontinued due to abdominal pain, nausea, yeast infections    Actos - discontinued during hospital stay   Januvia - discontinued during hospital stay     Glucose Monitor:   0-1 times a day testing  Log reviewed: oral recall  Fasting this AM: 191    Hypoglycemia:  Denies. Reports signs and symptoms of hypoglycemia with glucose in the 170s.     Knows how to correct with 15 grams of carbs- juice, coke, or a peppermint.     Diet/Exercise:  Eats 1-2 meals a day.   B: SKIPS  L: fast food OR SKIPS  D: fast food OR skips   Snacks : at night - cookies, chips, ice cream   Drinks : water, juice (mostly at night)   NO soft drinks  Exercise: None - active lifestyle.    Arthritis in her right knee - getting steroid injections in her knee - has now switched to something else that does not have steroids?      Diabetes Management Status    Hemoglobin A1C   Date Value Ref  Range Status   04/05/2021 6.8 (H) 4.0 - 5.6 % Final     Comment:     ADA Screening Guidelines:  5.7-6.4%  Consistent with prediabetes  >or=6.5%  Consistent with diabetes    High levels of fetal hemoglobin interfere with the HbA1C  assay. Heterozygous hemoglobin variants (HbS, HgC, etc)do  not significantly interfere with this assay.   However, presence of multiple variants may affect accuracy.     09/29/2020 7.2 (H) 4.0 - 5.6 % Final     Comment:     ADA Screening Guidelines:  5.7-6.4%  Consistent with prediabetes  >or=6.5%  Consistent with diabetes  High levels of fetal hemoglobin interfere with the HbA1C  assay. Heterozygous hemoglobin variants (HbS, HgC, etc)do  not significantly interfere with this assay.   However, presence of multiple variants may affect accuracy.     06/08/2020 7.3 (H) 4.0 - 5.6 % Final     Comment:     ADA Screening Guidelines:  5.7-6.4%  Consistent with prediabetes  >or=6.5%  Consistent with diabetes  High levels of fetal hemoglobin interfere with the HbA1C  assay. Heterozygous hemoglobin variants (HbS, HgC, etc)do  not significantly interfere with this assay.   However, presence of multiple variants may affect accuracy.         Statin: Taking  ACE/ARB: Taking  Screening or Prevention Patient's value Goal Complete/Controlled?   HgA1C Testing and Control   Lab Results   Component Value Date    HGBA1C 6.8 (H) 04/05/2021      Annually/Less than 8% Yes   Lipid profile : 04/05/2021 Annually Yes   LDL control Lab Results   Component Value Date    LDLCALC 77.4 04/05/2021    Annually/Less than 100 mg/dl  Yes   Nephropathy screening Lab Results   Component Value Date    LABMICR 21.0 09/29/2020     Lab Results   Component Value Date    PROTEINUA Negative 07/08/2019    Annually No   Blood pressure BP Readings from Last 1 Encounters:   02/09/22 132/80    Less than 140/90 No   Dilated retinal exam Most Recent Eye Exam Date: Not Found Annually Yes   Foot exam   : 02/09/2022 Annually No       Review of  "Systems   Constitutional: Negative for fatigue.   Eyes: Negative for visual disturbance.   Respiratory: Negative for shortness of breath.    Cardiovascular: Negative for chest pain.   Gastrointestinal: Negative for abdominal pain.   Musculoskeletal: Negative for arthralgias.   Skin: Negative for wound.   Neurological: Negative for headaches.       Objective:   Physical Exam  Vitals reviewed.   Neck:      Thyroid: No thyromegaly (possible nodule palpable on right).   Cardiovascular:      Rate and Rhythm: Normal rate.      Comments: No edema present  Pulmonary:      Effort: Pulmonary effort is normal.   Abdominal:      Palpations: Abdomen is soft.       Injection sites are without edema or erythema. No lipo hypertropthy or atrophy.    -- Feet have no wounds or ulcers.  -- Shoes are appropriate  -- Sensation is intact to vibration and monofilament.    Visit Vitals  /80   Pulse 86   Ht 5' 4" (1.626 m)   Wt 84.4 kg (186 lb 1.1 oz)   LMP 07/16/2019   SpO2 99%   BMI 31.94 kg/m²       Body mass index is 31.94 kg/m².    Lab Review:   Lab Results   Component Value Date    HGBA1C 6.8 (H) 04/05/2021    HGBA1C 7.2 (H) 09/29/2020    HGBA1C 7.3 (H) 06/08/2020       Lab Results   Component Value Date    CHOL 169 04/05/2021    HDL 45 04/05/2021    LDLCALC 77.4 04/05/2021    TRIG 233 (H) 04/05/2021    CHOLHDL 26.6 04/05/2021     Lab Results   Component Value Date     04/05/2021    K 4.1 04/05/2021     04/05/2021    CO2 26 04/05/2021     (H) 04/05/2021    BUN 16 04/05/2021    CREATININE 0.55 04/05/2021    CALCIUM 9.4 04/05/2021    PROT 6.6 04/05/2021    ALBUMIN 3.8 04/05/2021    BILITOT 0.2 04/05/2021    ALKPHOS 54 04/05/2021    AST 18 04/05/2021    ALT 14 04/05/2021    ANIONGAP 7 (L) 04/05/2021    ESTGFRAFRICA >60.0 04/05/2021    EGFRNONAA >60.0 04/05/2021    TSH 1.420 09/29/2020     Vit D, 25-Hydroxy   Date Value Ref Range Status   04/03/2019 8 (L) 30 - 96 ng/mL Final     Comment:     Vitamin D " deficiency.........<10 ng/mL                              Vitamin D insufficiency......10-29 ng/mL       Vitamin D sufficiency........> or equal to 30 ng/mL  Vitamin D toxicity............>100 ng/mL       Assessment and Plan     1. Type 2 diabetes mellitus with ketoacidosis without coma, without long-term current use of insulin  Ambulatory referral/consult to Ophthalmology    Ambulatory referral/consult to Podiatry    Hemoglobin A1C    Comprehensive Metabolic Panel    Lipid Panel    Microalbumin/Creatinine Ratio, Urine    TSH    blood-glucose sensor (DEXCOM G6 SENSOR) Rosa    blood-glucose transmitter (DEXCOM G6 TRANSMITTER) Rosa    blood-glucose meter Misc    lancets 33 gauge Misc    blood sugar diagnostic Strp   2. Primary hypertension     3. Mixed hyperlipidemia     4. Thyromegaly  US Soft Tissue Head Neck Thyroid       Type 2 diabetes mellitus with ketoacidosis without coma, without long-term current use of insulin  -- Labs today.  -- Podiatry and ophthalmology referrals. Will also need to see [place] pending Dexcom approval.   -- A1c goal <7%.  -- Medications discussed:  MFM   GLP1-DPP4   RAE   SGLT2   Reviewed potential adverse effects of SGLT-2 inhibitors, including genital mycotic infections, slightly increased risk of UTI, hypersensitivity, hypotension, and hyperkalemia. Advised to maintain water intake of 8-10 cups per day. Advised we need to check chemistry panel at baseline and 2 weeks after starting. Discussed FDA warning reports of ketoacidosis associated with SGLT-2 inhibitors. Advised to seek immediate medical attention and stop the medication if symptoms such as difficulty breathing, nausea, vomiting, abdominal pain, confusion, and unusual fatigue/sleepiness. Discussed possible precipitating factors including major illness/reduced food and fluid intake (advised to stop under these circumstances), and reduced insulin dose. Discussed possible effects of increased fracture risk/decreased bone density.  Discussed reports of increased risk of leg and foot amputations with canagliflozin and need to seek urgent care if developed new pain or tenderness, sores or ulcers, or infections in legs/feet.   Insulin   -- Reviewed logs/CGM:  Rarely checking glucose.  Ordered new meter with instruction to check fasting and before meals.  Instructed to send glucose logs in 7 days.  Reach out to me sooner for any glucose <70 or consistently >200.  -- Interested in Dexcom - sent to [facility] pharmacy.  -- Medication Changes:   CHANGE:  Metformin 500mg with dinner  Trulicity 3mg weekly - Sunday  Lantus 18 units twice a day   Novolog 10 units before meals    Discussed insulin MOA, appropriate timing of administration.     -- Reviewed goals of therapy are to get the best control we can without hypoglycemia.  -- Reviewed patient's current insulin regimen. Clarified proper insulin dose and timing in relation to meals, etc. Insulin injection sites and proper rotation instructed.    -- Advised frequent self blood glucose monitoring.  Patient encouraged to document glucose results and bring them to every clinic visit.  -- Hypoglycemia precautions discussed. Instructed on precautions before driving.    -- Call for Bg repeatedly < 90 or > 180.   -- Close adherence to lifestyle changes recommended.   -- Periodic follow ups for eye evaluations, foot care and dental care suggested.    HTN (hypertension)  -- On ARB.  -- Controlled.  -- Blood pressure goals discussed with patient.    Hyperlipidemia  -- Check lipid panel.  -- On statin.      Follow up in about 6 weeks (around 3/23/2022).

## 2022-02-09 ENCOUNTER — PATIENT MESSAGE (OUTPATIENT)
Dept: ENDOCRINOLOGY | Facility: CLINIC | Age: 47
End: 2022-02-09

## 2022-02-09 ENCOUNTER — LAB VISIT (OUTPATIENT)
Dept: LAB | Facility: HOSPITAL | Age: 47
End: 2022-02-09
Payer: COMMERCIAL

## 2022-02-09 ENCOUNTER — OFFICE VISIT (OUTPATIENT)
Dept: ENDOCRINOLOGY | Facility: CLINIC | Age: 47
End: 2022-02-09
Payer: COMMERCIAL

## 2022-02-09 VITALS
WEIGHT: 186.06 LBS | OXYGEN SATURATION: 99 % | BODY MASS INDEX: 31.77 KG/M2 | HEART RATE: 86 BPM | HEIGHT: 64 IN | DIASTOLIC BLOOD PRESSURE: 80 MMHG | SYSTOLIC BLOOD PRESSURE: 132 MMHG

## 2022-02-09 DIAGNOSIS — R79.89 ABNORMAL THYROID BLOOD TEST: Primary | ICD-10-CM

## 2022-02-09 DIAGNOSIS — E11.10 TYPE 2 DIABETES MELLITUS WITH KETOACIDOSIS WITHOUT COMA, WITHOUT LONG-TERM CURRENT USE OF INSULIN: Primary | ICD-10-CM

## 2022-02-09 DIAGNOSIS — E01.0 THYROMEGALY: ICD-10-CM

## 2022-02-09 DIAGNOSIS — E11.10 TYPE 2 DIABETES MELLITUS WITH KETOACIDOSIS WITHOUT COMA, WITHOUT LONG-TERM CURRENT USE OF INSULIN: ICD-10-CM

## 2022-02-09 DIAGNOSIS — E78.2 MIXED HYPERLIPIDEMIA: ICD-10-CM

## 2022-02-09 DIAGNOSIS — I10 PRIMARY HYPERTENSION: ICD-10-CM

## 2022-02-09 LAB
ALBUMIN SERPL BCP-MCNC: 3.8 G/DL (ref 3.5–5.2)
ALP SERPL-CCNC: 77 U/L (ref 55–135)
ALT SERPL W/O P-5'-P-CCNC: 19 U/L (ref 10–44)
ANION GAP SERPL CALC-SCNC: 8 MMOL/L (ref 8–16)
AST SERPL-CCNC: 20 U/L (ref 10–40)
BILIRUB SERPL-MCNC: 0.4 MG/DL (ref 0.1–1)
BUN SERPL-MCNC: 13 MG/DL (ref 6–20)
CALCIUM SERPL-MCNC: 9.9 MG/DL (ref 8.7–10.5)
CHLORIDE SERPL-SCNC: 102 MMOL/L (ref 95–110)
CHOLEST SERPL-MCNC: 199 MG/DL (ref 120–199)
CHOLEST/HDLC SERPL: 3.6 {RATIO} (ref 2–5)
CO2 SERPL-SCNC: 28 MMOL/L (ref 23–29)
CREAT SERPL-MCNC: 0.7 MG/DL (ref 0.5–1.4)
EST. GFR  (AFRICAN AMERICAN): >60 ML/MIN/1.73 M^2
EST. GFR  (NON AFRICAN AMERICAN): >60 ML/MIN/1.73 M^2
ESTIMATED AVG GLUCOSE: 166 MG/DL (ref 68–131)
GLUCOSE SERPL-MCNC: 147 MG/DL (ref 70–110)
HBA1C MFR BLD: 7.4 % (ref 4–5.6)
HDLC SERPL-MCNC: 56 MG/DL (ref 40–75)
HDLC SERPL: 28.1 % (ref 20–50)
LDLC SERPL CALC-MCNC: 122 MG/DL (ref 63–159)
NONHDLC SERPL-MCNC: 143 MG/DL
POTASSIUM SERPL-SCNC: 3.9 MMOL/L (ref 3.5–5.1)
PROT SERPL-MCNC: 7.6 G/DL (ref 6–8.4)
SODIUM SERPL-SCNC: 138 MMOL/L (ref 136–145)
T4 FREE SERPL-MCNC: 1.26 NG/DL (ref 0.71–1.51)
TRIGL SERPL-MCNC: 105 MG/DL (ref 30–150)
TSH SERPL DL<=0.005 MIU/L-ACNC: 0.25 UIU/ML (ref 0.4–4)

## 2022-02-09 PROCEDURE — 84443 ASSAY THYROID STIM HORMONE: CPT | Performed by: NURSE PRACTITIONER

## 2022-02-09 PROCEDURE — 1160F PR REVIEW ALL MEDS BY PRESCRIBER/CLIN PHARMACIST DOCUMENTED: ICD-10-PCS | Mod: CPTII,S$GLB,, | Performed by: NURSE PRACTITIONER

## 2022-02-09 PROCEDURE — 3075F PR MOST RECENT SYSTOLIC BLOOD PRESS GE 130-139MM HG: ICD-10-PCS | Mod: CPTII,S$GLB,, | Performed by: NURSE PRACTITIONER

## 2022-02-09 PROCEDURE — 99999 PR PBB SHADOW E&M-EST. PATIENT-LVL V: ICD-10-PCS | Mod: PBBFAC,,, | Performed by: NURSE PRACTITIONER

## 2022-02-09 PROCEDURE — 36415 COLL VENOUS BLD VENIPUNCTURE: CPT | Performed by: NURSE PRACTITIONER

## 2022-02-09 PROCEDURE — 1159F MED LIST DOCD IN RCRD: CPT | Mod: CPTII,S$GLB,, | Performed by: NURSE PRACTITIONER

## 2022-02-09 PROCEDURE — 99999 PR PBB SHADOW E&M-EST. PATIENT-LVL V: CPT | Mod: PBBFAC,,, | Performed by: NURSE PRACTITIONER

## 2022-02-09 PROCEDURE — 3008F BODY MASS INDEX DOCD: CPT | Mod: CPTII,S$GLB,, | Performed by: NURSE PRACTITIONER

## 2022-02-09 PROCEDURE — 80053 COMPREHEN METABOLIC PANEL: CPT | Performed by: NURSE PRACTITIONER

## 2022-02-09 PROCEDURE — 3075F SYST BP GE 130 - 139MM HG: CPT | Mod: CPTII,S$GLB,, | Performed by: NURSE PRACTITIONER

## 2022-02-09 PROCEDURE — 1160F RVW MEDS BY RX/DR IN RCRD: CPT | Mod: CPTII,S$GLB,, | Performed by: NURSE PRACTITIONER

## 2022-02-09 PROCEDURE — 99204 OFFICE O/P NEW MOD 45 MIN: CPT | Mod: S$GLB,,, | Performed by: NURSE PRACTITIONER

## 2022-02-09 PROCEDURE — 99204 PR OFFICE/OUTPT VISIT, NEW, LEVL IV, 45-59 MIN: ICD-10-PCS | Mod: S$GLB,,, | Performed by: NURSE PRACTITIONER

## 2022-02-09 PROCEDURE — 1159F PR MEDICATION LIST DOCUMENTED IN MEDICAL RECORD: ICD-10-PCS | Mod: CPTII,S$GLB,, | Performed by: NURSE PRACTITIONER

## 2022-02-09 PROCEDURE — 3079F DIAST BP 80-89 MM HG: CPT | Mod: CPTII,S$GLB,, | Performed by: NURSE PRACTITIONER

## 2022-02-09 PROCEDURE — 3008F PR BODY MASS INDEX (BMI) DOCUMENTED: ICD-10-PCS | Mod: CPTII,S$GLB,, | Performed by: NURSE PRACTITIONER

## 2022-02-09 PROCEDURE — 84439 ASSAY OF FREE THYROXINE: CPT | Performed by: NURSE PRACTITIONER

## 2022-02-09 PROCEDURE — 83036 HEMOGLOBIN GLYCOSYLATED A1C: CPT | Performed by: NURSE PRACTITIONER

## 2022-02-09 PROCEDURE — 3079F PR MOST RECENT DIASTOLIC BLOOD PRESSURE 80-89 MM HG: ICD-10-PCS | Mod: CPTII,S$GLB,, | Performed by: NURSE PRACTITIONER

## 2022-02-09 PROCEDURE — 80061 LIPID PANEL: CPT | Performed by: NURSE PRACTITIONER

## 2022-02-09 RX ORDER — INSULIN ASPART 100 [IU]/ML
10 INJECTION, SOLUTION INTRAVENOUS; SUBCUTANEOUS
Qty: 10 EACH | Refills: 3 | Status: SHIPPED | OUTPATIENT
Start: 2022-02-09 | End: 2022-10-05 | Stop reason: SDUPTHER

## 2022-02-09 RX ORDER — CETIRIZINE HYDROCHLORIDE 10 MG/1
10 TABLET ORAL DAILY
COMMUNITY
Start: 2021-08-24 | End: 2023-07-13

## 2022-02-09 RX ORDER — IBUPROFEN 600 MG/1
600 TABLET ORAL 3 TIMES DAILY PRN
COMMUNITY
Start: 2021-12-15 | End: 2022-06-21

## 2022-02-09 RX ORDER — BLOOD-GLUCOSE SENSOR
3 EACH MISCELLANEOUS CONTINUOUS PRN
Qty: 3 EACH | Status: SHIPPED | OUTPATIENT
Start: 2022-02-09 | End: 2023-02-12 | Stop reason: SDUPTHER

## 2022-02-09 RX ORDER — BLOOD-GLUCOSE TRANSMITTER
1 EACH MISCELLANEOUS CONTINUOUS PRN
Qty: 1 EACH | Status: SHIPPED | OUTPATIENT
Start: 2022-02-09 | End: 2023-04-22 | Stop reason: SDUPTHER

## 2022-02-09 RX ORDER — INSULIN GLARGINE 100 [IU]/ML
30 INJECTION, SOLUTION SUBCUTANEOUS NIGHTLY
COMMUNITY
Start: 2022-02-05 | End: 2022-02-09

## 2022-02-09 RX ORDER — METFORMIN HYDROCHLORIDE 500 MG/1
500 TABLET, EXTENDED RELEASE ORAL 2 TIMES DAILY WITH MEALS
Qty: 180 TABLET | Refills: 3 | Status: SHIPPED | OUTPATIENT
Start: 2022-02-09 | End: 2022-07-21 | Stop reason: SDUPTHER

## 2022-02-09 RX ORDER — IBUPROFEN 800 MG/1
TABLET ORAL
COMMUNITY
End: 2022-05-16

## 2022-02-09 RX ORDER — DULAGLUTIDE 3 MG/.5ML
INJECTION, SOLUTION SUBCUTANEOUS
COMMUNITY
Start: 2022-01-26 | End: 2022-02-09

## 2022-02-09 RX ORDER — DULAGLUTIDE 3 MG/.5ML
3 INJECTION, SOLUTION SUBCUTANEOUS
Qty: 4 PEN | Refills: 3 | Status: SHIPPED | OUTPATIENT
Start: 2022-02-09 | End: 2022-05-17

## 2022-02-09 RX ORDER — ALPRAZOLAM 0.5 MG/1
0.5 TABLET ORAL 3 TIMES DAILY
COMMUNITY
Start: 2021-12-15

## 2022-02-09 RX ORDER — LANCETS 33 GAUGE
EACH MISCELLANEOUS
Qty: 200 EACH | Refills: 11 | Status: SHIPPED | OUTPATIENT
Start: 2022-02-09 | End: 2022-06-21

## 2022-02-09 RX ORDER — INSULIN GLARGINE 100 [IU]/ML
18 INJECTION, SOLUTION SUBCUTANEOUS 2 TIMES DAILY
Qty: 10 EACH | Refills: 3 | Status: SHIPPED | OUTPATIENT
Start: 2022-02-09 | End: 2022-05-16 | Stop reason: SDUPTHER

## 2022-02-09 RX ORDER — DEXTROSE 4 G
TABLET,CHEWABLE ORAL
Qty: 1 EACH | Refills: 0 | Status: SHIPPED | OUTPATIENT
Start: 2022-02-09 | End: 2022-06-21

## 2022-02-09 NOTE — PATIENT INSTRUCTIONS
Instructed to send glucose logs in 7 days.  Reach out to me sooner for any glucose <70 or consistently >200.    Insulin types  You are taking two types of insulin and each of them has unique properties.     1. Long acting insulin:      Lantus is the long-acting insulin. You need to take it twice a day. Skipping a meal does not usually affect the dose of levemir.     2. Short-acting insulin  Humalog/Novolog/Apidra is the short acting insulin. It is used to correct your high blood sugars after EACH MEAL.    It should be given within 15 minutes before EACH MEAL, so the frequency of the injection of the short acting insulin is the same as how many meals you eat a day. For example, if you eat only lunch and dinner, then you just need to use the short acting insulin before lunch and before dinner.    If you happen to skip a meal, please also skip the dose of the short acting insulin for that meal. Otherwise, you may have low blood sugars.    If you miss a dose of short acting insulin, please do NOT try to make up for that dose.     Hypoglycemia (Low Blood Glucose)  Low blood glucose occurs with the following conditions.  · Not Enough Food or Missing Meal.  · Too Much Insulin  · More Exercise Than Usual    It is best to use something that you can always carry with you. Choose a food that is all carbohydrate because it will be very fast acting. Try not to choose chocolate or other high fat foods. They will not work fast enough and you may also end up over-treating your lows. The suggested amount of carbohydrate to start with is 15 grams. Don't keep eating until you feel better. Eat the required amount and stop. The feelings will pass and you will be grateful that you did not overdo it.    Some people with diabetes know when their blood glucose is low and some do not. If you are a person who is not aware of hypoglycemia, it is important to test your blood glucose more often. Everyone with diabetes should test before  driving a car to assure safety on the road. Blood glucose should be above 100 mg/dl before driving and at bedtime.     The symptoms of low blood sugars are usually heart racing, sweating, anxiety, feeling hungry, tremor, weakness or most severely loss of consciousness.     Rule of 15:    Test your blood sugar   If glucose is between 50-70 mg/dL then ingest 15 grams of fast-acting carbs   If glucose is less than 50 mg/dL then ingest 30 grams of fast-acting carbs   Ingest 15 grams of fast-acting carbohydrate - such as:   a. 3-4 glucose tablets  b. 4 oz juice  c. ½ can regular soda pop  d. 15 skittles or mini jelly beans    Re-check your blood sugar in 15 minutes. If its less than 70mg/dl, repeat steps 2 and 3.   If your next meal is more than 1 hour away, eat an additional 15 grams of carbohydrate and 1 ounce of protein (examples include crackers with cheese or one-half of a sandwich with peanut butter). It is important not to eat too much because this can raise your blood sugar above the target level.    After your blood sugar has normalized, think about why you went low. If you notice a pattern of low blood sugars, contact your Diabetes Team. We may need to adjust your medication.

## 2022-02-09 NOTE — ASSESSMENT & PLAN NOTE
-- Labs today.  -- Podiatry and ophthalmology referrals. Will also need to see DE pending Dexcom approval.   -- A1c goal <7%.  -- Medications discussed:  MFM   GLP1-DPP4   ARE   SGLT2   Reviewed potential adverse effects of SGLT-2 inhibitors, including genital mycotic infections, slightly increased risk of UTI, hypersensitivity, hypotension, and hyperkalemia. Advised to maintain water intake of 8-10 cups per day. Advised we need to check chemistry panel at baseline and 2 weeks after starting. Discussed FDA warning reports of ketoacidosis associated with SGLT-2 inhibitors. Advised to seek immediate medical attention and stop the medication if symptoms such as difficulty breathing, nausea, vomiting, abdominal pain, confusion, and unusual fatigue/sleepiness. Discussed possible precipitating factors including major illness/reduced food and fluid intake (advised to stop under these circumstances), and reduced insulin dose. Discussed possible effects of increased fracture risk/decreased bone density. Discussed reports of increased risk of leg and foot amputations with canagliflozin and need to seek urgent care if developed new pain or tenderness, sores or ulcers, or infections in legs/feet.   Insulin   -- Reviewed logs/CGM:  Rarely checking glucose.  Ordered new meter with instruction to check fasting and before meals.  Instructed to send glucose logs in 7 days.  Reach out to me sooner for any glucose <70 or consistently >200.  -- Interested in Dexcom - [place] [facility] [place].  -- Medication Changes:   CHANGE:  Metformin 500mg with dinner  Trulicity 3mg weekly - Sunday  Lantus 18 units twice a day   Novolog 10 units before meals    Discussed insulin MOA, appropriate timing of administration.     -- Reviewed goals of therapy are to get the best control we can without hypoglycemia.  -- Reviewed patient's current insulin regimen. Clarified proper insulin dose and timing in relation to meals, etc. Insulin injection sites and  proper rotation instructed.    -- Advised frequent self blood glucose monitoring.  Patient encouraged to document glucose results and bring them to every clinic visit.  -- Hypoglycemia precautions discussed. Instructed on precautions before driving.    -- Call for Bg repeatedly < 90 or > 180.   -- Close adherence to lifestyle changes recommended.   -- Periodic follow ups for eye evaluations, foot care and dental care suggested.

## 2022-02-10 ENCOUNTER — PATIENT MESSAGE (OUTPATIENT)
Dept: ENDOCRINOLOGY | Facility: CLINIC | Age: 47
End: 2022-02-10
Payer: COMMERCIAL

## 2022-02-14 ENCOUNTER — PATIENT MESSAGE (OUTPATIENT)
Dept: ENDOCRINOLOGY | Facility: CLINIC | Age: 47
End: 2022-02-14
Payer: COMMERCIAL

## 2022-02-14 DIAGNOSIS — E11.10 TYPE 2 DIABETES MELLITUS WITH KETOACIDOSIS WITHOUT COMA, WITHOUT LONG-TERM CURRENT USE OF INSULIN: Primary | ICD-10-CM

## 2022-02-15 ENCOUNTER — TELEPHONE (OUTPATIENT)
Dept: PHARMACY | Facility: CLINIC | Age: 47
End: 2022-02-15
Payer: COMMERCIAL

## 2022-02-16 ENCOUNTER — OFFICE VISIT (OUTPATIENT)
Dept: GASTROENTEROLOGY | Facility: CLINIC | Age: 47
End: 2022-02-16
Payer: COMMERCIAL

## 2022-02-16 ENCOUNTER — TELEPHONE (OUTPATIENT)
Dept: GASTROENTEROLOGY | Facility: CLINIC | Age: 47
End: 2022-02-16

## 2022-02-16 ENCOUNTER — HOSPITAL ENCOUNTER (OUTPATIENT)
Dept: RADIOLOGY | Facility: HOSPITAL | Age: 47
Discharge: HOME | End: 2022-02-16
Attending: NURSE PRACTITIONER
Payer: COMMERCIAL

## 2022-02-16 VITALS — WEIGHT: 182.75 LBS | HEIGHT: 64 IN | BODY MASS INDEX: 31.2 KG/M2

## 2022-02-16 DIAGNOSIS — K59.04 CHRONIC IDIOPATHIC CONSTIPATION: Primary | ICD-10-CM

## 2022-02-16 DIAGNOSIS — R11.0 NAUSEA: ICD-10-CM

## 2022-02-16 DIAGNOSIS — Z01.812 PRE-PROCEDURE LAB EXAM: ICD-10-CM

## 2022-02-16 DIAGNOSIS — K59.04 CHRONIC IDIOPATHIC CONSTIPATION: ICD-10-CM

## 2022-02-16 DIAGNOSIS — Z12.11 SCREENING FOR COLON CANCER: ICD-10-CM

## 2022-02-16 PROCEDURE — 1159F MED LIST DOCD IN RCRD: CPT | Mod: CPTII,S$GLB,, | Performed by: NURSE PRACTITIONER

## 2022-02-16 PROCEDURE — 1159F PR MEDICATION LIST DOCUMENTED IN MEDICAL RECORD: ICD-10-PCS | Mod: CPTII,S$GLB,, | Performed by: NURSE PRACTITIONER

## 2022-02-16 PROCEDURE — 74018 RADEX ABDOMEN 1 VIEW: CPT | Mod: 26,,, | Performed by: RADIOLOGY

## 2022-02-16 PROCEDURE — 99999 PR PBB SHADOW E&M-EST. PATIENT-LVL V: ICD-10-PCS | Mod: PBBFAC,,, | Performed by: NURSE PRACTITIONER

## 2022-02-16 PROCEDURE — 3060F POS MICROALBUMINURIA REV: CPT | Mod: CPTII,S$GLB,, | Performed by: NURSE PRACTITIONER

## 2022-02-16 PROCEDURE — 74018 RADEX ABDOMEN 1 VIEW: CPT | Mod: TC,FY

## 2022-02-16 PROCEDURE — 99204 OFFICE O/P NEW MOD 45 MIN: CPT | Mod: S$GLB,,, | Performed by: NURSE PRACTITIONER

## 2022-02-16 PROCEDURE — 3008F PR BODY MASS INDEX (BMI) DOCUMENTED: ICD-10-PCS | Mod: CPTII,S$GLB,, | Performed by: NURSE PRACTITIONER

## 2022-02-16 PROCEDURE — 3008F BODY MASS INDEX DOCD: CPT | Mod: CPTII,S$GLB,, | Performed by: NURSE PRACTITIONER

## 2022-02-16 PROCEDURE — 3051F HG A1C>EQUAL 7.0%<8.0%: CPT | Mod: CPTII,S$GLB,, | Performed by: NURSE PRACTITIONER

## 2022-02-16 PROCEDURE — 3051F PR MOST RECENT HEMOGLOBIN A1C LEVEL 7.0 - < 8.0%: ICD-10-PCS | Mod: CPTII,S$GLB,, | Performed by: NURSE PRACTITIONER

## 2022-02-16 PROCEDURE — 74018 XR ABDOMEN AP 1 VIEW: ICD-10-PCS | Mod: 26,,, | Performed by: RADIOLOGY

## 2022-02-16 PROCEDURE — 99204 PR OFFICE/OUTPT VISIT, NEW, LEVL IV, 45-59 MIN: ICD-10-PCS | Mod: S$GLB,,, | Performed by: NURSE PRACTITIONER

## 2022-02-16 PROCEDURE — 99999 PR PBB SHADOW E&M-EST. PATIENT-LVL V: CPT | Mod: PBBFAC,,, | Performed by: NURSE PRACTITIONER

## 2022-02-16 PROCEDURE — 3066F NEPHROPATHY DOC TX: CPT | Mod: CPTII,S$GLB,, | Performed by: NURSE PRACTITIONER

## 2022-02-16 PROCEDURE — 3066F PR DOCUMENTATION OF TREATMENT FOR NEPHROPATHY: ICD-10-PCS | Mod: CPTII,S$GLB,, | Performed by: NURSE PRACTITIONER

## 2022-02-16 PROCEDURE — 3060F PR POS MICROALBUMINURIA RESULT DOCUMENTED/REVIEW: ICD-10-PCS | Mod: CPTII,S$GLB,, | Performed by: NURSE PRACTITIONER

## 2022-02-16 RX ORDER — ONDANSETRON 4 MG/1
TABLET, ORALLY DISINTEGRATING ORAL
Qty: 20 TABLET | Refills: 1 | Status: SHIPPED | OUTPATIENT
Start: 2022-02-16 | End: 2022-06-21

## 2022-02-16 RX ORDER — SODIUM, POTASSIUM,MAG SULFATES 17.5-3.13G
1 SOLUTION, RECONSTITUTED, ORAL ORAL DAILY
Qty: 1 KIT | Refills: 0 | Status: SHIPPED | OUTPATIENT
Start: 2022-02-16 | End: 2022-05-17

## 2022-02-16 NOTE — PATIENT INSTRUCTIONS
SUPREP Instructions    [facility] [address]  Clinic Office [phone]  Endoscopy Lab [phone]    You are scheduled for a Colonoscopy with Dr. Tirado   on  03/03/20222  at [facility] in [place].    Check in at the Hospital -1st floor, Information desk.   Call [phone] to reschedule.     An adult friend/family member must come with you to drive you home.  You cannot drive, take a taxi, Uber/Lyft or bus to leave the Endoscopy Center alone.  If you do not have someone to drive you home, your test will be cancelled.      Please follow the directions of your doctor if you take any pills that thin your blood. If you take these meds: Aggrenox, Brilinta, Effient, Eliquis, Lovenox, Plavix, Pletal, Pradaxa, Ticilid, Xarelto or Coumadin, let the doctor's office know.     DON'T: On the morning of the test do not take insulin or pills for diabetes.      DO: On the morning of the test, do take any pills for blood pressure, heart, anti-rejection and or seizures with a small sip of water. Bring any inhalers with you.     To have a good prep, you must follow these instructions - please do not use the directions from the pharmacy.     The doctor will send a prescription for the SUPREP.      The Day Before the test:     You can only drink CLEAR LIQUIDS the whole day before your test.  You can't eat any food for the whole day.     You CAN have:  o Water, Coffee or decaf coffee (no milk or cream)  o Tea  o Soft drinks - regular and sugar free  o Jell-O (green or yellow)  o Apple Juice, grape juice, white cranberry juice  o Gatorade, Power Aid, Crystal Light, Julian Aid  o Lemonade and Limeade  o Bouillon, clear soup  o Snowball, popsicles  o YOU CAN'T DRINK ANYTHING RED  o YOU CAN'T DRINK ALCOHOL  ONLY DRINK WHAT IS ON THE LIST       At 5 pm the night before your test:    o Pour the 1st bottle of SUPREP into the cup provided in the box. Add water to the line on the cup and mix well.   Drink the whole cup and then drink 2 more full cups of water over 1 hour.  - This can be easier to drink if it is cold. You can mix it 20 minutes ahead of time and place in the refrigerator before you drink it.  You must drink it within 30-45 minutes of mixing it.  Do NOT pour the drink over ice.  You can drink it with a straw.    The Day of the test - We will call you 2 days before your test to tell you what time to get there.     5 hours before you come to the hospital (this may be in the middle of the night)  o Pour the 2nd bottle of SUPREP into the cup provided in the box. Add water to the line on the cup and mix well.  Drink the whole cup and then drink 2 more full cups of water over 1 hour.  - It might be easier to drink if it is cold. You can mix it 20 minutes ahead of time and place in the refrigerator before you drink it.  You must drink it within 30-45 minutes of mixing it.  Do NOT pour the drink over ice.  You can drink it with a straw.    o YOU CAN'T EAT OR DRINK ANYTHING ELSE ONCE YOU FINISH THE PREP    o Leave all valuables and jewelry at home. You will be at the hospital for 2-4 hours.    o Call the Endoscopy department at [phone] with any questions about your procedure.

## 2022-02-16 NOTE — H&P (VIEW-ONLY)
GASTROENTEROLOGY CLINIC NOTE    Chief Complaint: The primary encounter diagnosis was Chronic idiopathic constipation. Diagnoses of Nausea and Screening for colon cancer were also pertinent to this visit.  Referring provider/PCP: ONDINA Westbrook    HPI:  Heather Gomez is a 46 y.o. female who is a new patient to me with a PMH that is significant for Anticoagulant long-term use, DVT, popliteal, acute, GERD (gastroesophageal reflux disease), History of pulmonary embolus (PE), HLD (hyperlipidemia), Hypertension, and Type 2 diabetes mellitus, controlled and is accompanied by her .  She/He is here today to establish care for constipation.  This is not a new problem as patient reports it has been ongoing for several years.  She has one small bowel movement every 1-2 weeks but has to take magnesium citrate gummies to facilitate bowel movement.  Longest she has gone without bowel movement was three weeks.  Last bowel movement ws yelena week ago.  Bowel movements are small and hard in consistency and accompanied by straining, lower abdominal pain that radiates to her back, and occasional blood in stool.  Additionally, she reports nausea.  She has tried magnesium citrate and colace in the past for her symptoms.  Denies unexplained weight loss or changes in appetite.      Prior Upper Endoscopy: Appx 30 years ago; patient reports no abnormal findings  Prior Colonoscopy: No  Family h/o Colon Cancer: No  Family h/o Crohn's Disease or Ulcerative Colitis: No  Abdominal Surgeries: Hysterectomy;     GI ROS:  Reflux: No  Dysphagia: No   Constipation: Yes  Diarrhea: No  Rectal bleeding/Melena/hematemesis: No  NSAIDs: No  Anticoagulation or Antiplatelet: No    Review of Systems   Constitutional: Negative for weight loss.   HENT: Negative for sore throat.    Eyes: Negative for blurred vision.   Respiratory: Negative for cough.    Cardiovascular: Negative for chest pain.   Gastrointestinal: Positive  for abdominal pain (lower), constipation and nausea. Negative for blood in stool, diarrhea, heartburn, melena and vomiting.   Genitourinary: Negative for dysuria.   Musculoskeletal: Negative for myalgias.   Skin: Negative for rash.   Neurological: Negative for headaches.   Endo/Heme/Allergies: Negative for environmental allergies.   Psychiatric/Behavioral: Negative for suicidal ideas. The patient is not nervous/anxious.        Past Medical History: has a past medical history of Anticoagulant long-term use, DVT, popliteal, acute, GERD (gastroesophageal reflux disease), History of pulmonary embolus (PE), HLD (hyperlipidemia), Hypertension, and Type 2 diabetes mellitus, controlled.    Past Surgical History: has a past surgical history that includes  section; Upper gastrointestinal endoscopy (); Endometrial ablation; Laparoscopy-assisted vaginal hysterectomy (Bilateral, 2019); and Hysterectomy.    Family History:family history includes Breast cancer in her sister; Diabetes in her father and mother; Hyperlipidemia in her father and mother; Hypertension in her father and mother; Hyperthyroidism in her mother; Stroke in her father.    Allergies: Review of patient's allergies indicates:  No Known Allergies    Social History: reports that she has never smoked. She has never used smokeless tobacco. She reports that she does not drink alcohol and does not use drugs.    Home medications:   Current Outpatient Medications on File Prior to Visit   Medication Sig Dispense Refill    ALPRAZolam (XANAX) 0.5 MG tablet Take 0.5 mg by mouth 3 (three) times daily.      blood sugar diagnostic Strp To check BG 4 times daily 200 each 11    blood-glucose meter Misc To check BG 4 times daily 1 each 0    blood-glucose sensor (DEXCOM G6 SENSOR) Rosa 3 each by Misc.(Non-Drug; Combo Route) route continuous prn. Change every 10 days. 3 each PRN    blood-glucose transmitter (DEXCOM G6 TRANSMITTER) Rosa 1 each by  "Misc.(Non-Drug; Combo Route) route continuous prn. 1 each PRN    cetirizine (ZYRTEC) 10 MG tablet Take 10 mg by mouth once daily.      dulaglutide (TRULICITY) 3 mg/0.5 mL pen injector Inject 3 mg into the skin every 7 days. 4 pen 3    hydrOXYzine HCl (ATARAX) 25 MG tablet Take 25 mg by mouth 3 (three) times daily.      ibuprofen (ADVIL,MOTRIN) 600 MG tablet Take 600 mg by mouth 3 (three) times daily as needed.      ibuprofen (ADVIL,MOTRIN) 800 MG tablet ibuprofen 800 mg tablet      indomethacin (INDOCIN) 50 MG capsule Take 1 capsule (50 mg total) by mouth 2 (two) times daily with meals. 60 capsule 2    irbesartan-hydrochlorothiazide (AVALIDE) 150-12.5 mg per tablet Take 1 tablet by mouth once daily.      lancets 33 gauge Misc Use to check BG 4 times a day 200 each 11    LANTUS SOLOSTAR U-100 INSULIN glargine 100 units/mL (3mL) SubQ pen Inject 18 Units into the skin 2 (two) times a day. Increase per providers instruction Max TDD 60units. 10 each 3    metFORMIN (GLUCOPHAGE-XR) 500 MG ER 24hr tablet Take 1 tablet (500 mg total) by mouth 2 (two) times daily with meals. 180 tablet 3    NOVOLOG FLEXPEN U-100 INSULIN 100 unit/mL (3 mL) InPn pen Inject 10 Units into the skin 3 (three) times daily with meals. Plus correction scale Max TDD 60units. 10 each 3    ondansetron (ZOFRAN-ODT) 4 MG TbDL DISSOLVE 1 TABLET IN MOUTH EVERY 8 HOURS AS NEEDED FOR NAUSEA 20 tablet 0    pen needle, diabetic 33 gauge x 3/16" Ndle 1 application by Misc.(Non-Drug; Combo Route) route 3 (three) times daily before meals. 100 each 11    pravastatin (PRAVACHOL) 40 MG tablet Take 40 mg by mouth once daily.      zolpidem (AMBIEN) 10 mg Tab Take 10 mg by mouth once daily.       No current facility-administered medications on file prior to visit.       Vital signs:  [facility] 07/16/2019     Physical Exam  Vitals reviewed.   Constitutional:       General: She is not in acute distress.     Appearance: Normal appearance. She is not ill-appearing. "   HENT:      Head: Normocephalic.   Cardiovascular:      Rate and Rhythm: Normal rate and regular rhythm.      Heart sounds: Normal heart sounds. No murmur heard.      Pulmonary:      Effort: Pulmonary effort is normal. No respiratory distress.      Breath sounds: Normal breath sounds.   Chest:      Chest wall: No tenderness.   Abdominal:      General: Bowel sounds are normal. There is no distension.      Palpations: Abdomen is soft.      Tenderness: There is no abdominal tenderness. Negative signs include Freitas's sign.      Hernia: No hernia is present.   Skin:     General: Skin is warm.   Neurological:      Mental Status: She is alert and oriented to person, place, and time.   Psychiatric:         Mood and Affect: Mood normal.         Behavior: Behavior normal.         Routine labs:  Lab Results   Component Value Date    WBC 8.60 09/29/2020    HGB 12.9 09/29/2020    HCT 40.0 09/29/2020    MCV 94 09/29/2020     09/29/2020     Lab Results   Component Value Date    INR 1.0 07/08/2019     Lab Results   Component Value Date    IRON 64 04/03/2019    FERRITIN 214 04/03/2019    TIBC 320 04/03/2019    FESATURATED 20 04/03/2019     Lab Results   Component Value Date     02/09/2022    K 3.9 02/09/2022     02/09/2022    CO2 28 02/09/2022    BUN 13 02/09/2022    CREATININE 0.7 02/09/2022     Lab Results   Component Value Date    ALBUMIN 3.8 02/09/2022    ALT 19 02/09/2022    AST 20 02/09/2022    ALKPHOS 77 02/09/2022    BILITOT 0.4 02/09/2022     No results found for: GLUCOSE  Lab Results   Component Value Date    TSH 0.251 (L) 02/09/2022     Lab Results   Component Value Date    CALCIUM 9.9 02/09/2022    PHOS 2.6 (L) 06/23/2017     Lab Results   Component Value Date    HGBA1C 7.4 (H) 02/09/2022     Imaging:      I have reviewed prior labs, imaging, and notes.      Assessment:  1. Chronic idiopathic constipation    2. Nausea    3. Screening for colon cancer    4. Pre-procedure lab exam        Plan:  Orders  Placed This Encounter    X-Ray Abdomen AP 1 View    COVID-19 Routine Screening    ondansetron (ZOFRAN-ODT) 4 MG TbDL    linaCLOtide (LINZESS) 145 mcg Cap capsule    sodium,potassium,mag sulfates (SUPREP BOWEL PREP KIT) 17.5-3.13-1.6 gram SolR    Case Request Endoscopy: COLONOSCOPY     Abdominal xray to evaluate stool burden  Consider Miralax cleanse if abdominal xray reveals large stool burden.  Linzess 145mcg daily.  Zofran 4mg every 8 hours PRN  Colonoscopy for colon cancer screening.  Suprep.      Plan of care discussed with patient who is in agreement and verbalized understanding.     I have explained the planned procedures to the patient.The risks, benefits and alternatives of the procedure were also explained in detail. Patient verbalized understanding, all questions were answered. The patient agrees to proceed as planned    Follow Up: As Needed Pending Above Workup          Katharine Ruth, ISABELLA,FNP-BC  [facility] [place]

## 2022-02-16 NOTE — PROGRESS NOTES
GASTROENTEROLOGY CLINIC NOTE    Chief Complaint: The primary encounter diagnosis was Chronic idiopathic constipation. Diagnoses of Nausea and Screening for colon cancer were also pertinent to this visit.  Referring provider/PCP: ONDINA Westbrook    HPI:  Heather Gomez is a 46 y.o. female who is a new patient to me with a PMH that is significant for Anticoagulant long-term use, DVT, popliteal, acute, GERD (gastroesophageal reflux disease), History of pulmonary embolus (PE), HLD (hyperlipidemia), Hypertension, and Type 2 diabetes mellitus, controlled and is accompanied by her .  She/He is here today to establish care for constipation.  This is not a new problem as patient reports it has been ongoing for several years.  She has one small bowel movement every 1-2 weeks but has to take magnesium citrate gummies to facilitate bowel movement.  Longest she has gone without bowel movement was three weeks.  Last bowel movement ws yelena week ago.  Bowel movements are small and hard in consistency and accompanied by straining, lower abdominal pain that radiates to her back, and occasional blood in stool.  Additionally, she reports nausea.  She has tried magnesium citrate and colace in the past for her symptoms.  Denies unexplained weight loss or changes in appetite.      Prior Upper Endoscopy: Appx 30 years ago; patient reports no abnormal findings  Prior Colonoscopy: No  Family h/o Colon Cancer: No  Family h/o Crohn's Disease or Ulcerative Colitis: No  Abdominal Surgeries: Hysterectomy;     GI ROS:  Reflux: No  Dysphagia: No   Constipation: Yes  Diarrhea: No  Rectal bleeding/Melena/hematemesis: No  NSAIDs: No  Anticoagulation or Antiplatelet: No    Review of Systems   Constitutional: Negative for weight loss.   HENT: Negative for sore throat.    Eyes: Negative for blurred vision.   Respiratory: Negative for cough.    Cardiovascular: Negative for chest pain.   Gastrointestinal: Positive  for abdominal pain (lower), constipation and nausea. Negative for blood in stool, diarrhea, heartburn, melena and vomiting.   Genitourinary: Negative for dysuria.   Musculoskeletal: Negative for myalgias.   Skin: Negative for rash.   Neurological: Negative for headaches.   Endo/Heme/Allergies: Negative for environmental allergies.   Psychiatric/Behavioral: Negative for suicidal ideas. The patient is not nervous/anxious.        Past Medical History: has a past medical history of Anticoagulant long-term use, DVT, popliteal, acute, GERD (gastroesophageal reflux disease), History of pulmonary embolus (PE), HLD (hyperlipidemia), Hypertension, and Type 2 diabetes mellitus, controlled.    Past Surgical History: has a past surgical history that includes  section; Upper gastrointestinal endoscopy (); Endometrial ablation; Laparoscopy-assisted vaginal hysterectomy (Bilateral, 2019); and Hysterectomy.    Family History:family history includes Breast cancer in her sister; Diabetes in her father and mother; Hyperlipidemia in her father and mother; Hypertension in her father and mother; Hyperthyroidism in her mother; Stroke in her father.    Allergies: Review of patient's allergies indicates:  No Known Allergies    Social History: reports that she has never smoked. She has never used smokeless tobacco. She reports that she does not drink alcohol and does not use drugs.    Home medications:   Current Outpatient Medications on File Prior to Visit   Medication Sig Dispense Refill    ALPRAZolam (XANAX) 0.5 MG tablet Take 0.5 mg by mouth 3 (three) times daily.      blood sugar diagnostic Strp To check BG 4 times daily 200 each 11    blood-glucose meter Misc To check BG 4 times daily 1 each 0    blood-glucose sensor (DEXCOM G6 SENSOR) Rosa 3 each by Misc.(Non-Drug; Combo Route) route continuous prn. Change every 10 days. 3 each PRN    blood-glucose transmitter (DEXCOM G6 TRANSMITTER) Rosa 1 each by  "Misc.(Non-Drug; Combo Route) route continuous prn. 1 each PRN    cetirizine (ZYRTEC) 10 MG tablet Take 10 mg by mouth once daily.      dulaglutide (TRULICITY) 3 mg/0.5 mL pen injector Inject 3 mg into the skin every 7 days. 4 pen 3    hydrOXYzine HCl (ATARAX) 25 MG tablet Take 25 mg by mouth 3 (three) times daily.      ibuprofen (ADVIL,MOTRIN) 600 MG tablet Take 600 mg by mouth 3 (three) times daily as needed.      ibuprofen (ADVIL,MOTRIN) 800 MG tablet ibuprofen 800 mg tablet      indomethacin (INDOCIN) 50 MG capsule Take 1 capsule (50 mg total) by mouth 2 (two) times daily with meals. 60 capsule 2    irbesartan-hydrochlorothiazide (AVALIDE) 150-12.5 mg per tablet Take 1 tablet by mouth once daily.      lancets 33 gauge Misc Use to check BG 4 times a day 200 each 11    LANTUS SOLOSTAR U-100 INSULIN glargine 100 units/mL (3mL) SubQ pen Inject 18 Units into the skin 2 (two) times a day. Increase per providers instruction Max TDD 60units. 10 each 3    metFORMIN (GLUCOPHAGE-XR) 500 MG ER 24hr tablet Take 1 tablet (500 mg total) by mouth 2 (two) times daily with meals. 180 tablet 3    NOVOLOG FLEXPEN U-100 INSULIN 100 unit/mL (3 mL) InPn pen Inject 10 Units into the skin 3 (three) times daily with meals. Plus correction scale Max TDD 60units. 10 each 3    ondansetron (ZOFRAN-ODT) 4 MG TbDL DISSOLVE 1 TABLET IN MOUTH EVERY 8 HOURS AS NEEDED FOR NAUSEA 20 tablet 0    pen needle, diabetic 33 gauge x 3/16" Ndle 1 application by Misc.(Non-Drug; Combo Route) route 3 (three) times daily before meals. 100 each 11    pravastatin (PRAVACHOL) 40 MG tablet Take 40 mg by mouth once daily.      zolpidem (AMBIEN) 10 mg Tab Take 10 mg by mouth once daily.       No current facility-administered medications on file prior to visit.       Vital signs:  [facility] 07/16/2019     Physical Exam  Vitals reviewed.   Constitutional:       General: She is not in acute distress.     Appearance: Normal appearance. She is not ill-appearing. "   HENT:      Head: Normocephalic.   Cardiovascular:      Rate and Rhythm: Normal rate and regular rhythm.      Heart sounds: Normal heart sounds. No murmur heard.      Pulmonary:      Effort: Pulmonary effort is normal. No respiratory distress.      Breath sounds: Normal breath sounds.   Chest:      Chest wall: No tenderness.   Abdominal:      General: Bowel sounds are normal. There is no distension.      Palpations: Abdomen is soft.      Tenderness: There is no abdominal tenderness. Negative signs include Freitas's sign.      Hernia: No hernia is present.   Skin:     General: Skin is warm.   Neurological:      Mental Status: She is alert and oriented to person, place, and time.   Psychiatric:         Mood and Affect: Mood normal.         Behavior: Behavior normal.         Routine labs:  Lab Results   Component Value Date    WBC 8.60 09/29/2020    HGB 12.9 09/29/2020    HCT 40.0 09/29/2020    MCV 94 09/29/2020     09/29/2020     Lab Results   Component Value Date    INR 1.0 07/08/2019     Lab Results   Component Value Date    IRON 64 04/03/2019    FERRITIN 214 04/03/2019    TIBC 320 04/03/2019    FESATURATED 20 04/03/2019     Lab Results   Component Value Date     02/09/2022    K 3.9 02/09/2022     02/09/2022    CO2 28 02/09/2022    BUN 13 02/09/2022    CREATININE 0.7 02/09/2022     Lab Results   Component Value Date    ALBUMIN 3.8 02/09/2022    ALT 19 02/09/2022    AST 20 02/09/2022    ALKPHOS 77 02/09/2022    BILITOT 0.4 02/09/2022     No results found for: GLUCOSE  Lab Results   Component Value Date    TSH 0.251 (L) 02/09/2022     Lab Results   Component Value Date    CALCIUM 9.9 02/09/2022    PHOS 2.6 (L) 06/23/2017     Lab Results   Component Value Date    HGBA1C 7.4 (H) 02/09/2022     Imaging:      I have reviewed prior labs, imaging, and notes.      Assessment:  1. Chronic idiopathic constipation    2. Nausea    3. Screening for colon cancer    4. Pre-procedure lab exam        Plan:  Orders  Placed This Encounter    X-Ray Abdomen AP 1 View    COVID-19 Routine Screening    ondansetron (ZOFRAN-ODT) 4 MG TbDL    linaCLOtide (LINZESS) 145 mcg Cap capsule    sodium,potassium,mag sulfates (SUPREP BOWEL PREP KIT) 17.5-3.13-1.6 gram SolR    Case Request Endoscopy: COLONOSCOPY     Abdominal xray to evaluate stool burden  Consider Miralax cleanse if abdominal xray reveals large stool burden.  Linzess 145mcg daily.  Zofran 4mg every 8 hours PRN  Colonoscopy for colon cancer screening.  Suprep.      Plan of care discussed with patient who is in agreement and verbalized understanding.     I have explained the planned procedures to the patient.The risks, benefits and alternatives of the procedure were also explained in detail. Patient verbalized understanding, all questions were answered. The patient agrees to proceed as planned    Follow Up: As Needed Pending Above Workup          Katharine Ruth, ISABELLA,FNP-BC  [facility] [place]

## 2022-02-16 NOTE — TELEPHONE ENCOUNTER
----- Message from Katharine Gomez NP sent at 2/16/2022 11:55 AM CST -----  Please let patient know xray shows scattered stool throughout the large intestine.  I recommend starting the Linzess daily and also starting Metamucil daily. Metamucil is a fiber supplement to help the consistency of the stool.

## 2022-02-16 NOTE — TELEPHONE ENCOUNTER
Spoke to patient and discuss her xray results and that NP Katharine stated that her xray shows scattered stool throughout the large intestine.  I recommend starting the Linzess daily and also starting Metamucil daily. Metamucil is a fiber supplement to help the consistency of the stool.     Verbal understanding.

## 2022-02-21 ENCOUNTER — PATIENT MESSAGE (OUTPATIENT)
Dept: ENDOCRINOLOGY | Facility: CLINIC | Age: 47
End: 2022-02-21
Payer: COMMERCIAL

## 2022-02-22 ENCOUNTER — CLINICAL SUPPORT (OUTPATIENT)
Dept: DIABETES | Facility: CLINIC | Age: 47
End: 2022-02-22
Payer: COMMERCIAL

## 2022-02-22 ENCOUNTER — PATIENT MESSAGE (OUTPATIENT)
Dept: ENDOCRINOLOGY | Facility: CLINIC | Age: 47
End: 2022-02-22
Payer: COMMERCIAL

## 2022-02-22 VITALS — WEIGHT: 187.06 LBS | BODY MASS INDEX: 32.11 KG/M2

## 2022-02-22 DIAGNOSIS — E11.10 TYPE 2 DIABETES MELLITUS WITH KETOACIDOSIS WITHOUT COMA, WITHOUT LONG-TERM CURRENT USE OF INSULIN: ICD-10-CM

## 2022-02-22 PROCEDURE — G0108 DIAB MANAGE TRN  PER INDIV: HCPCS | Mod: S$GLB,,, | Performed by: INTERNAL MEDICINE

## 2022-02-22 PROCEDURE — 99999 PR PBB SHADOW E&M-EST. PATIENT-LVL II: ICD-10-PCS | Mod: PBBFAC,,,

## 2022-02-22 PROCEDURE — G0108 PR DIAB MANAGE TRN  PER INDIV: ICD-10-PCS | Mod: S$GLB,,, | Performed by: INTERNAL MEDICINE

## 2022-02-22 PROCEDURE — 99999 PR PBB SHADOW E&M-EST. PATIENT-LVL II: CPT | Mod: PBBFAC,,,

## 2022-02-22 NOTE — PROGRESS NOTES
Diabetes Care Specialist Progress Note  Author: Yisel Mclaughlin RN, CDE  Date: 2/22/2022    Program Intake  Reason for Diabetes Program Visit:: Initial Diabetes Assessment  Current diabetes risk level:: moderate  In the last 12 months, have you:: none  Permission to speak with others about care:: yes (Adult son present at visit)    Lab Results   Component Value Date    HGBA1C 7.4 (H) 02/09/2022       Clinical    Patient Health Rating  Compared to other people your age, how would you rate your health?: Good    Problem Review  Reviewed Problem List with Patient: yes  Active comorbidities affecting diabetes self-care.: yes  Comorbidities: Hypertension  Reviewed health maintenance: yes    Clinical Assessment  Current Diabetes Treatment: Oral Medication, Injectable, Insulin (Trulicity 3 mg weekly; Novolog 10 units ac meals, Lantus 18 in am 18 in pm; Metformin 500 mg bid)  Have you ever experienced hypoglycemia (low blood sugar)?: no  Have you ever experienced hyperglycemia (high blood sugar)?: no    Medication Information  How do you obtain your medications?: Patient drives  Do you sometimes have difficulty refilling your medications?: No  Medication adherence impacting ability to self-manage diabetes?: No         Nutritional Status  Diet: Regular  Meal Plan 24 Hour Recall: Breakfast, Lunch, Dinner, Snack  Meal Plan 24 Hour Recall - Breakfast: skips, does not take insulin  Meal Plan 24 Hour Recall - Lunch: skips  Meal Plan 24 Hour Recall - Dinner: Lately fast foods  Meal Plan 24 Hour Recall - Snack: chips, drinks water, juice  Change in appetite?: No  Recent Changes in Weight: No Recent Weight Change  Current nutritional status an area of need that is impacting patient's ability to self-manage diabetes?: Yes    Additional Social History    Support  Does anyone support you with your diabetes care?: yes  Who supports you?: son/daughter, spouse  Who takes you to your medical appointments?: son/daughter  Does the current  support meet the patient's needs?: Yes  Is Support an area impacting ability to self-manage diabetes?: No    Access to Mass Media & Technology  Does the patient have access to any of the following devices or technologies?: Smart phone  Media or technology needs impacting ability to self-manage diabetes?: No    Cognitive/Behavioral Health  Alert and Oriented: Yes  Difficulty Thinking: No  Requires Prompting: No  Requires assistance for routine expression?: No  Cognitive or behavioral barriers impacting ability to self-manage diabetes?: No         Communication  Language preference: English  Vision Problems: No  Communication needs impacting ability to self-manage diabetes?: No    Health Literacy  Preferred Learning Method: Face to Face, Demonstration, Reading Materials  How often do you need to have someone help you read instructions, pamphlets, or written material from your doctor or pharmacy?: Never  Health literacy needs impacting ability to self-manage diabetes?: No      Diabetes Self-Management Skills Assessment    Diabetes Disease Process/Treatment Options  Patient/caregiver able to state what happens when someone has diabetes.: somewhat  Patient/caregiver knows what type of diabetes they have.: yes  Diabetes Type : Type II  Diabetes Disease Process/Treatment Options: Skills Assessment Completed: Yes  Assessment indicates:: Knowledge deficit  Area of need?: Yes    Nutrition/Healthy Eating  Challenges to healthy eating:: eating out, going to parties, portion control, lack of will power  Method of carbohydrate measurement:: no method  Patient can identify foods that impact blood sugar.: yes  Patient-identified foods:: starches (bread, pasta, rice, cereal), soda, sweets  Nutrition/Healthy Eating Skills Assessment Completed:: Yes  Assessment indicates:: Knowledge deficit  Area of need?: Yes    Physical Activity/Exercise  Patient's daily activity level:: lightly active  Patient formally exercises outside of work.:  no  Reasons for not exercising:: time constraints  Patient can identify forms of physical activity.: yes  Stated forms of physical activity:: any movement performed by muscles that uses energy  Patient can identify reasons why exercise/physical activity is important in diabetes management.: yes  Identified reasons:: lowers blood glucose, blood pressure, and cholesterol  Physical Activity/Exercise Skills Assessment Completed: : Yes  Assessment indicates:: Knowledge deficit  Area of need?: Yes    Medications  Patient is able to describe current diabetes management routine.: yes  Diabetes management routine:: injectable medications, insulin, oral medications  Patient is able to identify current diabetes medications, dosages, and appropriate timing of medications.: yes  Patient understands the purpose of the medications taken for diabetes.: yes  Patient reports problems or concerns with current medication regimen.: no  Medication Skills Assessment Completed:: Yes  Assessment indicates:: Adequate understanding  Area of need?: No    Home Blood Glucose Monitoring  Patient states that blood sugar is checked at home daily.: yes  Monitoring Method:: personal continuous glucose monitor  Personal CGM type:: Dexcom G6  CGM Report reviewed?: yes  Home Blood Glucose Monitoring Skills Assessment Completed: : Yes  Assessment indicates:: Adequate understanding  Area of need?: No    Acute Complications  Patient is able to identify types of acute complications: No  Acute Complications Skills Assessment Completed: : Yes  Assessment indicates:: Knowledge deficit  Area of need?: Yes    Chronic Complications  Patient can identify major chronic complications of diabetes.: yes  Stated chronic complications:: neuropathy/nerve damage, retinopathy  Patient can identify ways to prevent or delay diabetes complications.: no  Patient is aware that having diabetes increases risk of heart disease?: No  Patient is aware that heart disease is the  leading cause of death and disability in people with diabetes?: No  Patient able to state risk factors for heart disease?: No  Patient is taking statin?: No  Chronic Complications Skills Assessment Completed: : Yes  Assessment indicates:: Knowledge deficit  Area of need?: Yes    Psychosocial/Coping  Patient can identify ways of coping with chronic disease.: yes  Patient-stated ways of coping with chronic disease:: support from loved ones  Psychosocial/Coping Skills Assessment Completed: : Yes  Assessment indicates:: Adequate understanding  Area of need?: No      Diabetes Self Support Plan         Assessment Summary and Plan    Based on today's diabetes care assessment, the following areas of need were identified:      Social 2/22/2022   Support No   Access to [employer] No   Cognitive/Behavioral Health No   Communication No   Health Literacy No        Clinical 2/22/2022   Medication Adherence No   Nutritional Status Yes        Diabetes Self-Management Skills 2/22/2022   Diabetes Disease Process/Treatment Options Yes, Provided DM management guide and provided instruction regarding signs and symptoms, risk factors of diabetes, increased risk for cardio and cerebral vascular events.  Instructed patient on what is diabetes and the progression of the disease. Discussed significance of current A1c and blood glucose goals.   Nutrition/Healthy Eating Yes, see care plan   Physical Activity/Exercise Yes - discussed benefits of exercise as it relates to insulin resistance and weight loss    Medication No   Home Blood Glucose Monitoring No   Acute Complications Yes - Reviewed blood glucose goals, prevention, detection, signs and symptoms, and treatment of hypoglycemia and hyperglycemia, and when to contact the clinic.   Chronic Complications Yes, Patient agrees to have the following diabetes cal maintenance screenings/appointments yearly eye exams, foot exams.    Psychosocial/Coping No          Today's interventions were  provided through individual discussion, instruction, and written materials were provided.      Patient verbalized understanding of instruction and written materials.  Pt was able to return back demonstration of instructions today. Patient understood key points, needs reinforcement and further instruction.     Diabetes Self-Management Care Plan:    Today's Diabetes Self-Management Care Plan was developed with Heather's input. Heather has agreed to work toward the following goal(s) to improve his/her overall diabetes control.      Care Plan: Diabetes Management   Updates made since 1/23/2022 12:00 AM      Problem: Healthy Eating       Goal: Eat 2-3 meals daily with 30-45g/2-3 servings of Carbohydrate per meal. Limit snacking in between meal to 1 serving (15 grams).    Start Date: 2/22/2022   Expected End Date: 5/24/2022   Priority: High   Barriers: No Barriers Identified   Note:    Reviewed meal planning and general nutritional counseling with regards to diabetes management. Meal habits reviewed. Encouraged increased consumption of non starchy veggies to diet.  Overall planning meals and making better choices.  Patient is motivated to make changes. Wants to lose about 30 lbs by next visit.   Will accomplish this by limiting carb portions per meal.     Task: Reviewed the sources and role of Carbohydrate, Protein, and Fat and how each nutrient impacts blood sugar. Completed 2/22/2022      Task: Provided visual examples using dry measuring cups, food models, and other familiar objects such as computer mouse, deck or cards, tennis ball etc. to help with visualization of portions. Completed 2/22/2022      Task: Explained how to count carbohydrates using the food label and the use of dry measuring cups for accurate carb counting. Completed 2/22/2022      Task: Discussed strategies for choosing healthier menu options when dining out. Completed 2/22/2022      Task: Recommended replacing beverages containing high sugar content  with noncaloric/sugar free options and/or water. Completed 2/22/2022      Task: Review the importance of balancing carbohydrates with each meal using portion control techniques to count servings of carbohydrate and label reading to identify serving size and amount of total carbs per serving. Completed 2/22/2022      Task: Provided Sample plate method and reviewed the use of the plate to estimate amounts of carbohydrate per meal. Completed 2/22/2022          Follow Up Plan     F/u on 5/24/22    Today's care plan and follow up schedule was discussed with patient.  Heather verbalized understanding of the care plan, goals, and agrees to follow up plan.        The patient was encouraged to communicate with his/her health care provider/physician and care team regarding his/her condition(s) and treatment.  I provided the patient with my contact information today and encouraged to contact me via phone or [facility] Patient Portal as needed.     Length of Visit   Total Time: 60 Minutes

## 2022-02-25 ENCOUNTER — PATIENT MESSAGE (OUTPATIENT)
Dept: ENDOCRINOLOGY | Facility: CLINIC | Age: 47
End: 2022-02-25
Payer: COMMERCIAL

## 2022-02-25 DIAGNOSIS — R79.89 ABNORMAL THYROID BLOOD TEST: Primary | ICD-10-CM

## 2022-02-25 NOTE — TELEPHONE ENCOUNTER
Latest Reference Range & Units 02/23/22 07:54   TSH 0.400 - 4.000 uIU/mL 0.238 (L) [1]   Free T4 0.71 - 1.51 ng/dL 1.10   Thyrotropin Receptor Ab 0.00 - 1.75 IU/L 1.53 [2]   Thyroperoxidase Antibodies <6.0 IU/mL <6.0        I reviewed your lab results. Your thyroid level is slightly below goal-we will watch this. No need for treatment at this time. Someone will be in touch to schedule labs in 8 weeks.      You do not have Hashimoto's or Graves disease.    Ms. Melchor will return next week and she will contact you with additional recommendations

## 2022-02-28 ENCOUNTER — LAB VISIT (OUTPATIENT)
Dept: FAMILY MEDICINE | Facility: CLINIC | Age: 47
End: 2022-02-28
Payer: COMMERCIAL

## 2022-02-28 ENCOUNTER — TELEPHONE (OUTPATIENT)
Dept: ENDOSCOPY | Facility: HOSPITAL | Age: 47
End: 2022-02-28
Payer: COMMERCIAL

## 2022-02-28 DIAGNOSIS — Z01.812 PRE-PROCEDURE LAB EXAM: ICD-10-CM

## 2022-02-28 PROCEDURE — U0003 INFECTIOUS AGENT DETECTION BY NUCLEIC ACID (DNA OR RNA); SEVERE ACUTE RESPIRATORY SYNDROME CORONAVIRUS 2 (SARS-COV-2) (CORONAVIRUS DISEASE [COVID-19]), AMPLIFIED PROBE TECHNIQUE, MAKING USE OF HIGH THROUGHPUT TECHNOLOGIES AS DESCRIBED BY CMS-2020-01-R: HCPCS | Performed by: NURSE PRACTITIONER

## 2022-02-28 PROCEDURE — U0005 INFEC AGEN DETEC AMPLI PROBE: HCPCS | Performed by: NURSE PRACTITIONER

## 2022-02-28 NOTE — TELEPHONE ENCOUNTER
Spoke with patient about arrival time @ 0800.     Covid test = in progress, 02/28    Prep instructions reviewed: the day before the procedure, follow a clear liquid diet all day, then start the first 1/2 of prep at 5pm and take 2nd 1/2 of prep @ 0300.  Pt must be completely NPO when prep completed @ 0500.  Instructions sent to portal for Suprep.          Medications: Do not take Insulin or oral diabetic medications the day of the procedure.  Take as prescribed: heart, seizure and blood pressure medication in the morning with a sip of water (less than an ounce).  Take any breathing medications and bring inhalers to hospital with you Leave all valuables and jewelry at home.     Wear comfortable clothes to procedure to change into hospital gown You cannot drive for 24 hours after your procedure because you will receive sedation for your procedure to make you comfortable.  A ride must be provided at discharge.

## 2022-03-01 LAB
SARS-COV-2 RNA RESP QL NAA+PROBE: NOT DETECTED
SARS-COV-2- CYCLE NUMBER: NORMAL

## 2022-03-03 ENCOUNTER — HOSPITAL ENCOUNTER (OUTPATIENT)
Facility: HOSPITAL | Age: 47
Discharge: HOME | End: 2022-03-03
Attending: INTERNAL MEDICINE | Admitting: INTERNAL MEDICINE
Payer: COMMERCIAL

## 2022-03-03 ENCOUNTER — ANESTHESIA (OUTPATIENT)
Dept: ENDOSCOPY | Facility: HOSPITAL | Age: 47
End: 2022-03-03
Payer: COMMERCIAL

## 2022-03-03 ENCOUNTER — ANESTHESIA EVENT (OUTPATIENT)
Dept: ENDOSCOPY | Facility: HOSPITAL | Age: 47
End: 2022-03-03
Payer: COMMERCIAL

## 2022-03-03 VITALS
BODY MASS INDEX: 31.07 KG/M2 | RESPIRATION RATE: 20 BRPM | WEIGHT: 182 LBS | OXYGEN SATURATION: 96 % | TEMPERATURE: 98 F | HEIGHT: 64 IN | HEART RATE: 99 BPM | DIASTOLIC BLOOD PRESSURE: 68 MMHG | SYSTOLIC BLOOD PRESSURE: 111 MMHG

## 2022-03-03 DIAGNOSIS — Z12.11 COLON CANCER SCREENING: ICD-10-CM

## 2022-03-03 LAB — POCT GLUCOSE: 93 MG/DL (ref 70–110)

## 2022-03-03 PROCEDURE — 88305 TISSUE EXAM BY PATHOLOGIST: ICD-10-PCS | Mod: 26,,, | Performed by: STUDENT IN AN ORGANIZED HEALTH CARE EDUCATION/TRAINING PROGRAM

## 2022-03-03 PROCEDURE — 82962 GLUCOSE BLOOD TEST: CPT | Performed by: INTERNAL MEDICINE

## 2022-03-03 PROCEDURE — 63600175 PHARM REV CODE 636 W HCPCS: Performed by: NURSE ANESTHETIST, CERTIFIED REGISTERED

## 2022-03-03 PROCEDURE — 25000003 PHARM REV CODE 250: Performed by: INTERNAL MEDICINE

## 2022-03-03 PROCEDURE — 37000008 HC ANESTHESIA 1ST 15 MINUTES: Performed by: INTERNAL MEDICINE

## 2022-03-03 PROCEDURE — 88305 TISSUE EXAM BY PATHOLOGIST: CPT | Performed by: STUDENT IN AN ORGANIZED HEALTH CARE EDUCATION/TRAINING PROGRAM

## 2022-03-03 PROCEDURE — 25000003 PHARM REV CODE 250: Performed by: NURSE ANESTHETIST, CERTIFIED REGISTERED

## 2022-03-03 PROCEDURE — 45385 COLONOSCOPY W/LESION REMOVAL: CPT | Mod: PT | Performed by: INTERNAL MEDICINE

## 2022-03-03 PROCEDURE — 45385 COLONOSCOPY W/LESION REMOVAL: CPT | Mod: 33,,, | Performed by: INTERNAL MEDICINE

## 2022-03-03 PROCEDURE — 45385 PR COLONOSCOPY,REMV LESN,SNARE: ICD-10-PCS | Mod: 33,,, | Performed by: INTERNAL MEDICINE

## 2022-03-03 PROCEDURE — 37000009 HC ANESTHESIA EA ADD 15 MINS: Performed by: INTERNAL MEDICINE

## 2022-03-03 PROCEDURE — 88305 TISSUE EXAM BY PATHOLOGIST: CPT | Mod: 26,,, | Performed by: STUDENT IN AN ORGANIZED HEALTH CARE EDUCATION/TRAINING PROGRAM

## 2022-03-03 PROCEDURE — 27201089 HC SNARE, DISP (ANY): Performed by: INTERNAL MEDICINE

## 2022-03-03 RX ORDER — PROPOFOL 10 MG/ML
VIAL (ML) INTRAVENOUS
Status: DISCONTINUED | OUTPATIENT
Start: 2022-03-03 | End: 2022-03-03

## 2022-03-03 RX ORDER — PROPOFOL 10 MG/ML
VIAL (ML) INTRAVENOUS CONTINUOUS PRN
Status: DISCONTINUED | OUTPATIENT
Start: 2022-03-03 | End: 2022-03-03

## 2022-03-03 RX ORDER — SODIUM CHLORIDE 0.9 % (FLUSH) 0.9 %
10 SYRINGE (ML) INJECTION
Status: DISCONTINUED | OUTPATIENT
Start: 2022-03-03 | End: 2022-03-03 | Stop reason: HOSPADM

## 2022-03-03 RX ORDER — LIDOCAINE HYDROCHLORIDE 20 MG/ML
INJECTION INTRAVENOUS
Status: DISCONTINUED | OUTPATIENT
Start: 2022-03-03 | End: 2022-03-03

## 2022-03-03 RX ORDER — SODIUM CHLORIDE 9 MG/ML
INJECTION, SOLUTION INTRAVENOUS CONTINUOUS
Status: DISCONTINUED | OUTPATIENT
Start: 2022-03-03 | End: 2022-03-03 | Stop reason: HOSPADM

## 2022-03-03 RX ADMIN — PROPOFOL 40 MG: 10 INJECTION, EMULSION INTRAVENOUS at 08:03

## 2022-03-03 RX ADMIN — LIDOCAINE HYDROCHLORIDE 100 MG: 20 INJECTION, SOLUTION INTRAVENOUS at 08:03

## 2022-03-03 RX ADMIN — SODIUM CHLORIDE: 0.9 INJECTION, SOLUTION INTRAVENOUS at 08:03

## 2022-03-03 RX ADMIN — PROPOFOL 175 MCG/KG/MIN: 10 INJECTION, EMULSION INTRAVENOUS at 08:03

## 2022-03-03 NOTE — ANESTHESIA PREPROCEDURE EVALUATION
2022  Heather Gomez is a 46 y.o., female          Past Surgical History:   Procedure Laterality Date     SECTION      x2    ENDOMETRIAL ABLATION      HYSTERECTOMY      LAPAROSCOPY-ASSISTED VAGINAL HYSTERECTOMY Bilateral 2019    Procedure: HYSTERECTOMY, VAGINAL, LAPAROSCOPY-ASSISTED;  Surgeon: Lashell Clayton MD;  Location: [facility];  Service: OB/GYN;  Laterality: Bilateral;  video    UPPER GASTROINTESTINAL ENDOSCOPY                Pre-op Assessment    I have reviewed the Patient Summary Reports.    I have reviewed the Nursing Notes.    I have reviewed the Medications.     Review of Systems  Anesthesia Hx:  No problems with previous Anesthesia  History of prior surgery of interest to airway management or planning: Previous anesthesia: Epidural, MAC Denies Family Hx of Anesthesia complications.   Denies Personal Hx of Anesthesia complications.   Social:  Non-Smoker, No Alcohol Use    Hematology/Oncology:        Hematology Comments: Pt on xarelto for PE/DVT; pt instructed to hold today per Dr. Mosquera   EENT/Dental:EENT/Dental Normal   Cardiovascular:   Exercise tolerance: good Hypertension, well controlled Denies Dysrhythmias.   Denies Angina. hyperlipidemia     ECG has been reviewed.  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 Mahoney), right lower extremity    Pulmonary:   Denies Shortness of breath. Near complete resolution saddle PE seen on CTA chest 2017; pt c/o intermittent dyspnea with heavy activity Pulmonary Embolism (2017 likely r/t nuvaring use and on xarelto; [facility] > 6 months ago, saddle embolus, thrombotic embolus    Renal/:  Renal/ Normal     Hepatic/GI:   GERD, well controlled    OB/GYN/PEDS:  Heavy uterine bleeding   Neurological:  Neurology Normal    Endocrine:   Diabetes,  well controlled, type 2, using insulin  Diabetes, Type 2 Diabetes , controlled by insulin. , most recent HgA1c value was 6.5 on 10/2017.    Psych:   anxiety          Physical Exam  General:  Obesity      Airway/Jaw/Neck:  Airway Findings: Mouth Opening: Normal   Tongue: Normal   General Airway Assessment: Adult Mallampati: II  TM Distance: Normal, at least 6 cm         Eyes/Ears/Nose:  EYES/EARS/NOSE FINDINGS: Normal   Dental:  Dental Findings: Periodontal disease, Mild     Chest/Lungs:  Chest/Lungs Clear    Heart/Vascular:  Heart Findings: Normal Heart murmur: negative    Abdomen:  Abdomen Findings: Normal      Mental Status:  Mental Status Findings: Normal        2D Echo w/CFD 6/2017  CONCLUSIONS     1 - Normal left ventricular systolic function (EF 60-65%).     2 - Concentric remodeling.     3 - No wall motion abnormalities.     4 - Normal left ventricular diastolic function.     5 - Normal right ventricular systolic function .   This document has been electronically    SIGNED BY: Murray Bernal MD On: 06/22/2017 09:32    CTA Chest 7/2017  Impression    Near-complete resolution of previously described pulmonary emboli with only a single nonocclusive punctate filling defect within a segmental right lower lobe artery consistent with chronic embolus.  Electronically signed by: SURJIT WEISS MD  Date: 08/17/17  Time: 15:26          Anesthesia Plan  Type of Anesthesia, risks & benefits discussed:  Anesthesia Type:  general    Patient's Preference:   Plan Factors:          Intra-op Monitoring Plan:   Intra-op Monitoring Plan Comments:   Post Op Pain Control Plan:   Post Op Pain Control Plan Comments:     Induction:   IV  Beta Blocker:  Patient is not currently on a Beta-Blocker (No further documentation required).       Informed Consent: Informed consent signed with the Patient and all parties understand the risks and agree with anesthesia plan.  All questions answered.    ASA Score: 3     Day of Surgery Review of History &  Physical:        Anesthesia Plan Notes: Anesthesia consent will be obtained prior to procedure on 1/23/2018.    HemOnc recommendations for holding xarelto in pt with PE/DVT 6/2017 in [facility] on 1/22/2018.        Ready For Surgery From Anesthesia Perspective.           Physical Exam  General: Obesity    Airway:  Mallampati: II   Mouth Opening: Normal  TM Distance: Normal, at least 6 cm  Tongue: Normal    Dental:  Periodontal disease, Mild          Anesthesia Plan  Type of Anesthesia, risks & benefits discussed:    Anesthesia Type: general  Induction:  IV  Informed Consent: Informed consent signed with the Patient and all parties understand the risks and agree with anesthesia plan.  All questions answered.   ASA Score: 3  Anesthesia Plan Notes: Anesthesia consent will be obtained prior to procedure on 1/23/2018.    HemOn recommendations for holding xarelto in pt with PE/DVT 6/2017 in [facility] on 1/22/2018.    Ready For Surgery From Anesthesia Perspective.       .

## 2022-03-03 NOTE — TRANSFER OF CARE
"Anesthesia Transfer of Care Note    Patient: Heather Gomez    Procedure(s) Performed: Procedure(s) (LRB):  COLONOSCOPY suprep (N/A)    Patient location: GI    Anesthesia Type: MAC    Transport from OR: Transported from OR on room air with adequate spontaneous ventilation    Post pain: adequate analgesia    Post assessment: no apparent anesthetic complications and tolerated procedure well    Post vital signs: stable    Level of consciousness: awake and alert    Nausea/Vomiting: no nausea/vomiting    Complications: none    Transfer of care protocol was followed      Last vitals:   Visit Vitals  /72   Pulse 106   Temp 36.6 °C (97.9 °F)   Resp 18   Ht 5' 4" (1.626 m)   Wt 82.6 kg (182 lb)   LMP 07/16/2019   SpO2 95%   BMI 31.24 kg/m²     "

## 2022-03-03 NOTE — PROVATION PATIENT INSTRUCTIONS
Discharge Summary/Instructions after an Endoscopic Procedure  Patient Name: Heather Gomez  Patient MRN: [id number]  Patient YOB: 1975  Thursday, March 3, 2022  Michael Tirado MD  Dear patient,  As a result of recent federal legislation (The Federal Cures Act), you may   receive lab or pathology results from your procedure in your [facility]   account before your physician is able to contact you. Your physician or   their representative will relay the results to you with their   recommendations at their soonest availability.  Thank you,  Your health is very important to us during the Covid Crisis. Following your   procedure today, you will receive a daily text for 2 weeks asking about   signs or symptoms of Covid 19.  Please respond to this text when you   receive it so we can follow up and keep you as safe as possible.   RESTRICTIONS:  During your procedure today, you received medications for sedation.  These   medications may affect your judgment, balance and coordination.  Therefore,   for 24 hours, you have the following restrictions:   - DO NOT drive a car, operate machinery, make legal/financial decisions,   sign important papers or drink alcohol.    ACTIVITY:  Today: no heavy lifting, straining or running due to procedural   sedation/anesthesia.  The following day: return to full activity including work.  DIET:  Eat and drink normally unless instructed otherwise.     TREATMENT FOR COMMON SIDE EFFECTS:  - Mild abdominal pain, nausea, belching, bloating or excessive gas:  rest,   eat lightly and use a heating pad.  - Sore Throat: treat with throat lozenges and/or gargle with warm salt   water.  - Because air was used during the procedure, expelling large amounts of air   from your rectum or belching is normal.  - If a bowel prep was taken, you may not have a bowel movement for 1-3 days.    This is normal.  SYMPTOMS TO WATCH FOR AND REPORT TO YOUR PHYSICIAN:  1. Abdominal pain or bloating, other  than gas cramps.  2. Chest pain.  3. Back pain.  4. Signs of infection such as: chills or fever occurring within 24 hours   after the procedure.  5. Rectal bleeding, which would show as bright red, maroon, or black stools.   (A tablespoon of blood from the rectum is not serious, especially if   hemorrhoids are present.)  6. Vomiting.  7. Weakness or dizziness.  GO DIRECTLY TO THE NEAREST EMERGENCY ROOM IF YOU HAVE ANY OF THE FOLLOWING:      Difficulty breathing              Chills and/or fever over 101 F   Persistent vomiting and/or vomiting blood   Severe abdominal pain   Severe chest pain   Black, tarry stools   Bleeding- more than one tablespoon   Any other symptom or condition that you feel may need urgent attention  Your doctor recommends these additional instructions:  If any biopsies were taken, your doctors clinic will contact you in 1 to 2   weeks with any results.  - Discharge patient to home.   - Resume previous diet.   - Continue present medications.   - Await pathology results.   - Repeat colonoscopy in 5 years for surveillance.   - Patient has a contact number available for emergencies.  The signs and   symptoms of potential delayed complications were discussed with the   patient.  Return to normal activities tomorrow.  Written discharge   instructions were provided to the patient.  For questions, problems or results please call your physician - Michael Tirado MD.  EMERGENCY PHONE NUMBER: [phone],  LAB RESULTS: [phone]  IF A COMPLICATION OR EMERGENCY SITUATION ARISES AND YOU ARE UNABLE TO REACH   YOUR PHYSICIAN - GO DIRECTLY TO THE EMERGENCY ROOM.  Michael Tirado MD  3/3/2022 9:01:34 AM  This report has been verified and signed electronically.  Dear patient,  As a result of recent federal legislation (The Federal Cures Act), you may   receive lab or pathology results from your procedure in your [facility]   account before your physician is able to contact you. Your  physician or   their representative will relay the results to you with their   recommendations at their soonest availability.  Thank you,  [employer]ATION

## 2022-03-03 NOTE — ANESTHESIA POSTPROCEDURE EVALUATION
Anesthesia Post Evaluation    Patient: Heather Gomez    Procedure(s) Performed: Procedure(s) (LRB):  COLONOSCOPY suprep (N/A)    Final Anesthesia Type: MAC      Patient location during evaluation: GI PACU  Patient participation: Yes- Able to Participate  Level of consciousness: awake and alert and awake  Post-procedure vital signs: reviewed and stable  Pain management: adequate  Airway patency: patent    PONV status at discharge: No PONV  Anesthetic complications: no      Cardiovascular status: blood pressure returned to baseline, hemodynamically stable and stable  Respiratory status: unassisted, spontaneous ventilation and room air  Hydration status: euvolemic  Follow-up not needed.          Vitals Value Taken Time   BP  03/03/22 0903   Temp  03/03/22 0903   Pulse  03/03/22 0903   Resp  03/03/22 0903   SpO2  03/03/22 0903     See nursing notes for vitals     No case tracking events are documented in the log.      Pain/Aguilar Score: No data recorded

## 2022-03-10 ENCOUNTER — TELEPHONE (OUTPATIENT)
Dept: GASTROENTEROLOGY | Facility: CLINIC | Age: 47
End: 2022-03-10
Payer: COMMERCIAL

## 2022-03-10 LAB
FINAL PATHOLOGIC DIAGNOSIS: NORMAL
Lab: NORMAL

## 2022-03-10 NOTE — TELEPHONE ENCOUNTER
----- Message from Michael Tirado MD sent at 3/10/2022  2:52 PM CST -----  Pathology from recent colonoscopy reviewed.  Colon polyp(s) benign.  Repeat colonoscopy in 5 years.

## 2022-03-10 NOTE — PROGRESS NOTES
Pathology from recent colonoscopy reviewed.  Colon polyp(s) benign.  Repeat colonoscopy in 5 years.

## 2022-03-25 ENCOUNTER — PATIENT MESSAGE (OUTPATIENT)
Dept: ENDOCRINOLOGY | Facility: CLINIC | Age: 47
End: 2022-03-25
Payer: COMMERCIAL

## 2022-03-25 DIAGNOSIS — R79.89 ABNORMAL THYROID BLOOD TEST: Primary | ICD-10-CM

## 2022-03-25 NOTE — TELEPHONE ENCOUNTER
Thyroid US 2/14/2022  The thyroid is enlarged.  Right lobe of the thyroid measures 4.5 x 2.1 x 1.9 cm.  Left lobe of the thyroid measures 4.4 x 1.9 x 2.1 cm.  Thyroid volume is 17.6 mL.  Normal thyroid parenchyma.     There is a 5 x 3 x 4 mm mixed solid and cystic nodule within the mid right thyroid lobe.  There is a 9 x 5 x 8 mm solid hypoechoic nodule within the mid right thyroid lobe.  Both of these nodules are wider than tall, smoothly marginated, and lack internal calcification.     There is a 10 x 5 x 9 mm solid slightly hypoechoic nodule within the thyroid isthmus which is wider than tall, smoothly marginated, and lacks internal calcification.     There is a 5 x 3 x 6 mm mixed solid and cystic nodule within the upper aspect of the left thyroid lobe.  Another 5 x 2 x 4 mm mixed solid and cystic nodule is seen within the mid left thyroid lobe.  Both of these nodules are wider than tall, smoothly marginated, and lack internal calcification.     Impression:     Enlarged thyroid containing multiple nodules.  No nodule currently meeting TI-RADS criteria for FNA sampling.    Lab Results   Component Value Date    TSH 0.253 (L) 03/24/2022    [id number] 89 07/24/2018    FREET4 1.16 03/24/2022

## 2022-04-01 ENCOUNTER — OFFICE VISIT (OUTPATIENT)
Dept: FAMILY MEDICINE | Facility: CLINIC | Age: 47
End: 2022-04-01
Payer: COMMERCIAL

## 2022-04-01 DIAGNOSIS — R11.0 NAUSEA: Primary | ICD-10-CM

## 2022-04-01 PROCEDURE — 3066F PR DOCUMENTATION OF TREATMENT FOR NEPHROPATHY: ICD-10-PCS | Mod: CPTII,95,, | Performed by: FAMILY MEDICINE

## 2022-04-01 PROCEDURE — 1160F RVW MEDS BY RX/DR IN RCRD: CPT | Mod: CPTII,95,, | Performed by: FAMILY MEDICINE

## 2022-04-01 PROCEDURE — 1160F PR REVIEW ALL MEDS BY PRESCRIBER/CLIN PHARMACIST DOCUMENTED: ICD-10-PCS | Mod: CPTII,95,, | Performed by: FAMILY MEDICINE

## 2022-04-01 PROCEDURE — 3051F HG A1C>EQUAL 7.0%<8.0%: CPT | Mod: CPTII,95,, | Performed by: FAMILY MEDICINE

## 2022-04-01 PROCEDURE — 3060F POS MICROALBUMINURIA REV: CPT | Mod: CPTII,95,, | Performed by: FAMILY MEDICINE

## 2022-04-01 PROCEDURE — 3051F PR MOST RECENT HEMOGLOBIN A1C LEVEL 7.0 - < 8.0%: ICD-10-PCS | Mod: CPTII,95,, | Performed by: FAMILY MEDICINE

## 2022-04-01 PROCEDURE — 3066F NEPHROPATHY DOC TX: CPT | Mod: CPTII,95,, | Performed by: FAMILY MEDICINE

## 2022-04-01 PROCEDURE — 99213 OFFICE O/P EST LOW 20 MIN: CPT | Mod: 95,,, | Performed by: FAMILY MEDICINE

## 2022-04-01 PROCEDURE — 1159F MED LIST DOCD IN RCRD: CPT | Mod: CPTII,95,, | Performed by: FAMILY MEDICINE

## 2022-04-01 PROCEDURE — 3060F PR POS MICROALBUMINURIA RESULT DOCUMENTED/REVIEW: ICD-10-PCS | Mod: CPTII,95,, | Performed by: FAMILY MEDICINE

## 2022-04-01 PROCEDURE — 1159F PR MEDICATION LIST DOCUMENTED IN MEDICAL RECORD: ICD-10-PCS | Mod: CPTII,95,, | Performed by: FAMILY MEDICINE

## 2022-04-01 PROCEDURE — 99213 PR OFFICE/OUTPT VISIT, EST, LEVL III, 20-29 MIN: ICD-10-PCS | Mod: 95,,, | Performed by: FAMILY MEDICINE

## 2022-04-01 NOTE — PROGRESS NOTES
Subjective:       Patient ID: Heather Gomez is a 46 y.o. female.    The patient location is: [place]  The chief complaint leading to consultation is: nausea    Visit type: audiovisual    Face to Face time with patient: 10  10 minutes of total time spent on the encounter, which includes face to face time and non-face to face time preparing to see the patient (eg, review of tests), Obtaining and/or reviewing separately obtained history, Documenting clinical information in the electronic or other health record, Independently interpreting results (not separately reported) and communicating results to the patient/family/caregiver, or Care coordination (not separately reported).         Each patient to whom he or she provides medical services by telemedicine is:  (1) informed of the relationship between the physician and patient and the respective role of any other health care provider with respect to management of the patient; and (2) notified that he or she may decline to receive medical services by telemedicine and may withdraw from such care at any time.      Heather is a 46 y.o. female who presents today for an UC visit. Last night she was cleaning her bathroom; she used two different  and since then she has felt nausea. She has not opened her windows. She has not been outside. She did not ingest anything. She just smelled it.     No abdominal pain. She has been on trulicity x 3 years. No recent dose changes    No current symptoms of dizziness.     Review of Systems   Constitutional: Negative for activity change and unexpected weight change.   HENT: Negative for hearing loss, rhinorrhea and trouble swallowing.    Eyes: Positive for discharge. Negative for visual disturbance.   Respiratory: Negative for chest tightness and wheezing.    Cardiovascular: Negative for chest pain and palpitations.   Gastrointestinal: Positive for nausea. Negative for blood in stool, constipation, diarrhea and vomiting.    Endocrine: Positive for polyuria. Negative for polydipsia.   Genitourinary: Negative for difficulty urinating, dysuria, hematuria and menstrual problem.   Musculoskeletal: Negative for arthralgias, joint swelling and neck pain.   Neurological: Negative for weakness and headaches.   Psychiatric/Behavioral: Negative for confusion and dysphoric mood.             Objective:   There were no vitals filed for this visit.     Physical Exam  Constitutional:       General: She is not in acute distress.     Appearance: She is well-developed. She is not diaphoretic.      Comments: Exam performed as able, but limited due to the inherent nature of telemedicine visit.    Pulmonary:      Effort: No respiratory distress.      Comments: No audible wheezing noted   Breathing as noted over telemedicine appears normal with no distress  Skin:     Comments: No visible rash noted   Neurological:      Mental Status: She is alert and oriented to person, place, and time.   Psychiatric:         Behavior: Behavior normal.         Thought Content: Thought content normal.         Judgment: Judgment normal.         Assessment:       1. Nausea        Plan:       Limited exam due to nature of telemedicine visit  Suspect symptoms due to cleaning supplies  No ingestion, doubt need to call poison control.   Recommend opening all the windows and home and airing the house out  Recommend being seated outside   If symptoms persist, recommend in person evaluation.   Discussed all with patient     Nausea            Warning signs discussed, patient to call with any further issues or worsening of symptoms.

## 2022-04-05 ENCOUNTER — HOSPITAL ENCOUNTER (OUTPATIENT)
Dept: RADIOLOGY | Facility: HOSPITAL | Age: 47
Discharge: HOME | End: 2022-04-05
Attending: NURSE PRACTITIONER
Payer: COMMERCIAL

## 2022-04-05 DIAGNOSIS — R79.89 ABNORMAL THYROID BLOOD TEST: ICD-10-CM

## 2022-04-05 PROCEDURE — 78014 THYROID IMAGING W/BLOOD FLOW: CPT | Mod: 26,,, | Performed by: RADIOLOGY

## 2022-04-05 PROCEDURE — 78014 NM THYROID UPTAKE AND SCAN: ICD-10-PCS | Mod: 26,,, | Performed by: RADIOLOGY

## 2022-04-05 PROCEDURE — A9516 IODINE I-123 SOD IODIDE MIC: HCPCS

## 2022-04-06 ENCOUNTER — HOSPITAL ENCOUNTER (OUTPATIENT)
Dept: RADIOLOGY | Facility: HOSPITAL | Age: 47
Discharge: HOME | End: 2022-04-06
Attending: NURSE PRACTITIONER
Payer: COMMERCIAL

## 2022-04-06 ENCOUNTER — PATIENT MESSAGE (OUTPATIENT)
Dept: ENDOCRINOLOGY | Facility: CLINIC | Age: 47
End: 2022-04-06
Payer: COMMERCIAL

## 2022-04-06 DIAGNOSIS — R79.89 ABNORMAL THYROID BLOOD TEST: Primary | ICD-10-CM

## 2022-04-06 NOTE — TELEPHONE ENCOUNTER
Spoke with the patient. She reports she is taking a lot of supplements and needs to check if there is Biotin in those. Will reach out when she gets home to review supplements.

## 2022-04-06 NOTE — TELEPHONE ENCOUNTER
The 24 hour uptake is elevated at 48.3 % (normal 10-30%).     Homogenous distribution of the radionuclide throughout the thyroid gland without hyper or hypo functional nodules.     Impression:     1. Elevated 24 hour radioiodine uptake by the thyroid.  2. Uniform uptake in the thyroid gland bilaterally.  Overall findings are in keeping with Graves disease..    Labs in 8 weeks.

## 2022-05-13 NOTE — PROGRESS NOTES
Subjective:      Patient ID: Heather Gomez is a 46 y.o. female.    Chief Complaint:  No chief complaint on file.    History of Present Illness  Heather Gomez is here for follow up of DM, thyroid nodule and subclinical hyperthyroidism.  Previously seen by me 2/2022.  This is a MyChart video visit.    The patient location is: [place]  The chief complaint leading to consultation is: DM  Visit type: Virtual visit with synchronous audio and video  Total time spent with patient: see below  Each patient to whom he or she provides medical services by telemedicine is:  (1) informed of the relationship between the physician and patient and the respective role of any other health care provider with respect to management of the patient; and (2) notified that he or she may decline to receive medical services by telemedicine and may withdraw from such care at any time.      With regards to diabetes:    Diagnosed: 2013 4/2021 C peptide 3.12 with glucose of 180    FH of DM:   Mother, father, 4 sisters   Has 2 children - no history of DM     DM Education: 2/2022    Known complications:  DKA : None  RN - Denies  - Eye Exam: > 1 year   PN : Denies   Nephropathy   - Podiatry: None  CAD : Denies    Denies history of pancreatitis & personal/family history of medullary thyroid cancer.     Current regimen:  Metformin 500mg with dinner  Trulicity 3mg weekly - Sunday  Lantus 18 units twice a day   Novolog 10 units before meals    Reports compliance.     Other medications tried:  Metformin - unable to tolerate due to GI upset   Invokana - discontinued due to abdominal pain, nausea, yeast infections    Actos - discontinued during hospital stay   Januvia - discontinued during hospital stay     Glucose Monitor:   6 times a day testing  Log reviewed: Dexcom G6 - off sensor for 2 weeks.    Hypoglycemia:  Denies.   Knows how to correct with 15 grams of carbs- juice, coke, or a peppermint.     Diet/Exercise:  Eats 1-2 meals a  day.   B: SKIPS  L: fast food OR SKIPS  D: fast food OR skips   Snacks : at night - cookies, chips, ice cream   Drinks : water, juice (rarely)  NO soft drinks  Exercise: None - active lifestyle.      Diabetes Management Status    Hemoglobin A1C   Date Value Ref Range Status   02/09/2022 7.4 (H) 4.0 - 5.6 % Final     Comment:     ADA Screening Guidelines:  5.7-6.4%  Consistent with prediabetes  >or=6.5%  Consistent with diabetes    High levels of fetal hemoglobin interfere with the HbA1C  assay. Heterozygous hemoglobin variants (HbS, HgC, etc)do  not significantly interfere with this assay.   However, presence of multiple variants may affect accuracy.     04/05/2021 6.8 (H) 4.0 - 5.6 % Final     Comment:     ADA Screening Guidelines:  5.7-6.4%  Consistent with prediabetes  >or=6.5%  Consistent with diabetes    High levels of fetal hemoglobin interfere with the HbA1C  assay. Heterozygous hemoglobin variants (HbS, HgC, etc)do  not significantly interfere with this assay.   However, presence of multiple variants may affect accuracy.     09/29/2020 7.2 (H) 4.0 - 5.6 % Final     Comment:     ADA Screening Guidelines:  5.7-6.4%  Consistent with prediabetes  >or=6.5%  Consistent with diabetes  High levels of fetal hemoglobin interfere with the HbA1C  assay. Heterozygous hemoglobin variants (HbS, HgC, etc)do  not significantly interfere with this assay.   However, presence of multiple variants may affect accuracy.         Statin: Taking  ACE/ARB: Taking  Screening or Prevention Patient's value Goal Complete/Controlled?   HgA1C Testing and Control   Lab Results   Component Value Date    HGBA1C 7.4 (H) 02/09/2022      Annually/Less than 8% Yes   Lipid profile : 02/09/2022 Annually Yes   LDL control Lab Results   Component Value Date    LDLCALC 122.0 02/09/2022    Annually/Less than 100 mg/dl  Yes   Nephropathy screening Lab Results   Component Value Date    LABMICR 34.0 02/09/2022     Lab Results   Component Value Date     PROTEINUA Negative 07/08/2019    Annually No   Blood pressure BP Readings from Last 1 Encounters:   03/03/22 111/68    Less than 140/90 No   Dilated retinal exam Most Recent Eye Exam Date: Not Found Annually Yes   Foot exam   : 02/09/2022 Annually No     With regards to thyroid nodule:    Thyroid US:   2/14/2022  The thyroid is enlarged.  Right lobe of the thyroid measures 4.5 x 2.1 x 1.9 cm.  Left lobe of the thyroid measures 4.4 x 1.9 x 2.1 cm.  Thyroid volume is 17.6 mL.  Normal thyroid parenchyma.  There is a 5 x 3 x 4 mm mixed solid and cystic nodule within the mid right thyroid lobe.  There is a 9 x 5 x 8 mm solid hypoechoic nodule within the mid right thyroid lobe.  Both of these nodules are wider than tall, smoothly marginated, and lack internal calcification.  There is a 10 x 5 x 9 mm solid slightly hypoechoic nodule within the thyroid isthmus which is wider than tall, smoothly marginated, and lacks internal calcification.  There is a 5 x 3 x 6 mm mixed solid and cystic nodule within the upper aspect of the left thyroid lobe.  Another 5 x 2 x 4 mm mixed solid and cystic nodule is seen within the mid left thyroid lobe.  Both of these nodules are wider than tall, smoothly marginated, and lack internal calcification.  Impression:  Enlarged thyroid containing multiple nodules.  No nodule currently meeting TI-RADS criteria for FNA sampling.    FNA: None    Signs or Symptoms:   Difficulty breathing: Denies  Difficulty swallowing: Denies  Voice Changes: Hoarseness but not all the time  FH of thyroid nodules but unsure if it was cancer (mother)  Personal history of radiation treatment or exposure: Denies      With regards to subclinical hyperthyroidism:    FH of thyroid disease: sister    2/2022  Thyroid Ab negative.    NM Thyroid Scan  4/6/2022  The 24 hour uptake is elevated at 48.3 % (normal 10-30%).  Homogenous distribution of the radionuclide throughout the thyroid gland without hyper or hypo functional  nodules.  Impression:  1. Elevated 24 hour radioiodine uptake by the thyroid.  2. Uniform uptake in the thyroid gland bilaterally.  Overall findings are in keeping with Graves disease..    Lab Results   Component Value Date    TSH 0.253 (L) 03/24/2022    [id number] 89 07/24/2018    FREET4 1.16 03/24/2022     Biotin Use: yes - holding for repeat labs tomorrow    Review of Systems   Constitutional: Negative for fatigue.   Eyes: Negative for visual disturbance.   Respiratory: Negative for shortness of breath.    Cardiovascular: Negative for chest pain.   Gastrointestinal: Negative for abdominal pain.   Musculoskeletal: Negative for arthralgias.   Skin: Negative for wound.   Neurological: Negative for headaches.           Visit Vitals  LMP 07/16/2019       There is no height or weight on file to calculate BMI.    Lab Review:   Lab Results   Component Value Date    HGBA1C 7.4 (H) 02/09/2022    HGBA1C 6.8 (H) 04/05/2021    HGBA1C 7.2 (H) 09/29/2020       Lab Results   Component Value Date    CHOL 199 02/09/2022    HDL 56 02/09/2022    LDLCALC 122.0 02/09/2022    TRIG 105 02/09/2022    CHOLHDL 28.1 02/09/2022     Lab Results   Component Value Date     02/09/2022    K 3.9 02/09/2022     02/09/2022    CO2 28 02/09/2022     (H) 02/09/2022    BUN 13 02/09/2022    CREATININE 0.7 02/09/2022    CALCIUM 9.9 02/09/2022    PROT 7.6 02/09/2022    ALBUMIN 3.8 02/09/2022    BILITOT 0.4 02/09/2022    ALKPHOS 77 02/09/2022    AST 20 02/09/2022    ALT 19 02/09/2022    ANIONGAP 8 02/09/2022    ESTGFRAFRICA >60.0 02/09/2022    EGFRNONAA >60.0 02/09/2022    TSH 0.253 (L) 03/24/2022     Vit D, 25-Hydroxy   Date Value Ref Range Status   04/03/2019 8 (L) 30 - 96 ng/mL Final     Comment:     Vitamin D deficiency.........<10 ng/mL                              Vitamin D insufficiency......10-29 ng/mL       Vitamin D sufficiency........> or equal to 30 ng/mL  Vitamin D toxicity............>100 ng/mL       Assessment and Plan     1.  Type 2 diabetes mellitus with ketoacidosis without coma, without long-term current use of insulin  Hemoglobin A1C    Comprehensive Metabolic Panel    dulaglutide (TRULICITY) 4.5 mg/0.5 mL pen injector   2. Thyroid nodule     3. Subclinical hyperthyroidism     4. Vitamin D deficiency  Vitamin D       Type 2 diabetes mellitus with ketoacidosis without coma, without long-term current use of insulin  -- Labs scheduled tomorrow.  -- Podiatry and ophthalmology referrals.   -- A1c goal <7%.  -- Medications discussed:  MFM   GLP1-DPP4   RAE   SGLT2   Reviewed potential adverse effects of SGLT-2 inhibitors, including genital mycotic infections, slightly increased risk of UTI, hypersensitivity, hypotension, and hyperkalemia. Advised to maintain water intake of 8-10 cups per day. Advised we need to check chemistry panel at baseline and 2 weeks after starting. Discussed FDA warning reports of ketoacidosis associated with SGLT-2 inhibitors. Advised to seek immediate medical attention and stop the medication if symptoms such as difficulty breathing, nausea, vomiting, abdominal pain, confusion, and unusual fatigue/sleepiness. Discussed possible precipitating factors including major illness/reduced food and fluid intake (advised to stop under these circumstances), and reduced insulin dose. Discussed possible effects of increased fracture risk/decreased bone density. Discussed reports of increased risk of leg and foot amputations with canagliflozin and need to seek urgent care if developed new pain or tenderness, sores or ulcers, or infections in legs/feet.   Insulin   -- Reviewed logs/CGM:  Glucose improved significantly while using CGM. Recent hyperglycemia without CGM use. Has a new sensor to put on. Issues with a few sensors.  Instructed to send glucose logs in 14 days.  Reach out to me sooner for any glucose <70 or consistently >180.  -- Medication Changes:   CHANGE:  Metformin 500mg twice daily with meals  Trulicity 3mg weekly  - Sunday - 3 more pens THEN increase to Trulicity 4.5mg weekly  Lantus 18 units twice a day   Novolog 10 units before meals    -- Reviewed goals of therapy are to get the best control we can without hypoglycemia.  -- Reviewed patient's current insulin regimen. Clarified proper insulin dose and timing in relation to meals, etc. Insulin injection sites and proper rotation instructed.    -- Advised frequent self blood glucose monitoring.  Patient encouraged to document glucose results and bring them to every clinic visit.  -- Hypoglycemia precautions discussed. Instructed on precautions before driving.    -- Call for Bg repeatedly < 90 or > 180.   -- Close adherence to lifestyle changes recommended.   -- Periodic follow ups for eye evaluations, foot care and dental care suggested.    Thyroid nodule  -- Denies compressive symptoms.  -- Repeat thyroid US 2/2023.    Subclinical hyperthyroidism  -- 2/2022 Thyroid Antibody negative.  -- 4/2022 NM Thyroid Scan  1. Elevated 24 hour radioiodine uptake by the thyroid.  2. Uniform uptake in the thyroid gland bilaterally.  Overall findings are in keeping with Graves disease.  -- Patient taking Biotin - held for one week with repeat labs scheduled.    Vitamin D deficiency  -- Check vitamin D.      Follow up in about 4 months (around 9/17/2022).    I spent 25 minutes face-to-face with the patient, over half of the visit was spent on counseling and/or coordinating the care of the patient.    Counseling includes:  Diagnostic results, impressions, recommendations   Prognosis   Risk and benefits of management/treatment options   Instructions for management treatment and or follow-up   Importance of compliance with management   Risk factor reduction   Patient education

## 2022-05-16 ENCOUNTER — PATIENT MESSAGE (OUTPATIENT)
Dept: ORTHOPEDICS | Facility: CLINIC | Age: 47
End: 2022-05-16
Payer: COMMERCIAL

## 2022-05-16 DIAGNOSIS — M17.11 PRIMARY OSTEOARTHRITIS OF RIGHT KNEE: ICD-10-CM

## 2022-05-16 DIAGNOSIS — K59.04 CHRONIC IDIOPATHIC CONSTIPATION: ICD-10-CM

## 2022-05-16 DIAGNOSIS — M25.531 RIGHT WRIST PAIN: ICD-10-CM

## 2022-05-16 RX ORDER — INDOMETHACIN 50 MG/1
50 CAPSULE ORAL 2 TIMES DAILY WITH MEALS
Qty: 60 CAPSULE | Refills: 2 | Status: SHIPPED | OUTPATIENT
Start: 2022-05-16 | End: 2023-01-04 | Stop reason: SDUPTHER

## 2022-05-17 ENCOUNTER — OFFICE VISIT (OUTPATIENT)
Dept: ENDOCRINOLOGY | Facility: CLINIC | Age: 47
End: 2022-05-17
Payer: COMMERCIAL

## 2022-05-17 DIAGNOSIS — E55.9 VITAMIN D DEFICIENCY: ICD-10-CM

## 2022-05-17 DIAGNOSIS — E04.1 THYROID NODULE: ICD-10-CM

## 2022-05-17 DIAGNOSIS — E05.90 SUBCLINICAL HYPERTHYROIDISM: ICD-10-CM

## 2022-05-17 DIAGNOSIS — E11.10 TYPE 2 DIABETES MELLITUS WITH KETOACIDOSIS WITHOUT COMA, WITHOUT LONG-TERM CURRENT USE OF INSULIN: Primary | ICD-10-CM

## 2022-05-17 PROCEDURE — 3051F HG A1C>EQUAL 7.0%<8.0%: CPT | Mod: CPTII,95,, | Performed by: NURSE PRACTITIONER

## 2022-05-17 PROCEDURE — 1159F PR MEDICATION LIST DOCUMENTED IN MEDICAL RECORD: ICD-10-PCS | Mod: CPTII,95,, | Performed by: NURSE PRACTITIONER

## 2022-05-17 PROCEDURE — 1160F PR REVIEW ALL MEDS BY PRESCRIBER/CLIN PHARMACIST DOCUMENTED: ICD-10-PCS | Mod: CPTII,95,, | Performed by: NURSE PRACTITIONER

## 2022-05-17 PROCEDURE — 1159F MED LIST DOCD IN RCRD: CPT | Mod: CPTII,95,, | Performed by: NURSE PRACTITIONER

## 2022-05-17 PROCEDURE — 3060F POS MICROALBUMINURIA REV: CPT | Mod: CPTII,95,, | Performed by: NURSE PRACTITIONER

## 2022-05-17 PROCEDURE — 3060F PR POS MICROALBUMINURIA RESULT DOCUMENTED/REVIEW: ICD-10-PCS | Mod: CPTII,95,, | Performed by: NURSE PRACTITIONER

## 2022-05-17 PROCEDURE — 99214 PR OFFICE/OUTPT VISIT, EST, LEVL IV, 30-39 MIN: ICD-10-PCS | Mod: 95,,, | Performed by: NURSE PRACTITIONER

## 2022-05-17 PROCEDURE — 3066F NEPHROPATHY DOC TX: CPT | Mod: CPTII,95,, | Performed by: NURSE PRACTITIONER

## 2022-05-17 PROCEDURE — 3051F PR MOST RECENT HEMOGLOBIN A1C LEVEL 7.0 - < 8.0%: ICD-10-PCS | Mod: CPTII,95,, | Performed by: NURSE PRACTITIONER

## 2022-05-17 PROCEDURE — 3066F PR DOCUMENTATION OF TREATMENT FOR NEPHROPATHY: ICD-10-PCS | Mod: CPTII,95,, | Performed by: NURSE PRACTITIONER

## 2022-05-17 PROCEDURE — 1160F RVW MEDS BY RX/DR IN RCRD: CPT | Mod: CPTII,95,, | Performed by: NURSE PRACTITIONER

## 2022-05-17 PROCEDURE — 99214 OFFICE O/P EST MOD 30 MIN: CPT | Mod: 95,,, | Performed by: NURSE PRACTITIONER

## 2022-05-17 RX ORDER — DULAGLUTIDE 4.5 MG/.5ML
4.5 INJECTION, SOLUTION SUBCUTANEOUS
Qty: 4 PEN | Refills: 3 | Status: SHIPPED | OUTPATIENT
Start: 2022-05-17 | End: 2022-06-03 | Stop reason: SDUPTHER

## 2022-05-17 NOTE — ASSESSMENT & PLAN NOTE
-- Labs scheduled tomorrow.  -- Podiatry and ophthalmology referrals.   -- A1c goal <7%.  -- Medications discussed:  MFM   GLP1-DPP4   RAE   SGLT2   Reviewed potential adverse effects of SGLT-2 inhibitors, including genital mycotic infections, slightly increased risk of UTI, hypersensitivity, hypotension, and hyperkalemia. Advised to maintain water intake of 8-10 cups per day. Advised we need to check chemistry panel at baseline and 2 weeks after starting. Discussed FDA warning reports of ketoacidosis associated with SGLT-2 inhibitors. Advised to seek immediate medical attention and stop the medication if symptoms such as difficulty breathing, nausea, vomiting, abdominal pain, confusion, and unusual fatigue/sleepiness. Discussed possible precipitating factors including major illness/reduced food and fluid intake (advised to stop under these circumstances), and reduced insulin dose. Discussed possible effects of increased fracture risk/decreased bone density. Discussed reports of increased risk of leg and foot amputations with canagliflozin and need to seek urgent care if developed new pain or tenderness, sores or ulcers, or infections in legs/feet.   Insulin   -- Reviewed logs/CGM:  Glucose improved significantly while using CGM. Recent hyperglycemia without CGM use. Has a new sensor to put on. Issues with a few sensors.  Instructed to send glucose logs in 14 days.  Reach out to me sooner for any glucose <70 or consistently >180.  -- Medication Changes:   CHANGE:  Metformin 500mg twice daily with meals  Trulicity 3mg weekly - Sunday - 3 more pens THEN increase to Trulicity 4.5mg weekly  Lantus 18 units twice a day   Novolog 10 units before meals    -- Reviewed goals of therapy are to get the best control we can without hypoglycemia.  -- Reviewed patient's current insulin regimen. Clarified proper insulin dose and timing in relation to meals, etc. Insulin injection sites and proper rotation instructed.    -- Advised  frequent self blood glucose monitoring.  Patient encouraged to document glucose results and bring them to every clinic visit.  -- Hypoglycemia precautions discussed. Instructed on precautions before driving.    -- Call for Bg repeatedly < 90 or > 180.   -- Close adherence to lifestyle changes recommended.   -- Periodic follow ups for eye evaluations, foot care and dental care suggested.

## 2022-05-17 NOTE — ASSESSMENT & PLAN NOTE
-- 2/2022 Thyroid Antibody negative.  -- 4/2022 NM Thyroid Scan  1. Elevated 24 hour radioiodine uptake by the thyroid.  2. Uniform uptake in the thyroid gland bilaterally.  Overall findings are in keeping with Graves disease.  -- Patient taking Biotin - held for one week with repeat labs scheduled.

## 2022-05-19 ENCOUNTER — PATIENT MESSAGE (OUTPATIENT)
Dept: ENDOCRINOLOGY | Facility: CLINIC | Age: 47
End: 2022-05-19
Payer: COMMERCIAL

## 2022-05-19 NOTE — TELEPHONE ENCOUNTER
Component      Latest Ref Rng & Units 5/18/2022   Sodium      136 - 145 mmol/L 139   Potassium      3.5 - 5.1 mmol/L 3.7   Chloride      95 - 110 mmol/L 104   CO2      23 - 29 mmol/L 27   Glucose      70 - 110 mg/dL 153 (H)   BUN      7 - 17 mg/dL 17   Creatinine      0.50 - 1.40 mg/dL 0.54   Calcium      8.7 - 10.5 mg/dL 8.9   PROTEIN TOTAL      6.0 - 8.4 g/dL 6.9   Albumin      3.5 - 5.2 g/dL 3.8   BILIRUBIN TOTAL      0.1 - 1.0 mg/dL 0.3   Alkaline Phosphatase      38 - 126 U/L 68   AST      15 - 46 U/L 21   ALT      10 - 44 U/L 17   Anion Gap      8 - 16 mmol/L 8   eGFR if African American      >60 mL/min/1.73 m:2 >60.0   eGFR if non African American      >60 mL/min/1.73 m:2 >60.0   Hemoglobin A1C External      4.0 - 5.6 % 6.5 (H)   Estimated Avg Glucose      68 - 131 mg/dL 140 (H)   TSH      0.400 - 4.000 uIU/mL 0.805   Free T4      0.71 - 1.51 ng/dL 0.91   Vit D, 25-Hydroxy      30 - 96 ng/mL 30

## 2022-05-31 ENCOUNTER — PATIENT MESSAGE (OUTPATIENT)
Dept: ENDOCRINOLOGY | Facility: CLINIC | Age: 47
End: 2022-05-31
Payer: COMMERCIAL

## 2022-05-31 ENCOUNTER — TELEPHONE (OUTPATIENT)
Dept: ENDOCRINOLOGY | Facility: CLINIC | Age: 47
End: 2022-05-31
Payer: COMMERCIAL

## 2022-06-17 NOTE — PROGRESS NOTES
Subjective:      Patient ID: Heather Gomez is a 46 y.o. female.    Chief Complaint: Pain of the Right Knee      HPI  (Steve)    Last seen by me on 1/4/22 for recheck of right knee pain. She has known known moderate/severe medial joint space narrowing right knee.     She was given right knee injection on 1/4/22. She had some relief with euflexxa injections that finished in September as well.     She is on prn indocin and uses knee brace as needed. PT ordered at last visit.     She is here for follow up.     Minimal relief with steroid injections. She has intermittent right knee pain along with catching, popping, and giving way. She rates her pain as a 5 on a scale of 1-10. Pain is aching. She has intermittent swelling. She is on indocin and uses knee brace prn.     She would like to repeat the euflexxa series in right knee.     Past Medical History:   Diagnosis Date    Anticoagulant long-term use     DVT, popliteal, acute     GERD (gastroesophageal reflux disease)     History of pulmonary embolus (PE)     HLD (hyperlipidemia)     Hypertension     Type 2 diabetes mellitus, controlled          Current Outpatient Medications:     ALPRAZolam (XANAX) 0.5 MG tablet, Take 0.5 mg by mouth 3 (three) times daily., Disp: , Rfl:     blood-glucose sensor (DEXCOM G6 SENSOR) Rosa, 3 each by Misc.(Non-Drug; Combo Route) route continuous prn. Change every 10 days., Disp: 3 each, Rfl: PRN    blood-glucose transmitter (DEXCOM G6 TRANSMITTER) Rosa, 1 each by Misc.(Non-Drug; Combo Route) route continuous prn., Disp: 1 each, Rfl: PRN    cetirizine (ZYRTEC) 10 MG tablet, Take 10 mg by mouth once daily., Disp: , Rfl:     dulaglutide (TRULICITY) 4.5 mg/0.5 mL pen injector, Inject 4.5 mg into the skin every 7 days., Disp: 4 pen, Rfl: 3    hydrOXYzine HCl (ATARAX) 25 MG tablet, Take 25 mg by mouth 3 (three) times daily., Disp: , Rfl:     indomethacin (INDOCIN) 50 MG capsule, Take 1 capsule (50 mg total) by mouth 2  "(two) times daily with meals., Disp: 60 capsule, Rfl: 2    irbesartan-hydrochlorothiazide (AVALIDE) 150-12.5 mg per tablet, Take 1 tablet by mouth once daily., Disp: , Rfl:     LANTUS SOLOSTAR U-100 INSULIN glargine 100 units/mL (3mL) SubQ pen, Inject 18 Units into the skin 2 (two) times a day. Increase per providers instruction Max TDD 60units., Disp: 30 mL, Rfl: 3    linaCLOtide (LINZESS) 145 mcg Cap capsule, Take 1 capsule (145 mcg total) by mouth before breakfast., Disp: 90 capsule, Rfl: 0    metFORMIN (GLUCOPHAGE-XR) 500 MG ER 24hr tablet, Take 1 tablet (500 mg total) by mouth 2 (two) times daily with meals., Disp: 180 tablet, Rfl: 3    NOVOLOG FLEXPEN U-100 INSULIN 100 unit/mL (3 mL) InPn pen, Inject 10 Units into the skin 3 (three) times daily with meals. Plus correction scale Max TDD 60units., Disp: 10 each, Rfl: 3    pravastatin (PRAVACHOL) 40 MG tablet, Take 40 mg by mouth once daily., Disp: , Rfl:     zolpidem (AMBIEN) 10 mg Tab, Take 10 mg by mouth once daily., Disp: , Rfl:     Current Facility-Administered Medications:     [START ON 7/5/2022] sodium hyaluronate (EUFLEXXA) 10 mg/mL(mw 2.4 -3.6 million) injection 20 mg, 20 mg, Intra-articular, Weekly, Marley Finch PA-C    Review of patient's allergies indicates:  No Known Allergies    Review of Systems   Constitutional: Negative for chills, fever, night sweats and weight gain.   Gastrointestinal: Negative for bowel incontinence, nausea and vomiting.   Genitourinary: Negative for bladder incontinence.   Neurological: Negative for disturbances in coordination and loss of balance.         Objective:        Ht 5' 4" (1.626 m)   Wt 83.3 kg (183 lb 11.2 oz)   LMP 07/16/2019   BMI 31.53 kg/m²     Ortho/SPM Exam    Body habitus is obese.   The patient walks without a limp.     RIGHT KNEE EXAM:  Resisted SLR negative.   The skin over the knee is intact.  Knee effusion none   Tendernes is located medial  Range of motion- Flexion 120 deg, Extension 0 " deg,      Ligament exam:              MCL 1+ laxity              Lachman intact              Post sag intact              LCL intact     Patellar apprehension negative.  Popliteal cyst negative  Patellar crepitation present.  Flexion/pinch negative.     Motor normal 5/5 strength in all tested muscle groups.   Sensory normal.        Assessment:       Encounter Diagnosis   Name Primary?    Primary osteoarthritis of right knee Yes          Plan:       Heather was seen today for pain.    Diagnoses and all orders for this visit:    Primary osteoarthritis of right knee  -     KNEE BRACE FOR HOME USE  -     Prior authorization Order    Other orders  -     sodium hyaluronate (EUFLEXXA) 10 mg/mL(mw 2.4 -3.6 million) injection 20 mg      Minimal relief with steroid injections. She has intermittent right knee pain along with catching, popping, and giving way.     Known moderate/severe medial joint space narrowing right knee. Last euflexxa injections helped (finished in September).      Treatment options reviewed with patient and following plan made:      - Will repeat euflexxa injections in right knee. Orders done. She has Kellgren-John score of 2-3 on her XRs. No relief with previous injections or medications.   - Order for medial  brace for right knee.   - Continue indocin. Reviewed dosing and side effects. Take with food.   - Discussed PT- insurance has high copay. Will hold off.     Follow up in about 2 weeks (around 7/5/2022).

## 2022-06-21 ENCOUNTER — OFFICE VISIT (OUTPATIENT)
Dept: ORTHOPEDICS | Facility: CLINIC | Age: 47
End: 2022-06-21
Payer: COMMERCIAL

## 2022-06-21 VITALS — BODY MASS INDEX: 31.36 KG/M2 | WEIGHT: 183.69 LBS | HEIGHT: 64 IN

## 2022-06-21 DIAGNOSIS — M17.11 PRIMARY OSTEOARTHRITIS OF RIGHT KNEE: Primary | ICD-10-CM

## 2022-06-21 PROCEDURE — 1159F MED LIST DOCD IN RCRD: CPT | Mod: CPTII,S$GLB,, | Performed by: PHYSICIAN ASSISTANT

## 2022-06-21 PROCEDURE — 3008F PR BODY MASS INDEX (BMI) DOCUMENTED: ICD-10-PCS | Mod: CPTII,S$GLB,, | Performed by: PHYSICIAN ASSISTANT

## 2022-06-21 PROCEDURE — 99213 OFFICE O/P EST LOW 20 MIN: CPT | Mod: S$GLB,,, | Performed by: PHYSICIAN ASSISTANT

## 2022-06-21 PROCEDURE — 99213 PR OFFICE/OUTPT VISIT, EST, LEVL III, 20-29 MIN: ICD-10-PCS | Mod: S$GLB,,, | Performed by: PHYSICIAN ASSISTANT

## 2022-06-21 PROCEDURE — 3066F NEPHROPATHY DOC TX: CPT | Mod: CPTII,S$GLB,, | Performed by: PHYSICIAN ASSISTANT

## 2022-06-21 PROCEDURE — 1160F RVW MEDS BY RX/DR IN RCRD: CPT | Mod: CPTII,S$GLB,, | Performed by: PHYSICIAN ASSISTANT

## 2022-06-21 PROCEDURE — 1160F PR REVIEW ALL MEDS BY PRESCRIBER/CLIN PHARMACIST DOCUMENTED: ICD-10-PCS | Mod: CPTII,S$GLB,, | Performed by: PHYSICIAN ASSISTANT

## 2022-06-21 PROCEDURE — 3044F PR MOST RECENT HEMOGLOBIN A1C LEVEL <7.0%: ICD-10-PCS | Mod: CPTII,S$GLB,, | Performed by: PHYSICIAN ASSISTANT

## 2022-06-21 PROCEDURE — 3060F POS MICROALBUMINURIA REV: CPT | Mod: CPTII,S$GLB,, | Performed by: PHYSICIAN ASSISTANT

## 2022-06-21 PROCEDURE — 99999 PR PBB SHADOW E&M-EST. PATIENT-LVL IV: ICD-10-PCS | Mod: PBBFAC,,, | Performed by: PHYSICIAN ASSISTANT

## 2022-06-21 PROCEDURE — 99999 PR PBB SHADOW E&M-EST. PATIENT-LVL IV: CPT | Mod: PBBFAC,,, | Performed by: PHYSICIAN ASSISTANT

## 2022-06-21 PROCEDURE — 3066F PR DOCUMENTATION OF TREATMENT FOR NEPHROPATHY: ICD-10-PCS | Mod: CPTII,S$GLB,, | Performed by: PHYSICIAN ASSISTANT

## 2022-06-21 PROCEDURE — 3008F BODY MASS INDEX DOCD: CPT | Mod: CPTII,S$GLB,, | Performed by: PHYSICIAN ASSISTANT

## 2022-06-21 PROCEDURE — 3044F HG A1C LEVEL LT 7.0%: CPT | Mod: CPTII,S$GLB,, | Performed by: PHYSICIAN ASSISTANT

## 2022-06-21 PROCEDURE — 1159F PR MEDICATION LIST DOCUMENTED IN MEDICAL RECORD: ICD-10-PCS | Mod: CPTII,S$GLB,, | Performed by: PHYSICIAN ASSISTANT

## 2022-06-21 PROCEDURE — 3060F PR POS MICROALBUMINURIA RESULT DOCUMENTED/REVIEW: ICD-10-PCS | Mod: CPTII,S$GLB,, | Performed by: PHYSICIAN ASSISTANT

## 2022-06-21 NOTE — PATIENT INSTRUCTIONS
It was nice to see you again today! I am sorry that you are hurting so much. I put in orders for a new knee brace. Michelle should call you. If not, call [phone].     Continue on indomethacin to help with pain/inflammation. Take as directed with food.     Will see you back for repeat euflexxa (gel) injections in right knee.     Please stay in touch and call me if you need anything. You can also send me a message in [facility].     Marley   [phone]

## 2022-06-27 ENCOUNTER — OFFICE VISIT (OUTPATIENT)
Dept: FAMILY MEDICINE | Facility: CLINIC | Age: 47
End: 2022-06-27
Payer: COMMERCIAL

## 2022-06-27 ENCOUNTER — TELEPHONE (OUTPATIENT)
Dept: FAMILY MEDICINE | Facility: CLINIC | Age: 47
End: 2022-06-27
Payer: COMMERCIAL

## 2022-06-27 DIAGNOSIS — R35.0 URINARY FREQUENCY: Primary | ICD-10-CM

## 2022-06-27 DIAGNOSIS — G89.29 CHRONIC BILATERAL LOW BACK PAIN WITHOUT SCIATICA: ICD-10-CM

## 2022-06-27 DIAGNOSIS — Z79.4 TYPE 2 DIABETES MELLITUS WITH HYPERGLYCEMIA, WITH LONG-TERM CURRENT USE OF INSULIN: ICD-10-CM

## 2022-06-27 DIAGNOSIS — M54.50 CHRONIC BILATERAL LOW BACK PAIN WITHOUT SCIATICA: ICD-10-CM

## 2022-06-27 DIAGNOSIS — E11.65 TYPE 2 DIABETES MELLITUS WITH HYPERGLYCEMIA, WITH LONG-TERM CURRENT USE OF INSULIN: ICD-10-CM

## 2022-06-27 DIAGNOSIS — R81 GLUCOSURIA: ICD-10-CM

## 2022-06-27 PROCEDURE — 3060F POS MICROALBUMINURIA REV: CPT | Mod: CPTII,95,, | Performed by: NURSE PRACTITIONER

## 2022-06-27 PROCEDURE — 99214 OFFICE O/P EST MOD 30 MIN: CPT | Mod: 95,,, | Performed by: NURSE PRACTITIONER

## 2022-06-27 PROCEDURE — 3044F PR MOST RECENT HEMOGLOBIN A1C LEVEL <7.0%: ICD-10-PCS | Mod: CPTII,95,, | Performed by: NURSE PRACTITIONER

## 2022-06-27 PROCEDURE — 1159F PR MEDICATION LIST DOCUMENTED IN MEDICAL RECORD: ICD-10-PCS | Mod: CPTII,95,, | Performed by: NURSE PRACTITIONER

## 2022-06-27 PROCEDURE — 1160F RVW MEDS BY RX/DR IN RCRD: CPT | Mod: CPTII,95,, | Performed by: NURSE PRACTITIONER

## 2022-06-27 PROCEDURE — 3066F NEPHROPATHY DOC TX: CPT | Mod: CPTII,95,, | Performed by: NURSE PRACTITIONER

## 2022-06-27 PROCEDURE — 3060F PR POS MICROALBUMINURIA RESULT DOCUMENTED/REVIEW: ICD-10-PCS | Mod: CPTII,95,, | Performed by: NURSE PRACTITIONER

## 2022-06-27 PROCEDURE — 1159F MED LIST DOCD IN RCRD: CPT | Mod: CPTII,95,, | Performed by: NURSE PRACTITIONER

## 2022-06-27 PROCEDURE — 1160F PR REVIEW ALL MEDS BY PRESCRIBER/CLIN PHARMACIST DOCUMENTED: ICD-10-PCS | Mod: CPTII,95,, | Performed by: NURSE PRACTITIONER

## 2022-06-27 PROCEDURE — 3066F PR DOCUMENTATION OF TREATMENT FOR NEPHROPATHY: ICD-10-PCS | Mod: CPTII,95,, | Performed by: NURSE PRACTITIONER

## 2022-06-27 PROCEDURE — 3044F HG A1C LEVEL LT 7.0%: CPT | Mod: CPTII,95,, | Performed by: NURSE PRACTITIONER

## 2022-06-27 PROCEDURE — 99214 PR OFFICE/OUTPT VISIT, EST, LEVL IV, 30-39 MIN: ICD-10-PCS | Mod: 95,,, | Performed by: NURSE PRACTITIONER

## 2022-06-27 RX ORDER — METHOCARBAMOL 750 MG/1
750 TABLET, FILM COATED ORAL 2 TIMES DAILY PRN
Qty: 60 TABLET | Refills: 0 | Status: SHIPPED | OUTPATIENT
Start: 2022-06-27 | End: 2022-07-07

## 2022-06-27 NOTE — Clinical Note
Seen your patient as telemed visit for urinary frequency and back pain.  Urine was NEGATIVE for UTI but has 2+ GLUCOSE and Trace Ketones in her urine.  However, her reported DexCom readings sounded pretty good per patient report.  Her low back pain sounded more musculoskeletal.  I wanted to make sure one of her providers knew about the 2+ glucose in urine to follow it up further.  Her PCP not in [facility].  Thanks, MUSA Rogers

## 2022-06-27 NOTE — TELEPHONE ENCOUNTER
Spoke with patient- said she is heading for work so she will stop at [facility].

## 2022-06-27 NOTE — PROGRESS NOTES
Subjective:       Patient ID: Heather Gomez is a 46 y.o. female.    Chief Complaint: Urinary Frequency and Low-back Pain    The patient location is: [place], [place]  The chief complaint leading to consultation is: urinary frequency and back pain    Visit type: audiovisual    Face to Face time with patient: 27  33 minutes of total time spent on the encounter, which includes face to face time and non-face to face time preparing to see the patient (eg, review of tests), Obtaining and/or reviewing separately obtained history, Documenting clinical information in the electronic or other health record, Independently interpreting results (not separately reported) and communicating results to the patient/family/caregiver, or Care coordination (not separately reported).     Each patient to whom he or she provides medical services by telemedicine is:  (1) informed of the relationship between the physician and patient and the respective role of any other health care provider with respect to management of the patient; and (2) notified that he or she may decline to receive medical services by telemedicine and may withdraw from such care at any time.    Notes:     Patient is a 46 year old black female with Type 2 diabetes on insulin therapy, Hypertension, Hyperlipidemia, history of DVT and PE, subclinical hyperthyroidism and thyroid nodule followed by Endocrinology that has virtual visit today for Urinary Frequency and low back pain.     Urinary Frequency  Increased frequency and urgency BUT NO PAIN,  No dysuria  Has has intermittent low back pain for past 6 months unrelated to the urinary frequency  This started 1 week ago.  No vaginal discharge  Sent for Urinalysis earlier today  NEGATIVE for Leukocytes, nitrite and blood.  2+ Glucose and Trace Ketones  Type 2 Diabetic patient on insulin - this would explain the urinary frequency BUT patient does have DexCom and reports blood sugars have been recently controlled.  Will  send message to her [facility] with findings.    Low Back Pain  Mostly in the morning when waking up, once she walks around for about an hour, the low back pain resolves.  She states she thought it was mattress so she changed mattress but still has the back pain in morning.  However, when she sleeps in massaging recliner, the back pain is not present.  The back pain does not only occur with urinary symptoms - it has been present so not likely related.  She is a back sleeper.  She states her PCP thought the back pain could be due to constipation but has been on Linzess that has helped constipation but still has back pain    Urinary Frequency   This is a new problem. Episode onset: started around 1 week ago. Episode frequency: states she now wakes up during night to urinate. The pain is at a severity of 0/10. There has been no fever. Associated symptoms include frequency and urgency. Pertinent negatives include no chills, discharge, flank pain, hematuria, hesitancy, nausea or possible pregnancy. Associated symptoms comments: Low back pain. She has tried nothing for the symptoms.   Low-back Pain  This is a chronic problem. Episode onset: past 6 months. The problem occurs intermittently (mostly wakes up in the morning with back pain that usually resolves after hour of being up). The problem has been waxing and waning. Associated symptoms include myalgias. Pertinent negatives include no abdominal pain, chills, congestion, coughing, fever, headaches or nausea. Associated symptoms comments: Does have chronic constipation.       Review of Systems   Constitutional: Negative for chills and fever.   HENT: Negative for congestion.    Respiratory: Negative for cough.    Gastrointestinal: Negative for abdominal pain and nausea.   Genitourinary: Positive for frequency and urgency. Negative for flank pain, hematuria and hesitancy.   Musculoskeletal: Positive for myalgias.   Neurological: Negative for headaches.          Objective:     There were no vitals filed for this visit.       Physical Exam  Constitutional:       General: She is not in acute distress.     Appearance: She is well-developed. She is not diaphoretic.   HENT:      Head: Normocephalic and atraumatic.   Eyes:      General: No scleral icterus.        Right eye: No discharge.         Left eye: No discharge.      Conjunctiva/sclera: Conjunctivae normal.      Pupils: Pupils are equal, round, and reactive to light.   Pulmonary:      Effort: Pulmonary effort is normal. No respiratory distress.   Neurological:      Mental Status: She is alert and oriented to person, place, and time.   Psychiatric:         Behavior: Behavior normal.         Thought Content: Thought content normal.         Judgment: Judgment normal.           Lab Visit on 06/27/2022   Component Date Value Ref Range Status    Specimen UA 06/27/2022 Urine, Clean Catch   Final    Color, UA 06/27/2022 Yellow  Yellow, Straw, Graciela Final    Appearance, UA 06/27/2022 Clear  Clear Final    pH, UA 06/27/2022 6.0  5.0 - 8.0 Final    Specific Gravity, UA 06/27/2022 >=1.030 (A) 1.005 - 1.030 Final    Protein, UA 06/27/2022 Negative  Negative Final    Comment: Recommend a 24 hour urine protein or a urine   protein/creatinine ratio if globulin induced proteinuria is  clinically suspected.      Glucose, UA 06/27/2022 2+ (A) Negative Final    Ketones, UA 06/27/2022 Trace (A) Negative Final    Bilirubin (UA) 06/27/2022 Negative  Negative Final    Occult Blood UA 06/27/2022 Negative  Negative Final    Nitrite, UA 06/27/2022 Negative  Negative Final    Urobilinogen, UA 06/27/2022 Negative  <2.0 EU/dL Final    Leukocytes, UA 06/27/2022 Negative  Negative Final       Assessment:         ICD-10-CM ICD-9-CM   1. Urinary frequency  R35.0 788.41   2. Glucosuria  R81 791.5   3. Type 2 diabetes mellitus with hyperglycemia, with long-term current use of insulin  E11.65 250.00    Z79.4 790.29     V58.67   4. Chronic  bilateral low back pain without sciatica  M54.50 724.2    G89.29 338.29       Plan:       Urinary frequency  Need to discuss with endocrinology or PCP regarding 2+ glucose and ketones in urine. I sent a message to [facility].    Glucosuria    Type 2 diabetes mellitus with hyperglycemia, with long-term current use of insulin    Chronic bilateral low back pain without sciatica  The back pain that occurs mostly in morning does sound to be more musculoskeletal since pain not present when sleeps in recliner - could be a sleeping position.  Advised to take INdocin as already prescribed 50 mg at night with Robaxin 750 mg before bed - can take again in morning if still has back pain.  If the back pain does not resolve, need to see PCP for further evaluation.  -     methocarbamoL (ROBAXIN) 750 MG Tab; Take 1 tablet (750 mg total) by mouth 2 (two) times daily as needed (back pain).  Dispense: 60 tablet; Refill: 0      Follow up if symptoms worsen or fail to improve.     Patient's Medications   New Prescriptions    METHOCARBAMOL (ROBAXIN) 750 MG TAB    Take 1 tablet (750 mg total) by mouth 2 (two) times daily as needed (back pain).   Previous Medications    ALPRAZOLAM (XANAX) 0.5 MG TABLET    Take 0.5 mg by mouth 3 (three) times daily.    BLOOD-GLUCOSE SENSOR (DEXCOM G6 SENSOR) PAUL    3 each by Misc.(Non-Drug; Combo Route) route continuous as needed. Change every 10 days.    BLOOD-GLUCOSE TRANSMITTER (DEXCOM G6 TRANSMITTER) PAUL    1 each by Misc.(Non-Drug; Combo Route) route continuous prn.    CETIRIZINE (ZYRTEC) 10 MG TABLET    Take 10 mg by mouth once daily.    DULAGLUTIDE (TRULICITY) 4.5 MG/0.5 ML PEN INJECTOR    Inject 4.5 mg into the skin every 7 days.    HYDROXYZINE HCL (ATARAX) 25 MG TABLET    Take 25 mg by mouth 3 (three) times daily.    INDOMETHACIN (INDOCIN) 50 MG CAPSULE    Take 1 capsule (50 mg total) by mouth 2 (two) times daily with meals.    IRBESARTAN-HYDROCHLOROTHIAZIDE (AVALIDE) 150-12.5 MG PER  TABLET    Take 1 tablet by mouth once daily.    LANTUS SOLOSTAR U-100 INSULIN GLARGINE 100 UNITS/ML (3ML) SUBQ PEN    Inject 18 Units into the skin 2 (two) times a day. Increase per providers instruction Max TDD 60units.    LINACLOTIDE (LINZESS) 145 MCG CAP CAPSULE    Take 1 capsule (145 mcg total) by mouth before breakfast.    METFORMIN (GLUCOPHAGE-XR) 500 MG ER 24HR TABLET    Take 1 tablet (500 mg total) by mouth 2 (two) times daily with meals.    NOVOLOG FLEXPEN U-100 INSULIN 100 UNIT/ML (3 ML) INPN PEN    Inject 10 Units into the skin 3 (three) times daily with meals. Plus correction scale Max TDD 60units.    PRAVASTATIN (PRAVACHOL) 40 MG TABLET    Take 40 mg by mouth once daily.    ZOLPIDEM (AMBIEN) 10 MG TAB    Take 10 mg by mouth once daily.   Modified Medications    No medications on file   Discontinued Medications    No medications on file       Past Medical History:   Diagnosis Date    Anticoagulant long-term use     DVT, popliteal, acute     GERD (gastroesophageal reflux disease)     History of pulmonary embolus (PE)     HLD (hyperlipidemia)     Hypertension     Type 2 diabetes mellitus, controlled        Past Surgical History:   Procedure Laterality Date     SECTION      x2    COLONOSCOPY N/A 3/3/2022    Procedure: COLONOSCOPY suprep;  Surgeon: Michael Tirado MD;  Location: [facility] ENDO;  Service: Endoscopy;  Laterality: N/A;    ENDOMETRIAL ABLATION      HYSTERECTOMY      LAPAROSCOPY-ASSISTED VAGINAL HYSTERECTOMY Bilateral 2019    Procedure: HYSTERECTOMY, VAGINAL, LAPAROSCOPY-ASSISTED;  Surgeon: Lashell Clayton MD;  Location: [facility] OR;  Service: OB/GYN;  Laterality: Bilateral;  video    UPPER GASTROINTESTINAL ENDOSCOPY              Family History   Problem Relation Age of Onset    Diabetes Mother     Hypertension Mother     Hyperlipidemia Mother     Hyperthyroidism Mother     Stroke Father     Hyperlipidemia Father     Hypertension Father     Diabetes  Father     Breast cancer Sister        Social History     Socioeconomic History    Marital status:    Tobacco Use    Smoking status: Never Smoker    Smokeless tobacco: Never Used   Substance and Sexual Activity    Alcohol use: No    Drug use: No    Sexual activity: Yes     Partners: Male     Birth control/protection: See Surgical Hx

## 2022-07-01 NOTE — PROGRESS NOTES
OFFICE VISIT FOR EUFLEXXA INJECTIONS:    (Steve)      Heather Gomez presents to clinic today for her first right knee Euflexxa injection.    She completed euflexxa injections in September of 2021 and did well with them.     Exam demonstrates no effusion in the  right knee, and the skin is intact. No evidence of infection noted. Calf is supple and non tender.     Diagnosis: Osteoarthritis RIGHT knee    LOT NUMBER:[id number]   EXPIRATION DATE: 10/18/22    Prior to procedure the correct patient, procedure, and site was verified. Allergies were reviewed and verbal consent was obtained.     After a sterile prep of the skin using chloraprep one step, the area was sprayed with local topical anesthetic and then cleaned with alcohol. The RIGHT knee was injected through an inferior lateral approach with 2 ml of Euflexxa.  Sterile dressing was applied.  The patient was instructed to rest apply ice and use OTC analgesics as needed. Patient should resume activities as tolerated and to call with any problems.     We will see Heather Gomez back next week for her second injection.    Subjective:      Patient ID: Heather Gomez is a 46 y.o. female.    Chief Complaint: Pain of the Right Knee    HPI  (French)    She also complains of 3 week history of constant left shoulder pain that is worse at rest. She is right hand dominant. History of chronic shoulder pain x years- flare ups can last for weeks. No known injury. Pain is aching in nature. She rates her pain as an 8 on a scale  Of 1-10.     No PT or surgery on her shoulder. Had left shoulder injection years ago with minimal relief.       Past Medical History:   Diagnosis Date    Anticoagulant long-term use     DVT, popliteal, acute     GERD (gastroesophageal reflux disease)     History of pulmonary embolus (PE)     HLD (hyperlipidemia)     Hypertension     Type 2 diabetes mellitus, controlled          Current Outpatient Medications:      ALPRAZolam (XANAX) 0.5 MG tablet, Take 0.5 mg by mouth 3 (three) times daily., Disp: , Rfl:     blood-glucose sensor (DEXCOM G6 SENSOR) Rosa, 3 each by Misc.(Non-Drug; Combo Route) route continuous as needed. Change every 10 days., Disp: 3 each, Rfl: PRN    blood-glucose transmitter (DEXCOM G6 TRANSMITTER) Rosa, 1 each by Misc.(Non-Drug; Combo Route) route continuous prn., Disp: 1 each, Rfl: PRN    cetirizine (ZYRTEC) 10 MG tablet, Take 10 mg by mouth once daily., Disp: , Rfl:     dulaglutide (TRULICITY) 4.5 mg/0.5 mL pen injector, Inject 4.5 mg into the skin every 7 days., Disp: 4 pen, Rfl: 3    hydrOXYzine HCl (ATARAX) 25 MG tablet, Take 25 mg by mouth 3 (three) times daily., Disp: , Rfl:     indomethacin (INDOCIN) 50 MG capsule, Take 1 capsule (50 mg total) by mouth 2 (two) times daily with meals., Disp: 60 capsule, Rfl: 2    irbesartan-hydrochlorothiazide (AVALIDE) 150-12.5 mg per tablet, Take 1 tablet by mouth once daily., Disp: , Rfl:     LANTUS SOLOSTAR U-100 INSULIN glargine 100 units/mL (3mL) SubQ pen, Inject 18 Units into the skin 2 (two) times a day. Increase per providers instruction Max TDD 60units. (Patient taking differently: Inject 14 Units into the skin 2 (two) times a day. Increase per providers instruction Max TDD 60units.), Disp: 30 mL, Rfl: 3    linaCLOtide (LINZESS) 145 mcg Cap capsule, Take 1 capsule (145 mcg total) by mouth before breakfast., Disp: 90 capsule, Rfl: 0    metFORMIN (GLUCOPHAGE-XR) 500 MG ER 24hr tablet, Take 1 tablet (500 mg total) by mouth 2 (two) times daily with meals., Disp: 180 tablet, Rfl: 3    methocarbamoL (ROBAXIN) 750 MG Tab, Take 1 tablet (750 mg total) by mouth 2 (two) times daily as needed (back pain)., Disp: 60 tablet, Rfl: 0    NOVOLOG FLEXPEN U-100 INSULIN 100 unit/mL (3 mL) InPn pen, Inject 10 Units into the skin 3 (three) times daily with meals. Plus correction scale Max TDD 60units. (Patient taking differently: Inject 8 Units into the skin 3  "(three) times daily with meals. Plus correction scale Max TDD 60units.), Disp: 10 each, Rfl: 3    pravastatin (PRAVACHOL) 40 MG tablet, Take 40 mg by mouth once daily., Disp: , Rfl:     zolpidem (AMBIEN) 10 mg Tab, Take 10 mg by mouth once daily., Disp: , Rfl:     Current Facility-Administered Medications:     sodium hyaluronate (EUFLEXXA) 10 mg/mL(mw 2.4 -3.6 million) injection 20 mg, 20 mg, Intra-articular, Weekly, Marley Finch PA-C, 20 mg at 07/05/22 0832    Review of patient's allergies indicates:  No Known Allergies    Review of Systems   Constitutional: Negative for chills, fever, night sweats and weight gain.   Gastrointestinal: Negative for bowel incontinence, nausea and vomiting.   Genitourinary: Negative for bladder incontinence.   Neurological: Negative for disturbances in coordination and loss of balance.         Objective:        Ht 5' 4" (1.626 m)   Wt 83 kg (183 lb)   LMP 07/16/2019   BMI 31.41 kg/m²     Ortho/SPM Exam    ROM OF BOTH SHOULDERS:  Active flexion to 180° on left and 180° on right.   Active abduction to 180° on left and 180° on right.      RIGHT SHOULDER EXAM:  Tenderness:  No tenderness at the SC or AC joint  No tenderness over the clavicle   No tenderness over biceps tendon or bicipital groove  No tenderness over subacromial space    Special Tests:  Empty can test - negative  Full can test - negative  Resisted internal rotation - negative  Resisted external rotation - negative    Neer's test - negative  Hawkin's-Titi test - negative    Speed's test - negative  Yergason's test - negative    Sulcus sign - none  AP load and shift laxity - none    LEFT SHOULDER EXAM:  Tenderness:  No tenderness at the SC or AC joint  No tenderness over the clavicle   No tenderness over biceps tendon or bicipital groove  Mild tenderness over subacromial space and into trapezial region    Special Tests:  Empty can test - negative  Full can test - negative  Resisted internal rotation - " negative  Resisted external rotation - negative    Neer's test - positive  Hawkin's-Titi test - positive    Speed's test - negative  Yergason's test - negative    Sulcus sign - none  AP load and shift laxity - none    Neurovascular Exam Bilateral UEs:  Sensation intact to light touch in the distal median, radial, and ulnar nerve distributions bilaterally.  Capillary refill intact <2 seconds in all digits bilaterally      XRAY INTERPRETATION:   MRI of left shoulder dated 9/9/20 is personally reviewed and shows:   Supraspinatus and infraspinatus tendinopathy with a superimposed partial thickness intrasubstance tear of the supraspinatus tendon at its insertion upon the greater tuberosity.        Assessment:       Encounter Diagnoses   Name Primary?    Primary osteoarthritis of right knee Yes    Chronic left shoulder pain           Plan:       Heather was seen today for pain.    Diagnoses and all orders for this visit:    Primary osteoarthritis of right knee    Chronic left shoulder pain  -     Ambulatory referral/consult to Physical/Occupational Therapy; Future       History of chronic shoulder pain x years- flare ups can last for weeks.    She also complains of 3 week history of constant left shoulder pain that is worse at rest. She is right hand dominant. No known injury.     Previous left shoulder MRI from 2020 shows rotator cuff tendinopathy with partial tear of supraspinatus.     Treatment options reviewed with patient along with above left shoulder MRI. Following plan made:     - She wants to hold on injection as previous one did not help.   - PT orders for left shoulder sent to [facility].   - Continue on indocin. Reviewed dosing and side effects. Take with food.   - If no improvement with above, consider updated MRI.   - Follow up in 3 months for recheck shoulder.     Follow up in about 1 week (around 7/12/2022).

## 2022-07-05 ENCOUNTER — OFFICE VISIT (OUTPATIENT)
Dept: ORTHOPEDICS | Facility: CLINIC | Age: 47
End: 2022-07-05
Payer: COMMERCIAL

## 2022-07-05 VITALS — BODY MASS INDEX: 31.24 KG/M2 | HEIGHT: 64 IN | WEIGHT: 183 LBS

## 2022-07-05 DIAGNOSIS — M17.11 PRIMARY OSTEOARTHRITIS OF RIGHT KNEE: Primary | ICD-10-CM

## 2022-07-05 DIAGNOSIS — M25.512 CHRONIC LEFT SHOULDER PAIN: ICD-10-CM

## 2022-07-05 DIAGNOSIS — G89.29 CHRONIC LEFT SHOULDER PAIN: ICD-10-CM

## 2022-07-05 PROCEDURE — 1159F PR MEDICATION LIST DOCUMENTED IN MEDICAL RECORD: ICD-10-PCS | Mod: CPTII,S$GLB,, | Performed by: PHYSICIAN ASSISTANT

## 2022-07-05 PROCEDURE — 99999 PR PBB SHADOW E&M-EST. PATIENT-LVL IV: CPT | Mod: PBBFAC,,, | Performed by: PHYSICIAN ASSISTANT

## 2022-07-05 PROCEDURE — 1160F RVW MEDS BY RX/DR IN RCRD: CPT | Mod: CPTII,S$GLB,, | Performed by: PHYSICIAN ASSISTANT

## 2022-07-05 PROCEDURE — 3066F NEPHROPATHY DOC TX: CPT | Mod: CPTII,S$GLB,, | Performed by: PHYSICIAN ASSISTANT

## 2022-07-05 PROCEDURE — 1160F PR REVIEW ALL MEDS BY PRESCRIBER/CLIN PHARMACIST DOCUMENTED: ICD-10-PCS | Mod: CPTII,S$GLB,, | Performed by: PHYSICIAN ASSISTANT

## 2022-07-05 PROCEDURE — 99214 OFFICE O/P EST MOD 30 MIN: CPT | Mod: 25,S$GLB,, | Performed by: PHYSICIAN ASSISTANT

## 2022-07-05 PROCEDURE — 1159F MED LIST DOCD IN RCRD: CPT | Mod: CPTII,S$GLB,, | Performed by: PHYSICIAN ASSISTANT

## 2022-07-05 PROCEDURE — 3008F BODY MASS INDEX DOCD: CPT | Mod: CPTII,S$GLB,, | Performed by: PHYSICIAN ASSISTANT

## 2022-07-05 PROCEDURE — 3008F PR BODY MASS INDEX (BMI) DOCUMENTED: ICD-10-PCS | Mod: CPTII,S$GLB,, | Performed by: PHYSICIAN ASSISTANT

## 2022-07-05 PROCEDURE — 20610 DRAIN/INJ JOINT/BURSA W/O US: CPT | Mod: RT,S$GLB,, | Performed by: PHYSICIAN ASSISTANT

## 2022-07-05 PROCEDURE — 99214 PR OFFICE/OUTPT VISIT, EST, LEVL IV, 30-39 MIN: ICD-10-PCS | Mod: 25,S$GLB,, | Performed by: PHYSICIAN ASSISTANT

## 2022-07-05 PROCEDURE — 3060F PR POS MICROALBUMINURIA RESULT DOCUMENTED/REVIEW: ICD-10-PCS | Mod: CPTII,S$GLB,, | Performed by: PHYSICIAN ASSISTANT

## 2022-07-05 PROCEDURE — 3066F PR DOCUMENTATION OF TREATMENT FOR NEPHROPATHY: ICD-10-PCS | Mod: CPTII,S$GLB,, | Performed by: PHYSICIAN ASSISTANT

## 2022-07-05 PROCEDURE — 99999 PR PBB SHADOW E&M-EST. PATIENT-LVL IV: ICD-10-PCS | Mod: PBBFAC,,, | Performed by: PHYSICIAN ASSISTANT

## 2022-07-05 PROCEDURE — 20610 PR DRAIN/INJECT LARGE JOINT/BURSA: ICD-10-PCS | Mod: RT,S$GLB,, | Performed by: PHYSICIAN ASSISTANT

## 2022-07-05 PROCEDURE — 3060F POS MICROALBUMINURIA REV: CPT | Mod: CPTII,S$GLB,, | Performed by: PHYSICIAN ASSISTANT

## 2022-07-05 PROCEDURE — 3044F PR MOST RECENT HEMOGLOBIN A1C LEVEL <7.0%: ICD-10-PCS | Mod: CPTII,S$GLB,, | Performed by: PHYSICIAN ASSISTANT

## 2022-07-05 PROCEDURE — 3044F HG A1C LEVEL LT 7.0%: CPT | Mod: CPTII,S$GLB,, | Performed by: PHYSICIAN ASSISTANT

## 2022-07-06 ENCOUNTER — OFFICE VISIT (OUTPATIENT)
Dept: OPTOMETRY | Facility: CLINIC | Age: 47
End: 2022-07-06
Payer: COMMERCIAL

## 2022-07-06 DIAGNOSIS — H04.123 DRY EYE SYNDROME OF BOTH EYES: ICD-10-CM

## 2022-07-06 DIAGNOSIS — H47.323 OPTIC NERVE DRUSEN, BILATERAL: ICD-10-CM

## 2022-07-06 DIAGNOSIS — E11.9 TYPE 2 DIABETES MELLITUS WITHOUT RETINOPATHY: Primary | ICD-10-CM

## 2022-07-06 DIAGNOSIS — H52.4 MYOPIA WITH PRESBYOPIA OF BOTH EYES: ICD-10-CM

## 2022-07-06 DIAGNOSIS — H52.13 MYOPIA WITH PRESBYOPIA OF BOTH EYES: ICD-10-CM

## 2022-07-06 DIAGNOSIS — E11.10 TYPE 2 DIABETES MELLITUS WITH KETOACIDOSIS WITHOUT COMA, WITHOUT LONG-TERM CURRENT USE OF INSULIN: ICD-10-CM

## 2022-07-06 PROCEDURE — 92004 PR EYE EXAM, NEW PATIENT,COMPREHESV: ICD-10-PCS | Mod: S$GLB,,, | Performed by: OPTOMETRIST

## 2022-07-06 PROCEDURE — 2023F DILAT RTA XM W/O RTNOPTHY: CPT | Mod: CPTII,S$GLB,, | Performed by: OPTOMETRIST

## 2022-07-06 PROCEDURE — 2023F PR DILATED RETINAL EXAM W/O EVID OF RETINOPATHY: ICD-10-PCS | Mod: CPTII,S$GLB,, | Performed by: OPTOMETRIST

## 2022-07-06 PROCEDURE — 1159F MED LIST DOCD IN RCRD: CPT | Mod: CPTII,S$GLB,, | Performed by: OPTOMETRIST

## 2022-07-06 PROCEDURE — 1159F PR MEDICATION LIST DOCUMENTED IN MEDICAL RECORD: ICD-10-PCS | Mod: CPTII,S$GLB,, | Performed by: OPTOMETRIST

## 2022-07-06 PROCEDURE — 3066F PR DOCUMENTATION OF TREATMENT FOR NEPHROPATHY: ICD-10-PCS | Mod: CPTII,S$GLB,, | Performed by: OPTOMETRIST

## 2022-07-06 PROCEDURE — 3060F POS MICROALBUMINURIA REV: CPT | Mod: CPTII,S$GLB,, | Performed by: OPTOMETRIST

## 2022-07-06 PROCEDURE — 92015 DETERMINE REFRACTIVE STATE: CPT | Mod: S$GLB,,, | Performed by: OPTOMETRIST

## 2022-07-06 PROCEDURE — 3044F PR MOST RECENT HEMOGLOBIN A1C LEVEL <7.0%: ICD-10-PCS | Mod: CPTII,S$GLB,, | Performed by: OPTOMETRIST

## 2022-07-06 PROCEDURE — 92004 COMPRE OPH EXAM NEW PT 1/>: CPT | Mod: S$GLB,,, | Performed by: OPTOMETRIST

## 2022-07-06 PROCEDURE — 99999 PR PBB SHADOW E&M-EST. PATIENT-LVL III: CPT | Mod: PBBFAC,,, | Performed by: OPTOMETRIST

## 2022-07-06 PROCEDURE — 92015 PR REFRACTION: ICD-10-PCS | Mod: S$GLB,,, | Performed by: OPTOMETRIST

## 2022-07-06 PROCEDURE — 99999 PR PBB SHADOW E&M-EST. PATIENT-LVL III: ICD-10-PCS | Mod: PBBFAC,,, | Performed by: OPTOMETRIST

## 2022-07-06 PROCEDURE — 3044F HG A1C LEVEL LT 7.0%: CPT | Mod: CPTII,S$GLB,, | Performed by: OPTOMETRIST

## 2022-07-06 PROCEDURE — 3060F PR POS MICROALBUMINURIA RESULT DOCUMENTED/REVIEW: ICD-10-PCS | Mod: CPTII,S$GLB,, | Performed by: OPTOMETRIST

## 2022-07-06 PROCEDURE — 3066F NEPHROPATHY DOC TX: CPT | Mod: CPTII,S$GLB,, | Performed by: OPTOMETRIST

## 2022-07-06 NOTE — PROGRESS NOTES
HPI     CC: Pt is here today for a diabetic eye exam. She states she has noticed   a slight decrease in her vision at both ranges.    LACHO: 1 year    (+) Changes in vision   (-) Pain  (+) Irritation   (-) Itching   (-) Flashes  (-) Floaters  (+) Glasses wearer  (-) CL wearer  (+) Uses eye gtts, Pataday BID OU    Does patient want a refraction today? yes    (-) Eye injury  (-) Eye surgery   (-)POHx  (+)FOHx, Glaucoma    (+)DM  Hemoglobin A1C       Date                     Value               Ref Range             Status                05/18/2022               6.5 (H)             4.0 - 5.6 %           Final                 02/09/2022               7.4 (H)             4.0 - 5.6 %           Final                   04/05/2021               6.8 (H)             4.0 - 5.6 %           Final                     Last edited by Antonette Carranza, OD on 8/19/2022 12:41 PM. (History)            Assessment /Plan     For exam results, see Encounter Report.    Type 2 diabetes mellitus without retinopathy    Type 2 diabetes mellitus with ketoacidosis without coma, without long-term current use of insulin  -     Ambulatory referral/consult to Ophthalmology    Optic nerve drusen, bilateral  -     OCT, Optic Nerve - OU - Both Eyes; Future  -     Sargent Visual Field - OU - Extended - Both Eyes; Future    Dry eye syndrome of both eyes    Myopia with presbyopia of both eyes      1-2. No retinopathy noted, OU. Continue proper BS control and annual diabetic eye exams. Monitor yearly.     3. Educated pt on findings. Optic nerve drusen OU. Will order baseline RNFL OCT and 24-2 HVF. Will call with results. Monitor.    4. Educated pt on findings. Recommended ATs BID-TID for added lubrication and comfort. Pataday prn for allergy symptoms. If symptoms worsen or dont improve, RTC. Monitor.     5. Updated SRx. Mild change from habitual. Monitor yearly.       RTC for testing, then yearly for annual DM eye exam or sooner if needed.       Addendum  08/19/2022  Testing reviewed. See results below. Significant RNFL changes OD>OS due to optic nerve drusen. Field changes OD>OS. Recommend starting latanoprost 1 gtt OU QHS in order to lower IOP to protect RNFL. Patient called with results. Gtt called into pharmacy. Monitor 1 month with IOP check.     RNFL OCT  OD: Significant RNFL thinning TI, T, TS, NS, N, and G  OS: Significant RNFL thinning G, T, and TI    24-2 HVF  OD: Reliable testing (FL: 1/19 FP: 3% FN: 9% GHT: ONL VFI: 80%). Largest field defect inferior nasal. Defect also noted superior temporal and inferior temporal.   OS: Mildly unreliable testing (FL: 4/15xx FP: 0% FN: 2% GHT: Borderline VFI: 99%). Inferior nasal defect.       RTC in 1 month for IOP check or sooner if needed.

## 2022-07-08 NOTE — PROGRESS NOTES
OFFICE VISIT FOR EUFLEXXA INJECTIONS:    (Steev)      Heather Gomez presents to clinic today for her third right knee Euflexxa injection.    She completed euflexxa injections in September of 2021 and did well with them.     Exam demonstrates no effusion in the  right knee, and the skin is intact. No evidence of infection noted. Calf is supple and non tender.     Diagnosis: Osteoarthritis RIGHT knee    LOT NUMBER:[id number]  EXPIRATION DATE: 10/18/22    Prior to procedure the correct patient, procedure, and site was verified. Allergies were reviewed and verbal consent was obtained.     After a sterile prep of the skin using chloraprep one step, the area was sprayed with local topical anesthetic and then cleaned with alcohol. The RIGHT knee was injected through an inferior lateral approach with 2 ml of Euflexxa.  Sterile dressing was applied.  The patient was instructed to rest apply ice and use OTC analgesics as needed. Patient should resume activities as tolerated and to call with any problems.     We will see Heather Gomez back in 3 months for recheck of right knee and left shoulder.

## 2022-07-12 ENCOUNTER — OFFICE VISIT (OUTPATIENT)
Dept: SPORTS MEDICINE | Facility: CLINIC | Age: 47
End: 2022-07-12
Payer: COMMERCIAL

## 2022-07-12 VITALS — BODY MASS INDEX: 30.53 KG/M2 | TEMPERATURE: 98 F | WEIGHT: 178.81 LBS | HEIGHT: 64 IN

## 2022-07-12 DIAGNOSIS — M17.11 PRIMARY OSTEOARTHRITIS OF RIGHT KNEE: Primary | ICD-10-CM

## 2022-07-12 PROCEDURE — 1160F PR REVIEW ALL MEDS BY PRESCRIBER/CLIN PHARMACIST DOCUMENTED: ICD-10-PCS | Mod: CPTII,S$GLB,, | Performed by: ORTHOPAEDIC SURGERY

## 2022-07-12 PROCEDURE — 1159F MED LIST DOCD IN RCRD: CPT | Mod: CPTII,S$GLB,, | Performed by: ORTHOPAEDIC SURGERY

## 2022-07-12 PROCEDURE — 1160F RVW MEDS BY RX/DR IN RCRD: CPT | Mod: CPTII,S$GLB,, | Performed by: ORTHOPAEDIC SURGERY

## 2022-07-12 PROCEDURE — 3008F BODY MASS INDEX DOCD: CPT | Mod: CPTII,S$GLB,, | Performed by: ORTHOPAEDIC SURGERY

## 2022-07-12 PROCEDURE — 3060F POS MICROALBUMINURIA REV: CPT | Mod: CPTII,S$GLB,, | Performed by: ORTHOPAEDIC SURGERY

## 2022-07-12 PROCEDURE — 3066F NEPHROPATHY DOC TX: CPT | Mod: CPTII,S$GLB,, | Performed by: ORTHOPAEDIC SURGERY

## 2022-07-12 PROCEDURE — 99999 PR PBB SHADOW E&M-EST. PATIENT-LVL III: CPT | Mod: PBBFAC,,, | Performed by: ORTHOPAEDIC SURGERY

## 2022-07-12 PROCEDURE — 99499 NO LOS: ICD-10-PCS | Mod: S$GLB,,, | Performed by: ORTHOPAEDIC SURGERY

## 2022-07-12 PROCEDURE — 3066F PR DOCUMENTATION OF TREATMENT FOR NEPHROPATHY: ICD-10-PCS | Mod: CPTII,S$GLB,, | Performed by: ORTHOPAEDIC SURGERY

## 2022-07-12 PROCEDURE — 3008F PR BODY MASS INDEX (BMI) DOCUMENTED: ICD-10-PCS | Mod: CPTII,S$GLB,, | Performed by: ORTHOPAEDIC SURGERY

## 2022-07-12 PROCEDURE — 3044F PR MOST RECENT HEMOGLOBIN A1C LEVEL <7.0%: ICD-10-PCS | Mod: CPTII,S$GLB,, | Performed by: ORTHOPAEDIC SURGERY

## 2022-07-12 PROCEDURE — 20610 LARGE JOINT ASPIRATION/INJECTION: R KNEE: ICD-10-PCS | Mod: RT,S$GLB,, | Performed by: ORTHOPAEDIC SURGERY

## 2022-07-12 PROCEDURE — 3060F PR POS MICROALBUMINURIA RESULT DOCUMENTED/REVIEW: ICD-10-PCS | Mod: CPTII,S$GLB,, | Performed by: ORTHOPAEDIC SURGERY

## 2022-07-12 PROCEDURE — 99999 PR PBB SHADOW E&M-EST. PATIENT-LVL III: ICD-10-PCS | Mod: PBBFAC,,, | Performed by: ORTHOPAEDIC SURGERY

## 2022-07-12 PROCEDURE — 20610 DRAIN/INJ JOINT/BURSA W/O US: CPT | Mod: RT,S$GLB,, | Performed by: ORTHOPAEDIC SURGERY

## 2022-07-12 PROCEDURE — 99499 UNLISTED E&M SERVICE: CPT | Mod: S$GLB,,, | Performed by: ORTHOPAEDIC SURGERY

## 2022-07-12 PROCEDURE — 1159F PR MEDICATION LIST DOCUMENTED IN MEDICAL RECORD: ICD-10-PCS | Mod: CPTII,S$GLB,, | Performed by: ORTHOPAEDIC SURGERY

## 2022-07-12 PROCEDURE — 3044F HG A1C LEVEL LT 7.0%: CPT | Mod: CPTII,S$GLB,, | Performed by: ORTHOPAEDIC SURGERY

## 2022-07-12 NOTE — PROGRESS NOTES
Patient is here for follow up of right knee arthritis. Pt is requesting Euflexxa #2 bilateral knees.  [facility] Has failed other conservative modalities including NSAIDS, activity modification, weight loss.    The prior shots were tolerated well.    PHYSICAL EXAMINATION:     General: The patient is alert and oriented x 3. Mood is pleasant.   Observation of ears, eyes and nose reveals no gross abnormalities. No   labored breathing observed.     No signs of infection or adverse reaction to knee.    Patient had no adverse reactions to the medication. Pain decreased. She was instructed to apply ice to the joint for 20 minutes and avoid strenuous activities for 24-36 hours following the injection. She was warned of possible blood sugar and/or blood pressure changes during that time. Following that time, she can resume regular activities.    She was reminded to call the clinic immediately for any adverse side effects as explained in clinic today.      RTC in 1 week with Marley Finch PA-C for bilateral knee Euflexxa series #3.  All questions were answered, pt will contact us for questions or concerns in the interim.

## 2022-07-12 NOTE — PROCEDURES
Large Joint Aspiration/Injection: R knee    Date/Time: 7/12/2022 10:30 AM  Performed by: Michael Bolden PA-C  Authorized by: Michael Bolden PA-C     Consent Done?:  Yes (Verbal)  Indications:  Arthritis and pain  Site marked: the procedure site was marked    Timeout: prior to procedure the correct patient, procedure, and site was verified    Prep: patient was prepped and draped in usual sterile fashion      Local anesthesia used?: Yes    Local anesthetic:  Lidocaine spray    Details:  Needle Size:  22 G  Approach:  Anteromedial  Location:  Knee  Site:  R knee  Medications:  10 mg sodium hyaluronate (EUFLEXXA) 10 mg/mL(mw 2.4 -3.6 million)  Patient tolerance:  Patient tolerated the procedure well with no immediate complications

## 2022-07-18 PROBLEM — M62.81 MUSCLE WEAKNESS: Status: ACTIVE | Noted: 2022-07-18

## 2022-07-19 ENCOUNTER — OFFICE VISIT (OUTPATIENT)
Dept: ORTHOPEDICS | Facility: CLINIC | Age: 47
End: 2022-07-19
Payer: COMMERCIAL

## 2022-07-19 VITALS — BODY MASS INDEX: 31.76 KG/M2 | WEIGHT: 185 LBS

## 2022-07-19 DIAGNOSIS — M17.11 PRIMARY OSTEOARTHRITIS OF RIGHT KNEE: Primary | ICD-10-CM

## 2022-07-19 PROCEDURE — 20610 PR DRAIN/INJECT LARGE JOINT/BURSA: ICD-10-PCS | Mod: RT,S$GLB,, | Performed by: PHYSICIAN ASSISTANT

## 2022-07-19 PROCEDURE — 20610 DRAIN/INJ JOINT/BURSA W/O US: CPT | Mod: RT,S$GLB,, | Performed by: PHYSICIAN ASSISTANT

## 2022-07-19 PROCEDURE — 3060F POS MICROALBUMINURIA REV: CPT | Mod: CPTII,S$GLB,, | Performed by: PHYSICIAN ASSISTANT

## 2022-07-19 PROCEDURE — 3008F BODY MASS INDEX DOCD: CPT | Mod: CPTII,S$GLB,, | Performed by: PHYSICIAN ASSISTANT

## 2022-07-19 PROCEDURE — 3066F PR DOCUMENTATION OF TREATMENT FOR NEPHROPATHY: ICD-10-PCS | Mod: CPTII,S$GLB,, | Performed by: PHYSICIAN ASSISTANT

## 2022-07-19 PROCEDURE — 99999 PR PBB SHADOW E&M-EST. PATIENT-LVL III: CPT | Mod: PBBFAC,,, | Performed by: PHYSICIAN ASSISTANT

## 2022-07-19 PROCEDURE — 1160F RVW MEDS BY RX/DR IN RCRD: CPT | Mod: CPTII,S$GLB,, | Performed by: PHYSICIAN ASSISTANT

## 2022-07-19 PROCEDURE — 3044F HG A1C LEVEL LT 7.0%: CPT | Mod: CPTII,S$GLB,, | Performed by: PHYSICIAN ASSISTANT

## 2022-07-19 PROCEDURE — 1159F PR MEDICATION LIST DOCUMENTED IN MEDICAL RECORD: ICD-10-PCS | Mod: CPTII,S$GLB,, | Performed by: PHYSICIAN ASSISTANT

## 2022-07-19 PROCEDURE — 3044F PR MOST RECENT HEMOGLOBIN A1C LEVEL <7.0%: ICD-10-PCS | Mod: CPTII,S$GLB,, | Performed by: PHYSICIAN ASSISTANT

## 2022-07-19 PROCEDURE — 3008F PR BODY MASS INDEX (BMI) DOCUMENTED: ICD-10-PCS | Mod: CPTII,S$GLB,, | Performed by: PHYSICIAN ASSISTANT

## 2022-07-19 PROCEDURE — 3066F NEPHROPATHY DOC TX: CPT | Mod: CPTII,S$GLB,, | Performed by: PHYSICIAN ASSISTANT

## 2022-07-19 PROCEDURE — 1159F MED LIST DOCD IN RCRD: CPT | Mod: CPTII,S$GLB,, | Performed by: PHYSICIAN ASSISTANT

## 2022-07-19 PROCEDURE — 1160F PR REVIEW ALL MEDS BY PRESCRIBER/CLIN PHARMACIST DOCUMENTED: ICD-10-PCS | Mod: CPTII,S$GLB,, | Performed by: PHYSICIAN ASSISTANT

## 2022-07-19 PROCEDURE — 99499 NO LOS: ICD-10-PCS | Mod: S$GLB,,, | Performed by: PHYSICIAN ASSISTANT

## 2022-07-19 PROCEDURE — 99999 PR PBB SHADOW E&M-EST. PATIENT-LVL III: ICD-10-PCS | Mod: PBBFAC,,, | Performed by: PHYSICIAN ASSISTANT

## 2022-07-19 PROCEDURE — 3060F PR POS MICROALBUMINURIA RESULT DOCUMENTED/REVIEW: ICD-10-PCS | Mod: CPTII,S$GLB,, | Performed by: PHYSICIAN ASSISTANT

## 2022-07-19 PROCEDURE — 99499 UNLISTED E&M SERVICE: CPT | Mod: S$GLB,,, | Performed by: PHYSICIAN ASSISTANT

## 2022-07-21 ENCOUNTER — OFFICE VISIT (OUTPATIENT)
Dept: URGENT CARE | Facility: CLINIC | Age: 47
End: 2022-07-21
Payer: COMMERCIAL

## 2022-07-21 VITALS
TEMPERATURE: 98 F | RESPIRATION RATE: 18 BRPM | OXYGEN SATURATION: 96 % | WEIGHT: 185 LBS | DIASTOLIC BLOOD PRESSURE: 83 MMHG | HEART RATE: 106 BPM | BODY MASS INDEX: 31.58 KG/M2 | SYSTOLIC BLOOD PRESSURE: 163 MMHG | HEIGHT: 64 IN

## 2022-07-21 DIAGNOSIS — R11.2 NON-INTRACTABLE VOMITING WITH NAUSEA, UNSPECIFIED VOMITING TYPE: ICD-10-CM

## 2022-07-21 DIAGNOSIS — I10 ELEVATED BLOOD PRESSURE READING IN OFFICE WITH DIAGNOSIS OF HYPERTENSION: ICD-10-CM

## 2022-07-21 DIAGNOSIS — B34.9 VIRAL ILLNESS: Primary | ICD-10-CM

## 2022-07-21 LAB
CTP QC/QA: YES
SARS-COV-2 RDRP RESP QL NAA+PROBE: NEGATIVE

## 2022-07-21 PROCEDURE — 3008F BODY MASS INDEX DOCD: CPT | Mod: CPTII,S$GLB,, | Performed by: FAMILY MEDICINE

## 2022-07-21 PROCEDURE — 1159F PR MEDICATION LIST DOCUMENTED IN MEDICAL RECORD: ICD-10-PCS | Mod: CPTII,S$GLB,, | Performed by: FAMILY MEDICINE

## 2022-07-21 PROCEDURE — 1160F PR REVIEW ALL MEDS BY PRESCRIBER/CLIN PHARMACIST DOCUMENTED: ICD-10-PCS | Mod: CPTII,S$GLB,, | Performed by: FAMILY MEDICINE

## 2022-07-21 PROCEDURE — 99213 OFFICE O/P EST LOW 20 MIN: CPT | Mod: S$GLB,,, | Performed by: FAMILY MEDICINE

## 2022-07-21 PROCEDURE — 3044F PR MOST RECENT HEMOGLOBIN A1C LEVEL <7.0%: ICD-10-PCS | Mod: CPTII,S$GLB,, | Performed by: FAMILY MEDICINE

## 2022-07-21 PROCEDURE — 3077F SYST BP >= 140 MM HG: CPT | Mod: CPTII,S$GLB,, | Performed by: FAMILY MEDICINE

## 2022-07-21 PROCEDURE — U0002: ICD-10-PCS | Mod: QW,S$GLB,, | Performed by: FAMILY MEDICINE

## 2022-07-21 PROCEDURE — 3066F NEPHROPATHY DOC TX: CPT | Mod: CPTII,S$GLB,, | Performed by: FAMILY MEDICINE

## 2022-07-21 PROCEDURE — 3066F PR DOCUMENTATION OF TREATMENT FOR NEPHROPATHY: ICD-10-PCS | Mod: CPTII,S$GLB,, | Performed by: FAMILY MEDICINE

## 2022-07-21 PROCEDURE — 3008F PR BODY MASS INDEX (BMI) DOCUMENTED: ICD-10-PCS | Mod: CPTII,S$GLB,, | Performed by: FAMILY MEDICINE

## 2022-07-21 PROCEDURE — 3060F PR POS MICROALBUMINURIA RESULT DOCUMENTED/REVIEW: ICD-10-PCS | Mod: CPTII,S$GLB,, | Performed by: FAMILY MEDICINE

## 2022-07-21 PROCEDURE — 3079F DIAST BP 80-89 MM HG: CPT | Mod: CPTII,S$GLB,, | Performed by: FAMILY MEDICINE

## 2022-07-21 PROCEDURE — 1160F RVW MEDS BY RX/DR IN RCRD: CPT | Mod: CPTII,S$GLB,, | Performed by: FAMILY MEDICINE

## 2022-07-21 PROCEDURE — 1159F MED LIST DOCD IN RCRD: CPT | Mod: CPTII,S$GLB,, | Performed by: FAMILY MEDICINE

## 2022-07-21 PROCEDURE — U0002 COVID-19 LAB TEST NON-CDC: HCPCS | Mod: QW,S$GLB,, | Performed by: FAMILY MEDICINE

## 2022-07-21 PROCEDURE — 3044F HG A1C LEVEL LT 7.0%: CPT | Mod: CPTII,S$GLB,, | Performed by: FAMILY MEDICINE

## 2022-07-21 PROCEDURE — 3079F PR MOST RECENT DIASTOLIC BLOOD PRESSURE 80-89 MM HG: ICD-10-PCS | Mod: CPTII,S$GLB,, | Performed by: FAMILY MEDICINE

## 2022-07-21 PROCEDURE — 3060F POS MICROALBUMINURIA REV: CPT | Mod: CPTII,S$GLB,, | Performed by: FAMILY MEDICINE

## 2022-07-21 PROCEDURE — 99213 PR OFFICE/OUTPT VISIT, EST, LEVL III, 20-29 MIN: ICD-10-PCS | Mod: S$GLB,,, | Performed by: FAMILY MEDICINE

## 2022-07-21 PROCEDURE — 3077F PR MOST RECENT SYSTOLIC BLOOD PRESSURE >= 140 MM HG: ICD-10-PCS | Mod: CPTII,S$GLB,, | Performed by: FAMILY MEDICINE

## 2022-07-21 RX ORDER — PROMETHAZINE HYDROCHLORIDE 50 MG/1
50 SUPPOSITORY RECTAL 2 TIMES DAILY PRN
Qty: 12 SUPPOSITORY | Refills: 0 | Status: SHIPPED | OUTPATIENT
Start: 2022-07-21 | End: 2023-07-13

## 2022-07-21 RX ORDER — ONDANSETRON 4 MG/1
4 TABLET, ORALLY DISINTEGRATING ORAL 3 TIMES DAILY PRN
Qty: 30 TABLET | Refills: 0 | Status: SHIPPED | OUTPATIENT
Start: 2022-07-21 | End: 2023-07-13

## 2022-07-21 NOTE — LETTER
July 21, 2022      [facility]  [address] [address]  Phone: [phone]  Fax: [phone]       Patient: Heather Gomez   YOB: 1975  Date of Visit: 07/21/2022    To Whom It May Concern:    Yaneli Gomez  was at [facility] on 07/21/2022. She has an acute illness. The patient may return to work on 7/26/22 with no restrictions. If you have any questions or concerns, or if I can be of further assistance, please do not hesitate to contact me.    Sincerely,      Patito Farris MD

## 2022-07-21 NOTE — PROGRESS NOTES
"Subjective:       Patient ID: Heather Gomez is a 46 y.o. female.    Vitals:  height is 5' 4" (1.626 m) and weight is 83.9 kg (185 lb). Her oral temperature is 98.4 °F (36.9 °C). Her blood pressure is 163/83 (abnormal) and her pulse is 106. Her respiration is 18 and oxygen saturation is 96%.     Chief Complaint: URI    Headache, nausea, body aches, nasal congestion, chills, runny nose, dry throat & vomiting since yesterday.   Pt has been taking tylenol, excedrin & motion sickness pills.  Reports that coworkers have tested positive for COVID 19; concerned that there is improper masking at work.    URI   Associated symptoms include congestion, headaches, nausea, rhinorrhea, sinus pain, sneezing and vomiting. Pertinent negatives include no chest pain, coughing, dysuria or rash. She has tried acetaminophen (excedrin, motion sickness pills) for the symptoms. The treatment provided no relief.       Constitution: Positive for appetite change, chills, fatigue and generalized weakness. Negative for activity change and fever.   HENT: Positive for congestion and sinus pain. Negative for postnasal drip and sinus pressure.    Neck: Negative for neck swelling.   Cardiovascular: Negative for chest pain, leg swelling, palpitations, sob on exertion and passing out.   Eyes: Negative for vision loss.   Respiratory: Negative for chest tightness, cough and shortness of breath.    Gastrointestinal: Positive for nausea and vomiting.   Genitourinary: Negative for dysuria.   Musculoskeletal: Positive for muscle ache.   Skin: Negative for rash.   Allergic/Immunologic: Positive for sneezing.   Neurological: Positive for headaches. Negative for passing out, altered mental status and numbness.   Psychiatric/Behavioral: Negative for altered mental status, confusion and agitation.       Objective:       Vitals:    07/21/22 1213   BP: (!) 163/83   Pulse: 106   Resp: 18   Temp: 98.4 °F (36.9 °C)   TempSrc: Oral   SpO2: 96%   Weight: " "83.9 kg (185 lb)   Height: 5' 4" (1.626 m)     Physical Exam   Constitutional: She is oriented to person, place, and time. She appears well-developed. She is cooperative.  Non-toxic appearance. She does not appear ill. No distress.   HENT:   Head: Normocephalic and atraumatic.   Ears:   Right Ear: Hearing, tympanic membrane, external ear and ear canal normal.   Left Ear: Hearing, tympanic membrane, external ear and ear canal normal.   Nose: Nose normal. No mucosal edema, rhinorrhea or nasal deformity. No epistaxis. Right sinus exhibits no maxillary sinus tenderness and no frontal sinus tenderness. Left sinus exhibits no maxillary sinus tenderness and no frontal sinus tenderness.   Mouth/Throat: Uvula is midline, oropharynx is clear and moist and mucous membranes are normal. No trismus in the jaw. Normal dentition. No uvula swelling. No oropharyngeal exudate, posterior oropharyngeal edema or posterior oropharyngeal erythema.   Eyes: Conjunctivae and lids are normal. No scleral icterus.   Neck: Trachea normal and phonation normal. Neck supple. No edema present. No erythema present. No neck rigidity present.   Cardiovascular: Normal rate, regular rhythm, normal heart sounds and normal pulses.   Pulmonary/Chest: Effort normal and breath sounds normal. No respiratory distress. She has no decreased breath sounds. She has no rhonchi.   Abdominal: Normal appearance.   Musculoskeletal: Normal range of motion.         General: No deformity. Normal range of motion.   Neurological: She is alert and oriented to person, place, and time. She exhibits normal muscle tone. Coordination normal.   Skin: Skin is warm, dry, intact, not diaphoretic and not pale.   Psychiatric: Her speech is normal and behavior is normal. Judgment and thought content normal.   Nursing note and vitals reviewed.        Assessment:       1. Viral illness    2. Non-intractable vomiting with nausea, unspecified vomiting type    3. Elevated blood pressure " reading in office with diagnosis of hypertension          Plan:           1. Viral illness  -     POCT COVID-19 Rapid Screening  Patient Instructions     Results for orders placed or performed in visit on 07/21/22   POCT COVID-19 Rapid Screening   Result Value Ref Range    POC Rapid COVID Negative Negative     Acceptable Yes       Your COVID 19 test was negative  Take tylenol/ ibuprofen over the counter medications as needed for pain/ fever  Take Zofran/ promethazine for nausea or vomiting.    Follow up with primary care/ urgent care if no improvement of symptoms  Seek immediate care in the emergency room in the event of severe abdominal pain, chest pain, respiratory distress, fevers, dehydration, loss of consciousness.     2. Non-intractable vomiting with nausea, unspecified vomiting type  -     promethazine (PHENERGAN) 50 MG suppository; Place 1 suppository (50 mg total) rectally 2 (two) times daily as needed for Nausea (vomiting). Do not fill if not affordable.  Dispense: 12 suppository; Refill: 0  -     ondansetron (ZOFRAN-ODT) 4 MG TbDL; Take 1 tablet (4 mg total) by mouth 3 (three) times daily as needed (nausea/ vomiting).  Dispense: 30 tablet; Refill: 0    3. Elevated blood pressure reading in office with diagnosis of hypertension  Likely due to viral illness. Outpatient follow up is appropriate.    Disclaimer: This note has been generated using voice-recognition software. There may be typographical errors that have been missed during proof-reading

## 2022-07-21 NOTE — PATIENT INSTRUCTIONS
Results for orders placed or performed in visit on 07/21/22   POCT COVID-19 Rapid Screening   Result Value Ref Range    POC Rapid COVID Negative Negative     Acceptable Yes       Your COVID 19 test was negative  Take tylenol/ ibuprofen over the counter medications as needed for pain/ fever  Take Zofran/ promethazine for nausea or vomiting.    Follow up with primary care/ urgent care if no improvement of symptoms  Seek immediate care in the emergency room in the event of severe abdominal pain, chest pain, respiratory distress, fevers, dehydration, loss of consciousness.

## 2022-07-27 ENCOUNTER — TELEPHONE (OUTPATIENT)
Dept: OPTOMETRY | Facility: CLINIC | Age: 47
End: 2022-07-27
Payer: COMMERCIAL

## 2022-07-27 NOTE — TELEPHONE ENCOUNTER
----- Message from Lorri Stewart sent at 7/27/2022  3:16 PM CDT -----  Type:  Needs Medical Advice    Who Called: pt  Symptoms (please be specific): pt needs to reschedule appointment from 07/19/22 with  Perimet[facility]asenor if possible [facility]   How long has patient had these symptoms:    Pharmacy name and phone #:    Would the patient rather a call back or a response via [facility]? PLEASE CALL  Best Call Back Number: [phone]  Additional Information:

## 2022-08-03 ENCOUNTER — OFFICE VISIT (OUTPATIENT)
Dept: FAMILY MEDICINE | Facility: CLINIC | Age: 47
End: 2022-08-03
Payer: COMMERCIAL

## 2022-08-03 DIAGNOSIS — H57.9 EYE PROBLEM: Primary | ICD-10-CM

## 2022-08-03 PROCEDURE — 3044F HG A1C LEVEL LT 7.0%: CPT | Mod: CPTII,95,, | Performed by: FAMILY MEDICINE

## 2022-08-03 PROCEDURE — 1160F PR REVIEW ALL MEDS BY PRESCRIBER/CLIN PHARMACIST DOCUMENTED: ICD-10-PCS | Mod: CPTII,95,, | Performed by: FAMILY MEDICINE

## 2022-08-03 PROCEDURE — 3060F POS MICROALBUMINURIA REV: CPT | Mod: CPTII,95,, | Performed by: FAMILY MEDICINE

## 2022-08-03 PROCEDURE — 3044F PR MOST RECENT HEMOGLOBIN A1C LEVEL <7.0%: ICD-10-PCS | Mod: CPTII,95,, | Performed by: FAMILY MEDICINE

## 2022-08-03 PROCEDURE — 3066F PR DOCUMENTATION OF TREATMENT FOR NEPHROPATHY: ICD-10-PCS | Mod: CPTII,95,, | Performed by: FAMILY MEDICINE

## 2022-08-03 PROCEDURE — 3060F PR POS MICROALBUMINURIA RESULT DOCUMENTED/REVIEW: ICD-10-PCS | Mod: CPTII,95,, | Performed by: FAMILY MEDICINE

## 2022-08-03 PROCEDURE — 99214 PR OFFICE/OUTPT VISIT, EST, LEVL IV, 30-39 MIN: ICD-10-PCS | Mod: 95,,, | Performed by: FAMILY MEDICINE

## 2022-08-03 PROCEDURE — 99214 OFFICE O/P EST MOD 30 MIN: CPT | Mod: 95,,, | Performed by: FAMILY MEDICINE

## 2022-08-03 PROCEDURE — 3066F NEPHROPATHY DOC TX: CPT | Mod: CPTII,95,, | Performed by: FAMILY MEDICINE

## 2022-08-03 PROCEDURE — 1159F MED LIST DOCD IN RCRD: CPT | Mod: CPTII,95,, | Performed by: FAMILY MEDICINE

## 2022-08-03 PROCEDURE — 1160F RVW MEDS BY RX/DR IN RCRD: CPT | Mod: CPTII,95,, | Performed by: FAMILY MEDICINE

## 2022-08-03 PROCEDURE — 1159F PR MEDICATION LIST DOCUMENTED IN MEDICAL RECORD: ICD-10-PCS | Mod: CPTII,95,, | Performed by: FAMILY MEDICINE

## 2022-08-03 RX ORDER — FLUOROMETHOLONE 1 MG/ML
1 SUSPENSION/ DROPS OPHTHALMIC 4 TIMES DAILY
Qty: 5 ML | Refills: 0 | Status: SHIPPED | OUTPATIENT
Start: 2022-08-03 | End: 2022-08-13

## 2022-08-03 RX ORDER — FLUOROMETHOLONE 1 MG/ML
1 SUSPENSION/ DROPS OPHTHALMIC 4 TIMES DAILY
Qty: 5 ML | Refills: 0 | Status: SHIPPED | OUTPATIENT
Start: 2022-08-03 | End: 2022-08-03 | Stop reason: SDUPTHER

## 2022-08-03 NOTE — PROGRESS NOTES
Subjective:       Patient ID: Heather Gomez is a 46 y.o. female.    The patient location is: [place]  The chief complaint leading to consultation is: eye issue    Visit type: audiovisual    Face to Face time with patient: aprox 10  10 minutes of total time spent on the encounter, which includes face to face time and non-face to face time preparing to see the patient (eg, review of tests), Obtaining and/or reviewing separately obtained history, Documenting clinical information in the electronic or other health record, Independently interpreting results (not separately reported) and communicating results to the patient/family/caregiver, or Care coordination (not separately reported).         Each patient to whom he or she provides medical services by telemedicine is:  (1) informed of the relationship between the physician and patient and the respective role of any other health care provider with respect to management of the patient; and (2) notified that he or she may decline to receive medical services by telemedicine and may withdraw from such care at any time.      Heather is a 46 y.o. female who presents today for an  visit. She is having eye allergies. She states that she always has this. She has tried pataday. She states that her eye doctor has prescribed her fluormethelone for her eye issues in the past. She has tried other medications for this in the past. She has no other symptoms. Home covid test negative. She has itchy eyes. No blurry vision. Vision is normal. No fevers.     Review of Systems   Constitutional: Negative for activity change and unexpected weight change.   HENT: Negative for hearing loss, rhinorrhea and trouble swallowing.    Eyes: Negative for discharge and visual disturbance.   Respiratory: Negative for chest tightness and wheezing.    Cardiovascular: Negative for chest pain and palpitations.   Gastrointestinal: Negative for blood in stool, constipation, diarrhea and vomiting.    Endocrine: Negative for polydipsia and polyuria.   Genitourinary: Negative for difficulty urinating, dysuria, hematuria and menstrual problem.   Musculoskeletal: Negative for arthralgias, joint swelling and neck pain.   Neurological: Negative for weakness and headaches.   Psychiatric/Behavioral: Negative for confusion and dysphoric mood.             Results for orders placed or performed in visit on 07/21/22   POCT COVID-19 Rapid Screening   Result Value Ref Range    POC Rapid COVID Negative Negative     Acceptable Yes        Objective:   There were no vitals filed for this visit.     Physical Exam  Vitals and nursing note reviewed.   Constitutional:       General: She is not in acute distress.     Appearance: She is well-developed. She is not ill-appearing, toxic-appearing or diaphoretic.      Comments: Exam performed as able, but limited due to the inherent nature of telemedicine visit.    HENT:      Head: Normocephalic and atraumatic.   Eyes:      Comments: Visible conjunctiva appears normal   Pulmonary:      Effort: No respiratory distress.      Comments: No audible wheezing noted   Breathing as noted over telemedicine appears normal with no distress  Neurological:      Mental Status: She is alert and oriented to person, place, and time.   Psychiatric:         Behavior: Behavior normal.         Thought Content: Thought content normal.         Judgment: Judgment normal.         Assessment:       1. Eye problem        Plan:       Refilled meds x 1  Discussed this is a steroid  Advised patient to f/u with her eye doctor for eval of chronic allergic conjunctivitis vs other inflammatory eye issues      Eye problem  -     fluorometholone 0.1% (FML) 0.1 % DrpS; Place 1 drop into both eyes 4 (four) times daily. for 10 days  Dispense: 5 mL; Refill: 0      Warning signs discussed, patient to call with any further issues or worsening of symptoms.

## 2022-08-03 NOTE — PATIENT INSTRUCTIONS
Refilled meds x 1  Discussed this is a steroid  Advised patient to f/u with her eye doctor for eval of chronic allergic conjunctivitis vs other inflammatory eye issues

## 2022-08-09 ENCOUNTER — CLINICAL SUPPORT (OUTPATIENT)
Dept: OPHTHALMOLOGY | Facility: CLINIC | Age: 47
End: 2022-08-09
Payer: COMMERCIAL

## 2022-08-09 DIAGNOSIS — H47.323 OPTIC NERVE DRUSEN, BILATERAL: ICD-10-CM

## 2022-08-10 NOTE — PROGRESS NOTES
Visual field test done.  Patient stated no latex allergies used coverlet      Date: 8/10/2022        Glasses Prescription     Sphere Cylinder Dist VA Add   Right -0.50 Sphere 20/20 +1.50   Left -0.50 Sphere 20/20 +1.50   Type: PAL   Expiration Date: 7/6/2023

## 2022-08-12 ENCOUNTER — OFFICE VISIT (OUTPATIENT)
Dept: OBSTETRICS AND GYNECOLOGY | Facility: CLINIC | Age: 47
End: 2022-08-12
Payer: COMMERCIAL

## 2022-08-12 VITALS
WEIGHT: 183.88 LBS | DIASTOLIC BLOOD PRESSURE: 80 MMHG | SYSTOLIC BLOOD PRESSURE: 130 MMHG | BODY MASS INDEX: 31.39 KG/M2 | HEIGHT: 64 IN

## 2022-08-12 DIAGNOSIS — Z01.419 WELL WOMAN EXAM WITH ROUTINE GYNECOLOGICAL EXAM: Primary | ICD-10-CM

## 2022-08-12 DIAGNOSIS — Z12.31 SCREENING MAMMOGRAM FOR HIGH-RISK PATIENT: ICD-10-CM

## 2022-08-12 PROCEDURE — 3044F PR MOST RECENT HEMOGLOBIN A1C LEVEL <7.0%: ICD-10-PCS | Mod: CPTII,S$GLB,, | Performed by: OBSTETRICS & GYNECOLOGY

## 2022-08-12 PROCEDURE — 1159F MED LIST DOCD IN RCRD: CPT | Mod: CPTII,S$GLB,, | Performed by: OBSTETRICS & GYNECOLOGY

## 2022-08-12 PROCEDURE — 3075F SYST BP GE 130 - 139MM HG: CPT | Mod: CPTII,S$GLB,, | Performed by: OBSTETRICS & GYNECOLOGY

## 2022-08-12 PROCEDURE — 3060F PR POS MICROALBUMINURIA RESULT DOCUMENTED/REVIEW: ICD-10-PCS | Mod: CPTII,S$GLB,, | Performed by: OBSTETRICS & GYNECOLOGY

## 2022-08-12 PROCEDURE — 3079F DIAST BP 80-89 MM HG: CPT | Mod: CPTII,S$GLB,, | Performed by: OBSTETRICS & GYNECOLOGY

## 2022-08-12 PROCEDURE — 3075F PR MOST RECENT SYSTOLIC BLOOD PRESS GE 130-139MM HG: ICD-10-PCS | Mod: CPTII,S$GLB,, | Performed by: OBSTETRICS & GYNECOLOGY

## 2022-08-12 PROCEDURE — 99396 PR PREVENTIVE VISIT,EST,40-64: ICD-10-PCS | Mod: S$GLB,,, | Performed by: OBSTETRICS & GYNECOLOGY

## 2022-08-12 PROCEDURE — 3044F HG A1C LEVEL LT 7.0%: CPT | Mod: CPTII,S$GLB,, | Performed by: OBSTETRICS & GYNECOLOGY

## 2022-08-12 PROCEDURE — 1159F PR MEDICATION LIST DOCUMENTED IN MEDICAL RECORD: ICD-10-PCS | Mod: CPTII,S$GLB,, | Performed by: OBSTETRICS & GYNECOLOGY

## 2022-08-12 PROCEDURE — 3066F PR DOCUMENTATION OF TREATMENT FOR NEPHROPATHY: ICD-10-PCS | Mod: CPTII,S$GLB,, | Performed by: OBSTETRICS & GYNECOLOGY

## 2022-08-12 PROCEDURE — 3008F BODY MASS INDEX DOCD: CPT | Mod: CPTII,S$GLB,, | Performed by: OBSTETRICS & GYNECOLOGY

## 2022-08-12 PROCEDURE — 99396 PREV VISIT EST AGE 40-64: CPT | Mod: S$GLB,,, | Performed by: OBSTETRICS & GYNECOLOGY

## 2022-08-12 PROCEDURE — 1160F PR REVIEW ALL MEDS BY PRESCRIBER/CLIN PHARMACIST DOCUMENTED: ICD-10-PCS | Mod: CPTII,S$GLB,, | Performed by: OBSTETRICS & GYNECOLOGY

## 2022-08-12 PROCEDURE — 3079F PR MOST RECENT DIASTOLIC BLOOD PRESSURE 80-89 MM HG: ICD-10-PCS | Mod: CPTII,S$GLB,, | Performed by: OBSTETRICS & GYNECOLOGY

## 2022-08-12 PROCEDURE — 99999 PR PBB SHADOW E&M-EST. PATIENT-LVL IV: ICD-10-PCS | Mod: PBBFAC,,, | Performed by: OBSTETRICS & GYNECOLOGY

## 2022-08-12 PROCEDURE — 99999 PR PBB SHADOW E&M-EST. PATIENT-LVL IV: CPT | Mod: PBBFAC,,, | Performed by: OBSTETRICS & GYNECOLOGY

## 2022-08-12 PROCEDURE — 3060F POS MICROALBUMINURIA REV: CPT | Mod: CPTII,S$GLB,, | Performed by: OBSTETRICS & GYNECOLOGY

## 2022-08-12 PROCEDURE — 3066F NEPHROPATHY DOC TX: CPT | Mod: CPTII,S$GLB,, | Performed by: OBSTETRICS & GYNECOLOGY

## 2022-08-12 PROCEDURE — 1160F RVW MEDS BY RX/DR IN RCRD: CPT | Mod: CPTII,S$GLB,, | Performed by: OBSTETRICS & GYNECOLOGY

## 2022-08-12 PROCEDURE — 3008F PR BODY MASS INDEX (BMI) DOCUMENTED: ICD-10-PCS | Mod: CPTII,S$GLB,, | Performed by: OBSTETRICS & GYNECOLOGY

## 2022-08-12 NOTE — PROGRESS NOTES
"[facility]  Ambulatory Clinic  Obstetrics & Gynecology    Visit Date:  2022    Chief Complaint:  Annual GYN exam    History of Present Illness:      Heather Gomez is a 47 y.o.  here for a gynecologic exam.    Pt has no major complaints today.    Pt reports an uneventful transition into menopause and is not on hormone replacement therapy.    Pt reports h/o hysterectomy with ovarian conservation for uterine fibroids.  Pt denies recent h/o abnormal pap  Pt denies active sexually transmitted infections.  Pt has h/o right breat bx .  Pt performs monthly self breast examination, non-smoker, uses seat belts, and denies abuse.   Pt denies vaginal bleeding, vaginal discharge, dyspareunia, pelvic pain, bloating, early satiety, unintentional weight loss, breast mass/skin changes, incontinence, GI or urinary complaints.    Otherwise, the pt is in her usual state of health.    Past History:  Gynecologic history as noted above.    Review of Systems:      GENERAL:  No fever, fatigue, excessive weight gain or loss  HEENT:  No headaches, hearing changes, visual disturbance  RESPIRATORY:  No cough, shortness of breath  CARDIOVASCULAR:  No chest pain, heart palpitations, leg swelling  BREAST:  No lump, pain, nipple discharge, skin changes  GASTROINTESTINAL:  No nausea, vomiting, constipation, diarrhea, abd pain, rectal bleeding   GENITOURINARY:  See HPI  ENDOCRINE:  No heat or cold intolerance  HEMATOLOGIC:  No easy bruisability or bleeding   LYMPHATICS:  No enlarged nodes  MUSCULOSKELETAL:  No acute joint pain or swelling  SKIN:  No rash, lesions, jaundice  NEUROLOGIC:  No dizziness, weakness, syncope  PSYCHIATRIC:  No significant mood changes, homicidal/suicidal ideations    Physical Exam:     /80   Ht 5' 4" (1.626 m)   Wt 83.4 kg (183 lb 13.8 oz)   LMP 2019   BMI 31.56 kg/m²      GENERAL:  No acute distress, well-nourished  HEENT:  Atraumatic, anicteric, moist " mucus membranes. Neck supple w/o masses.  BREAST:  Symmetric, nontender, no obvious masses, adenopathy, skin changes or nipple discharge.  LUNGS:  Clear normal respiratory effort  HEART:  Regular rate and rhythm  ABDOMEN:  Soft, non-tender, non-distended, no obvious organomegaly  EXT:  Symmetric w/o cramping, claudication, or edema. +2 distal pulses.  SKIN:  No rashes or bruising  PSYCH:  Mood and affect appropriate  NEURO:  Grossly intact bilaterally     GENITOURINARY:    VULVAR:  Female external genitalia w/o any obvious lesions. Normal urethral meatus. No gross lymphadenopathy.   VAGINA:  Well estrogenized with good rugation. Adequate support. No significant cystocele or rectocele. No obvious lesion. No discharge.  CERVIX:   Surgically absent. No cuff lesions or tenderness.     UTERUS:  Surgically absent.   ADNEXA:   No masses, non-tender   RECTAL:  Deferred. No obvious external lesions    Chaperone present for exam.    Assessment:     47 y.o.  with h/o hysterectomy with ovarian conservation for uterine fibroids:    1. Well woman gynecologic exam    Plan:    A gynecologic health assessment was performed with age appropriate counseling.  Cervical cancer screening - pap not clinically indicated due h/o hysterectomy.  Screening mammogram scheduled.  Encourage healthy lifestyle modifications, monthly self breast exams, Ca/Vit D, rec'd COVID vaccines, and colonoscopy.  F/u with PCP for health maintenance.  Return 1 year for gynecologic exam or sooner as needed.    All questions answered, pt voiced understanding.        London Bishop MD

## 2022-08-17 ENCOUNTER — TELEPHONE (OUTPATIENT)
Dept: OPHTHALMOLOGY | Facility: CLINIC | Age: 47
End: 2022-08-17
Payer: COMMERCIAL

## 2022-08-17 NOTE — TELEPHONE ENCOUNTER
----- Message from Cherie Zapata sent at 8/17/2022  9:22 AM CDT -----  Patient is calling stating that her OU are watering so bad and are red and irritated.   She stated that she went to a doctor yesterday and isn't getting any relief.   Please contact patient to schedule at [phone].

## 2022-08-25 ENCOUNTER — TELEPHONE (OUTPATIENT)
Dept: OPTOMETRY | Facility: CLINIC | Age: 47
End: 2022-08-25
Payer: COMMERCIAL

## 2022-08-25 NOTE — TELEPHONE ENCOUNTER
----- Message from Antonette Carranza, OD sent at 8/19/2022 12:35 PM CDT -----  Regarding: Optic Nerve Drusen Patient, Testing Reviewed  Patient we saw in DESC. I just looked over her testing. Can you call and discuss with her?     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 The only treatment option is a gtt to lower her eye pressure to protect the optic nerve a little more. If patient is interested we can start her on the gtt. It would be 1 gtt OU QHS. If she wants to start, then let me know what pharmacy to send to. I will also need to f/u with her for IOP check in 1 month if you could get that scheduled too. I recommend starting the gtt. Only other option would be to monitor in 4-6 months with repeat testing (RNFL OCT and 24-2 HVF).       Let me know what she says! Thank you!

## 2022-10-03 ENCOUNTER — TELEPHONE (OUTPATIENT)
Dept: ORTHOPEDICS | Facility: CLINIC | Age: 47
End: 2022-10-03
Payer: COMMERCIAL

## 2022-10-03 DIAGNOSIS — M25.512 LEFT SHOULDER PAIN, UNSPECIFIED CHRONICITY: Primary | ICD-10-CM

## 2022-10-03 NOTE — TELEPHONE ENCOUNTER
----- Message from Yue White MA sent at 10/3/2022  1:27 PM CDT -----  Left shoulder needs xray  ----- Message -----  From: Marley Finch PA-C  Sent: 10/3/2022   1:23 PM CDT  To: Josette Roach Staff    She has appt on Wednesday. She needs XRs of her shoulder prior to seeing me. Please find out which shoulder and let me know so I can order XRs.     Thanks.        normal...

## 2022-11-07 NOTE — PROGRESS NOTES
Subjective:      Patient ID: Heather Gomez is a 47 y.o. female.    Chief Complaint:  No chief complaint on file.    History of Present Illness  Heather Gomez is here for follow up of DM, thyroid nodule and subclinical hyperthyroidism.  Previously seen by me 5/2022.  This is a MyChart video visit.    The patient location is: [place]  The chief complaint leading to consultation is: DM  Visit type: Virtual visit with synchronous audio and video  Total time spent with patient: see below  Each patient to whom he or she provides medical services by telemedicine is:  (1) informed of the relationship between the physician and patient and the respective role of any other health care provider with respect to management of the patient; and (2) notified that he or she may decline to receive medical services by telemedicine and may withdraw from such care at any time.      With regards to diabetes:    Diagnosed: 2013 4/2021 C peptide 3.12 with glucose of 180    FH of DM:   Mother, father, 4 sisters   Has 2 children - no history of DM     DM Education: 2/2022    Known complications:  DKA : None  RN - Denies  - Eye Exam: 7/2022  PN : Denies   Nephropathy   - Podiatry: None  CAD : Denies    Denies history of pancreatitis & personal/family history of medullary thyroid cancer.     Current regimen:  Metformin 500mg twice daily with meals  Trulicity 4.5mg weekly - Sunday   Lantus 14 units twice a day   Novolog 8 units before meals    Reports compliance.     Other medications tried:  Metformin - unable to tolerate due to GI upset   Invokana - discontinued due to abdominal pain, nausea, yeast infections    Actos - discontinued during hospital stay   Januvia - discontinued during hospital stay     Glucose Monitor:   6 times a day testing  Log reviewed: Dexcom G6 - need new sharing code  Reports rarely glucose > 200    Hypoglycemia:  Denies.   Knows how to correct with 15 grams of carbs- juice, coke, or a peppermint.      Diet/Exercise:  Eats 1-2 meals a day.   B: SKIPS  L: fast food OR SKIPS  D: fast food OR skips   Snacks : at night - cookies, chips, ice cream   Drinks : water, juice (rarely)  NO soft drinks  Exercise: None - active lifestyle.      Diabetes Management Status    Hemoglobin A1C   Date Value Ref Range Status   05/18/2022 6.5 (H) 4.0 - 5.6 % Final     Comment:     ADA Screening Guidelines:  5.7-6.4%  Consistent with prediabetes  >or=6.5%  Consistent with diabetes    High levels of fetal hemoglobin interfere with the HbA1C  assay. Heterozygous hemoglobin variants (HbS, HgC, etc)do  not significantly interfere with this assay.   However, presence of multiple variants may affect accuracy.     02/09/2022 7.4 (H) 4.0 - 5.6 % Final     Comment:     ADA Screening Guidelines:  5.7-6.4%  Consistent with prediabetes  >or=6.5%  Consistent with diabetes    High levels of fetal hemoglobin interfere with the HbA1C  assay. Heterozygous hemoglobin variants (HbS, HgC, etc)do  not significantly interfere with this assay.   However, presence of multiple variants may affect accuracy.     04/05/2021 6.8 (H) 4.0 - 5.6 % Final     Comment:     ADA Screening Guidelines:  5.7-6.4%  Consistent with prediabetes  >or=6.5%  Consistent with diabetes    High levels of fetal hemoglobin interfere with the HbA1C  assay. Heterozygous hemoglobin variants (HbS, HgC, etc)do  not significantly interfere with this assay.   However, presence of multiple variants may affect accuracy.         Statin: Taking  ACE/ARB: Taking  Screening or Prevention Patient's value Goal Complete/Controlled?   HgA1C Testing and Control   Lab Results   Component Value Date    HGBA1C 6.5 (H) 05/18/2022      Annually/Less than 8% Yes   Lipid profile : 02/09/2022 Annually Yes   LDL control Lab Results   Component Value Date    LDLCALC 122.0 02/09/2022    Annually/Less than 100 mg/dl  Yes   Nephropathy screening Lab Results   Component Value Date    LABMICR 34.0 02/09/2022     Lab  Results   Component Value Date    PROTEINUA Negative 06/27/2022    Annually No   Blood pressure BP Readings from Last 1 Encounters:   08/12/22 130/80    Less than 140/90 No   Dilated retinal exam : 07/06/2022 Annually Yes   Foot exam   : 02/09/2022 Annually No     With regards to thyroid nodule:    Thyroid US:   2/14/2022  The thyroid is enlarged.  Right lobe of the thyroid measures 4.5 x 2.1 x 1.9 cm.  Left lobe of the thyroid measures 4.4 x 1.9 x 2.1 cm.  Thyroid volume is 17.6 mL.  Normal thyroid parenchyma.  There is a 5 x 3 x 4 mm mixed solid and cystic nodule within the mid right thyroid lobe.  There is a 9 x 5 x 8 mm solid hypoechoic nodule within the mid right thyroid lobe.  Both of these nodules are wider than tall, smoothly marginated, and lack internal calcification.  There is a 10 x 5 x 9 mm solid slightly hypoechoic nodule within the thyroid isthmus which is wider than tall, smoothly marginated, and lacks internal calcification.  There is a 5 x 3 x 6 mm mixed solid and cystic nodule within the upper aspect of the left thyroid lobe.  Another 5 x 2 x 4 mm mixed solid and cystic nodule is seen within the mid left thyroid lobe.  Both of these nodules are wider than tall, smoothly marginated, and lack internal calcification.  Impression:  Enlarged thyroid containing multiple nodules.  No nodule currently meeting TI-RADS criteria for FNA sampling.    FNA: None    Signs or Symptoms:   Difficulty breathing: Denies  Difficulty swallowing: Denies  Voice Changes: Hoarseness but not all the time  FH of thyroid nodules but unsure if it was cancer (mother)  Personal history of radiation treatment or exposure: Denies      With regards to subclinical hyperthyroidism:    FH of thyroid disease: sister    2/2022  Thyroid Ab negative.    NM Thyroid Scan  4/6/2022  The 24 hour uptake is elevated at 48.3 % (normal 10-30%).  Homogenous distribution of the radionuclide throughout the thyroid gland without hyper or hypo  functional nodules.  Impression:  1. Elevated 24 hour radioiodine uptake by the thyroid.  2. Uniform uptake in the thyroid gland bilaterally.  Overall findings are in keeping with Graves disease..    Lab Results   Component Value Date    TSH 0.805 05/18/2022    P8OHPXN 89 07/24/2018    FREET4 0.91 05/18/2022     Biotin Use: yes - held for repeat labs     Review of Systems   Constitutional:  Negative for fatigue.   Eyes:  Negative for visual disturbance.   Respiratory:  Negative for shortness of breath.    Cardiovascular:  Negative for chest pain.   Gastrointestinal:  Negative for abdominal pain.   Musculoskeletal:  Negative for arthralgias.   Skin:  Negative for wound.   Neurological:  Negative for headaches.         Visit Vitals  LMP 07/16/2019       There is no height or weight on file to calculate BMI.    Lab Review:   Lab Results   Component Value Date    HGBA1C 6.5 (H) 05/18/2022    HGBA1C 7.4 (H) 02/09/2022    HGBA1C 6.8 (H) 04/05/2021       Lab Results   Component Value Date    CHOL 199 02/09/2022    HDL 56 02/09/2022    LDLCALC 122.0 02/09/2022    TRIG 105 02/09/2022    CHOLHDL 28.1 02/09/2022     Lab Results   Component Value Date     05/18/2022    K 3.7 05/18/2022     05/18/2022    CO2 27 05/18/2022     (H) 05/18/2022    BUN 17 05/18/2022    CREATININE 0.54 05/18/2022    CALCIUM 8.9 05/18/2022    PROT 6.9 05/18/2022    ALBUMIN 3.8 05/18/2022    BILITOT 0.3 05/18/2022    ALKPHOS 68 05/18/2022    AST 21 05/18/2022    ALT 17 05/18/2022    ANIONGAP 8 05/18/2022    ESTGFRAFRICA >60.0 05/18/2022    EGFRNONAA >60.0 05/18/2022    TSH 0.805 05/18/2022     Vit D, 25-Hydroxy   Date Value Ref Range Status   05/18/2022 30 30 - 96 ng/mL Final     Comment:     Vitamin D deficiency.........<10 ng/mL                              Vitamin D insufficiency......10-29 ng/mL       Vitamin D sufficiency........> or equal to 30 ng/mL  Vitamin D toxicity............>100 ng/mL       Assessment and Plan     1. Type  2 diabetes mellitus with ketoacidosis without coma, without long-term current use of insulin  metFORMIN (GLUCOPHAGE-XR) 500 MG ER 24hr tablet    LANTUS SOLOSTAR U-100 INSULIN glargine 100 units/mL SubQ pen    NOVOLOG FLEXPEN U-100 INSULIN 100 unit/mL (3 mL) InPn pen    tirzepatide (MOUNJARO) 5 mg/0.5 mL PnIj    Hemoglobin A1C    Comprehensive Metabolic Panel      2. Thyroid nodule  US Soft Tissue Head Neck Thyroid      3. Subclinical hyperthyroidism            Type 2 diabetes mellitus with ketoacidosis without coma, without long-term current use of insulin  -- Labs prior to follow up.  -- A1c goal <7%.  -- Medications discussed:  MFM   GLP1-DPP4   RAE   SGLT2   Reviewed potential adverse effects of SGLT-2 inhibitors, including genital mycotic infections, slightly increased risk of UTI, hypersensitivity, hypotension, and hyperkalemia. Advised to maintain water intake of 8-10 cups per day. Advised we need to check chemistry panel at baseline and 2 weeks after starting. Discussed FDA warning reports of ketoacidosis associated with SGLT-2 inhibitors. Advised to seek immediate medical attention and stop the medication if symptoms such as difficulty breathing, nausea, vomiting, abdominal pain, confusion, and unusual fatigue/sleepiness. Discussed possible precipitating factors including major illness/reduced food and fluid intake (advised to stop under these circumstances), and reduced insulin dose. Discussed possible effects of increased fracture risk/decreased bone density. Discussed reports of increased risk of leg and foot amputations with canagliflozin and need to seek urgent care if developed new pain or tenderness, sores or ulcers, or infections in legs/feet.   Insulin   -- Reviewed logs/CGM:  Reported glucose relatively controlled.  Needs to send me a new sharing code.  Instructed to send glucose logs in 14 days.  Reach out to me sooner for any glucose <70 or consistently >180.  -- Medication Changes:   Metformin  500mg in AM and 1000mg in PM for 1 week, THEN Metformin 1000mg twice daily  Trulicity 4.5mg weekly - Sunday   Lantus 14 units twice a day   Novolog 8 units before meals  Price Mounjaro which will REPLACE Trulicity.    -- Reviewed goals of therapy are to get the best control we can without hypoglycemia.  -- Reviewed patient's current insulin regimen. Clarified proper insulin dose and timing in relation to meals, etc. Insulin injection sites and proper rotation instructed.    -- Advised frequent self blood glucose monitoring.  Patient encouraged to document glucose results and bring them to every clinic visit.  -- Hypoglycemia precautions discussed. Instructed on precautions before driving.    -- Call for Bg repeatedly < 90 or > 180.   -- Close adherence to lifestyle changes recommended.   -- Periodic follow ups for eye evaluations, foot care and dental care suggested.    Thyroid nodule  -- Denies compressive symptoms.  -- Repeat thyroid US 2/2023.    Subclinical hyperthyroidism  -- Biochemically euthyroid.  -- Monitor annually.       Follow up in about 4 months (around 3/9/2023).    I spent 30 minutes face-to-face with the patient, over half of the visit was spent on counseling and/or coordinating the care of the patient.    Counseling includes:  Diagnostic results, impressions, recommendations   Prognosis   Risk and benefits of management/treatment options   Instructions for management treatment and or follow-up   Importance of compliance with management   Risk factor reduction   Patient education

## 2022-11-09 ENCOUNTER — PATIENT MESSAGE (OUTPATIENT)
Dept: ENDOCRINOLOGY | Facility: CLINIC | Age: 47
End: 2022-11-09

## 2022-11-09 ENCOUNTER — OFFICE VISIT (OUTPATIENT)
Dept: ENDOCRINOLOGY | Facility: CLINIC | Age: 47
End: 2022-11-09
Payer: COMMERCIAL

## 2022-11-09 DIAGNOSIS — E05.90 SUBCLINICAL HYPERTHYROIDISM: ICD-10-CM

## 2022-11-09 DIAGNOSIS — E11.10 TYPE 2 DIABETES MELLITUS WITH KETOACIDOSIS WITHOUT COMA, WITHOUT LONG-TERM CURRENT USE OF INSULIN: Primary | ICD-10-CM

## 2022-11-09 DIAGNOSIS — E11.10 TYPE 2 DIABETES MELLITUS WITH KETOACIDOSIS WITHOUT COMA, WITHOUT LONG-TERM CURRENT USE OF INSULIN: ICD-10-CM

## 2022-11-09 DIAGNOSIS — E04.1 THYROID NODULE: ICD-10-CM

## 2022-11-09 PROCEDURE — 3044F PR MOST RECENT HEMOGLOBIN A1C LEVEL <7.0%: ICD-10-PCS | Mod: CPTII,95,, | Performed by: NURSE PRACTITIONER

## 2022-11-09 PROCEDURE — 3066F NEPHROPATHY DOC TX: CPT | Mod: CPTII,95,, | Performed by: NURSE PRACTITIONER

## 2022-11-09 PROCEDURE — 1160F PR REVIEW ALL MEDS BY PRESCRIBER/CLIN PHARMACIST DOCUMENTED: ICD-10-PCS | Mod: CPTII,95,, | Performed by: NURSE PRACTITIONER

## 2022-11-09 PROCEDURE — 3060F POS MICROALBUMINURIA REV: CPT | Mod: CPTII,95,, | Performed by: NURSE PRACTITIONER

## 2022-11-09 PROCEDURE — 1160F RVW MEDS BY RX/DR IN RCRD: CPT | Mod: CPTII,95,, | Performed by: NURSE PRACTITIONER

## 2022-11-09 PROCEDURE — 3060F PR POS MICROALBUMINURIA RESULT DOCUMENTED/REVIEW: ICD-10-PCS | Mod: CPTII,95,, | Performed by: NURSE PRACTITIONER

## 2022-11-09 PROCEDURE — 99214 PR OFFICE/OUTPT VISIT, EST, LEVL IV, 30-39 MIN: ICD-10-PCS | Mod: 95,,, | Performed by: NURSE PRACTITIONER

## 2022-11-09 PROCEDURE — 3066F PR DOCUMENTATION OF TREATMENT FOR NEPHROPATHY: ICD-10-PCS | Mod: CPTII,95,, | Performed by: NURSE PRACTITIONER

## 2022-11-09 PROCEDURE — 3044F HG A1C LEVEL LT 7.0%: CPT | Mod: CPTII,95,, | Performed by: NURSE PRACTITIONER

## 2022-11-09 PROCEDURE — 1159F MED LIST DOCD IN RCRD: CPT | Mod: CPTII,95,, | Performed by: NURSE PRACTITIONER

## 2022-11-09 PROCEDURE — 1159F PR MEDICATION LIST DOCUMENTED IN MEDICAL RECORD: ICD-10-PCS | Mod: CPTII,95,, | Performed by: NURSE PRACTITIONER

## 2022-11-09 PROCEDURE — 99214 OFFICE O/P EST MOD 30 MIN: CPT | Mod: 95,,, | Performed by: NURSE PRACTITIONER

## 2022-11-09 RX ORDER — IRBESARTAN AND HYDROCHLOROTHIAZIDE 150; 12.5 MG/1; MG/1
1 TABLET, FILM COATED ORAL DAILY
Qty: 90 TABLET | Refills: 3 | Status: SHIPPED | OUTPATIENT
Start: 2022-11-09 | End: 2023-01-26 | Stop reason: SDUPTHER

## 2022-11-09 RX ORDER — PRAVASTATIN SODIUM 40 MG/1
40 TABLET ORAL DAILY
Qty: 90 TABLET | Refills: 3 | Status: SHIPPED | OUTPATIENT
Start: 2022-11-09 | End: 2023-01-26 | Stop reason: SDUPTHER

## 2022-11-09 RX ORDER — INSULIN ASPART 100 [IU]/ML
8 INJECTION, SOLUTION INTRAVENOUS; SUBCUTANEOUS
Qty: 20 ML | Refills: 3 | Status: SHIPPED | OUTPATIENT
Start: 2022-11-09 | End: 2023-03-23 | Stop reason: SDUPTHER

## 2022-11-09 RX ORDER — TIRZEPATIDE 5 MG/.5ML
5 INJECTION, SOLUTION SUBCUTANEOUS
Qty: 4 PEN | Refills: 0 | Status: SHIPPED | OUTPATIENT
Start: 2022-11-09 | End: 2022-12-12

## 2022-11-09 RX ORDER — INSULIN GLARGINE 100 [IU]/ML
14 INJECTION, SOLUTION SUBCUTANEOUS 2 TIMES DAILY
Qty: 20 ML | Refills: 3 | Status: SHIPPED | OUTPATIENT
Start: 2022-11-09 | End: 2023-03-28 | Stop reason: SDUPTHER

## 2022-11-09 RX ORDER — METFORMIN HYDROCHLORIDE 500 MG/1
1000 TABLET, EXTENDED RELEASE ORAL 2 TIMES DAILY WITH MEALS
Qty: 360 TABLET | Refills: 3 | Status: SHIPPED | OUTPATIENT
Start: 2022-11-09 | End: 2023-03-28 | Stop reason: SDUPTHER

## 2022-11-09 RX ORDER — TIRZEPATIDE 5 MG/.5ML
5 INJECTION, SOLUTION SUBCUTANEOUS
Qty: 4 PEN | Refills: 0 | Status: SHIPPED | OUTPATIENT
Start: 2022-11-09 | End: 2022-11-09

## 2022-11-09 NOTE — ASSESSMENT & PLAN NOTE
-- Labs prior to follow up.  -- A1c goal <7%.  -- Medications discussed:  MFM   GLP1-DPP4   RAE   SGLT2   Reviewed potential adverse effects of SGLT-2 inhibitors, including genital mycotic infections, slightly increased risk of UTI, hypersensitivity, hypotension, and hyperkalemia. Advised to maintain water intake of 8-10 cups per day. Advised we need to check chemistry panel at baseline and 2 weeks after starting. Discussed FDA warning reports of ketoacidosis associated with SGLT-2 inhibitors. Advised to seek immediate medical attention and stop the medication if symptoms such as difficulty breathing, nausea, vomiting, abdominal pain, confusion, and unusual fatigue/sleepiness. Discussed possible precipitating factors including major illness/reduced food and fluid intake (advised to stop under these circumstances), and reduced insulin dose. Discussed possible effects of increased fracture risk/decreased bone density. Discussed reports of increased risk of leg and foot amputations with canagliflozin and need to seek urgent care if developed new pain or tenderness, sores or ulcers, or infections in legs/feet.   Insulin   -- Reviewed logs/CGM:  Reported glucose relatively controlled.  Needs to send me a new sharing code.  Instructed to send glucose logs in 14 days.  Reach out to me sooner for any glucose <70 or consistently >180.  -- Medication Changes:   Metformin 500mg in AM and 1000mg in PM for 1 week, THEN Metformin 1000mg twice daily  Trulicity 4.5mg weekly - Sunday   Lantus 14 units twice a day   Novolog 8 units before meals  Price Mounjaro which will REPLACE Trulicity.    -- Reviewed goals of therapy are to get the best control we can without hypoglycemia.  -- Reviewed patient's current insulin regimen. Clarified proper insulin dose and timing in relation to meals, etc. Insulin injection sites and proper rotation instructed.    -- Advised frequent self blood glucose monitoring.  Patient encouraged to document  glucose results and bring them to every clinic visit.  -- Hypoglycemia precautions discussed. Instructed on precautions before driving.    -- Call for Bg repeatedly < 90 or > 180.   -- Close adherence to lifestyle changes recommended.   -- Periodic follow ups for eye evaluations, foot care and dental care suggested.

## 2022-12-08 ENCOUNTER — OFFICE VISIT (OUTPATIENT)
Dept: OPTOMETRY | Facility: CLINIC | Age: 47
End: 2022-12-08
Payer: COMMERCIAL

## 2022-12-08 DIAGNOSIS — H47.323 OPTIC NERVE DRUSEN, BILATERAL: Primary | ICD-10-CM

## 2022-12-08 PROCEDURE — 92012 PR EYE EXAM, EST PATIENT,INTERMED: ICD-10-PCS | Mod: S$GLB,,, | Performed by: OPTOMETRIST

## 2022-12-08 PROCEDURE — 99999 PR PBB SHADOW E&M-EST. PATIENT-LVL III: ICD-10-PCS | Mod: PBBFAC,,, | Performed by: OPTOMETRIST

## 2022-12-08 PROCEDURE — 3060F POS MICROALBUMINURIA REV: CPT | Mod: CPTII,S$GLB,, | Performed by: OPTOMETRIST

## 2022-12-08 PROCEDURE — 92012 INTRM OPH EXAM EST PATIENT: CPT | Mod: S$GLB,,, | Performed by: OPTOMETRIST

## 2022-12-08 PROCEDURE — 3066F PR DOCUMENTATION OF TREATMENT FOR NEPHROPATHY: ICD-10-PCS | Mod: CPTII,S$GLB,, | Performed by: OPTOMETRIST

## 2022-12-08 PROCEDURE — 3066F NEPHROPATHY DOC TX: CPT | Mod: CPTII,S$GLB,, | Performed by: OPTOMETRIST

## 2022-12-08 PROCEDURE — 3044F HG A1C LEVEL LT 7.0%: CPT | Mod: CPTII,S$GLB,, | Performed by: OPTOMETRIST

## 2022-12-08 PROCEDURE — 1159F PR MEDICATION LIST DOCUMENTED IN MEDICAL RECORD: ICD-10-PCS | Mod: CPTII,S$GLB,, | Performed by: OPTOMETRIST

## 2022-12-08 PROCEDURE — 3044F PR MOST RECENT HEMOGLOBIN A1C LEVEL <7.0%: ICD-10-PCS | Mod: CPTII,S$GLB,, | Performed by: OPTOMETRIST

## 2022-12-08 PROCEDURE — 3060F PR POS MICROALBUMINURIA RESULT DOCUMENTED/REVIEW: ICD-10-PCS | Mod: CPTII,S$GLB,, | Performed by: OPTOMETRIST

## 2022-12-08 PROCEDURE — 1159F MED LIST DOCD IN RCRD: CPT | Mod: CPTII,S$GLB,, | Performed by: OPTOMETRIST

## 2022-12-08 PROCEDURE — 99999 PR PBB SHADOW E&M-EST. PATIENT-LVL III: CPT | Mod: PBBFAC,,, | Performed by: OPTOMETRIST

## 2022-12-08 RX ORDER — LATANOPROST 50 UG/ML
1 SOLUTION/ DROPS OPHTHALMIC NIGHTLY
Qty: 2.5 ML | Refills: 5 | Status: SHIPPED | OUTPATIENT
Start: 2022-12-08 | End: 2023-05-03 | Stop reason: SDUPTHER

## 2022-12-08 RX ORDER — LATANOPROST 50 UG/ML
SOLUTION/ DROPS OPHTHALMIC
COMMUNITY
End: 2022-12-08 | Stop reason: SDUPTHER

## 2022-12-08 NOTE — PROGRESS NOTES
HPI    Pt is here today for 1 month IOP Check. She states she is not seeing well   with her current glasses. She was given a glasses prescription this year   but misplaced it.    Eye Meds:  Latanoprost QHS OU  Xiidra prn OU  Last edited by Antonette Carranza, OD on 12/8/2022 10:12 AM.            Assessment /Plan     For exam results, see Encounter Report.    Optic nerve drusen, bilateral  -     latanoprost 0.005 % ophthalmic solution; Place 1 drop into both eyes every evening.  Dispense: 2.5 mL; Refill: 5  -     OCT, Optic Nerve - OU - Both Eyes; Future  -     Sargent Visual Field - OU - Extended - Both Eyes; Future      Educated pt on findings. IOP target of low teens. Discussed reason for eye gtt and need to protect RNFL. OCT and HVF changes noted on last testing due to optic nerve drusen. Patient to continue latanoprost 1 gtt OU QHS. Refills ordered. Monitor 6 months with RNFL OCT and 24-2 HVF and annual DFE.       RTC in 6 months for RNFL OCT, 24-2 HVF, and annual DFE or sooner if needed.

## 2022-12-11 DIAGNOSIS — E11.10 TYPE 2 DIABETES MELLITUS WITH KETOACIDOSIS WITHOUT COMA, WITHOUT LONG-TERM CURRENT USE OF INSULIN: ICD-10-CM

## 2022-12-12 ENCOUNTER — PATIENT MESSAGE (OUTPATIENT)
Dept: ENDOCRINOLOGY | Facility: CLINIC | Age: 47
End: 2022-12-12
Payer: COMMERCIAL

## 2022-12-12 DIAGNOSIS — E11.10 TYPE 2 DIABETES MELLITUS WITH KETOACIDOSIS WITHOUT COMA, WITHOUT LONG-TERM CURRENT USE OF INSULIN: Primary | ICD-10-CM

## 2022-12-12 RX ORDER — TIRZEPATIDE 5 MG/.5ML
INJECTION, SOLUTION SUBCUTANEOUS
Refills: 0 | OUTPATIENT
Start: 2022-12-12

## 2022-12-12 RX ORDER — TIRZEPATIDE 7.5 MG/.5ML
7.5 INJECTION, SOLUTION SUBCUTANEOUS
Qty: 4 PEN | Refills: 0 | Status: SHIPPED | OUTPATIENT
Start: 2022-12-12 | End: 2022-12-20

## 2022-12-19 ENCOUNTER — PATIENT MESSAGE (OUTPATIENT)
Dept: ENDOCRINOLOGY | Facility: CLINIC | Age: 47
End: 2022-12-19
Payer: COMMERCIAL

## 2022-12-19 DIAGNOSIS — E11.10 TYPE 2 DIABETES MELLITUS WITH KETOACIDOSIS WITHOUT COMA, WITHOUT LONG-TERM CURRENT USE OF INSULIN: Primary | ICD-10-CM

## 2022-12-20 ENCOUNTER — PATIENT MESSAGE (OUTPATIENT)
Dept: ENDOCRINOLOGY | Facility: CLINIC | Age: 47
End: 2022-12-20
Payer: COMMERCIAL

## 2022-12-20 RX ORDER — TIRZEPATIDE 5 MG/.5ML
5 INJECTION, SOLUTION SUBCUTANEOUS
Qty: 4 PEN | Refills: 0 | Status: SHIPPED | OUTPATIENT
Start: 2022-12-20 | End: 2023-01-26

## 2023-01-04 DIAGNOSIS — M17.11 PRIMARY OSTEOARTHRITIS OF RIGHT KNEE: ICD-10-CM

## 2023-01-04 DIAGNOSIS — M25.531 RIGHT WRIST PAIN: ICD-10-CM

## 2023-01-04 RX ORDER — INDOMETHACIN 50 MG/1
50 CAPSULE ORAL 2 TIMES DAILY WITH MEALS
Qty: 60 CAPSULE | Refills: 2 | Status: SHIPPED | OUTPATIENT
Start: 2023-01-04 | End: 2024-02-15 | Stop reason: SDUPTHER

## 2023-01-26 ENCOUNTER — PATIENT MESSAGE (OUTPATIENT)
Dept: ENDOCRINOLOGY | Facility: CLINIC | Age: 48
End: 2023-01-26
Payer: COMMERCIAL

## 2023-01-26 DIAGNOSIS — E11.10 TYPE 2 DIABETES MELLITUS WITH KETOACIDOSIS WITHOUT COMA, WITHOUT LONG-TERM CURRENT USE OF INSULIN: Primary | ICD-10-CM

## 2023-01-26 RX ORDER — TIRZEPATIDE 7.5 MG/.5ML
7.5 INJECTION, SOLUTION SUBCUTANEOUS
Qty: 4 PEN | Refills: 0 | Status: SHIPPED | OUTPATIENT
Start: 2023-01-26 | End: 2023-02-23 | Stop reason: SDUPTHER

## 2023-01-27 ENCOUNTER — OFFICE VISIT (OUTPATIENT)
Dept: SLEEP MEDICINE | Facility: CLINIC | Age: 48
End: 2023-01-27
Payer: COMMERCIAL

## 2023-01-27 ENCOUNTER — PATIENT MESSAGE (OUTPATIENT)
Dept: PSYCHIATRY | Facility: CLINIC | Age: 48
End: 2023-01-27
Payer: COMMERCIAL

## 2023-01-27 VITALS — WEIGHT: 180 LBS | BODY MASS INDEX: 30.73 KG/M2 | HEIGHT: 64 IN

## 2023-01-27 DIAGNOSIS — E11.9 CONTROLLED TYPE 2 DIABETES MELLITUS WITHOUT COMPLICATION, WITH LONG-TERM CURRENT USE OF INSULIN: ICD-10-CM

## 2023-01-27 DIAGNOSIS — Z79.4 CONTROLLED TYPE 2 DIABETES MELLITUS WITHOUT COMPLICATION, WITH LONG-TERM CURRENT USE OF INSULIN: ICD-10-CM

## 2023-01-27 DIAGNOSIS — I10 PRIMARY HYPERTENSION: ICD-10-CM

## 2023-01-27 DIAGNOSIS — G47.00 INSOMNIA, UNSPECIFIED TYPE: Primary | ICD-10-CM

## 2023-01-27 PROCEDURE — 3072F PR LOW RISK FOR RETINOPATHY: ICD-10-PCS | Mod: CPTII,95,, | Performed by: NURSE PRACTITIONER

## 2023-01-27 PROCEDURE — 99204 OFFICE O/P NEW MOD 45 MIN: CPT | Mod: CR,95,, | Performed by: NURSE PRACTITIONER

## 2023-01-27 PROCEDURE — 3072F LOW RISK FOR RETINOPATHY: CPT | Mod: CPTII,95,, | Performed by: NURSE PRACTITIONER

## 2023-01-27 PROCEDURE — 99204 PR OFFICE/OUTPT VISIT, NEW, LEVL IV, 45-59 MIN: ICD-10-PCS | Mod: CR,95,, | Performed by: NURSE PRACTITIONER

## 2023-01-27 PROCEDURE — 3008F BODY MASS INDEX DOCD: CPT | Mod: CPTII,95,, | Performed by: NURSE PRACTITIONER

## 2023-01-27 PROCEDURE — 3008F PR BODY MASS INDEX (BMI) DOCUMENTED: ICD-10-PCS | Mod: CPTII,95,, | Performed by: NURSE PRACTITIONER

## 2023-01-27 RX ORDER — ESZOPICLONE 3 MG/1
3 TABLET, FILM COATED ORAL NIGHTLY
Qty: 30 TABLET | Refills: 3 | Status: SHIPPED | OUTPATIENT
Start: 2023-01-27 | End: 2023-02-09 | Stop reason: SDUPTHER

## 2023-01-27 NOTE — PROGRESS NOTES
"The [place]  The chief complaint leading to consultation is: sleep evaluation    Visit type: audiovisual    Face to Face time with patient:21  minutes of total time spent on the encounter, which includes face to face time and non-face to face time preparing to see the patient (eg, review of tests), Obtaining and/or reviewing separately obtained history, Documenting clinical information in the electronic or other health record, Independently interpreting results (not separately reported) and communicating results to the patient/family/caregiver, or Care coordination (not separately reported). Each patient to whom he or she provides medical services by telemedicine is:  (1) informed of the relationship between the physician and patient and the respective role of any other health care provider with respect to management of the patient; and (2) notified that he or she may decline to receive medical services by telemedicine and may withdraw from such care at any time.    Self-referred     HISTORY OF PRESENT ILLNESS:She has never had a sleep study. Difficultly falling asleep, sometimes up days at a time. May feel drowsy when driving. Tried cold air in bedroom/ambien 10mg and trazadone ?dose but had side effects (memory) or were ineffective. Feels exhausted at work.  Denies witnessed apneic pauses. Denies snoring or witnessed apneic pauses. Occasional am headaches. Up until 5a then sleeps few hours then awakens for unknown reason.   Bp stable  HgBA1c 6.5, sees endo    BT:10-11pm  WT- 8a, when can't sleep gets up walks the house/lights are on, listens to relaxing music/deep breathing exercises but ear bud in causes head ache    FAMILY HISTORY:sister +MAEVE  SOCIAL HISTORY: . /on computer    Ht 5' 4" (1.626 m)   Wt 81.6 kg (180 lb)   LMP 07/16/2019   BMI 30.90 kg/m²       ASSESSMENT:   1. Insomnia NEC. Multi-factorial - excess time in bed, poor sleep hygiene, and likely paradoxical " insomnia play a role.  HTN  DM        PLAN:  Behavioral Modification:  Implement stimulus control: [place] bedroom for sleep only. Restrict time in bed to 7-8hr only/not necessarily sleep time. REFER to CBT-I, trial lunesta 3mg   Avoid clock watching  Avoid thinking/worrying about sleep when trying to fall asleep  Continue to limit caffeinated products  Continue to ensure bedroom is dark, quiet and cool    When sleeping better, plan HST assess for MAEVE   HTN  DM  Obesity

## 2023-01-30 ENCOUNTER — PATIENT MESSAGE (OUTPATIENT)
Dept: ENDOCRINOLOGY | Facility: CLINIC | Age: 48
End: 2023-01-30
Payer: COMMERCIAL

## 2023-02-12 DIAGNOSIS — E11.10 TYPE 2 DIABETES MELLITUS WITH KETOACIDOSIS WITHOUT COMA, WITHOUT LONG-TERM CURRENT USE OF INSULIN: ICD-10-CM

## 2023-02-13 RX ORDER — BLOOD-GLUCOSE SENSOR
3 EACH MISCELLANEOUS CONTINUOUS PRN
Qty: 3 EACH | OUTPATIENT
Start: 2023-02-13 | End: 2024-02-13

## 2023-02-22 ENCOUNTER — PATIENT MESSAGE (OUTPATIENT)
Dept: ENDOCRINOLOGY | Facility: CLINIC | Age: 48
End: 2023-02-22
Payer: COMMERCIAL

## 2023-02-23 DIAGNOSIS — E11.10 TYPE 2 DIABETES MELLITUS WITH KETOACIDOSIS WITHOUT COMA, WITHOUT LONG-TERM CURRENT USE OF INSULIN: ICD-10-CM

## 2023-02-23 RX ORDER — TIRZEPATIDE 7.5 MG/.5ML
7.5 INJECTION, SOLUTION SUBCUTANEOUS
Qty: 4 PEN | Refills: 0 | Status: SHIPPED | OUTPATIENT
Start: 2023-02-23 | End: 2023-03-28

## 2023-03-01 ENCOUNTER — LAB VISIT (OUTPATIENT)
Dept: LAB | Facility: HOSPITAL | Age: 48
End: 2023-03-01
Payer: COMMERCIAL

## 2023-03-01 ENCOUNTER — HOSPITAL ENCOUNTER (OUTPATIENT)
Dept: ENDOCRINOLOGY | Facility: CLINIC | Age: 48
Discharge: HOME | End: 2023-03-01
Attending: NURSE PRACTITIONER
Payer: COMMERCIAL

## 2023-03-01 DIAGNOSIS — E04.1 THYROID NODULE: ICD-10-CM

## 2023-03-01 DIAGNOSIS — E11.10 TYPE 2 DIABETES MELLITUS WITH KETOACIDOSIS WITHOUT COMA, WITHOUT LONG-TERM CURRENT USE OF INSULIN: ICD-10-CM

## 2023-03-01 LAB
ALBUMIN SERPL BCP-MCNC: 3.7 G/DL (ref 3.5–5.2)
ALP SERPL-CCNC: 64 U/L (ref 55–135)
ALT SERPL W/O P-5'-P-CCNC: 17 U/L (ref 10–44)
ANION GAP SERPL CALC-SCNC: 10 MMOL/L (ref 8–16)
AST SERPL-CCNC: 15 U/L (ref 10–40)
BILIRUB SERPL-MCNC: 0.3 MG/DL (ref 0.1–1)
BUN SERPL-MCNC: 19 MG/DL (ref 6–20)
CALCIUM SERPL-MCNC: 9.8 MG/DL (ref 8.7–10.5)
CHLORIDE SERPL-SCNC: 107 MMOL/L (ref 95–110)
CO2 SERPL-SCNC: 24 MMOL/L (ref 23–29)
CREAT SERPL-MCNC: 0.7 MG/DL (ref 0.5–1.4)
EST. GFR  (NO RACE VARIABLE): >60 ML/MIN/1.73 M^2
ESTIMATED AVG GLUCOSE: 148 MG/DL (ref 68–131)
GLUCOSE SERPL-MCNC: 177 MG/DL (ref 70–110)
HBA1C MFR BLD: 6.8 % (ref 4–5.6)
POTASSIUM SERPL-SCNC: 3.8 MMOL/L (ref 3.5–5.1)
PROT SERPL-MCNC: 7.1 G/DL (ref 6–8.4)
SODIUM SERPL-SCNC: 141 MMOL/L (ref 136–145)

## 2023-03-01 PROCEDURE — 80053 COMPREHEN METABOLIC PANEL: CPT | Performed by: NURSE PRACTITIONER

## 2023-03-01 PROCEDURE — 76536 US EXAM OF HEAD AND NECK: CPT | Mod: S$GLB,,, | Performed by: INTERNAL MEDICINE

## 2023-03-01 PROCEDURE — 83036 HEMOGLOBIN GLYCOSYLATED A1C: CPT | Performed by: NURSE PRACTITIONER

## 2023-03-01 PROCEDURE — 36415 COLL VENOUS BLD VENIPUNCTURE: CPT | Performed by: NURSE PRACTITIONER

## 2023-03-01 PROCEDURE — 76536 US SOFT TISSUE HEAD NECK THYROID: ICD-10-PCS | Mod: S$GLB,,, | Performed by: INTERNAL MEDICINE

## 2023-03-21 NOTE — PROGRESS NOTES
Subjective:      Patient ID: Heather Gomez is a 47 y.o. female.    Chief Complaint:  No chief complaint on file.      History of Present Illness  Heather Gomez is here for follow up of DM, thyroid nodule and subclinical hyperthyroidism.  Previously seen by me 11/2022.  This is a MyChart video visit.    The patient location is: [place]  The chief complaint leading to consultation is: DM  Visit type: Virtual visit with synchronous audio and video  Total time spent with patient: see below  Each patient to whom he or she provides medical services by telemedicine is:  (1) informed of the relationship between the physician and patient and the respective role of any other health care provider with respect to management of the patient; and (2) notified that he or she may decline to receive medical services by telemedicine and may withdraw from such care at any time.    Recent illness, injury, steroids: Denies     With regards to diabetes:    Diagnosed: 2013 4/2021 C peptide 3.12 with glucose of 180    FH of DM:   Mother, father, 4 sisters   Has 2 children - no history of DM     DM Education: 2/2022    Known complications:  DKA : None  RN - Denies  - Eye Exam: 7/2022  PN : Denies   Nephropathy   - Podiatry: None  CAD : Denies    Denies history of pancreatitis & personal/family history of medullary thyroid cancer.     Current regimen:  Metformin 1000mg twice daily  Mounjaro 7.5mg weekly on Sunday   Lantus 14 units twice a day   Novolog 10 units before meals    Reports compliance.     Other medications tried:  Metformin - unable to tolerate due to GI upset   Invokana - discontinued due to abdominal pain, nausea, yeast infections    Actos - discontinued during hospital stay   Januvia - discontinued during hospital stay     Glucose Monitor:   6 times a day testing  Log reviewed: Dexcom G6 - sensor downloaded and reviewed, scanned in media tab.    Hypoglycemia:  One glucose of 66 - hypoglycemia on sensor  report is false   Knows how to correct with 15 grams of carbs- juice, coke, or a peppermint.     Diet/Exercise:  Eats 1-2 meals a day.   B: SKIPS  L: fast food OR SKIPS  D: fast food OR skips   Snacks : Denies.   Drinks : water, rarely a soft drink  Exercise: None - active lifestyle.      Diabetes Management Status    Hemoglobin A1C   Date Value Ref Range Status   03/01/2023 6.8 (H) 4.0 - 5.6 % Final     Comment:     ADA Screening Guidelines:  5.7-6.4%  Consistent with prediabetes  >or=6.5%  Consistent with diabetes    High levels of fetal hemoglobin interfere with the HbA1C  assay. Heterozygous hemoglobin variants (HbS, HgC, etc)do  not significantly interfere with this assay.   However, presence of multiple variants may affect accuracy.     05/18/2022 6.5 (H) 4.0 - 5.6 % Final     Comment:     ADA Screening Guidelines:  5.7-6.4%  Consistent with prediabetes  >or=6.5%  Consistent with diabetes    High levels of fetal hemoglobin interfere with the HbA1C  assay. Heterozygous hemoglobin variants (HbS, HgC, etc)do  not significantly interfere with this assay.   However, presence of multiple variants may affect accuracy.     02/09/2022 7.4 (H) 4.0 - 5.6 % Final     Comment:     ADA Screening Guidelines:  5.7-6.4%  Consistent with prediabetes  >or=6.5%  Consistent with diabetes    High levels of fetal hemoglobin interfere with the HbA1C  assay. Heterozygous hemoglobin variants (HbS, HgC, etc)do  not significantly interfere with this assay.   However, presence of multiple variants may affect accuracy.         Statin: Taking  ACE/ARB: Taking  Screening or Prevention Patient's value Goal Complete/Controlled?   HgA1C Testing and Control   Lab Results   Component Value Date    HGBA1C 6.8 (H) 03/01/2023      Annually/Less than 8% Yes   Lipid profile : 02/09/2022 Annually Yes   LDL control Lab Results   Component Value Date    LDLCALC 122.0 02/09/2022    Annually/Less than 100 mg/dl  Yes   Nephropathy screening Lab Results    Component Value Date    LABMICR 34.0 02/09/2022     Lab Results   Component Value Date    PROTEINUA Negative 06/27/2022    Annually No   Blood pressure BP Readings from Last 1 Encounters:   08/12/22 130/80    Less than 140/90 No   Dilated retinal exam : 12/08/2022 Annually Yes   Foot exam   : 02/09/2022 Annually No     With regards to thyroid nodule:    Thyroid US:   3/1/2023  Impression:     1.  Thyroid gland is enlarged with homogenous echotexture.     2.  1 nodule in the right thyroid described above.     3.  1 nodule in the left thyroid described above.     4.  1 nodule in the isthmus described above.     RECOMMENDATIONS:  Follow-up ultrasound in 2 years is recommended.       FNA: None    Signs or Symptoms:   Difficulty breathing: Denies  Difficulty swallowing: Denies  Voice Changes: Hoarseness but not all the time  FH of thyroid nodules but unsure if it was cancer (mother)  Personal history of radiation treatment or exposure: Denies      With regards to subclinical hyperthyroidism:    FH of thyroid disease: sister    2/2022  Thyroid Ab negative.    NM Thyroid Scan  4/6/2022  The 24 hour uptake is elevated at 48.3 % (normal 10-30%).  Homogenous distribution of the radionuclide throughout the thyroid gland without hyper or hypo functional nodules.  Impression:  1. Elevated 24 hour radioiodine uptake by the thyroid.  2. Uniform uptake in the thyroid gland bilaterally.  Overall findings are in keeping with Graves disease..    Lab Results   Component Value Date    TSH 0.805 05/18/2022    G5SEFZF 89 07/24/2018    FREET4 0.91 05/18/2022     Biotin Use: Denies    Review of Systems   Constitutional:  Negative for fatigue.   Eyes:  Negative for visual disturbance.   Respiratory:  Negative for shortness of breath.    Cardiovascular:  Negative for chest pain.   Gastrointestinal:  Negative for abdominal pain.   Musculoskeletal:  Negative for arthralgias.   Skin:  Negative for wound.   Neurological:  Negative for  headaches.         Visit Vitals  LMP 06/17/2019       There is no height or weight on file to calculate BMI.    Lab Review:   Lab Results   Component Value Date    HGBA1C 6.8 (H) 03/01/2023    HGBA1C 6.5 (H) 05/18/2022    HGBA1C 7.4 (H) 02/09/2022       Lab Results   Component Value Date    CHOL 199 02/09/2022    HDL 56 02/09/2022    LDLCALC 122.0 02/09/2022    TRIG 105 02/09/2022    CHOLHDL 28.1 02/09/2022     Lab Results   Component Value Date     03/01/2023    K 3.8 03/01/2023     03/01/2023    CO2 24 03/01/2023     (H) 03/01/2023    BUN 19 03/01/2023    CREATININE 0.7 03/01/2023    CALCIUM 9.8 03/01/2023    PROT 7.1 03/01/2023    ALBUMIN 3.7 03/01/2023    BILITOT 0.3 03/01/2023    ALKPHOS 64 03/01/2023    AST 15 03/01/2023    ALT 17 03/01/2023    ANIONGAP 10 03/01/2023    ESTGFRAFRICA >60.0 05/18/2022    EGFRNONAA >60.0 05/18/2022    TSH 0.805 05/18/2022     Vit D, 25-Hydroxy   Date Value Ref Range Status   05/18/2022 30 30 - 96 ng/mL Final     Comment:     Vitamin D deficiency.........<10 ng/mL                              Vitamin D insufficiency......10-29 ng/mL       Vitamin D sufficiency........> or equal to 30 ng/mL  Vitamin D toxicity............>100 ng/mL       Assessment and Plan     1. Type 2 diabetes mellitus with hyperglycemia, with long-term current use of insulin  tirzepatide (MOUNJARO) 10 mg/0.5 mL PnIj    Hemoglobin A1C    Comprehensive Metabolic Panel    Lipid Panel    Microalbumin/Creatinine Ratio, Urine      2. Thyroid nodule  TSH      3. Subclinical hyperthyroidism  TSH      4. Vitamin D deficiency  Vitamin D      5. Type 2 diabetes mellitus with ketoacidosis without coma, without long-term current use of insulin  metFORMIN (GLUCOPHAGE-XR) 500 MG ER 24hr tablet    LANTUS SOLOSTAR U-100 INSULIN glargine 100 units/mL SubQ pen    NOVOLOG FLEXPEN U-100 INSULIN 100 unit/mL (3 mL) InPn pen            Type 2 diabetes mellitus with hyperglycemia, with long-term current use of  insulin  -- Labs prior to follow up.  -- A1c goal <7%.  -- Medications discussed:  MFM   GLP1-DPP4   RAE   SGLT2   Reviewed potential adverse effects of SGLT-2 inhibitors, including genital mycotic infections, slightly increased risk of UTI, hypersensitivity, hypotension, and hyperkalemia. Advised to maintain water intake of 8-10 cups per day. Advised we need to check chemistry panel at baseline and 2 weeks after starting. Discussed FDA warning reports of ketoacidosis associated with SGLT-2 inhibitors. Advised to seek immediate medical attention and stop the medication if symptoms such as difficulty breathing, nausea, vomiting, abdominal pain, confusion, and unusual fatigue/sleepiness. Discussed possible precipitating factors including major illness/reduced food and fluid intake (advised to stop under these circumstances), and reduced insulin dose. Discussed possible effects of increased fracture risk/decreased bone density. Discussed reports of increased risk of leg and foot amputations with canagliflozin and need to seek urgent care if developed new pain or tenderness, sores or ulcers, or infections in legs/feet.   Insulin   -- Reviewed logs/CGM:  Glucose relatively controlled.  Instructed to send glucose logs in 14 days.  Reach out to me sooner for any glucose <70 or consistently >180.  -- Medication Changes:   Metformin 1000mg twice daily  Mounjaro 10 mg weekly on Sunday (increase 4/2)  Lantus 14 units twice a day   Novolog 10 units before meals  -- Reviewed goals of therapy are to get the best control we can without hypoglycemia.  -- Reviewed patient's current insulin regimen. Clarified proper insulin dose and timing in relation to meals, etc. Insulin injection sites and proper rotation instructed.    -- Advised frequent self blood glucose monitoring.  Patient encouraged to document glucose results and bring them to every clinic visit.  -- Hypoglycemia precautions discussed. Instructed on precautions before  driving.    -- Call for Bg repeatedly < 90 or > 180.   -- Close adherence to lifestyle changes recommended.   -- Periodic follow ups for eye evaluations, foot care and dental care suggested.    Thyroid nodule  -- Denies compressive symptoms.  -- Repeat thyroid US 3/2025.    Subclinical hyperthyroidism  -- Biochemically euthyroid.  -- Monitor annually.     Vitamin D deficiency  -- Check vitamin D.        Follow up in about 4 months (around 7/28/2023).    I spent 30 minutes face-to-face with the patient, over half of the visit was spent on counseling and/or coordinating the care of the patient.    Counseling includes:  Diagnostic results, impressions, recommendations   Prognosis   Risk and benefits of management/treatment options   Instructions for management treatment and or follow-up   Importance of compliance with management   Risk factor reduction   Patient education

## 2023-03-28 ENCOUNTER — OFFICE VISIT (OUTPATIENT)
Dept: ENDOCRINOLOGY | Facility: CLINIC | Age: 48
End: 2023-03-28
Payer: COMMERCIAL

## 2023-03-28 DIAGNOSIS — E11.65 TYPE 2 DIABETES MELLITUS WITH HYPERGLYCEMIA, WITH LONG-TERM CURRENT USE OF INSULIN: Primary | ICD-10-CM

## 2023-03-28 DIAGNOSIS — E04.1 THYROID NODULE: ICD-10-CM

## 2023-03-28 DIAGNOSIS — Z79.4 TYPE 2 DIABETES MELLITUS WITH HYPERGLYCEMIA, WITH LONG-TERM CURRENT USE OF INSULIN: Primary | ICD-10-CM

## 2023-03-28 DIAGNOSIS — E55.9 VITAMIN D DEFICIENCY: ICD-10-CM

## 2023-03-28 DIAGNOSIS — E05.90 SUBCLINICAL HYPERTHYROIDISM: ICD-10-CM

## 2023-03-28 DIAGNOSIS — E11.10 TYPE 2 DIABETES MELLITUS WITH KETOACIDOSIS WITHOUT COMA, WITHOUT LONG-TERM CURRENT USE OF INSULIN: ICD-10-CM

## 2023-03-28 PROCEDURE — 3044F HG A1C LEVEL LT 7.0%: CPT | Mod: CPTII,95,, | Performed by: NURSE PRACTITIONER

## 2023-03-28 PROCEDURE — 1160F PR REVIEW ALL MEDS BY PRESCRIBER/CLIN PHARMACIST DOCUMENTED: ICD-10-PCS | Mod: CPTII,95,, | Performed by: NURSE PRACTITIONER

## 2023-03-28 PROCEDURE — 1160F RVW MEDS BY RX/DR IN RCRD: CPT | Mod: CPTII,95,, | Performed by: NURSE PRACTITIONER

## 2023-03-28 PROCEDURE — 1159F PR MEDICATION LIST DOCUMENTED IN MEDICAL RECORD: ICD-10-PCS | Mod: CPTII,95,, | Performed by: NURSE PRACTITIONER

## 2023-03-28 PROCEDURE — 99214 PR OFFICE/OUTPT VISIT, EST, LEVL IV, 30-39 MIN: ICD-10-PCS | Mod: 25,95,, | Performed by: NURSE PRACTITIONER

## 2023-03-28 PROCEDURE — 3044F PR MOST RECENT HEMOGLOBIN A1C LEVEL <7.0%: ICD-10-PCS | Mod: CPTII,95,, | Performed by: NURSE PRACTITIONER

## 2023-03-28 PROCEDURE — 99214 OFFICE O/P EST MOD 30 MIN: CPT | Mod: 25,95,, | Performed by: NURSE PRACTITIONER

## 2023-03-28 PROCEDURE — 3072F LOW RISK FOR RETINOPATHY: CPT | Mod: CPTII,95,, | Performed by: NURSE PRACTITIONER

## 2023-03-28 PROCEDURE — 1159F MED LIST DOCD IN RCRD: CPT | Mod: CPTII,95,, | Performed by: NURSE PRACTITIONER

## 2023-03-28 PROCEDURE — 3072F PR LOW RISK FOR RETINOPATHY: ICD-10-PCS | Mod: CPTII,95,, | Performed by: NURSE PRACTITIONER

## 2023-03-28 RX ORDER — INSULIN ASPART 100 [IU]/ML
10 INJECTION, SOLUTION INTRAVENOUS; SUBCUTANEOUS
Qty: 30 ML | Refills: 3 | Status: SHIPPED | OUTPATIENT
Start: 2023-03-28 | End: 2023-05-03 | Stop reason: SDUPTHER

## 2023-03-28 RX ORDER — METFORMIN HYDROCHLORIDE 500 MG/1
1000 TABLET, EXTENDED RELEASE ORAL 2 TIMES DAILY WITH MEALS
Qty: 360 TABLET | Refills: 3 | Status: SHIPPED | OUTPATIENT
Start: 2023-03-28 | End: 2023-05-03 | Stop reason: SDUPTHER

## 2023-03-28 RX ORDER — TIRZEPATIDE 10 MG/.5ML
10 INJECTION, SOLUTION SUBCUTANEOUS
Qty: 4 PEN | Refills: 0 | Status: SHIPPED | OUTPATIENT
Start: 2023-03-28 | End: 2023-04-26 | Stop reason: SDUPTHER

## 2023-03-28 RX ORDER — INSULIN GLARGINE 100 [IU]/ML
14 INJECTION, SOLUTION SUBCUTANEOUS 2 TIMES DAILY
Qty: 20 ML | Refills: 3 | Status: SHIPPED | OUTPATIENT
Start: 2023-03-28 | End: 2024-02-08

## 2023-03-28 NOTE — ASSESSMENT & PLAN NOTE
-- Labs prior to follow up.  -- A1c goal <7%.  -- Medications discussed:  MFM   GLP1-DPP4   RAE   SGLT2   Reviewed potential adverse effects of SGLT-2 inhibitors, including genital mycotic infections, slightly increased risk of UTI, hypersensitivity, hypotension, and hyperkalemia. Advised to maintain water intake of 8-10 cups per day. Advised we need to check chemistry panel at baseline and 2 weeks after starting. Discussed FDA warning reports of ketoacidosis associated with SGLT-2 inhibitors. Advised to seek immediate medical attention and stop the medication if symptoms such as difficulty breathing, nausea, vomiting, abdominal pain, confusion, and unusual fatigue/sleepiness. Discussed possible precipitating factors including major illness/reduced food and fluid intake (advised to stop under these circumstances), and reduced insulin dose. Discussed possible effects of increased fracture risk/decreased bone density. Discussed reports of increased risk of leg and foot amputations with canagliflozin and need to seek urgent care if developed new pain or tenderness, sores or ulcers, or infections in legs/feet.   Insulin   -- Reviewed logs/CGM:  Glucose relatively controlled.  Instructed to send glucose logs in 14 days.  Reach out to me sooner for any glucose <70 or consistently >180.  -- Medication Changes:   Metformin 1000mg twice daily  Mounjaro 10 mg weekly on Sunday (increase 4/2)  Lantus 14 units twice a day   Novolog 10 units before meals  -- Reviewed goals of therapy are to get the best control we can without hypoglycemia.  -- Reviewed patient's current insulin regimen. Clarified proper insulin dose and timing in relation to meals, etc. Insulin injection sites and proper rotation instructed.    -- Advised frequent self blood glucose monitoring.  Patient encouraged to document glucose results and bring them to every clinic visit.  -- Hypoglycemia precautions discussed. Instructed on precautions before driving.     -- Call for Bg repeatedly < 90 or > 180.   -- Close adherence to lifestyle changes recommended.   -- Periodic follow ups for eye evaluations, foot care and dental care suggested.

## 2023-03-28 NOTE — Clinical Note
Upload Dexcom Virtual visit in 4 months with fasting labs prior Orders Placed This Encounter     Hemoglobin A1C     Comprehensive Metabolic Panel     TSH     Vitamin D     Lipid Panel     Microalbumin/Creatinine Ratio, Urine

## 2023-03-30 ENCOUNTER — OFFICE VISIT (OUTPATIENT)
Dept: SLEEP MEDICINE | Facility: CLINIC | Age: 48
End: 2023-03-30
Payer: COMMERCIAL

## 2023-03-30 DIAGNOSIS — G47.00 INSOMNIA, UNSPECIFIED TYPE: Primary | ICD-10-CM

## 2023-03-30 PROCEDURE — 99214 PR OFFICE/OUTPT VISIT, EST, LEVL IV, 30-39 MIN: ICD-10-PCS | Mod: 25,CR,95, | Performed by: NURSE PRACTITIONER

## 2023-03-30 PROCEDURE — 3044F PR MOST RECENT HEMOGLOBIN A1C LEVEL <7.0%: ICD-10-PCS | Mod: CPTII,95,, | Performed by: NURSE PRACTITIONER

## 2023-03-30 PROCEDURE — 3072F PR LOW RISK FOR RETINOPATHY: ICD-10-PCS | Mod: CPTII,95,, | Performed by: NURSE PRACTITIONER

## 2023-03-30 PROCEDURE — 99214 OFFICE O/P EST MOD 30 MIN: CPT | Mod: 25,CR,95, | Performed by: NURSE PRACTITIONER

## 2023-03-30 PROCEDURE — 3044F HG A1C LEVEL LT 7.0%: CPT | Mod: CPTII,95,, | Performed by: NURSE PRACTITIONER

## 2023-03-30 PROCEDURE — 3072F LOW RISK FOR RETINOPATHY: CPT | Mod: CPTII,95,, | Performed by: NURSE PRACTITIONER

## 2023-03-30 RX ORDER — ZOLPIDEM TARTRATE 5 MG/1
5 TABLET ORAL NIGHTLY PRN
Qty: 30 TABLET | Refills: 3 | Status: SHIPPED | OUTPATIENT
Start: 2023-03-30 | End: 2023-05-03 | Stop reason: SDUPTHER

## 2023-03-30 NOTE — PROGRESS NOTES
The [place]  The chief complaint leading to consultation is: MAEVE    Visit type: audiovisual    Face to Face time with patient 10 minutes of total time spent on the encounter, which includes face to face time and non-face to face time preparing to see the patient (eg, review of tests), Obtaining and/or reviewing separately obtained history, Documenting clinical information in the electronic or other health record, Independently interpreting results (not separately reported) and communicating results to the patient/family/caregiver, or Care coordination (not separately reported). Each patient to whom he or she provides medical services by telemedicine is:  (1) informed of the relationship between the physician and patient and the respective role of any other health care provider with respect to management of the patient; and (2) notified that he or she may decline to receive medical services by telemedicine and may withdraw from such care at any time.    Lunesta 3mg helped her get 2 wks of sleep then stopped and she began adding melatonin and supplement called CARY to it which didn't help. Sometimes up all night then sleepy right when has to get up for work. Even bought a sleep # bed. Never had sleep study yet. Honestly she is in bed in evening time watching tv.     HX :She has never had a sleep study. Difficultly falling asleep, sometimes up days at a time. May feel drowsy when driving. Tried cold air in bedroom/ambien 10mg and trazadone ?dose but had side effects (memory) or were ineffective. Feels exhausted at work.  Denies witnessed apneic pauses. Denies snoring or witnessed apneic pauses. Occasional am headaches. Up until 5a then sleeps few hours then awakens for unknown reason.   Bp stable  HgBA1c 6.5, sees endo    BT:10-11pm  WT- 8a, when can't sleep gets up walks the house/lights are on, listens to relaxing music/deep breathing exercises but ear bud in causes head ache    FAMILY  HISTORY:sister +MAEVE  SOCIAL HISTORY: . /on computer      ASSESSMENT:   1. Insomnia NEC. Multi-factorial - excess time in bed, poor sleep hygiene, and likely paradoxical insomnia play a role.  HTN  DM  Unspecified sleep apnea      PLAN:  BEGIN Behavioral Modification:  Implement stimulus control: [place] bedroom for sleep only.  REFER again to CBT-I, trial ambien but lower dose 5mg  s/t  Avoid clock watching  Avoid thinking/worrying about sleep when trying to fall asleep  Continue to limit caffeinated products  Continue to ensure bedroom is dark, quiet and cool    When sleeping better, plan HST assess for MAEVE

## 2023-04-12 ENCOUNTER — PATIENT MESSAGE (OUTPATIENT)
Dept: ENDOCRINOLOGY | Facility: CLINIC | Age: 48
End: 2023-04-12
Payer: COMMERCIAL

## 2023-04-17 ENCOUNTER — PATIENT MESSAGE (OUTPATIENT)
Dept: ENDOCRINOLOGY | Facility: CLINIC | Age: 48
End: 2023-04-17
Payer: COMMERCIAL

## 2023-04-17 ENCOUNTER — TELEPHONE (OUTPATIENT)
Dept: ENDOCRINOLOGY | Facility: CLINIC | Age: 48
End: 2023-04-17
Payer: COMMERCIAL

## 2023-04-18 ENCOUNTER — OFFICE VISIT (OUTPATIENT)
Dept: FAMILY MEDICINE | Facility: CLINIC | Age: 48
End: 2023-04-18
Payer: COMMERCIAL

## 2023-04-18 ENCOUNTER — PATIENT MESSAGE (OUTPATIENT)
Dept: FAMILY MEDICINE | Facility: CLINIC | Age: 48
End: 2023-04-18

## 2023-04-18 VITALS
BODY MASS INDEX: 30.78 KG/M2 | DIASTOLIC BLOOD PRESSURE: 90 MMHG | WEIGHT: 180.31 LBS | HEIGHT: 64 IN | OXYGEN SATURATION: 98 % | HEART RATE: 98 BPM | SYSTOLIC BLOOD PRESSURE: 132 MMHG

## 2023-04-18 DIAGNOSIS — I10 HYPERTENSION, UNSPECIFIED TYPE: ICD-10-CM

## 2023-04-18 DIAGNOSIS — E11.10 TYPE 2 DIABETES MELLITUS WITH KETOACIDOSIS WITHOUT COMA, WITHOUT LONG-TERM CURRENT USE OF INSULIN: ICD-10-CM

## 2023-04-18 DIAGNOSIS — H66.91 RIGHT OTITIS MEDIA, UNSPECIFIED OTITIS MEDIA TYPE: ICD-10-CM

## 2023-04-18 DIAGNOSIS — F41.9 ANXIETY: ICD-10-CM

## 2023-04-18 DIAGNOSIS — Z76.0 MEDICATION REFILL: Primary | ICD-10-CM

## 2023-04-18 PROCEDURE — 99999 PR PBB SHADOW E&M-EST. PATIENT-LVL V: ICD-10-PCS | Mod: PBBFAC,,, | Performed by: FAMILY MEDICINE

## 2023-04-18 PROCEDURE — 3008F BODY MASS INDEX DOCD: CPT | Mod: CPTII,S$GLB,, | Performed by: FAMILY MEDICINE

## 2023-04-18 PROCEDURE — 1159F MED LIST DOCD IN RCRD: CPT | Mod: CPTII,S$GLB,, | Performed by: FAMILY MEDICINE

## 2023-04-18 PROCEDURE — 3075F PR MOST RECENT SYSTOLIC BLOOD PRESS GE 130-139MM HG: ICD-10-PCS | Mod: CPTII,S$GLB,, | Performed by: FAMILY MEDICINE

## 2023-04-18 PROCEDURE — 3080F PR MOST RECENT DIASTOLIC BLOOD PRESSURE >= 90 MM HG: ICD-10-PCS | Mod: CPTII,S$GLB,, | Performed by: FAMILY MEDICINE

## 2023-04-18 PROCEDURE — 99214 PR OFFICE/OUTPT VISIT, EST, LEVL IV, 30-39 MIN: ICD-10-PCS | Mod: S$GLB,,, | Performed by: FAMILY MEDICINE

## 2023-04-18 PROCEDURE — 3072F LOW RISK FOR RETINOPATHY: CPT | Mod: CPTII,S$GLB,, | Performed by: FAMILY MEDICINE

## 2023-04-18 PROCEDURE — 3008F PR BODY MASS INDEX (BMI) DOCUMENTED: ICD-10-PCS | Mod: CPTII,S$GLB,, | Performed by: FAMILY MEDICINE

## 2023-04-18 PROCEDURE — 3044F HG A1C LEVEL LT 7.0%: CPT | Mod: CPTII,S$GLB,, | Performed by: FAMILY MEDICINE

## 2023-04-18 PROCEDURE — 3072F PR LOW RISK FOR RETINOPATHY: ICD-10-PCS | Mod: CPTII,S$GLB,, | Performed by: FAMILY MEDICINE

## 2023-04-18 PROCEDURE — 3080F DIAST BP >= 90 MM HG: CPT | Mod: CPTII,S$GLB,, | Performed by: FAMILY MEDICINE

## 2023-04-18 PROCEDURE — 1159F PR MEDICATION LIST DOCUMENTED IN MEDICAL RECORD: ICD-10-PCS | Mod: CPTII,S$GLB,, | Performed by: FAMILY MEDICINE

## 2023-04-18 PROCEDURE — 3075F SYST BP GE 130 - 139MM HG: CPT | Mod: CPTII,S$GLB,, | Performed by: FAMILY MEDICINE

## 2023-04-18 PROCEDURE — 99214 OFFICE O/P EST MOD 30 MIN: CPT | Mod: S$GLB,,, | Performed by: FAMILY MEDICINE

## 2023-04-18 PROCEDURE — 3044F PR MOST RECENT HEMOGLOBIN A1C LEVEL <7.0%: ICD-10-PCS | Mod: CPTII,S$GLB,, | Performed by: FAMILY MEDICINE

## 2023-04-18 PROCEDURE — 99999 PR PBB SHADOW E&M-EST. PATIENT-LVL V: CPT | Mod: PBBFAC,,, | Performed by: FAMILY MEDICINE

## 2023-04-18 RX ORDER — AMOXICILLIN AND CLAVULANATE POTASSIUM 875; 125 MG/1; MG/1
1 TABLET, FILM COATED ORAL EVERY 12 HOURS
Qty: 14 TABLET | Refills: 0 | Status: SHIPPED | OUTPATIENT
Start: 2023-04-18 | End: 2023-04-25

## 2023-04-18 RX ORDER — IRBESARTAN AND HYDROCHLOROTHIAZIDE 300; 12.5 MG/1; MG/1
1 TABLET, FILM COATED ORAL DAILY
Qty: 90 TABLET | Refills: 1 | Status: SHIPPED | OUTPATIENT
Start: 2023-04-18 | End: 2023-10-18 | Stop reason: SDUPTHER

## 2023-04-18 RX ORDER — SERTRALINE HYDROCHLORIDE 50 MG/1
TABLET, FILM COATED ORAL
Qty: 30 TABLET | Refills: 2 | Status: SHIPPED | OUTPATIENT
Start: 2023-04-18 | End: 2023-06-29 | Stop reason: SDUPTHER

## 2023-04-18 RX ORDER — PRAVASTATIN SODIUM 40 MG/1
40 TABLET ORAL DAILY
Qty: 90 TABLET | Refills: 2 | Status: SHIPPED | OUTPATIENT
Start: 2023-04-18 | End: 2024-01-25

## 2023-04-19 NOTE — PROGRESS NOTES
(Portions of this note were dictated using voice recognition software and may contain dictation related errors in spelling/grammar/syntax not found on text review)    CC:   Chief Complaint   Patient presents with    Hypertension    Medication Refill       HPI: 47 y.o. female presented with concerns about hypertension and needs medication refills, she is new to me, she plans to establish care here.  She is medical history significant for hypertension, type 2 diabetes mellitus, hyperlipidemia, gastroesophageal reflux disease.  She takes irbesartan-HCTZ 150 mg-12.5 mg, reports blood pressure has been elevated with taking medication regularly.  She also need refills on Zoloft 50 mg and pravastatin.  She is concerns about having right ear pain with feeling of glad swelling on the same side, denies fever, chills, body aches, rhinorrhea, sore throat.  She denies having any other symptoms or concerns.    Past Medical History:   Diagnosis Date    Anticoagulant long-term use     DVT, popliteal, acute     GERD (gastroesophageal reflux disease)     History of pulmonary embolus (PE)     HLD (hyperlipidemia)     Hypertension     Type 2 diabetes mellitus, controlled        Past Surgical History:   Procedure Laterality Date     SECTION      x2    COLONOSCOPY N/A 3/3/2022    Procedure: COLONOSCOPY suprep;  Surgeon: Michael Tirado MD;  Location: [facility] ENDO;  Service: Endoscopy;  Laterality: N/A;    ENDOMETRIAL ABLATION      HYSTERECTOMY      LAPAROSCOPY-ASSISTED VAGINAL HYSTERECTOMY Bilateral 2019    Procedure: HYSTERECTOMY, VAGINAL, LAPAROSCOPY-ASSISTED;  Surgeon: Lashell Clayton MD;  Location: [facility] OR;  Service: OB/GYN;  Laterality: Bilateral;  video    UPPER GASTROINTESTINAL ENDOSCOPY              Family History   Problem Relation Age of Onset    Diabetes Mother     Hypertension Mother     Hyperlipidemia Mother     Hyperthyroidism Mother     Stroke Father     Hyperlipidemia Father     Hypertension  Father     Diabetes Father     Breast cancer Sister        Social History     Tobacco Use    Smoking status: Never    Smokeless tobacco: Never   Substance Use Topics    Alcohol use: No    Drug use: No       Lab Results   Component Value Date    WBC 8.60 09/29/2020    HGB 12.9 09/29/2020    HCT 40.0 09/29/2020    MCV 94 09/29/2020     09/29/2020    CHOL 199 02/09/2022    TRIG 105 02/09/2022    HDL 56 02/09/2022    ALT 17 03/01/2023    AST 15 03/01/2023    BILITOT 0.3 03/01/2023    ALKPHOS 64 03/01/2023     03/01/2023    K 3.8 03/01/2023     03/01/2023    CREATININE 0.7 03/01/2023    ESTGFRAFRICA >60.0 05/18/2022    EGFRNONAA >60.0 05/18/2022    CALCIUM 9.8 03/01/2023    ALBUMIN 3.7 03/01/2023    BUN 19 03/01/2023    CO2 24 03/01/2023    TSH 0.805 05/18/2022    INR 1.0 07/08/2019    HGBA1C 6.8 (H) 03/01/2023    MICALBCREAT 37.4 (H) 02/09/2022    LDLCALC 122.0 02/09/2022     (H) 03/01/2023    [id number] 30 05/18/2022             Vital signs reviewed  PE:   APPEARANCE: Well nourished, well developed, in no acute distress.    HEAD: Normocephalic, atraumatic.  EYES: EOMI.  Conjunctivae noninjected.  EAR:  Erythematous external auditory canal, TM bulging, TTP with otoscope, TTP on parotid gland  NOSE: Mucosa pink. Airway clear.  MOUTH & THROAT: No tonsillar enlargement. No pharyngeal erythema or exudate.   NECK: Supple with no cervical lymphadenopathy.    CHEST: Good inspiratory effort. Lungs clear to auscultation with no wheezes or crackles.  CARDIOVASCULAR: Normal S1, S2. No rubs, murmurs, or gallops.  ABDOMEN: Bowel sounds normal. Not distended. Soft. No tenderness or masses. No organomegaly.  EXTREMITIES: No edema, cyanosis, or clubbing.    Review of Systems   Constitutional:  Negative for chills, fatigue and fever.   HENT:  Positive for ear pain. Negative for nasal congestion, rhinorrhea, sinus pressure/congestion and sore throat.    Respiratory:  Negative for cough, shortness of breath and  wheezing.    Cardiovascular:  Negative for chest pain, palpitations and leg swelling.   Gastrointestinal: Negative.    Genitourinary: Negative.    Neurological: Negative.    Psychiatric/Behavioral: Negative.     All other systems reviewed and are negative.    IMPRESSION  1. Medication refill    2. Hypertension, unspecified type    3. Right otitis media, unspecified otitis media type    4. Type 2 diabetes mellitus with ketoacidosis without coma, without long-term current use of insulin    5. Anxiety            PLAN      1. Hypertension, unspecified type    Dose of medication increased    - irbesartan-hydrochlorothiazide (AVALIDE) 300-12.5 mg per tablet; Take 1 tablet by mouth once daily.  Dispense: 90 tablet; Refill: 1    Low-salt diet, regular exercise  Advised to monitor blood pressure at home      2. Right otitis media, unspecified otitis media type    - amoxicillin-clavulanate 875-125mg (AUGMENTIN) 875-125 mg per tablet; Take 1 tablet by mouth every 12 (twelve) hours. for 7 days  Dispense: 14 tablet; Refill: 0      3. Type 2 diabetes mellitus with ketoacidosis without coma, without long-term current use of insulin    Followed by endocrinology    - pravastatin (PRAVACHOL) 40 MG tablet; Take 1 tablet (40 mg total) by mouth once daily.  Dispense: 90 tablet; Refill: 2      4. Anxiety    - sertraline (ZOLOFT) 50 MG tablet; Take 1 tablet by mouth once daily  Dispense: 30 tablet; Refill: 2      5. Medication refill          Age/demographic appropriate health maintenance:    Health Maintenance Due   Topic Date Due    Hepatitis C Screening  Never done    Pneumococcal Vaccines (Age 0-64) (1 - PCV) Never done    HIV Screening  Never done    TETANUS VACCINE  Never done    COVID-19 Vaccine (3 - Booster for Moderna series) 06/10/2021    Mammogram  10/17/2021    Influenza Vaccine (1) Never done    Diabetes Urine Screening  02/09/2023    Foot Exam  02/09/2023    Lipid Panel  02/09/2023         F/U appt on 5/2 to establish  care      Larry Landa

## 2023-04-23 DIAGNOSIS — E11.10 TYPE 2 DIABETES MELLITUS WITH KETOACIDOSIS WITHOUT COMA, WITHOUT LONG-TERM CURRENT USE OF INSULIN: ICD-10-CM

## 2023-04-24 RX ORDER — BLOOD-GLUCOSE TRANSMITTER
1 EACH MISCELLANEOUS CONTINUOUS PRN
Qty: 1 EACH | Status: SHIPPED | OUTPATIENT
Start: 2023-04-24 | End: 2024-03-13

## 2023-04-26 DIAGNOSIS — Z79.4 TYPE 2 DIABETES MELLITUS WITH HYPERGLYCEMIA, WITH LONG-TERM CURRENT USE OF INSULIN: ICD-10-CM

## 2023-04-26 DIAGNOSIS — E11.65 TYPE 2 DIABETES MELLITUS WITH HYPERGLYCEMIA, WITH LONG-TERM CURRENT USE OF INSULIN: ICD-10-CM

## 2023-04-26 RX ORDER — TIRZEPATIDE 10 MG/.5ML
10 INJECTION, SOLUTION SUBCUTANEOUS
Qty: 4 PEN | Refills: 0 | Status: SHIPPED | OUTPATIENT
Start: 2023-04-26 | End: 2023-05-23 | Stop reason: SDUPTHER

## 2023-05-02 ENCOUNTER — OFFICE VISIT (OUTPATIENT)
Dept: FAMILY MEDICINE | Facility: CLINIC | Age: 48
End: 2023-05-02
Payer: COMMERCIAL

## 2023-05-02 VITALS
OXYGEN SATURATION: 96 % | SYSTOLIC BLOOD PRESSURE: 132 MMHG | WEIGHT: 178.38 LBS | HEIGHT: 64 IN | HEART RATE: 97 BPM | BODY MASS INDEX: 30.45 KG/M2 | DIASTOLIC BLOOD PRESSURE: 76 MMHG

## 2023-05-02 DIAGNOSIS — Z12.31 SCREENING MAMMOGRAM FOR BREAST CANCER: ICD-10-CM

## 2023-05-02 DIAGNOSIS — E11.9 CONTROLLED TYPE 2 DIABETES MELLITUS WITHOUT COMPLICATION, WITH LONG-TERM CURRENT USE OF INSULIN: ICD-10-CM

## 2023-05-02 DIAGNOSIS — Z76.89 ENCOUNTER TO ESTABLISH CARE WITH NEW DOCTOR: Primary | ICD-10-CM

## 2023-05-02 DIAGNOSIS — E66.09 CLASS 1 OBESITY DUE TO EXCESS CALORIES WITH SERIOUS COMORBIDITY AND BODY MASS INDEX (BMI) OF 30.0 TO 30.9 IN ADULT: ICD-10-CM

## 2023-05-02 DIAGNOSIS — Z79.4 CONTROLLED TYPE 2 DIABETES MELLITUS WITHOUT COMPLICATION, WITH LONG-TERM CURRENT USE OF INSULIN: ICD-10-CM

## 2023-05-02 DIAGNOSIS — I10 PRIMARY HYPERTENSION: ICD-10-CM

## 2023-05-02 DIAGNOSIS — E78.2 MIXED HYPERLIPIDEMIA: ICD-10-CM

## 2023-05-02 PROCEDURE — 3044F HG A1C LEVEL LT 7.0%: CPT | Mod: CPTII,S$GLB,, | Performed by: FAMILY MEDICINE

## 2023-05-02 PROCEDURE — 99999 PR PBB SHADOW E&M-EST. PATIENT-LVL V: CPT | Mod: PBBFAC,,, | Performed by: FAMILY MEDICINE

## 2023-05-02 PROCEDURE — 3008F BODY MASS INDEX DOCD: CPT | Mod: CPTII,S$GLB,, | Performed by: FAMILY MEDICINE

## 2023-05-02 PROCEDURE — 1159F MED LIST DOCD IN RCRD: CPT | Mod: CPTII,S$GLB,, | Performed by: FAMILY MEDICINE

## 2023-05-02 PROCEDURE — 99214 OFFICE O/P EST MOD 30 MIN: CPT | Mod: S$GLB,,, | Performed by: FAMILY MEDICINE

## 2023-05-02 PROCEDURE — 3075F SYST BP GE 130 - 139MM HG: CPT | Mod: CPTII,S$GLB,, | Performed by: FAMILY MEDICINE

## 2023-05-02 PROCEDURE — 3078F DIAST BP <80 MM HG: CPT | Mod: CPTII,S$GLB,, | Performed by: FAMILY MEDICINE

## 2023-05-02 PROCEDURE — 3075F PR MOST RECENT SYSTOLIC BLOOD PRESS GE 130-139MM HG: ICD-10-PCS | Mod: CPTII,S$GLB,, | Performed by: FAMILY MEDICINE

## 2023-05-02 PROCEDURE — 3078F PR MOST RECENT DIASTOLIC BLOOD PRESSURE < 80 MM HG: ICD-10-PCS | Mod: CPTII,S$GLB,, | Performed by: FAMILY MEDICINE

## 2023-05-02 PROCEDURE — 3008F PR BODY MASS INDEX (BMI) DOCUMENTED: ICD-10-PCS | Mod: CPTII,S$GLB,, | Performed by: FAMILY MEDICINE

## 2023-05-02 PROCEDURE — 1159F PR MEDICATION LIST DOCUMENTED IN MEDICAL RECORD: ICD-10-PCS | Mod: CPTII,S$GLB,, | Performed by: FAMILY MEDICINE

## 2023-05-02 PROCEDURE — 99999 PR PBB SHADOW E&M-EST. PATIENT-LVL V: ICD-10-PCS | Mod: PBBFAC,,, | Performed by: FAMILY MEDICINE

## 2023-05-02 PROCEDURE — 3072F PR LOW RISK FOR RETINOPATHY: ICD-10-PCS | Mod: CPTII,S$GLB,, | Performed by: FAMILY MEDICINE

## 2023-05-02 PROCEDURE — 99214 PR OFFICE/OUTPT VISIT, EST, LEVL IV, 30-39 MIN: ICD-10-PCS | Mod: S$GLB,,, | Performed by: FAMILY MEDICINE

## 2023-05-02 PROCEDURE — 3044F PR MOST RECENT HEMOGLOBIN A1C LEVEL <7.0%: ICD-10-PCS | Mod: CPTII,S$GLB,, | Performed by: FAMILY MEDICINE

## 2023-05-02 PROCEDURE — 3072F LOW RISK FOR RETINOPATHY: CPT | Mod: CPTII,S$GLB,, | Performed by: FAMILY MEDICINE

## 2023-05-02 RX ORDER — AZELASTINE 1 MG/ML
SPRAY, METERED NASAL
COMMUNITY
Start: 2023-02-06

## 2023-05-02 NOTE — PROGRESS NOTES
(Portions of this note were dictated using voice recognition software and may contain dictation related errors in spelling/grammar/syntax not found on text review)    CC:   Chief Complaint   Patient presents with    [facility]    Hypertension       HPI: 47 [facility] She has medical history significant for hypertension, type 2 diabetes mellitus, hyperlipidemia, gastroesophageal reflux disease.  She takes irbesartan-HCTZ 300 mg-12.5 mg, reports blood pressure has been stable since started on higher dose of irbesartan on last visit. She was prescribed Augmentin for right ear infection, she states glands swelling and pain in ear and throat has improved, however, she still has blocked sensation in right ear, she has hx of allergic rhinitis, uses Flonase nasal spray but not any antihistamine, denies fever, chills, body aches, rhinorrhea, sore throat. She needs mammogram. She denies having any other symptoms or concerns.    Past Medical History:   Diagnosis Date    Anticoagulant long-term use     DVT, popliteal, acute     GERD (gastroesophageal reflux disease)     History of pulmonary embolus (PE)     HLD (hyperlipidemia)     Hypertension     Type 2 diabetes mellitus, controlled        Past Surgical History:   Procedure Laterality Date     SECTION      x2    COLONOSCOPY N/A 3/3/2022    Procedure: COLONOSCOPY suprep;  Surgeon: Michael Tirado MD;  Location: [facility] ENDO;  Service: Endoscopy;  Laterality: N/A;    ENDOMETRIAL ABLATION      HYSTERECTOMY      LAPAROSCOPY-ASSISTED VAGINAL HYSTERECTOMY Bilateral 2019    Procedure: HYSTERECTOMY, VAGINAL, LAPAROSCOPY-ASSISTED;  Surgeon: Lashell Clayton MD;  Location: [facility] OR;  Service: OB/GYN;  Laterality: Bilateral;  video    UPPER GASTROINTESTINAL ENDOSCOPY              Family History   Problem Relation Age of Onset    Diabetes Mother     Hypertension Mother     Hyperlipidemia Mother     Hyperthyroidism  Mother     Stroke Father     Hyperlipidemia Father     Hypertension Father     Diabetes Father     Breast cancer Sister        Social History     Tobacco Use    Smoking status: Never    Smokeless tobacco: Never   Substance Use Topics    Alcohol use: No    Drug use: No       Lab Results   Component Value Date    WBC 8.60 09/29/2020    HGB 12.9 09/29/2020    HCT 40.0 09/29/2020    MCV 94 09/29/2020     09/29/2020    CHOL 199 02/09/2022    TRIG 105 02/09/2022    HDL 56 02/09/2022    ALT 17 03/01/2023    AST 15 03/01/2023    BILITOT 0.3 03/01/2023    ALKPHOS 64 03/01/2023     03/01/2023    K 3.8 03/01/2023     03/01/2023    CREATININE 0.7 03/01/2023    ESTGFRAFRICA >60.0 05/18/2022    EGFRNONAA >60.0 05/18/2022    CALCIUM 9.8 03/01/2023    ALBUMIN 3.7 03/01/2023    BUN 19 03/01/2023    CO2 24 03/01/2023    TSH 0.805 05/18/2022    INR 1.0 07/08/2019    HGBA1C 6.8 (H) 03/01/2023    MICALBCREAT 37.4 (H) 02/09/2022    LDLCALC 122.0 02/09/2022     (H) 03/01/2023    [id number] 30 05/18/2022             Vital signs reviewed  PE:   APPEARANCE: Well nourished, well developed, in no acute distress.    HEAD: Normocephalic, atraumatic.  EYES: EOMI.  Conjunctivae noninjected.  EAR: TM clear, slight erythema of EAC  NOSE: Mucosa pink. Airway clear.  MOUTH & THROAT: No tonsillar enlargement. No pharyngeal erythema or exudate.   NECK: Supple with no cervical lymphadenopathy.    CHEST:Lungs clear to auscultation with no wheezes or crackles.  CARDIOVASCULAR: Normal S1, S2. No rubs, murmurs, or gallops.  ABDOMEN: Bowel sounds normal. Not distended. Soft. No tenderness or masses. No organomegaly.  EXTREMITIES: No edema, cyanosis, or clubbing.    Review of Systems   Constitutional:  Negative for chills, fatigue and fever.   HENT: Negative.     Respiratory: Negative.     Cardiovascular:  Negative for chest pain, palpitations and leg swelling.   Gastrointestinal: Negative.    Genitourinary: Negative.     Neurological: Negative.    Psychiatric/Behavioral: Negative.     All other systems reviewed and are negative.    IMPRESSION  1. Encounter to establish care with new doctor    2. Screening mammogram for breast cancer    3. Primary hypertension    4. Mixed hyperlipidemia    5. Controlled type 2 diabetes mellitus without complication, with long-term current use of insulin    6. Class 1 obesity due to excess calories with serious comorbidity and body mass index (BMI) of 30.0 to 30.9 in adult            PLAN      1. Screening mammogram for breast cancer    - Mammo Digital Screening Bilat w/ Marco; Future      2. Encounter to establish care with new doctor    Labs in chart reviewed      3. Primary hypertension    Stable    Continue irbesartan- HCTZ  300-12.5 mg      4. Mixed hyperlipidemia    Continue pravastatin      5. Controlled type 2 diabetes mellitus without complication, with long-term current use of insulin    Followed by endocrinology    Last hba1c 6.8 (3/23)    Continue mounjaro, metformin and Lantuss      6. Class 1 obesity due to excess calories with serious comorbidity and body mass index (BMI) of 30.0 to 30.9 in adult    BMI 30    Counseling provided on healthy lifestyle, encouraged to do moderate intensity regular exercise 30 minutes every day 5 days a week, to include vegetables and fruits and cut back on saturated fats and carbohydrates.          SCREENINGS      Immunizations:   Had 2 doses of Covid vaccines      Age/demographic appropriate health maintenance:    Health Maintenance Due   Topic Date Due    Hepatitis C Screening  Never done    Pneumococcal Vaccines (Age 0-64) (1 - PCV) Never done    HIV Screening  Never done    TETANUS VACCINE  Never done    COVID-19 Vaccine (3 - Booster for Moderna series) 06/10/2021    Mammogram  10/17/2021    Diabetes Urine Screening  02/09/2023    Foot Exam  02/09/2023    Lipid Panel  02/09/2023           Larry Landa   5/2/2023

## 2023-05-03 DIAGNOSIS — G47.00 INSOMNIA, UNSPECIFIED TYPE: ICD-10-CM

## 2023-05-03 DIAGNOSIS — H47.323 OPTIC NERVE DRUSEN, BILATERAL: ICD-10-CM

## 2023-05-03 DIAGNOSIS — E11.10 TYPE 2 DIABETES MELLITUS WITH KETOACIDOSIS WITHOUT COMA, WITHOUT LONG-TERM CURRENT USE OF INSULIN: ICD-10-CM

## 2023-05-03 RX ORDER — METFORMIN HYDROCHLORIDE 500 MG/1
1000 TABLET, EXTENDED RELEASE ORAL 2 TIMES DAILY WITH MEALS
Qty: 360 TABLET | Refills: 3 | Status: SHIPPED | OUTPATIENT
Start: 2023-05-03 | End: 2024-06-18

## 2023-05-03 RX ORDER — ZOLPIDEM TARTRATE 5 MG/1
5 TABLET ORAL NIGHTLY PRN
Qty: 30 TABLET | Refills: 3 | Status: SHIPPED | OUTPATIENT
Start: 2023-05-03 | End: 2023-07-05 | Stop reason: SDUPTHER

## 2023-05-03 RX ORDER — INSULIN ASPART 100 [IU]/ML
10 INJECTION, SOLUTION INTRAVENOUS; SUBCUTANEOUS
Qty: 30 ML | Refills: 3 | Status: SHIPPED | OUTPATIENT
Start: 2023-05-03 | End: 2023-11-21 | Stop reason: SDUPTHER

## 2023-05-03 RX ORDER — LATANOPROST 50 UG/ML
1 SOLUTION/ DROPS OPHTHALMIC NIGHTLY
Qty: 2.5 ML | Refills: 5 | Status: SHIPPED | OUTPATIENT
Start: 2023-05-03 | End: 2024-05-02

## 2023-05-03 NOTE — TELEPHONE ENCOUNTER
Requested Prescriptions     Pending Prescriptions Disp Refills    zolpidem (AMBIEN) 5 MG Tab 30 tablet 3     Sig: Take 1 tablet (5 mg total) by mouth nightly as needed.     Lov 03/30/23

## 2023-05-13 DIAGNOSIS — E11.10 TYPE 2 DIABETES MELLITUS WITH KETOACIDOSIS WITHOUT COMA, WITHOUT LONG-TERM CURRENT USE OF INSULIN: ICD-10-CM

## 2023-05-13 NOTE — PROGRESS NOTES
Subjective:       Patient ID: Heather Gomez is a 43 y.o. female.    Chief Complaint: Anemia    Anemia   There has been no abdominal pain, bruising/bleeding easily, confusion, fever or palpitations.        Ms.Sarah Gomez is here for f/u of DVT and PE.     She was on ethinyl estradiol ring for abnormal menstrual bleeding and in April a progesterone pill (Provera 10 milligrams) was added control the bleeding.  She then started to experience pain in the back of her leg and calf which got worse to the point that she started to experience uncontrolled pain in the foot which was going on for 2 weeks and due to this she presented to the ER and on 6/20/17 she was noted to have a positive DVT in the right popliteal vein.  She was started on Xarelto and sent home but presented to the ER the next day because she was having pain in the abdominal area associated with dyspnea at which time a CT scan of the chest was done that revealed large burden of central pulmonary thromboembolism located at the bifurcation of the right main pulmonary artery and left main pulmonary artery and extending into all the lobes.    She is now seeing Dr. Clayton (OB/GYN) for further evaluation of her gynecological condition. A hysterectomy is being recommended.    She is now on Xarelto.  She is here for follow-up.      She is off her Provera.    Due to iron deficiency anemia she received 2 doses of IV iron therapy in August 2017 and 2 more in march 2018    She continues to have atypical chest discomfort with minimal activity - cardiac etiology ruled out    Underwent D&C, endometrial ablation on 1/23/18    Review of Systems   Constitutional: Negative for fatigue, fever and unexpected weight change.   HENT: Negative for mouth sores and nosebleeds.    Respiratory: Negative for chest tightness, shortness of breath and wheezing.    Cardiovascular: Negative for palpitations and leg swelling.   Gastrointestinal: Negative for abdominal pain and nausea.    Genitourinary: Negative for difficulty urinating and menstrual problem.   Neurological: Negative for seizures and headaches.   Hematological: Negative for adenopathy. Does not bruise/bleed easily.   Psychiatric/Behavioral: Negative for behavioral problems and confusion. The patient is nervous/anxious.    All other systems reviewed and are negative.        Objective:      Physical Exam   Constitutional: She is oriented to person, place, and time. She appears well-developed and well-nourished. No distress.   HENT:   Mouth/Throat: Oropharynx is clear and moist and mucous membranes are normal. Mucous membranes are not pale. No oropharyngeal exudate.   Eyes: Conjunctivae are normal. No scleral icterus.   Neck: Normal range of motion. Neck supple. No thyroid mass (Thyroid area is non-tender) and no thyromegaly present.   Cardiovascular: Normal rate and regular rhythm. Exam reveals no friction rub.   Pulmonary/Chest: Effort normal and breath sounds normal. No accessory muscle usage or stridor. No respiratory distress. She has no wheezes.   Abdominal: She exhibits no ascites and no mass. There is no hepatosplenomegaly. There is no tenderness.   Musculoskeletal: She exhibits no edema.   No varicosities noted.   Lymphadenopathy:     She has no cervical adenopathy.        Right: No supraclavicular adenopathy present.        Left: No supraclavicular adenopathy present.   Neurological: She is alert and oriented to person, place, and time.   Psychiatric: She has a normal mood and affect. Judgment and thought content normal. Cognition and memory are normal. She exhibits normal recent memory and normal remote memory.         Assessment:       1. 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 Anticoagulated    3. Iron deficiency anemia due to chronic blood loss    4. Vitamin D deficiency        Plan:   Labs reviewed.  Iron levels are good.  No need further IV iron therapy for  Pt presents to ED c/o arm pain bruise and richi , denies any SI/HI now.    Vitamin-D levels low.  Start high-dose vitamin D3 81619 units weekly    Discussed pros and cons of discontinuing anticoagulation.  Discussed a small chance of recurrence.  She understands the risks associated with stopping Xarelto.  She is agreeable.  Will DC Xarelto.    Return to clinic 6 months.

## 2023-05-15 RX ORDER — BLOOD-GLUCOSE SENSOR
3 EACH MISCELLANEOUS CONTINUOUS PRN
Qty: 3 EACH | Status: SHIPPED | OUTPATIENT
Start: 2023-05-15 | End: 2023-11-01 | Stop reason: SDUPTHER

## 2023-05-19 ENCOUNTER — PATIENT MESSAGE (OUTPATIENT)
Dept: ENDOCRINOLOGY | Facility: CLINIC | Age: 48
End: 2023-05-19
Payer: COMMERCIAL

## 2023-05-19 DIAGNOSIS — Z79.4 TYPE 2 DIABETES MELLITUS WITH HYPERGLYCEMIA, WITH LONG-TERM CURRENT USE OF INSULIN: Primary | ICD-10-CM

## 2023-05-19 DIAGNOSIS — E11.65 TYPE 2 DIABETES MELLITUS WITH HYPERGLYCEMIA, WITH LONG-TERM CURRENT USE OF INSULIN: Primary | ICD-10-CM

## 2023-05-22 ENCOUNTER — HOSPITAL ENCOUNTER (OUTPATIENT)
Dept: RADIOLOGY | Facility: HOSPITAL | Age: 48
Discharge: HOME | End: 2023-05-22
Attending: FAMILY MEDICINE
Payer: COMMERCIAL

## 2023-05-22 DIAGNOSIS — Z12.31 SCREENING MAMMOGRAM FOR BREAST CANCER: ICD-10-CM

## 2023-05-22 PROCEDURE — 77067 SCR MAMMO BI INCL CAD: CPT | Mod: TC

## 2023-05-22 PROCEDURE — 77063 MAMMO DIGITAL SCREENING BILAT WITH TOMO: ICD-10-PCS | Mod: 26,,, | Performed by: RADIOLOGY

## 2023-05-22 PROCEDURE — 77067 MAMMO DIGITAL SCREENING BILAT WITH TOMO: ICD-10-PCS | Mod: 26,,, | Performed by: RADIOLOGY

## 2023-05-22 PROCEDURE — 77067 SCR MAMMO BI INCL CAD: CPT | Mod: 26,,, | Performed by: RADIOLOGY

## 2023-05-22 PROCEDURE — 77063 BREAST TOMOSYNTHESIS BI: CPT | Mod: 26,,, | Performed by: RADIOLOGY

## 2023-05-23 DIAGNOSIS — Z79.4 TYPE 2 DIABETES MELLITUS WITH HYPERGLYCEMIA, WITH LONG-TERM CURRENT USE OF INSULIN: ICD-10-CM

## 2023-05-23 DIAGNOSIS — E11.65 TYPE 2 DIABETES MELLITUS WITH HYPERGLYCEMIA, WITH LONG-TERM CURRENT USE OF INSULIN: ICD-10-CM

## 2023-05-23 RX ORDER — TIRZEPATIDE 10 MG/.5ML
10 INJECTION, SOLUTION SUBCUTANEOUS
Qty: 4 PEN | Refills: 0 | Status: SHIPPED | OUTPATIENT
Start: 2023-05-23 | End: 2023-05-24

## 2023-05-24 RX ORDER — TIRZEPATIDE 12.5 MG/.5ML
12.5 INJECTION, SOLUTION SUBCUTANEOUS
Qty: 4 PEN | Refills: 3 | Status: SHIPPED | OUTPATIENT
Start: 2023-05-24 | End: 2023-09-08 | Stop reason: SDUPTHER

## 2023-06-05 ENCOUNTER — PATIENT MESSAGE (OUTPATIENT)
Dept: ENDOCRINOLOGY | Facility: CLINIC | Age: 48
End: 2023-06-05
Payer: COMMERCIAL

## 2023-06-16 ENCOUNTER — PATIENT MESSAGE (OUTPATIENT)
Dept: PODIATRY | Facility: CLINIC | Age: 48
End: 2023-06-16
Payer: COMMERCIAL

## 2023-06-29 DIAGNOSIS — F41.9 ANXIETY: ICD-10-CM

## 2023-06-29 DIAGNOSIS — E11.10 TYPE 2 DIABETES MELLITUS WITH KETOACIDOSIS WITHOUT COMA, WITHOUT LONG-TERM CURRENT USE OF INSULIN: ICD-10-CM

## 2023-06-29 RX ORDER — SERTRALINE HYDROCHLORIDE 50 MG/1
TABLET, FILM COATED ORAL
Qty: 30 TABLET | Refills: 2 | Status: SHIPPED | OUTPATIENT
Start: 2023-06-29 | End: 2023-10-17 | Stop reason: SDUPTHER

## 2023-06-29 RX ORDER — PRAVASTATIN SODIUM 40 MG/1
40 TABLET ORAL DAILY
Qty: 90 TABLET | Refills: 2 | Status: CANCELLED | OUTPATIENT
Start: 2023-06-29

## 2023-07-05 DIAGNOSIS — G47.00 INSOMNIA, UNSPECIFIED TYPE: ICD-10-CM

## 2023-07-05 RX ORDER — ZOLPIDEM TARTRATE 5 MG/1
5 TABLET ORAL NIGHTLY PRN
Qty: 30 TABLET | Refills: 3 | Status: SHIPPED | OUTPATIENT
Start: 2023-07-05 | End: 2023-07-11 | Stop reason: SDUPTHER

## 2023-07-05 NOTE — TELEPHONE ENCOUNTER
Requested Prescriptions     Pending Prescriptions Disp Refills    zolpidem (AMBIEN) 5 MG Tab 30 tablet 3     Sig: Take 1 tablet (5 mg total) by mouth nightly as needed.     LOV:03/30/2023

## 2023-07-11 DIAGNOSIS — G47.00 INSOMNIA, UNSPECIFIED TYPE: ICD-10-CM

## 2023-07-11 RX ORDER — ZOLPIDEM TARTRATE 5 MG/1
5 TABLET ORAL NIGHTLY PRN
Qty: 30 TABLET | Refills: 3 | Status: SHIPPED | OUTPATIENT
Start: 2023-07-11 | End: 2023-08-01 | Stop reason: SDUPTHER

## 2023-07-12 ENCOUNTER — PATIENT MESSAGE (OUTPATIENT)
Dept: ENDOCRINOLOGY | Facility: CLINIC | Age: 48
End: 2023-07-12
Payer: COMMERCIAL

## 2023-07-13 ENCOUNTER — OFFICE VISIT (OUTPATIENT)
Dept: SLEEP MEDICINE | Facility: CLINIC | Age: 48
End: 2023-07-13
Payer: COMMERCIAL

## 2023-07-13 ENCOUNTER — PATIENT MESSAGE (OUTPATIENT)
Dept: SLEEP MEDICINE | Facility: CLINIC | Age: 48
End: 2023-07-13

## 2023-07-13 VITALS
WEIGHT: 171.69 LBS | SYSTOLIC BLOOD PRESSURE: 122 MMHG | HEART RATE: 109 BPM | BODY MASS INDEX: 29.47 KG/M2 | DIASTOLIC BLOOD PRESSURE: 76 MMHG

## 2023-07-13 DIAGNOSIS — G47.30 SLEEP APNEA, UNSPECIFIED TYPE: Primary | ICD-10-CM

## 2023-07-13 PROCEDURE — 3044F PR MOST RECENT HEMOGLOBIN A1C LEVEL <7.0%: ICD-10-PCS | Mod: CPTII,S$GLB,, | Performed by: NURSE PRACTITIONER

## 2023-07-13 PROCEDURE — 1159F PR MEDICATION LIST DOCUMENTED IN MEDICAL RECORD: ICD-10-PCS | Mod: CPTII,S$GLB,, | Performed by: NURSE PRACTITIONER

## 2023-07-13 PROCEDURE — 3072F PR LOW RISK FOR RETINOPATHY: ICD-10-PCS | Mod: CPTII,S$GLB,, | Performed by: NURSE PRACTITIONER

## 2023-07-13 PROCEDURE — 99999 PR PBB SHADOW E&M-EST. PATIENT-LVL III: CPT | Mod: PBBFAC,,, | Performed by: NURSE PRACTITIONER

## 2023-07-13 PROCEDURE — 99214 OFFICE O/P EST MOD 30 MIN: CPT | Mod: S$GLB,,, | Performed by: NURSE PRACTITIONER

## 2023-07-13 PROCEDURE — 99214 PR OFFICE/OUTPT VISIT, EST, LEVL IV, 30-39 MIN: ICD-10-PCS | Mod: S$GLB,,, | Performed by: NURSE PRACTITIONER

## 2023-07-13 PROCEDURE — 99999 PR PBB SHADOW E&M-EST. PATIENT-LVL III: ICD-10-PCS | Mod: PBBFAC,,, | Performed by: NURSE PRACTITIONER

## 2023-07-13 PROCEDURE — 3072F LOW RISK FOR RETINOPATHY: CPT | Mod: CPTII,S$GLB,, | Performed by: NURSE PRACTITIONER

## 2023-07-13 PROCEDURE — 3078F PR MOST RECENT DIASTOLIC BLOOD PRESSURE < 80 MM HG: ICD-10-PCS | Mod: CPTII,S$GLB,, | Performed by: NURSE PRACTITIONER

## 2023-07-13 PROCEDURE — 3074F PR MOST RECENT SYSTOLIC BLOOD PRESSURE < 130 MM HG: ICD-10-PCS | Mod: CPTII,S$GLB,, | Performed by: NURSE PRACTITIONER

## 2023-07-13 PROCEDURE — 3008F BODY MASS INDEX DOCD: CPT | Mod: CPTII,S$GLB,, | Performed by: NURSE PRACTITIONER

## 2023-07-13 PROCEDURE — 3078F DIAST BP <80 MM HG: CPT | Mod: CPTII,S$GLB,, | Performed by: NURSE PRACTITIONER

## 2023-07-13 PROCEDURE — 3008F PR BODY MASS INDEX (BMI) DOCUMENTED: ICD-10-PCS | Mod: CPTII,S$GLB,, | Performed by: NURSE PRACTITIONER

## 2023-07-13 PROCEDURE — 1159F MED LIST DOCD IN RCRD: CPT | Mod: CPTII,S$GLB,, | Performed by: NURSE PRACTITIONER

## 2023-07-13 PROCEDURE — 3044F HG A1C LEVEL LT 7.0%: CPT | Mod: CPTII,S$GLB,, | Performed by: NURSE PRACTITIONER

## 2023-07-13 PROCEDURE — 3074F SYST BP LT 130 MM HG: CPT | Mod: CPTII,S$GLB,, | Performed by: NURSE PRACTITIONER

## 2023-07-13 NOTE — PROGRESS NOTES
She is not /trying not to get in bed earlier than 11-1130P but still clock watching when can't go to sleep, sometimes several hours. Sleeping during day b/c not sleeping at nighttime. Wants higher ambien dose again. Takes current 5mg with supplements. OUT of bed 8a.       HX :She has never had a sleep study. Difficultly falling asleep, sometimes up days at a time. May feel drowsy when driving. Tried cold air in bedroom/ambien 10mg and trazadone ?dose but had side effects (memory) or were ineffective. Feels exhausted at work.  Denies witnessed apneic pauses. Denies snoring or witnessed apneic pauses. Occasional am headaches. Up until 5a then sleeps few hours then awakens for unknown reason.   Bp stable  HgBA1c 6.5, sees endo    BT:10-11pm  WT- 8a, when can't sleep gets up walks the house/lights are on, listens to relaxing music/deep breathing exercises but ear bud in causes head ache  3/2023: Lunesta 3mg helped her get 2 wks of sleep then stopped and she began adding melatonin and supplement called CARY to it which didn't help. Sometimes up all night then sleepy right when has to get up for work. Even bought a sleep # bed. Never had sleep study yet. Honestly she is in bed in evening time watching tv.     SH: . /on computer      ASSESSMENT:   1. Insomnia NEC. Multi-factorial - excess time in bed, poor sleep hygiene, and likely paradoxical insomnia play a role.  HTN  DM  Unspecified sleep apnea      PLAN:  Enforced Behavioral Modification:  Implement stimulus control: [place] bedroom for sleep only.  REFER again to CBT-I, trial ambien 10mg s/t , stop clock watching,r estrict time in bed until 1a and out of bed 8a.    HST assess for MAEVE

## 2023-07-14 ENCOUNTER — LAB VISIT (OUTPATIENT)
Dept: LAB | Facility: HOSPITAL | Age: 48
End: 2023-07-14
Payer: COMMERCIAL

## 2023-07-14 DIAGNOSIS — E05.90 SUBCLINICAL HYPERTHYROIDISM: ICD-10-CM

## 2023-07-14 DIAGNOSIS — E04.1 THYROID NODULE: ICD-10-CM

## 2023-07-14 DIAGNOSIS — E11.65 TYPE 2 DIABETES MELLITUS WITH HYPERGLYCEMIA, WITH LONG-TERM CURRENT USE OF INSULIN: ICD-10-CM

## 2023-07-14 DIAGNOSIS — E55.9 VITAMIN D DEFICIENCY: ICD-10-CM

## 2023-07-14 DIAGNOSIS — Z79.4 TYPE 2 DIABETES MELLITUS WITH HYPERGLYCEMIA, WITH LONG-TERM CURRENT USE OF INSULIN: ICD-10-CM

## 2023-07-14 LAB
25(OH)D3+25(OH)D2 SERPL-MCNC: 26 NG/ML (ref 30–96)
ALBUMIN SERPL BCP-MCNC: 3.8 G/DL (ref 3.5–5.2)
ALP SERPL-CCNC: 63 U/L (ref 55–135)
ALT SERPL W/O P-5'-P-CCNC: 12 U/L (ref 10–44)
ANION GAP SERPL CALC-SCNC: 9 MMOL/L (ref 8–16)
AST SERPL-CCNC: 14 U/L (ref 10–40)
BILIRUB SERPL-MCNC: 0.3 MG/DL (ref 0.1–1)
BUN SERPL-MCNC: 17 MG/DL (ref 6–20)
CALCIUM SERPL-MCNC: 10.1 MG/DL (ref 8.7–10.5)
CHLORIDE SERPL-SCNC: 104 MMOL/L (ref 95–110)
CHOLEST SERPL-MCNC: 177 MG/DL (ref 120–199)
CHOLEST/HDLC SERPL: 4.3 {RATIO} (ref 2–5)
CO2 SERPL-SCNC: 29 MMOL/L (ref 23–29)
CREAT SERPL-MCNC: 0.7 MG/DL (ref 0.5–1.4)
EST. GFR  (NO RACE VARIABLE): >60 ML/MIN/1.73 M^2
ESTIMATED AVG GLUCOSE: 120 MG/DL (ref 68–131)
GLUCOSE SERPL-MCNC: 126 MG/DL (ref 70–110)
HBA1C MFR BLD: 5.8 % (ref 4–5.6)
HDLC SERPL-MCNC: 41 MG/DL (ref 40–75)
HDLC SERPL: 23.2 % (ref 20–50)
LDLC SERPL CALC-MCNC: 98.8 MG/DL (ref 63–159)
NONHDLC SERPL-MCNC: 136 MG/DL
POTASSIUM SERPL-SCNC: 3.1 MMOL/L (ref 3.5–5.1)
PROT SERPL-MCNC: 7.2 G/DL (ref 6–8.4)
SODIUM SERPL-SCNC: 142 MMOL/L (ref 136–145)
T4 FREE SERPL-MCNC: 1.06 NG/DL (ref 0.71–1.51)
TRIGL SERPL-MCNC: 186 MG/DL (ref 30–150)
TSH SERPL DL<=0.005 MIU/L-ACNC: 0.13 UIU/ML (ref 0.4–4)

## 2023-07-14 PROCEDURE — 36415 COLL VENOUS BLD VENIPUNCTURE: CPT | Performed by: NURSE PRACTITIONER

## 2023-07-14 PROCEDURE — 84443 ASSAY THYROID STIM HORMONE: CPT | Performed by: NURSE PRACTITIONER

## 2023-07-14 PROCEDURE — 82306 VITAMIN D 25 HYDROXY: CPT | Performed by: NURSE PRACTITIONER

## 2023-07-14 PROCEDURE — 80061 LIPID PANEL: CPT | Performed by: NURSE PRACTITIONER

## 2023-07-14 PROCEDURE — 84439 ASSAY OF FREE THYROXINE: CPT | Performed by: NURSE PRACTITIONER

## 2023-07-14 PROCEDURE — 83036 HEMOGLOBIN GLYCOSYLATED A1C: CPT | Performed by: NURSE PRACTITIONER

## 2023-07-14 PROCEDURE — 80053 COMPREHEN METABOLIC PANEL: CPT | Performed by: NURSE PRACTITIONER

## 2023-07-17 ENCOUNTER — TELEPHONE (OUTPATIENT)
Dept: SLEEP MEDICINE | Facility: OTHER | Age: 48
End: 2023-07-17
Payer: COMMERCIAL

## 2023-07-18 NOTE — PROGRESS NOTES
Subjective:      Patient ID: Heather Gomez is a 47 y.o. female.    Chief Complaint:  No chief complaint on file.    History of Present Illness  Heather Gomez is here for follow up of DM, thyroid nodule and subclinical hyperthyroidism.  Previously seen by me 3/2023.  This is a MyChart video visit.    The patient location is: [place]  The chief complaint leading to consultation is: DM  Visit type: Virtual visit with synchronous audio and video  Total time spent with patient: see below  Each patient to whom he or she provides medical services by telemedicine is:  (1) informed of the relationship between the physician and patient and the respective role of any other health care provider with respect to management of the patient; and (2) notified that he or she may decline to receive medical services by telemedicine and may withdraw from such care at any time.    With regards to diabetes:    Diagnosed: 2013 4/2021 C peptide 3.12 with glucose of 180    FH of DM:   Mother, father, 4 sisters   Has 2 children - no history of DM     DM Education: 2/2022    Known complications:  DKA : None  RN - Denies  - Eye Exam: 7/2022  PN : Denies   Nephropathy   - Podiatry: None  CAD : Denies    Denies history of pancreatitis & personal/family history of medullary thyroid cancer.     Current regimen:  Metformin 1000mg twice daily  Mounjaro 12.5 mg weekly on Sunday   Lantus 14 units twice a day   Novolog 8 units before meals    Reports compliance.     Other medications tried:  Metformin - unable to tolerate due to GI upset   Invokana - discontinued due to abdominal pain, nausea, yeast infections    Actos - discontinued during hospital stay   Januvia - discontinued during hospital stay     Glucose Monitor:   6 times a day testing  Log reviewed: Dexcom G6 - sensor downloaded and reviewed, scanned in media tab.    Hypoglycemia:  Denies  Knows how to correct with 15 grams of carbs- juice, coke, or a peppermint.      Diet/Exercise:  Eats 1 meal a day.   Snacks : Denies.   Drinks : water, rarely a soft drink  Exercise: None - active lifestyle.  Recent illness, injury, steroids: Denies     Diabetes Management Status    Hemoglobin A1C   Date Value Ref Range Status   07/14/2023 5.8 (H) 4.0 - 5.6 % Final     Comment:     ADA Screening Guidelines:  5.7-6.4%  Consistent with prediabetes  >or=6.5%  Consistent with diabetes    High levels of fetal hemoglobin interfere with the HbA1C  assay. Heterozygous hemoglobin variants (HbS, HgC, etc)do  not significantly interfere with this assay.   However, presence of multiple variants may affect accuracy.     03/01/2023 6.8 (H) 4.0 - 5.6 % Final     Comment:     ADA Screening Guidelines:  5.7-6.4%  Consistent with prediabetes  >or=6.5%  Consistent with diabetes    High levels of fetal hemoglobin interfere with the HbA1C  assay. Heterozygous hemoglobin variants (HbS, HgC, etc)do  not significantly interfere with this assay.   However, presence of multiple variants may affect accuracy.     05/18/2022 6.5 (H) 4.0 - 5.6 % Final     Comment:     ADA Screening Guidelines:  5.7-6.4%  Consistent with prediabetes  >or=6.5%  Consistent with diabetes    High levels of fetal hemoglobin interfere with the HbA1C  assay. Heterozygous hemoglobin variants (HbS, HgC, etc)do  not significantly interfere with this assay.   However, presence of multiple variants may affect accuracy.         Statin: Taking  ACE/ARB: Taking  Screening or Prevention Patient's value Goal Complete/Controlled?   HgA1C Testing and Control   Lab Results   Component Value Date    HGBA1C 5.8 (H) 07/14/2023      Annually/Less than 8% Yes   Lipid profile : 07/14/2023 Annually Yes   LDL control Lab Results   Component Value Date    LDLCALC 98.8 07/14/2023    Annually/Less than 100 mg/dl  Yes   Nephropathy screening Lab Results   Component Value Date    LABMICR 34.0 02/09/2022     Lab Results   Component Value Date    PROTEINUA Negative  06/27/2022    Annually No   Blood pressure BP Readings from Last 1 Encounters:   07/13/23 122/76    Less than 140/90 No   Dilated retinal exam : 12/08/2022 Annually Yes   Foot exam   : 02/09/2022 Annually No     With regards to thyroid nodule:    Thyroid US:   3/1/2023  Impression:     1.  Thyroid gland is enlarged with homogenous echotexture.     2.  1 nodule in the right thyroid described above.     3.  1 nodule in the left thyroid described above.     4.  1 nodule in the isthmus described above.     RECOMMENDATIONS:  Follow-up ultrasound in 2 years is recommended.       FNA: None    Signs or Symptoms:   Difficulty breathing: Denies  Difficulty swallowing: Denies  Voice Changes: Hoarseness but not all the time  FH of thyroid nodules but unsure if it was cancer (mother)  Personal history of radiation treatment or exposure: Denies      With regards to subclinical hyperthyroidism:    FH of thyroid disease: sister    2/2022  Thyroid Ab negative.    NM Thyroid Scan  4/6/2022  The 24 hour uptake is elevated at 48.3 % (normal 10-30%).  Homogenous distribution of the radionuclide throughout the thyroid gland without hyper or hypo functional nodules.  Impression:  1. Elevated 24 hour radioiodine uptake by the thyroid.  2. Uniform uptake in the thyroid gland bilaterally.  Overall findings are in keeping with Graves disease..    Lab Results   Component Value Date    TSH 0.132 (L) 07/14/2023    [id number] 89 07/24/2018    FREET4 1.06 07/14/2023     Biotin Use: Yes - forgot to hold it.     With regards to Vitamin D Deficiency:    Vit D, 25-Hydroxy   Date Value Ref Range Status   07/14/2023 26 (L) 30 - 96 ng/mL Final     Comment:     Vitamin D deficiency.........<10 ng/mL                              Vitamin D insufficiency......10-29 ng/mL       Vitamin D sufficiency........> or equal to 30 ng/mL  Vitamin D toxicity............>100 ng/mL         Current Meds: None.     Review of Systems   Constitutional:  Negative for fatigue.    Eyes:  Negative for visual disturbance.   Respiratory:  Negative for shortness of breath.    Cardiovascular:  Negative for chest pain.   Gastrointestinal:  Negative for abdominal pain.   Musculoskeletal:  Negative for arthralgias.   Skin:  Negative for wound.   Neurological:  Negative for headaches.         Visit Vitals  LMP 06/17/2019       There is no height or weight on file to calculate BMI.    Lab Review:   Lab Results   Component Value Date    HGBA1C 5.8 (H) 07/14/2023    HGBA1C 6.8 (H) 03/01/2023    HGBA1C 6.5 (H) 05/18/2022       Lab Results   Component Value Date    CHOL 177 07/14/2023    HDL 41 07/14/2023    LDLCALC 98.8 07/14/2023    TRIG 186 (H) 07/14/2023    CHOLHDL 23.2 07/14/2023     Lab Results   Component Value Date     07/14/2023    K 3.1 (L) 07/14/2023     07/14/2023    CO2 29 07/14/2023     (H) 07/14/2023    BUN 17 07/14/2023    CREATININE 0.7 07/14/2023    CALCIUM 10.1 07/14/2023    PROT 7.2 07/14/2023    ALBUMIN 3.8 07/14/2023    BILITOT 0.3 07/14/2023    ALKPHOS 63 07/14/2023    AST 14 07/14/2023    ALT 12 07/14/2023    ANIONGAP 9 07/14/2023    ESTGFRAFRICA >60.0 05/18/2022    EGFRNONAA >60.0 05/18/2022    TSH 0.132 (L) 07/14/2023     Vit D, 25-Hydroxy   Date Value Ref Range Status   07/14/2023 26 (L) 30 - 96 ng/mL Final     Comment:     Vitamin D deficiency.........<10 ng/mL                              Vitamin D insufficiency......10-29 ng/mL       Vitamin D sufficiency........> or equal to 30 ng/mL  Vitamin D toxicity............>100 ng/mL       Assessment and Plan     1. Type 2 diabetes mellitus with ketoacidosis without coma, without long-term current use of insulin  Hemoglobin A1C    Comprehensive Metabolic Panel    Microalbumin/Creatinine Ratio, Urine      2. Thyroid nodule  TSH      3. Subclinical hyperthyroidism  TSH      4. Vitamin D deficiency  Vitamin D            Type 2 diabetes mellitus with ketoacidosis without coma, without long-term current use of  insulin  -- Labs prior to follow up.  -- A1c goal <7%.  -- Medications discussed:  MFM   GLP1-DPP4   RAE   SGLT2   Reviewed potential adverse effects of SGLT-2 inhibitors, including genital mycotic infections, slightly increased risk of UTI, hypersensitivity, hypotension, and hyperkalemia. Advised to maintain water intake of 8-10 cups per day. Advised we need to check chemistry panel at baseline and 2 weeks after starting. Discussed FDA warning reports of ketoacidosis associated with SGLT-2 inhibitors. Advised to seek immediate medical attention and stop the medication if symptoms such as difficulty breathing, nausea, vomiting, abdominal pain, confusion, and unusual fatigue/sleepiness. Discussed possible precipitating factors including major illness/reduced food and fluid intake (advised to stop under these circumstances), and reduced insulin dose. Discussed possible effects of increased fracture risk/decreased bone density. Discussed reports of increased risk of leg and foot amputations with canagliflozin and need to seek urgent care if developed new pain or tenderness, sores or ulcers, or infections in legs/feet.   Insulin   -- Reviewed logs/CGM:  Glucose relatively controlled.  Instructed to send glucose logs in 7 days.  Reach out to me sooner for any glucose <70 or consistently >180.  -- Medication Changes:   Metformin 1000mg twice daily  Mounjaro 12.5 mg weekly on Sunday (plan to increase to 15mg when back in stock)  Lantus 14 units twice a day   TRIAL OFF: Novolog 8 units before meals  -- Reviewed goals of therapy are to get the best control we can without hypoglycemia.  -- Reviewed patient's current insulin regimen. Clarified proper insulin dose and timing in relation to meals, etc. Insulin injection sites and proper rotation instructed.    -- Advised frequent self blood glucose monitoring.  Patient encouraged to document glucose results and bring them to every clinic visit.  -- Hypoglycemia precautions  discussed. Instructed on precautions before driving.    -- Call for Bg repeatedly < 90 or > 180.   -- Close adherence to lifestyle changes recommended.   -- Periodic follow ups for eye evaluations, foot care and dental care suggested.    Thyroid nodule  -- Denies compressive symptoms.  -- Repeat thyroid US 3/2025.    Subclinical hyperthyroidism  -- Clinically euthyroid.  -- Repeat TFTs with next set of labs. Please note: if you are taking biotin please hold it for 5 days prior to labs as it can interfere with the thyroid testing.    Vitamin D deficiency  -- START OTC Vit D3 2000iu daily.          Follow up in about 4 months (around 11/21/2023).    I spent 30 minutes face-to-face with the patient, over half of the visit was spent on counseling and/or coordinating the care of the patient.    Counseling includes:  Diagnostic results, impressions, recommendations   Prognosis   Risk and benefits of management/treatment options   Instructions for management treatment and or follow-up   Importance of compliance with management   Risk factor reduction   Patient education

## 2023-07-19 ENCOUNTER — HOSPITAL ENCOUNTER (OUTPATIENT)
Dept: SLEEP MEDICINE | Facility: HOSPITAL | Age: 48
Discharge: HOME | End: 2023-07-19
Attending: NURSE PRACTITIONER
Payer: COMMERCIAL

## 2023-07-19 DIAGNOSIS — G47.30 SLEEP APNEA, UNSPECIFIED TYPE: ICD-10-CM

## 2023-07-19 PROCEDURE — 95800 SLP STDY UNATTENDED: CPT

## 2023-07-21 ENCOUNTER — OFFICE VISIT (OUTPATIENT)
Dept: ENDOCRINOLOGY | Facility: CLINIC | Age: 48
End: 2023-07-21
Payer: COMMERCIAL

## 2023-07-21 DIAGNOSIS — E11.10 TYPE 2 DIABETES MELLITUS WITH KETOACIDOSIS WITHOUT COMA, WITHOUT LONG-TERM CURRENT USE OF INSULIN: Primary | ICD-10-CM

## 2023-07-21 DIAGNOSIS — E55.9 VITAMIN D DEFICIENCY: ICD-10-CM

## 2023-07-21 DIAGNOSIS — E05.90 SUBCLINICAL HYPERTHYROIDISM: ICD-10-CM

## 2023-07-21 DIAGNOSIS — E04.1 THYROID NODULE: ICD-10-CM

## 2023-07-21 PROCEDURE — 99214 OFFICE O/P EST MOD 30 MIN: CPT | Mod: 95,,, | Performed by: NURSE PRACTITIONER

## 2023-07-21 PROCEDURE — 1159F MED LIST DOCD IN RCRD: CPT | Mod: CPTII,95,, | Performed by: NURSE PRACTITIONER

## 2023-07-21 PROCEDURE — 1160F RVW MEDS BY RX/DR IN RCRD: CPT | Mod: CPTII,95,, | Performed by: NURSE PRACTITIONER

## 2023-07-21 PROCEDURE — 1159F PR MEDICATION LIST DOCUMENTED IN MEDICAL RECORD: ICD-10-PCS | Mod: CPTII,95,, | Performed by: NURSE PRACTITIONER

## 2023-07-21 PROCEDURE — 1160F PR REVIEW ALL MEDS BY PRESCRIBER/CLIN PHARMACIST DOCUMENTED: ICD-10-PCS | Mod: CPTII,95,, | Performed by: NURSE PRACTITIONER

## 2023-07-21 PROCEDURE — 3072F PR LOW RISK FOR RETINOPATHY: ICD-10-PCS | Mod: CPTII,95,, | Performed by: NURSE PRACTITIONER

## 2023-07-21 PROCEDURE — 99214 PR OFFICE/OUTPT VISIT, EST, LEVL IV, 30-39 MIN: ICD-10-PCS | Mod: 95,,, | Performed by: NURSE PRACTITIONER

## 2023-07-21 PROCEDURE — 95251 CONT GLUC MNTR ANALYSIS I&R: CPT | Mod: NDTC,S$GLB,, | Performed by: NURSE PRACTITIONER

## 2023-07-21 PROCEDURE — 95251 PR GLUCOSE MONITOR, 72 HOUR, PHYS INTERP: ICD-10-PCS | Mod: NDTC,S$GLB,, | Performed by: NURSE PRACTITIONER

## 2023-07-21 PROCEDURE — 3044F PR MOST RECENT HEMOGLOBIN A1C LEVEL <7.0%: ICD-10-PCS | Mod: CPTII,95,, | Performed by: NURSE PRACTITIONER

## 2023-07-21 PROCEDURE — 3044F HG A1C LEVEL LT 7.0%: CPT | Mod: CPTII,95,, | Performed by: NURSE PRACTITIONER

## 2023-07-21 PROCEDURE — 3072F LOW RISK FOR RETINOPATHY: CPT | Mod: CPTII,95,, | Performed by: NURSE PRACTITIONER

## 2023-07-21 NOTE — ASSESSMENT & PLAN NOTE
-- Labs prior to follow up.  -- A1c goal <7%.  -- Medications discussed:  MFM   GLP1-DPP4   RAE   SGLT2   Reviewed potential adverse effects of SGLT-2 inhibitors, including genital mycotic infections, slightly increased risk of UTI, hypersensitivity, hypotension, and hyperkalemia. Advised to maintain water intake of 8-10 cups per day. Advised we need to check chemistry panel at baseline and 2 weeks after starting. Discussed FDA warning reports of ketoacidosis associated with SGLT-2 inhibitors. Advised to seek immediate medical attention and stop the medication if symptoms such as difficulty breathing, nausea, vomiting, abdominal pain, confusion, and unusual fatigue/sleepiness. Discussed possible precipitating factors including major illness/reduced food and fluid intake (advised to stop under these circumstances), and reduced insulin dose. Discussed possible effects of increased fracture risk/decreased bone density. Discussed reports of increased risk of leg and foot amputations with canagliflozin and need to seek urgent care if developed new pain or tenderness, sores or ulcers, or infections in legs/feet.   Insulin   -- Reviewed logs/CGM:  Glucose relatively controlled.  Instructed to send glucose logs in 7 days.  Reach out to me sooner for any glucose <70 or consistently >180.  -- Medication Changes:   Metformin 1000mg twice daily  Mounjaro 12.5 mg weekly on Sunday (plan to increase to 15mg when back in stock)  Lantus 14 units twice a day   TRIAL OFF: Novolog 8 units before meals  -- Reviewed goals of therapy are to get the best control we can without hypoglycemia.  -- Reviewed patient's current insulin regimen. Clarified proper insulin dose and timing in relation to meals, etc. Insulin injection sites and proper rotation instructed.    -- Advised frequent self blood glucose monitoring.  Patient encouraged to document glucose results and bring them to every clinic visit.  -- Hypoglycemia precautions discussed.  Instructed on precautions before driving.    -- Call for Bg repeatedly < 90 or > 180.   -- Close adherence to lifestyle changes recommended.   -- Periodic follow ups for eye evaluations, foot care and dental care suggested.

## 2023-07-21 NOTE — Clinical Note
Upload Dexcom Virtual visit  in 4 months with labs and urine prior Orders Placed This Encounter     Hemoglobin A1C     Comprehensive Metabolic Panel     TSH     Vitamin D     Microalbumin/Creatinine Ratio, Urine

## 2023-07-21 NOTE — ASSESSMENT & PLAN NOTE
-- Clinically euthyroid.  -- Repeat TFTs with next set of labs. Please note: if you are taking biotin please hold it for 5 days prior to labs as it can interfere with the thyroid testing.

## 2023-07-24 PROCEDURE — 95806 SLEEP STUDY UNATT&RESP EFFT: CPT | Mod: 26,,, | Performed by: INTERNAL MEDICINE

## 2023-07-24 PROCEDURE — 95806 PR SLEEP STUDY, UNATTENDED, SIMUL RECORD HR/O2 SAT/RESP FLOW/RESP EFFT: ICD-10-PCS | Mod: 26,,, | Performed by: INTERNAL MEDICINE

## 2023-07-25 ENCOUNTER — PATIENT MESSAGE (OUTPATIENT)
Dept: SLEEP MEDICINE | Facility: CLINIC | Age: 48
End: 2023-07-25
Payer: COMMERCIAL

## 2023-07-25 DIAGNOSIS — G47.33 OSA (OBSTRUCTIVE SLEEP APNEA): Primary | ICD-10-CM

## 2023-07-25 PROBLEM — G47.30 SLEEP APNEA: Status: ACTIVE | Noted: 2023-07-25

## 2023-07-26 ENCOUNTER — PATIENT MESSAGE (OUTPATIENT)
Dept: ENDOCRINOLOGY | Facility: CLINIC | Age: 48
End: 2023-07-26
Payer: COMMERCIAL

## 2023-07-26 ENCOUNTER — TELEPHONE (OUTPATIENT)
Dept: ENDOCRINOLOGY | Facility: CLINIC | Age: 48
End: 2023-07-26
Payer: COMMERCIAL

## 2023-08-01 ENCOUNTER — PATIENT MESSAGE (OUTPATIENT)
Dept: SLEEP MEDICINE | Facility: CLINIC | Age: 48
End: 2023-08-01
Payer: COMMERCIAL

## 2023-08-01 DIAGNOSIS — G47.00 INSOMNIA, UNSPECIFIED TYPE: ICD-10-CM

## 2023-08-01 RX ORDER — ZOLPIDEM TARTRATE 5 MG/1
5 TABLET ORAL NIGHTLY PRN
Qty: 30 TABLET | Refills: 3 | Status: SHIPPED | OUTPATIENT
Start: 2023-08-01 | End: 2023-08-02 | Stop reason: DRUGHIGH

## 2023-08-02 ENCOUNTER — PATIENT MESSAGE (OUTPATIENT)
Dept: ENDOCRINOLOGY | Facility: CLINIC | Age: 48
End: 2023-08-02
Payer: COMMERCIAL

## 2023-08-02 ENCOUNTER — PATIENT MESSAGE (OUTPATIENT)
Dept: PSYCHIATRY | Facility: CLINIC | Age: 48
End: 2023-08-02
Payer: COMMERCIAL

## 2023-08-02 ENCOUNTER — PATIENT MESSAGE (OUTPATIENT)
Dept: SLEEP MEDICINE | Facility: CLINIC | Age: 48
End: 2023-08-02
Payer: COMMERCIAL

## 2023-08-02 DIAGNOSIS — E11.10 TYPE 2 DIABETES MELLITUS WITH KETOACIDOSIS WITHOUT COMA, WITHOUT LONG-TERM CURRENT USE OF INSULIN: Primary | ICD-10-CM

## 2023-08-02 DIAGNOSIS — G47.00 INSOMNIA, UNSPECIFIED TYPE: Primary | ICD-10-CM

## 2023-08-02 RX ORDER — ZOLPIDEM TARTRATE 10 MG/1
10 TABLET ORAL NIGHTLY PRN
Qty: 30 TABLET | Refills: 3 | Status: SHIPPED | OUTPATIENT
Start: 2023-08-02 | End: 2023-12-23 | Stop reason: SDUPTHER

## 2023-08-02 RX ORDER — GLIPIZIDE 5 MG/1
TABLET ORAL
Qty: 30 TABLET | Refills: 11 | Status: SHIPPED | OUTPATIENT
Start: 2023-08-02 | End: 2023-11-21

## 2023-08-12 ENCOUNTER — OFFICE VISIT (OUTPATIENT)
Dept: URGENT CARE | Facility: CLINIC | Age: 48
End: 2023-08-12
Payer: COMMERCIAL

## 2023-08-12 VITALS
TEMPERATURE: 98 F | WEIGHT: 175 LBS | HEIGHT: 64 IN | DIASTOLIC BLOOD PRESSURE: 71 MMHG | OXYGEN SATURATION: 95 % | HEART RATE: 105 BPM | SYSTOLIC BLOOD PRESSURE: 113 MMHG | RESPIRATION RATE: 16 BRPM | BODY MASS INDEX: 29.88 KG/M2

## 2023-08-12 DIAGNOSIS — J02.9 SORE THROAT: Primary | ICD-10-CM

## 2023-08-12 DIAGNOSIS — R09.82 POSTNASAL DRIP: ICD-10-CM

## 2023-08-12 DIAGNOSIS — R11.2 NAUSEA AND VOMITING, UNSPECIFIED VOMITING TYPE: ICD-10-CM

## 2023-08-12 LAB
CTP QC/QA: YES
MOLECULAR STREP A: NEGATIVE
POC MOLECULAR INFLUENZA A AGN: NEGATIVE
POC MOLECULAR INFLUENZA B AGN: NEGATIVE
SARS-COV-2 AG RESP QL IA.RAPID: NEGATIVE

## 2023-08-12 PROCEDURE — 87651 STREP A DNA AMP PROBE: CPT | Mod: QW,S$GLB,, | Performed by: FAMILY MEDICINE

## 2023-08-12 PROCEDURE — 87811 SARS-COV-2 COVID19 W/OPTIC: CPT | Mod: QW,S$GLB,, | Performed by: FAMILY MEDICINE

## 2023-08-12 PROCEDURE — 87502 INFLUENZA DNA AMP PROBE: CPT | Mod: QW,S$GLB,, | Performed by: FAMILY MEDICINE

## 2023-08-12 PROCEDURE — 87502 POCT INFLUENZA A/B MOLECULAR: ICD-10-PCS | Mod: QW,S$GLB,, | Performed by: FAMILY MEDICINE

## 2023-08-12 PROCEDURE — 99214 PR OFFICE/OUTPT VISIT, EST, LEVL IV, 30-39 MIN: ICD-10-PCS | Mod: S$GLB,,, | Performed by: FAMILY MEDICINE

## 2023-08-12 PROCEDURE — 87651 POCT STREP A MOLECULAR: ICD-10-PCS | Mod: QW,S$GLB,, | Performed by: FAMILY MEDICINE

## 2023-08-12 PROCEDURE — 99214 OFFICE O/P EST MOD 30 MIN: CPT | Mod: S$GLB,,, | Performed by: FAMILY MEDICINE

## 2023-08-12 PROCEDURE — 87811 SARS CORONAVIRUS 2 ANTIGEN POCT, MANUAL READ: ICD-10-PCS | Mod: QW,S$GLB,, | Performed by: FAMILY MEDICINE

## 2023-08-12 RX ORDER — GLIMEPIRIDE 1 MG/1
TABLET ORAL
COMMUNITY
End: 2023-11-21

## 2023-08-12 RX ORDER — ONDANSETRON HYDROCHLORIDE 8 MG/1
8 TABLET, FILM COATED ORAL EVERY 12 HOURS PRN
Qty: 12 TABLET | Refills: 0 | Status: SHIPPED | OUTPATIENT
Start: 2023-08-12 | End: 2024-02-02

## 2023-08-12 RX ORDER — IPRATROPIUM BROMIDE 21 UG/1
2 SPRAY, METERED NASAL 2 TIMES DAILY PRN
Qty: 30 ML | Refills: 0 | Status: SHIPPED | OUTPATIENT
Start: 2023-08-12 | End: 2024-02-02

## 2023-08-12 NOTE — PROGRESS NOTES
"Subjective:      Patient ID: Heather Gomez is a 48 y.o. female.    Vitals:  height is 5' 4" (1.626 m) and weight is 79.4 kg (175 lb). Her temperature is 97.6 °F (36.4 °C). Her blood pressure is 113/71 and her pulse is 105. Her respiration is 16 and oxygen saturation is 95%.     Chief Complaint: URI    This is a 48 y.o. female with a chief complaint of URI.    Sx started Wednesday and are worsening    Sx include nausea, vomitting, sore throat, trouble with swallowing, runny nose, blood on tissue when blowing nose (pt says it is common with her sinus infection) congestion, bodyache,     Pt has taken dayquil for sx with no relief      URI   This is a new problem. The current episode started in the past 7 days. The problem has been gradually worsening. There has been no fever. Associated symptoms include congestion, coughing, nausea, rhinorrhea and a sore throat. She has tried acetaminophen for the symptoms. The treatment provided no relief.     HENT:  Positive for congestion and sore throat.    Respiratory:  Positive for cough.    Gastrointestinal:  Positive for nausea.    Objective:     Physical Exam   Constitutional: She is oriented to person, place, and time. No distress. normal  HENT:   Head: Normocephalic and atraumatic.   Ears:   Right Ear: Tympanic membrane, external ear and ear canal normal.   Left Ear: Tympanic membrane, external ear and ear canal normal.   Nose: Rhinorrhea and congestion present.   Mouth/Throat: Mucous membranes are moist. Oropharynx is clear.   Eyes: Conjunctivae are normal. Pupils are equal, round, and reactive to light. Extraocular movement intact   Neck: Neck supple.   Cardiovascular: Normal rate, regular rhythm, normal heart sounds and normal pulses.   No murmur heard.  Pulmonary/Chest: Effort normal and breath sounds normal. No respiratory distress.   Abdominal: Normal appearance and bowel sounds are normal. She exhibits no distension. Soft. flat abdomen   Musculoskeletal: " Normal range of motion.         General: No swelling or tenderness. Normal range of motion.   Neurological: no focal deficit. She is alert and oriented to person, place, and time.   Skin: Skin is dry. Capillary refill takes less than 2 seconds. jaundice  Psychiatric: Her behavior is normal. Mood, judgment and thought content normal.     Assessment:     Plan:   1. Sore throat viral  - SARS Coronavirus 2 Antigen, POCT Manual Read  - POCT Influenza A/B MOLECULAR  - POCT Strep A, Molecular  - diphenhydrAMINE-aluminum-magnesium hydroxide-simethicone-LIDOcaine HCl 2%; Swish and spit 5 mLs every 6 (six) hours as needed.  Dispense: 300 each; Refill: 0    2. Nausea and vomiting, unspecified vomiting type  - ondansetron (ZOFRAN) 8 MG tablet; Take 1 tablet (8 mg total) by mouth every 12 (twelve) hours as needed for Nausea.  Dispense: 12 tablet; Refill: 0    3. Postnasal drip  - ipratropium (ATROVENT) 21 mcg (0.03 %) nasal spray; 2 sprays by Each Nostril route 2 (two) times daily as needed.  Dispense: 30 mL; Refill: 0   All results discussed with pt prior to discharge    Pt and her  were not happy because they waited a long time to see us and didn't get an antibiotic  I explained thoroughly 2x that she has a virus and antibiotic will not help it. They still were not happy because   They didn't get an antibiotic.

## 2023-08-14 ENCOUNTER — OFFICE VISIT (OUTPATIENT)
Dept: PSYCHIATRY | Facility: CLINIC | Age: 48
End: 2023-08-14
Payer: COMMERCIAL

## 2023-08-14 DIAGNOSIS — F51.01 PRIMARY INSOMNIA: Primary | ICD-10-CM

## 2023-08-14 PROCEDURE — 90791 PSYCH DIAGNOSTIC EVALUATION: CPT | Mod: 95,,, | Performed by: PSYCHOLOGIST

## 2023-08-14 PROCEDURE — 90791 PR PSYCHIATRIC DIAGNOSTIC EVALUATION: ICD-10-PCS | Mod: 95,,, | Performed by: PSYCHOLOGIST

## 2023-08-14 PROCEDURE — 3044F HG A1C LEVEL LT 7.0%: CPT | Mod: 95,CPTII,, | Performed by: PSYCHOLOGIST

## 2023-08-14 PROCEDURE — 3044F PR MOST RECENT HEMOGLOBIN A1C LEVEL <7.0%: ICD-10-PCS | Mod: 95,CPTII,, | Performed by: PSYCHOLOGIST

## 2023-08-14 NOTE — PROGRESS NOTES
"[facility] Psychiatry Initial Visit (PhD/LCSW)      Patient Name:  Heather Gomez  Date:  08/14/2023  Site:  The patient location is: [place], [place]  The chief complaint leading to consultation is: insomnia    Visit type: audiovisual    Face to Face time with patient: 40  55 minutes of total time spent on the encounter, which includes face to face time and non-face to face time preparing to see the patient (eg, review of tests), Obtaining and/or reviewing separately obtained history, Documenting clinical information in the electronic or other health record, Independently interpreting results (not separately reported) and communicating results to the patient/family/caregiver, or Care coordination (not separately reported).         Each patient to whom he or she provides medical services by telemedicine is:  (1) informed of the relationship between the physician and patient and the respective role of any other health care provider with respect to management of the patient; and (2) notified that he or she may decline to receive medical services by telemedicine and may withdraw from such care at any time.    Notes:   Referral source:  Barbara Craven NP       Chief complaint/reason for encounter:  Psychological Evaluation to assess suitability for admission to the [facility]   Clinical status of patient:  Outpatient   Met with:  Patient   CPT Code: 94655      Before this evaluation was initiated, the purposes and process of the assessment and the limits of confidentiality were discussed with the patient who expressed understanding of these issues and verbally consented to proceed with the evaluation.      History of present illness:  Ms.Sarah Susan Gomez is a 48-year-old female who is pursuing psychotherapy to improve insomnia.  Patient states, "I've had difficulty sleeping for years now. I can't sleep at all unless I have some type of medication. I have been on Ambien and tried to go down " "to 5mg and that didn't work at all so I just recently went back up to 10 mg. It works some but I still don't get enough sleep.  I doze off at work. I usually get in bed around 11 or 11:30 and sometimes I don't sleep at all, like not until 8 am and I have to get up for work at 9:00." States she scrolls on phone, watches TV, or tosses and turns in bed for hours.  On weekends, sometimes stays in bed until 2-3 in the afternoon.      Insomnia           Sleep issues?   difficulty falling asleep, staying asleep   How long?     years   Prompting event?  Gradual or sudden onset?   gradual worsening over the years   Frequency per week?   nightly   Bedtime?   11pm-1am   Wake time?  How?   9:00 am on work days, 2-3pm on nonwork days   Sleep aids?  (Prescriptions, OTC)   Ambien 10mg       Criterion A:  Poor sleep quantity or quality:  Yes          No    1+ symptoms (endorsed symptoms are bolded)   Difficulty falling asleep   Difficulty staying asleep   Early-morning awakening  Yes          No    Criterion B:  Significant distress or impairment  Yes          No    Criterion C:  3 nights per week  Yes          No    Criterion D:  Present for 3 months  Yes          No    Criterion E:  Poor sleep despite adequate opportunity for sleep  Yes          No    Criterion F/G/H:  Not better explained by other sleep disorder, substance, or mental/medical condition  Yes          No    Apnea:  Snoring, pauses in breathing at night, shortness of breath, choking at night, morning headaches, chest pain, dry mouth   Narcolepsy:  Sleep attacks, sleep paralysis, hypnagogic hallucinations, cataplexy   Sleep-wake schedule disorder:  Rotating shift or night shift work   Parasomnias:  Nightmares, night terrors, sleepwalking/talking, bruxism (teeth grinding)   Restless legs:  Crawling or aching feelings in your legs (calves) and inability to keep legs still   Periodic limb movements:  Leg twitches or jerks during the night, waking up with cramps in your " legs   Other (Gastro-esophageal reflux, Allergic Rhinitis):  Sour taste in mouth, heartburn, reflux?  Nose blocking up at night, daytime allergies           Medical history:    Past Medical History:   Diagnosis Date    Anticoagulant long-term use     DVT, popliteal, acute     GERD (gastroesophageal reflux disease)     History of pulmonary embolus (PE)     HLD (hyperlipidemia)     Hypertension     Type 2 diabetes mellitus, controlled         Psychiatric symptoms:   Depression - Denied depressed mood, loss of interest in pleasurable activities, anhedonia, sleep changes, decreased motivation, decreased concentration, feelings of excessive or irrational guilt, helplessness, hopelessness, increased or decreased appetite, weight changes, increased or decreased motor activity, decreased energy, suicidal ideations/thoughts of death.  Lisa/Hypomania - Denied increased goal directed activity, decreased need for sleep, pressured speech or increased talkative, racing thoughts, increased risk-taking behavior, episodic elevated or irritable mood, flight of ideas, distractibility, inflated self-esteem, grandiosity  Anxiety - Endorsed excessive worry, difficulty controlling worrying, being unable to relax   Panic Attacks- Denied palpitations, sweating, trembling, dyspnea, choking sensation, chest pain/discomfort, nausea, dizziness, chills or hot flashes, tingling, derealization, fear of losing control, fear of dying.  Thoughts - Denied any AVH, paranoia, delusions, ideas of reference, thought insertion or thought broadcasting  Suicidal thoughts/behaviors - denied passive or active SI, denied suicidal plans or intent  Self-injury - denied.  Substance abuse - denied abuse or dependence.   Sleep - Endorsed, see above     Current psychosocial stressors:  sister past away last week  Report of coping skills:  spending time with 15 year old son  Support system:  children    Current and past substance use:   Alcohol:  Denied current use.   Denied history of abuse or dependency.   Drugs:  Denied current use.  Denied history of abuse or dependency.   Tobacco:  Denied current use.   Caffeine:  Denied current use.      Current Psychiatric Treatment:   Medications:  Zoloft, xanax   Psychotherapy:  none     Psychiatric history:   Previous diagnosis:  anxiety  Previous hospitalizations:  none  History of outpatient treatment:  none  Previous suicide attempt:  Denied.   Family history of psychiatric illness:  denied  Access to guns:  no     Trauma history:  Denied.      Social history:  Ms. Heather Gomez was born and raised in [place], [place], by her biological parents.  She described her childhood as average.  She has a GED.  She is currently a .  She denied  service.  She is not on disability.  She has been  to her  for since age 13 years.  She has 2 children (ages 15 and 29).  She currently lives with her  and both children.       Mental Status Exam:   General appearance:  Appears stated age, neatly dressed, well groomed   Speech:  Normal rate, normal tone, normal pitch, normal volume   Level of cooperation:  Cooperative   Thought processes:  Logical, goal-directed   Mood:  Euthymic   Thought content:  No illusions, no visual hallucinations, no auditory hallucinations, no delusions, no active or passive homicidal thoughts, no active or passive suicidal ideation, no obsessions, no compulsions, no violence   Affect:  Appropriate   Orientation:  Oriented to person, place, and date   Memory:   Recent memory:  Intact   Remote memory: Intact   Attention span and concentration:  Appropriate   Fund of general knowledge: Appropriate  Abstract reasoning:   Not Assessed  Judgment and insight: Fair   Language:  Intact      Diagnostic impression:     ICD-10-CM ICD-9-CM   1. Primary insomnia  F51.01 307.42             Plan:  Ms. Heather Gomez will be admitted to the [facility].  She understood [facility]  guidelines and signed the [facility] Informed Consent and [facility] Partnership Agreement.  She was provided with information about [facility] treatments and will proceed with _[facility].

## 2023-08-16 ENCOUNTER — ON-DEMAND VIRTUAL (OUTPATIENT)
Dept: URGENT CARE | Facility: CLINIC | Age: 48
End: 2023-08-16
Payer: COMMERCIAL

## 2023-08-16 DIAGNOSIS — J01.00 ACUTE NON-RECURRENT MAXILLARY SINUSITIS: Primary | ICD-10-CM

## 2023-08-16 PROCEDURE — 99203 PR OFFICE/OUTPT VISIT, NEW, LEVL III, 30-44 MIN: ICD-10-PCS | Mod: 95,,, | Performed by: NURSE PRACTITIONER

## 2023-08-16 PROCEDURE — 99203 OFFICE O/P NEW LOW 30 MIN: CPT | Mod: 95,,, | Performed by: NURSE PRACTITIONER

## 2023-08-16 RX ORDER — AMOXICILLIN AND CLAVULANATE POTASSIUM 875; 125 MG/1; MG/1
1 TABLET, FILM COATED ORAL EVERY 12 HOURS
Qty: 14 TABLET | Refills: 0 | Status: SHIPPED | OUTPATIENT
Start: 2023-08-16 | End: 2023-08-23

## 2023-08-16 NOTE — PATIENT INSTRUCTIONS
OVER THE COUNTER RECOMMENDATIONS/SUGGESTIONS.     ·         Make sure to stay well hydrated.     ·         Use Nasal Saline to mechanically move any post nasal drip from your eustachian tube or from the back of your throat.     ·         Use warm saltwater gargles to ease your throat pain. Warm saltwater gargles as needed for sore throat-  1/2 tsp salt to 1 cup warm water, gargle as desired. Warm fluids tend to relieve a sore throat.     .         Throat lozenges, Chloraseptic spray or other over the counter treatments are ok to use as well. Use as directed.     ·         Use an antihistamine such as Claritin, Zyrtec or Allegra to dry you out.     ·         Use pseudoephedrine (behind the counter) to decongest. Pseudoephedrine  30 mg up to 240 mg /day. It can raise your blood pressure and give you palpitations.     ·         Use Mucinex (guaifenesin) to break up mucous up to 2400mg/day to loosen any mucous.     ·         You can use Mucinex D (it has guaifenesin and a high dose of pseudoephedrine) in the mornings to help decongest.     ·         Use Afrin (oxymetazoline) in each nare for no longer than 3 days, as it is addictive. It can also dry out your mucous membranes and cause elevated blood pressure. This is especially useful if you are flying.     ·         Use Flonase 1-2 sprays/nostril per day. It is a local acting steroid nasal spray, if you develop a bloody nose, stop using the medication immediately.     ·         Sometimes Nyquil at night is beneficial to help you get some rest, however it is sedating, and it does have an antihistamine, and Tylenol.     ·         Honey is a natural cough suppressant that can be used.     ·         Tylenol up to 4,000 mg a day is safe for short periods and can be used for body aches, pain, and fever. However, in high doses and prolonged use it can cause liver irritation.     ·         Ibuprofen is a non-steroidal anti-inflammatory that can be used for body aches, pain,  and fever. However, it can also cause stomach irritation if overused.

## 2023-08-16 NOTE — PROGRESS NOTES
Subjective:      Patient ID: Heather Gomez is a 48 y.o. female.    Vitals:  vitals were not taken for this visit.     Chief Complaint: Sinus Problem (Symptom onset 1 week, worsening symptoms. Reports sore throat, runny nose and green discharge. Hx of seasonal allergies. COVID, Strep, and Flu negative Monday at  visit.)      Visit Type: TELE AUDIOVISUAL    Present with the patient at the time of consultation: TELEMED PRESENT WITH PATIENT: None    Past Medical History:   Diagnosis Date    Anticoagulant long-term use     DVT, popliteal, acute     GERD (gastroesophageal reflux disease)     History of pulmonary embolus (PE)     HLD (hyperlipidemia)     Hypertension     Type 2 diabetes mellitus, controlled      Past Surgical History:   Procedure Laterality Date    BREAST BIOPSY Right 2019     SECTION      x2    COLONOSCOPY N/A 2022    Procedure: COLONOSCOPY suprep;  Surgeon: Michael Tirado MD;  Location: [facility] ENDO;  Service: Endoscopy;  Laterality: N/A;    ENDOMETRIAL ABLATION      HYSTERECTOMY      LAPAROSCOPY-ASSISTED VAGINAL HYSTERECTOMY Bilateral 2019    Procedure: HYSTERECTOMY, VAGINAL, LAPAROSCOPY-ASSISTED;  Surgeon: Lashell Clayton MD;  Location: [facility] OR;  Service: OB/GYN;  Laterality: Bilateral;  video    UPPER GASTROINTESTINAL ENDOSCOPY            Review of patient's allergies indicates:  No Known Allergies  Current Outpatient Medications on File Prior to Visit   Medication Sig Dispense Refill    ALPRAZolam (XANAX) 0.5 MG tablet Take 0.5 mg by mouth 3 (three) times daily.      azelastine (ASTELIN) 137 mcg (0.1 %) nasal spray SMARTSIG:Both Nares      blood-glucose sensor (DEXCOM G6 SENSOR) Rosa 3 each by Misc.(Non-Drug; Combo Route) route continuous as needed. Change every 10 days. 3 each PRN    blood-glucose sensor Rosa Use and change every 10 days continuously as directed 3 each PRN    blood-glucose transmitter (DEXCOM G5 TRANSMITTER) Rosa Use  continuously as needed (as directed) 1 each PRN    blood-glucose transmitter (DEXCOM G6 TRANSMITTER) Rosa 1 each by Misc.(Non-Drug; Combo Route) route continuous prn. 1 each PRN    blood-glucose transmitter (DEXCOM G6 TRANSMITTER) Rosa 1 each by Misc.(Non-Drug; Combo Route) route continuous prn. 1 each PRN    diphenhydrAMINE-aluminum-magnesium hydroxide-simethicone-LIDOcaine HCl 2% Swish and spit 5 mLs every 6 (six) hours as needed. 300 each 0    glimepiride (AMARYL) 1 MG tablet       glipiZIDE (GLUCOTROL) 5 MG tablet Take 2.5 mg = 1/2 tab with biggest meal of the day. 30 tablet 11    glipiZIDE (GLUCOTROL) 5 MG tablet Take 1/2 tablet by mouth once daily with biggest meal of the day 30 tablet 11    hydrOXYzine HCl (ATARAX) 25 MG tablet Take 25 mg by mouth 3 (three) times daily.      hydrOXYzine HCL (ATARAX) 25 MG tablet Take 1 tablet by mouth 3 times per day , for 15 days 45 tablet 2    indomethacin (INDOCIN) 50 MG capsule Take 1 capsule (50 mg total) by mouth 2 (two) times daily with meals. 60 capsule 2    ipratropium (ATROVENT) 21 mcg (0.03 %) nasal spray 2 sprays by Each Nostril route 2 (two) times daily as needed. 30 mL 0    irbesartan-hydrochlorothiazide (AVALIDE) 300-12.5 mg per tablet Take 1 tablet by mouth once daily. 90 tablet 1    LANTUS SOLOSTAR U-100 INSULIN glargine 100 units/mL SubQ pen Inject 14 Units into the skin 2 (two) times a day. Increase per providers instruction Max TDD 60units. 20 mL 3    latanoprost 0.005 % ophthalmic solution Place 1 drop into both eyes every evening. 2.5 mL 5    lifitegrast (XIIDRA) 5 % Dpet Place 1 drop in both eyes twice daily (Patient not taking: Reported on 8/12/2023) 60 each 6    metFORMIN (GLUCOPHAGE-XR) 500 MG ER 24hr tablet Take 2 tablets (1,000 mg total) by mouth 2 (two) times daily with meals. 360 tablet 3    NOVOLOG FLEXPEN U-100 INSULIN 100 unit/mL (3 mL) InPn pen Inject 10 Units into the skin 3 (three) times daily with meals. Plus correction scale Max TDD  60units. (Patient not taking: Reported on 8/12/2023) 30 mL 3    ondansetron (ZOFRAN) 8 MG tablet Take 1 tablet (8 mg total) by mouth every 12 (twelve) hours as needed for Nausea. 12 tablet 0    pravastatin (PRAVACHOL) 40 MG tablet Take 1 tablet (40 mg total) by mouth once daily. 90 tablet 2    pravastatin (PRAVACHOL) 40 MG tablet Take 1 tablet by mouth once daily 90 tablet 3    pravastatin (PRAVACHOL) 40 MG tablet Take 1 tablet by mouth once daily 90 tablet 2    sertraline (ZOLOFT) 50 MG tablet Take 1 tablet by mouth once daily 30 tablet 2    tirzepatide (MOUNJARO) 12.5 mg/0.5 mL PnIj Inject 12.5 mg into the skin every 7 days. 4 pen 3    zolpidem (AMBIEN) 10 mg Tab Take 1 tablet (10 mg total) by mouth nightly as needed. 30 tablet 3     No current facility-administered medications on file prior to visit.     Family History   Problem Relation Age of Onset    Diabetes Mother     Hypertension Mother     Hyperlipidemia Mother     Hyperthyroidism Mother     Stroke Father     Hyperlipidemia Father     Hypertension Father     Diabetes Father     Breast cancer Sister        Medications Ordered                [id number] - [place], [place] - [address]   [address], [place] [address]    Telephone: [phone]   Fax: [phone]   Hours: Not open 24 hours                         E-Prescribed (1 of 1)              amoxicillin-clavulanate 875-125mg (AUGMENTIN) 875-125 mg per tablet    Sig: Take 1 tablet by mouth every 12 (twelve) hours. for 7 days       Start: 8/16/23     Quantity: 14 tablet Refills: 0                           Ohs Peq Odvv Intake    8/16/2023  8:54 AM CDT - Filed by Patient   Describe your reason for todays visit Possible sinus infection had a visit with Dr Sonia Johnson at 4:58 this morning she prescribed a medicine but never called it in   What is your current physical address in the event of a medical emergency? [address]   Are you able to take your vital signs? No   Please attach any  relevant images or files          Sinus Problem  Associated symptoms include congestion, coughing, ear pain, sinus pressure and a sore throat.       Constitution: Negative for fatigue, fever and generalized weakness.   HENT:  Positive for ear pain, congestion, sinus pain, sinus pressure and sore throat.    Cardiovascular:  Negative for chest pain.   Respiratory:  Positive for cough.         Objective:   The physical exam was conducted virtually.  Physical Exam   Constitutional: She is oriented to person, place, and time. She appears well-developed. She is cooperative. No distress. awake  HENT:   Head: Normocephalic and atraumatic.   Ears:   Right Ear: Hearing normal.   Left Ear: Hearing normal.   Nose: Rhinorrhea and sinus tenderness present. Right sinus exhibits maxillary sinus tenderness. Left sinus exhibits maxillary sinus tenderness.   Mouth/Throat: Uvula is midline, oropharynx is clear and moist and mucous membranes are normal. Mucous membranes are moist. Oropharynx is clear.   Eyes: Conjunctivae and lids are normal. Pupils are equal, round, and reactive to light. Extraocular movement intact   Neck: Trachea normal and phonation normal. Neck supple.   Cardiovascular:      Comments: Unable to assess heart sounds   Pulmonary/Chest: Effort normal. No accessory muscle usage. No respiratory distress.   Unable to assess lung sounds         Comments: Unable to assess lung sounds and Unable to assess lung sounds    Abdominal: Normal appearance.      Comments: Unable to assess Bowel sounds   Genitourinary:         Comments: Unable to assess     Musculoskeletal: Normal range of motion.         General: Normal range of motion.   Neurological: no focal deficit. She is alert, oriented to person, place, and time and at baseline.   Skin: Skin is not diaphoretic and not pale.   Psychiatric: She experiences Normal attention. Her speech is normal and behavior is normal. Mood, affect and thought content normal. Cognition  normal  Vitals reviewed.      Assessment:     1. Acute non-recurrent maxillary sinusitis        Plan:   Patient encouraged to monitor symptoms closely and instructed to follow-up for new or worsening symptoms. Further, in-person, evaluation may be necessary for continued treatment. Please follow up with your primary care doctor or specialist as needed. Verbally discussed plan. Patient confirms understanding and is in agreement with treatment and plan.     You must understand that you've received a Virtual Care treatment only and that you may be released before all your medical problems are known or treated. You, the patient, will arrange for follow up care as instructed.     Acute non-recurrent maxillary sinusitis  -     amoxicillin-clavulanate 875-125mg (AUGMENTIN) 875-125 mg per tablet; Take 1 tablet by mouth every 12 (twelve) hours. for 7 days  Dispense: 14 tablet; Refill: 0

## 2023-09-08 DIAGNOSIS — E11.65 TYPE 2 DIABETES MELLITUS WITH HYPERGLYCEMIA, WITH LONG-TERM CURRENT USE OF INSULIN: ICD-10-CM

## 2023-09-08 DIAGNOSIS — Z79.4 TYPE 2 DIABETES MELLITUS WITH HYPERGLYCEMIA, WITH LONG-TERM CURRENT USE OF INSULIN: ICD-10-CM

## 2023-09-08 RX ORDER — TIRZEPATIDE 12.5 MG/.5ML
12.5 INJECTION, SOLUTION SUBCUTANEOUS
Qty: 4 PEN | Refills: 3 | Status: SHIPPED | OUTPATIENT
Start: 2023-09-08 | End: 2023-11-16

## 2023-10-17 DIAGNOSIS — F41.9 ANXIETY: ICD-10-CM

## 2023-10-17 RX ORDER — SERTRALINE HYDROCHLORIDE 50 MG/1
TABLET, FILM COATED ORAL
Qty: 90 TABLET | Refills: 3 | Status: SHIPPED | OUTPATIENT
Start: 2023-10-17

## 2023-10-18 DIAGNOSIS — I10 HYPERTENSION, UNSPECIFIED TYPE: ICD-10-CM

## 2023-10-20 RX ORDER — IRBESARTAN AND HYDROCHLOROTHIAZIDE 300; 12.5 MG/1; MG/1
1 TABLET, FILM COATED ORAL DAILY
Qty: 90 TABLET | Refills: 1 | Status: SHIPPED | OUTPATIENT
Start: 2023-10-20 | End: 2024-02-02 | Stop reason: SDUPTHER

## 2023-11-01 DIAGNOSIS — E11.10 TYPE 2 DIABETES MELLITUS WITH KETOACIDOSIS WITHOUT COMA, WITHOUT LONG-TERM CURRENT USE OF INSULIN: ICD-10-CM

## 2023-11-01 RX ORDER — BLOOD-GLUCOSE SENSOR
3 EACH MISCELLANEOUS CONTINUOUS PRN
Qty: 3 EACH | Status: SHIPPED | OUTPATIENT
Start: 2023-11-01 | End: 2023-12-26 | Stop reason: SDUPTHER

## 2023-11-13 NOTE — PROGRESS NOTES
Subjective:      Patient ID: Heather Gomez is a 48 y.o. female.    Chief Complaint:  No chief complaint on file.    History of Present Illness  Heather Gomez is here for follow up of DM, thyroid nodule and subclinical hyperthyroidism.  Previously seen by me 7/2023.  This is a MyChart video visit.    The patient location is: [place]  The chief complaint leading to consultation is: DM  Visit type: Virtual visit with synchronous audio and video  Total time spent with patient: see below  Each patient to whom he or she provides medical services by telemedicine is:  (1) informed of the relationship between the physician and patient and the respective role of any other health care provider with respect to management of the patient; and (2) notified that he or she may decline to receive medical services by telemedicine and may withdraw from such care at any time.    With regards to Diabetes:    Diagnosed: 2013 4/2021 C peptide 3.12 with glucose of 180    FH of DM:   Mother, father, 4 sisters   Has 2 children - no history of DM     DM Education: 2/2022    Known complications:  DKA : None  RN - Denies  - Eye Exam: 7/2022  PN : Denies   - Podiatry: None  Nephropathy   CAD : Denies  Denies history of pancreatitis & personal/family history of medullary thyroid cancer.     Diet/Exercise: reports diet has been poor since LOV   Eats 1 meal a day.   Snacks : Denies.   Drinks : water, rarely a soft drink  Exercise: None - active lifestyle.  Recent illness, injury, steroids: URI     Current regimen:  Metformin 1000mg twice daily  Mounjaro 12.5 mg weekly on Sunday   Lantus 14 units twice a day   Glipizide 5mg with dinner     Reports compliance.     Other medications tried:  Metformin - unable to tolerate due to GI upset   Invokana - discontinued due to abdominal pain, nausea, yeast infections    Actos - discontinued during hospital stay   Januvia - discontinued during hospital stay   Novolog stopped  7/2023    Glucose Monitor:   6 times a day testing  Log reviewed: Dexcom G6 - need a new sharing code      Hypoglycemia:  Denies  Knows how to correct with 15 grams of carbs- juice, coke, or a peppermint.      Diabetes Management Status    Hemoglobin A1C   Date Value Ref Range Status   11/15/2023 5.9 (H) 4.0 - 5.6 % Final     Comment:     ADA Screening Guidelines:  5.7-6.4%  Consistent with prediabetes  >or=6.5%  Consistent with diabetes    High levels of fetal hemoglobin interfere with the HbA1C  assay. Heterozygous hemoglobin variants (HbS, HgC, etc)do  not significantly interfere with this assay.   However, presence of multiple variants may affect accuracy.     07/14/2023 5.8 (H) 4.0 - 5.6 % Final     Comment:     ADA Screening Guidelines:  5.7-6.4%  Consistent with prediabetes  >or=6.5%  Consistent with diabetes    High levels of fetal hemoglobin interfere with the HbA1C  assay. Heterozygous hemoglobin variants (HbS, HgC, etc)do  not significantly interfere with this assay.   However, presence of multiple variants may affect accuracy.     03/01/2023 6.8 (H) 4.0 - 5.6 % Final     Comment:     ADA Screening Guidelines:  5.7-6.4%  Consistent with prediabetes  >or=6.5%  Consistent with diabetes    High levels of fetal hemoglobin interfere with the HbA1C  assay. Heterozygous hemoglobin variants (HbS, HgC, etc)do  not significantly interfere with this assay.   However, presence of multiple variants may affect accuracy.         Statin: Taking  ACE/ARB: Taking  Screening or Prevention Patient's value Goal Complete/Controlled?   HgA1C Testing and Control   Lab Results   Component Value Date    HGBA1C 5.9 (H) 11/15/2023      Annually/Less than 8% Yes   Lipid profile : 07/14/2023 Annually Yes   LDL control Lab Results   Component Value Date    LDLCALC 98.8 07/14/2023    Annually/Less than 100 mg/dl  Yes   Nephropathy screening Lab Results   Component Value Date    LABMICR 34.0 02/09/2022     Lab Results   Component Value  Date    PROTEINUA Negative 06/27/2022    Annually No   Blood pressure BP Readings from Last 1 Encounters:   11/17/23 134/88    Less than 140/90 No   Dilated retinal exam : 12/08/2022 Annually Yes   Foot exam   : 02/09/2022 Annually No     With regards to thyroid nodule:    Thyroid US:   3/1/2023  Impression:  1.  Thyroid gland is enlarged with homogenous echotexture.  2.  1 nodule in the right thyroid described above.  3.  1 nodule in the left thyroid described above.  4.  1 nodule in the isthmus described above.  RECOMMENDATIONS:  Follow-up ultrasound in 2 years is recommended.       FNA: None    Signs or Symptoms:   Difficulty breathing: Denies  Difficulty swallowing: Denies  Voice Changes: Hoarseness but not all the time  FH of thyroid nodules but unsure if it was cancer (mother)  Personal history of radiation treatment or exposure: Denies      With regards to subclinical hyperthyroidism:    FH of thyroid disease: sister    2/2022  Thyroid Ab negative.    NM Thyroid Scan  4/6/2022  The 24 hour uptake is elevated at 48.3 % (normal 10-30%).  Homogenous distribution of the radionuclide throughout the thyroid gland without hyper or hypo functional nodules.  Impression:  1. Elevated 24 hour radioiodine uptake by the thyroid.  2. Uniform uptake in the thyroid gland bilaterally.  Overall findings are in keeping with Graves disease..    Lab Results   Component Value Date    TSH <0.010 (L) 11/15/2023    P9KTILC 89 07/24/2018    FREET4 1.16 11/15/2023     Biotin Use: Yes - held for labs     With regards to Vitamin D Deficiency:    Vit D, 25-Hydroxy   Date Value Ref Range Status   11/15/2023 30 30 - 96 ng/mL Final     Comment:     Vitamin D deficiency.........<10 ng/mL                              Vitamin D insufficiency......10-29 ng/mL       Vitamin D sufficiency........> or equal to 30 ng/mL  Vitamin D toxicity............>100 ng/mL         Current Meds: OTC D3 2000iu daily.     Review of Systems   Constitutional:   Negative for fatigue.   Eyes:  Negative for visual disturbance.   Respiratory:  Negative for shortness of breath.    Cardiovascular:  Negative for chest pain.   Gastrointestinal:  Negative for abdominal pain.   Musculoskeletal:  Negative for arthralgias.   Skin:  Negative for wound.   Neurological:  Negative for headaches.           Visit Vitals  LMP 06/17/2019       There is no height or weight on file to calculate BMI.    Lab Review:   Lab Results   Component Value Date    HGBA1C 5.9 (H) 11/15/2023    HGBA1C 5.8 (H) 07/14/2023    HGBA1C 6.8 (H) 03/01/2023       Lab Results   Component Value Date    CHOL 177 07/14/2023    HDL 41 07/14/2023    LDLCALC 98.8 07/14/2023    TRIG 186 (H) 07/14/2023    CHOLHDL 23.2 07/14/2023     Lab Results   Component Value Date     11/15/2023    K 4.2 11/15/2023     11/15/2023    CO2 27 11/15/2023     (H) 11/15/2023    BUN 16 11/15/2023    CREATININE 0.7 11/15/2023    CALCIUM 9.3 11/15/2023    PROT 6.8 11/15/2023    ALBUMIN 3.3 (L) 11/15/2023    BILITOT 0.2 11/15/2023    ALKPHOS 71 11/15/2023    AST 19 11/15/2023    ALT 23 11/15/2023    ANIONGAP 7 (L) 11/15/2023    ESTGFRAFRICA >60.0 05/18/2022    EGFRNONAA >60.0 05/18/2022    TSH <0.010 (L) 11/15/2023     Vit D, 25-Hydroxy   Date Value Ref Range Status   11/15/2023 30 30 - 96 ng/mL Final     Comment:     Vitamin D deficiency.........<10 ng/mL                              Vitamin D insufficiency......10-29 ng/mL       Vitamin D sufficiency........> or equal to 30 ng/mL  Vitamin D toxicity............>100 ng/mL       Assessment and Plan     1. Type 2 diabetes mellitus with hyperglycemia, with long-term current use of insulin  Ambulatory referral/consult to Ophthalmology    tirzepatide (MOUNJARO) 12.5 mg/0.5 mL PnIj    Comprehensive Metabolic Panel    Hemoglobin A1C    Microalbumin/Creatinine Ratio, Urine      2. Thyroid nodule  TSH    T4, Free      3. Vitamin D deficiency        4. Subclinical hyperthyroidism         5. Type 2 diabetes mellitus with ketoacidosis without coma, without long-term current use of insulin  NOVOLOG FLEXPEN U-100 INSULIN 100 unit/mL (3 mL) InPn pen            Type 2 diabetes mellitus with hyperglycemia, with long-term current use of insulin  -- Sugar clinic in 4 weeks. Labs prior to follow up in 12 weeks.  -- A1c goal <7%.  -- Medications discussed:  MFM   GLP1-DPP4   RAE   SGLT2   Reviewed potential adverse effects of SGLT-2 inhibitors, including genital mycotic infections, slightly increased risk of UTI, hypersensitivity, hypotension, and hyperkalemia. Advised to maintain water intake of 8-10 cups per day. Advised we need to check chemistry panel at baseline and 2 weeks after starting. Discussed FDA warning reports of ketoacidosis associated with SGLT-2 inhibitors. Advised to seek immediate medical attention and stop the medication if symptoms such as difficulty breathing, nausea, vomiting, abdominal pain, confusion, and unusual fatigue/sleepiness. Discussed possible precipitating factors including major illness/reduced food and fluid intake (advised to stop under these circumstances), and reduced insulin dose. Discussed possible effects of increased fracture risk/decreased bone density. Discussed reports of increased risk of leg and foot amputations with canagliflozin and need to seek urgent care if developed new pain or tenderness, sores or ulcers, or infections in legs/feet.   Insulin   -- Reviewed logs/CGM:  Need a new Dexcom sharing code.  Reports global hyperglycemia for 2 weeks - of note- URI, OTC medication and reports diet has been poor.  Instructed to send glucose logs in 7 days.  Reach out to me sooner for any glucose <70 or consistently >180.  -- Medication Changes:   Metformin 1000mg twice daily  Mounjaro 12.5 mg weekly   Lantus 14 units twice a day   Novolog 4 units before meals  -- Reviewed goals of therapy are to get the best control we can without hypoglycemia.  -- Reviewed patient's  current insulin regimen. Clarified proper insulin dose and timing in relation to meals, etc. Insulin injection sites and proper rotation instructed.    -- Advised frequent self blood glucose monitoring.  Patient encouraged to document glucose results and bring them to every clinic visit.  -- Hypoglycemia precautions discussed. Instructed on precautions before driving.    -- Call for Bg repeatedly < 90 or > 180.   -- Close adherence to lifestyle changes recommended.   -- Periodic follow ups for eye evaluations, foot care and dental care suggested.    Thyroid nodule  -- Denies compressive symptoms.  -- Repeat thyroid US 3/2025.    Subclinical hyperthyroidism  -- Clinically euthyroid.  -- Repeat TFTs in 6 weeks. Please note: if you are taking biotin please hold it for 5 days prior to labs as it can interfere with the thyroid testing.    Vitamin D deficiency  -- CONTINUE OTC Vit D3 2000iu daily.    Follow up in about 4 weeks (around 12/19/2023).    I spent 30 minutes face-to-face with the patient, over half of the visit was spent on counseling and/or coordinating the care of the patient.    Counseling includes:  Diagnostic results, impressions, recommendations   Prognosis   Risk and benefits of management/treatment options   Instructions for management treatment and or follow-up   Importance of compliance with management   Risk factor reduction   Patient education        ADDENDUM: reviewed CGM - PP hyperglycemia.

## 2023-11-15 ENCOUNTER — LAB VISIT (OUTPATIENT)
Dept: LAB | Facility: HOSPITAL | Age: 48
End: 2023-11-15
Attending: NURSE PRACTITIONER
Payer: COMMERCIAL

## 2023-11-15 DIAGNOSIS — E04.1 THYROID NODULE: ICD-10-CM

## 2023-11-15 DIAGNOSIS — E05.90 SUBCLINICAL HYPERTHYROIDISM: ICD-10-CM

## 2023-11-15 DIAGNOSIS — E55.9 VITAMIN D DEFICIENCY: ICD-10-CM

## 2023-11-15 DIAGNOSIS — E11.10 TYPE 2 DIABETES MELLITUS WITH KETOACIDOSIS WITHOUT COMA, WITHOUT LONG-TERM CURRENT USE OF INSULIN: ICD-10-CM

## 2023-11-15 LAB
25(OH)D3+25(OH)D2 SERPL-MCNC: 30 NG/ML (ref 30–96)
ALBUMIN SERPL BCP-MCNC: 3.3 G/DL (ref 3.5–5.2)
ALP SERPL-CCNC: 71 U/L (ref 55–135)
ALT SERPL W/O P-5'-P-CCNC: 23 U/L (ref 10–44)
ANION GAP SERPL CALC-SCNC: 7 MMOL/L (ref 8–16)
AST SERPL-CCNC: 19 U/L (ref 10–40)
BILIRUB SERPL-MCNC: 0.2 MG/DL (ref 0.1–1)
BUN SERPL-MCNC: 16 MG/DL (ref 6–20)
CALCIUM SERPL-MCNC: 9.3 MG/DL (ref 8.7–10.5)
CHLORIDE SERPL-SCNC: 109 MMOL/L (ref 95–110)
CO2 SERPL-SCNC: 27 MMOL/L (ref 23–29)
CREAT SERPL-MCNC: 0.7 MG/DL (ref 0.5–1.4)
EST. GFR  (NO RACE VARIABLE): >60 ML/MIN/1.73 M^2
ESTIMATED AVG GLUCOSE: 123 MG/DL (ref 68–131)
GLUCOSE SERPL-MCNC: 166 MG/DL (ref 70–110)
HBA1C MFR BLD: 5.9 % (ref 4–5.6)
POTASSIUM SERPL-SCNC: 4.2 MMOL/L (ref 3.5–5.1)
PROT SERPL-MCNC: 6.8 G/DL (ref 6–8.4)
SODIUM SERPL-SCNC: 143 MMOL/L (ref 136–145)
T4 FREE SERPL-MCNC: 1.16 NG/DL (ref 0.71–1.51)
TSH SERPL DL<=0.005 MIU/L-ACNC: <0.01 UIU/ML (ref 0.4–4)

## 2023-11-15 PROCEDURE — 83036 HEMOGLOBIN GLYCOSYLATED A1C: CPT | Performed by: NURSE PRACTITIONER

## 2023-11-15 PROCEDURE — 84443 ASSAY THYROID STIM HORMONE: CPT | Performed by: NURSE PRACTITIONER

## 2023-11-15 PROCEDURE — 80053 COMPREHEN METABOLIC PANEL: CPT | Performed by: NURSE PRACTITIONER

## 2023-11-15 PROCEDURE — 82306 VITAMIN D 25 HYDROXY: CPT | Performed by: NURSE PRACTITIONER

## 2023-11-15 PROCEDURE — 36415 COLL VENOUS BLD VENIPUNCTURE: CPT | Performed by: NURSE PRACTITIONER

## 2023-11-15 PROCEDURE — 84439 ASSAY OF FREE THYROXINE: CPT | Performed by: NURSE PRACTITIONER

## 2023-11-16 ENCOUNTER — PATIENT MESSAGE (OUTPATIENT)
Dept: ENDOCRINOLOGY | Facility: CLINIC | Age: 48
End: 2023-11-16
Payer: COMMERCIAL

## 2023-11-16 DIAGNOSIS — E11.10 TYPE 2 DIABETES MELLITUS WITH KETOACIDOSIS WITHOUT COMA, WITHOUT LONG-TERM CURRENT USE OF INSULIN: Primary | ICD-10-CM

## 2023-11-16 RX ORDER — TIRZEPATIDE 15 MG/.5ML
15 INJECTION, SOLUTION SUBCUTANEOUS
Qty: 12 PEN | Refills: 1 | Status: SHIPPED | OUTPATIENT
Start: 2023-11-16

## 2023-11-21 ENCOUNTER — OFFICE VISIT (OUTPATIENT)
Dept: ENDOCRINOLOGY | Facility: CLINIC | Age: 48
End: 2023-11-21
Payer: COMMERCIAL

## 2023-11-21 ENCOUNTER — PATIENT MESSAGE (OUTPATIENT)
Dept: ENDOCRINOLOGY | Facility: CLINIC | Age: 48
End: 2023-11-21

## 2023-11-21 DIAGNOSIS — E55.9 VITAMIN D DEFICIENCY: ICD-10-CM

## 2023-11-21 DIAGNOSIS — Z79.4 TYPE 2 DIABETES MELLITUS WITH HYPERGLYCEMIA, WITH LONG-TERM CURRENT USE OF INSULIN: Primary | ICD-10-CM

## 2023-11-21 DIAGNOSIS — E05.90 SUBCLINICAL HYPERTHYROIDISM: ICD-10-CM

## 2023-11-21 DIAGNOSIS — E11.10 TYPE 2 DIABETES MELLITUS WITH KETOACIDOSIS WITHOUT COMA, WITHOUT LONG-TERM CURRENT USE OF INSULIN: ICD-10-CM

## 2023-11-21 DIAGNOSIS — E04.1 THYROID NODULE: ICD-10-CM

## 2023-11-21 DIAGNOSIS — E11.65 TYPE 2 DIABETES MELLITUS WITH HYPERGLYCEMIA, WITH LONG-TERM CURRENT USE OF INSULIN: Primary | ICD-10-CM

## 2023-11-21 PROCEDURE — 1159F MED LIST DOCD IN RCRD: CPT | Mod: CPTII,95,, | Performed by: NURSE PRACTITIONER

## 2023-11-21 PROCEDURE — 95251 PR GLUCOSE MONITOR, 72 HOUR, PHYS INTERP: ICD-10-PCS | Mod: NDTC,S$GLB,, | Performed by: NURSE PRACTITIONER

## 2023-11-21 PROCEDURE — 99214 OFFICE O/P EST MOD 30 MIN: CPT | Mod: 25,95,, | Performed by: NURSE PRACTITIONER

## 2023-11-21 PROCEDURE — 1160F RVW MEDS BY RX/DR IN RCRD: CPT | Mod: CPTII,95,, | Performed by: NURSE PRACTITIONER

## 2023-11-21 PROCEDURE — 3044F HG A1C LEVEL LT 7.0%: CPT | Mod: CPTII,95,, | Performed by: NURSE PRACTITIONER

## 2023-11-21 PROCEDURE — 3044F PR MOST RECENT HEMOGLOBIN A1C LEVEL <7.0%: ICD-10-PCS | Mod: CPTII,95,, | Performed by: NURSE PRACTITIONER

## 2023-11-21 PROCEDURE — 99214 PR OFFICE/OUTPT VISIT, EST, LEVL IV, 30-39 MIN: ICD-10-PCS | Mod: 25,95,, | Performed by: NURSE PRACTITIONER

## 2023-11-21 PROCEDURE — 1159F PR MEDICATION LIST DOCUMENTED IN MEDICAL RECORD: ICD-10-PCS | Mod: CPTII,95,, | Performed by: NURSE PRACTITIONER

## 2023-11-21 PROCEDURE — 1160F PR REVIEW ALL MEDS BY PRESCRIBER/CLIN PHARMACIST DOCUMENTED: ICD-10-PCS | Mod: CPTII,95,, | Performed by: NURSE PRACTITIONER

## 2023-11-21 PROCEDURE — 95251 CONT GLUC MNTR ANALYSIS I&R: CPT | Mod: NDTC,S$GLB,, | Performed by: NURSE PRACTITIONER

## 2023-11-21 RX ORDER — TIRZEPATIDE 12.5 MG/.5ML
12.5 INJECTION, SOLUTION SUBCUTANEOUS
Qty: 4 PEN | Refills: 3 | Status: SHIPPED | OUTPATIENT
Start: 2023-11-21 | End: 2024-02-02

## 2023-11-21 RX ORDER — INSULIN ASPART 100 [IU]/ML
4 INJECTION, SOLUTION INTRAVENOUS; SUBCUTANEOUS
Qty: 20 ML | Refills: 3 | Status: SHIPPED | OUTPATIENT
Start: 2023-11-21 | End: 2024-02-08

## 2023-11-21 NOTE — Clinical Note
Sugar clinic in ~ 4 weeks  Labs in 6 weeks: TSH, free T4 VV in 12 weeks with labs prior: A1c, CMP, Urine

## 2023-11-21 NOTE — ASSESSMENT & PLAN NOTE
-- Sugar clinic in 4 weeks. Labs prior to follow up in 12 weeks.  -- A1c goal <7%.  -- Medications discussed:  MFM   GLP1-DPP4   RAE   SGLT2   Reviewed potential adverse effects of SGLT-2 inhibitors, including genital mycotic infections, slightly increased risk of UTI, hypersensitivity, hypotension, and hyperkalemia. Advised to maintain water intake of 8-10 cups per day. Advised we need to check chemistry panel at baseline and 2 weeks after starting. Discussed FDA warning reports of ketoacidosis associated with SGLT-2 inhibitors. Advised to seek immediate medical attention and stop the medication if symptoms such as difficulty breathing, nausea, vomiting, abdominal pain, confusion, and unusual fatigue/sleepiness. Discussed possible precipitating factors including major illness/reduced food and fluid intake (advised to stop under these circumstances), and reduced insulin dose. Discussed possible effects of increased fracture risk/decreased bone density. Discussed reports of increased risk of leg and foot amputations with canagliflozin and need to seek urgent care if developed new pain or tenderness, sores or ulcers, or infections in legs/feet.   Insulin   -- Reviewed logs/CGM:  Need a new Dexcom sharing code.  Reports global hyperglycemia for 2 weeks - of note- URI, OTC medication and reports diet has been poor.  Instructed to send glucose logs in 7 days.  Reach out to me sooner for any glucose <70 or consistently >180.  -- Medication Changes:   Metformin 1000mg twice daily  Mounjaro 12.5 mg weekly   Lantus 14 units twice a day   Novolog 4 units before meals  -- Reviewed goals of therapy are to get the best control we can without hypoglycemia.  -- Reviewed patient's current insulin regimen. Clarified proper insulin dose and timing in relation to meals, etc. Insulin injection sites and proper rotation instructed.    -- Advised frequent self blood glucose monitoring.  Patient encouraged to document glucose results and  bring them to every clinic visit.  -- Hypoglycemia precautions discussed. Instructed on precautions before driving.    -- Call for Bg repeatedly < 90 or > 180.   -- Close adherence to lifestyle changes recommended.   -- Periodic follow ups for eye evaluations, foot care and dental care suggested.

## 2023-11-21 NOTE — ASSESSMENT & PLAN NOTE
-- Clinically euthyroid.  -- Repeat TFTs in 6 weeks. Please note: if you are taking biotin please hold it for 5 days prior to labs as it can interfere with the thyroid testing.

## 2023-11-22 DIAGNOSIS — E11.10 TYPE 2 DIABETES MELLITUS WITH KETOACIDOSIS WITHOUT COMA, WITHOUT LONG-TERM CURRENT USE OF INSULIN: ICD-10-CM

## 2023-11-22 RX ORDER — BLOOD-GLUCOSE TRANSMITTER
1 EACH MISCELLANEOUS CONTINUOUS PRN
Qty: 1 EACH | Status: SHIPPED | OUTPATIENT
Start: 2023-11-22 | End: 2024-03-13 | Stop reason: SDUPTHER

## 2023-11-24 ENCOUNTER — OFFICE VISIT (OUTPATIENT)
Dept: URGENT CARE | Facility: CLINIC | Age: 48
End: 2023-11-24
Payer: COMMERCIAL

## 2023-11-24 VITALS
DIASTOLIC BLOOD PRESSURE: 86 MMHG | HEIGHT: 64 IN | WEIGHT: 182 LBS | BODY MASS INDEX: 31.07 KG/M2 | TEMPERATURE: 98 F | OXYGEN SATURATION: 96 % | SYSTOLIC BLOOD PRESSURE: 150 MMHG | HEART RATE: 115 BPM | RESPIRATION RATE: 18 BRPM

## 2023-11-24 DIAGNOSIS — J40 BRONCHITIS: Primary | ICD-10-CM

## 2023-11-24 DIAGNOSIS — J98.01 BRONCHOSPASM: ICD-10-CM

## 2023-11-24 DIAGNOSIS — Z76.0 MEDICATION REFILL: ICD-10-CM

## 2023-11-24 PROCEDURE — 94640 AIRWAY INHALATION TREATMENT: CPT | Mod: S$GLB,,, | Performed by: FAMILY MEDICINE

## 2023-11-24 PROCEDURE — 99215 OFFICE O/P EST HI 40 MIN: CPT | Mod: 25,S$GLB,, | Performed by: PHYSICIAN ASSISTANT

## 2023-11-24 PROCEDURE — 96372 THER/PROPH/DIAG INJ SC/IM: CPT | Mod: S$GLB,,, | Performed by: FAMILY MEDICINE

## 2023-11-24 PROCEDURE — 96372 PR INJECTION,THERAP/PROPH/DIAG2ST, IM OR SUBCUT: ICD-10-PCS | Mod: S$GLB,,, | Performed by: FAMILY MEDICINE

## 2023-11-24 PROCEDURE — 99215 PR OFFICE/OUTPT VISIT, EST, LEVL V, 40-54 MIN: ICD-10-PCS | Mod: 25,S$GLB,, | Performed by: PHYSICIAN ASSISTANT

## 2023-11-24 PROCEDURE — 94640 PR INHAL RX, AIRWAY OBST/DX SPUTUM INDUCT: ICD-10-PCS | Mod: S$GLB,,, | Performed by: FAMILY MEDICINE

## 2023-11-24 RX ORDER — IPRATROPIUM BROMIDE 0.5 MG/2.5ML
0.5 SOLUTION RESPIRATORY (INHALATION)
Status: COMPLETED | OUTPATIENT
Start: 2023-11-24 | End: 2023-11-24

## 2023-11-24 RX ORDER — ALBUTEROL SULFATE 0.83 MG/ML
2.5 SOLUTION RESPIRATORY (INHALATION) EVERY 8 HOURS PRN
Qty: 90 EACH | Refills: 1 | Status: SHIPPED | OUTPATIENT
Start: 2023-11-24 | End: 2024-11-23

## 2023-11-24 RX ORDER — ALBUTEROL SULFATE 90 UG/1
2 AEROSOL, METERED RESPIRATORY (INHALATION) EVERY 6 HOURS PRN
Qty: 18 G | Refills: 0 | Status: SHIPPED | OUTPATIENT
Start: 2023-11-24 | End: 2024-02-02

## 2023-11-24 RX ORDER — PREDNISONE 20 MG/1
40 TABLET ORAL
Status: COMPLETED | OUTPATIENT
Start: 2023-11-24 | End: 2023-11-24

## 2023-11-24 RX ORDER — DEXAMETHASONE SODIUM PHOSPHATE 100 MG/10ML
10 INJECTION INTRAMUSCULAR; INTRAVENOUS
Status: COMPLETED | OUTPATIENT
Start: 2023-11-24 | End: 2023-11-24

## 2023-11-24 RX ORDER — PREDNISONE 10 MG/1
10 TABLET ORAL DAILY
Qty: 4 TABLET | Refills: 0 | Status: SHIPPED | OUTPATIENT
Start: 2023-11-24 | End: 2024-02-02

## 2023-11-24 RX ORDER — GUAIFENESIN/DEXTROMETHORPHAN 100-10MG/5
5 SYRUP ORAL EVERY 4 HOURS PRN
Qty: 236 ML | Refills: 0 | Status: SHIPPED | OUTPATIENT
Start: 2023-11-24 | End: 2023-12-04

## 2023-11-24 RX ORDER — BENZONATATE 100 MG/1
100 CAPSULE ORAL 3 TIMES DAILY PRN
Qty: 21 CAPSULE | Refills: 0 | Status: SHIPPED | OUTPATIENT
Start: 2023-11-24 | End: 2023-12-04

## 2023-11-24 RX ORDER — IPRATROPIUM BROMIDE 0.5 MG/2.5ML
500 SOLUTION RESPIRATORY (INHALATION) 3 TIMES DAILY PRN
Qty: 90 EACH | Refills: 1 | Status: SHIPPED | OUTPATIENT
Start: 2023-11-24 | End: 2024-11-23

## 2023-11-24 RX ORDER — ALBUTEROL SULFATE 0.83 MG/ML
2.5 SOLUTION RESPIRATORY (INHALATION)
Status: COMPLETED | OUTPATIENT
Start: 2023-11-24 | End: 2023-11-24

## 2023-11-24 RX ADMIN — ALBUTEROL SULFATE 2.5 MG: 0.83 SOLUTION RESPIRATORY (INHALATION) at 03:11

## 2023-11-24 RX ADMIN — PREDNISONE 40 MG: 20 TABLET ORAL at 03:11

## 2023-11-24 RX ADMIN — DEXAMETHASONE SODIUM PHOSPHATE 10 MG: 100 INJECTION INTRAMUSCULAR; INTRAVENOUS at 03:11

## 2023-11-24 RX ADMIN — IPRATROPIUM BROMIDE 0.5 MG: 0.5 SOLUTION RESPIRATORY (INHALATION) at 03:11

## 2023-11-24 NOTE — PROGRESS NOTES
"Subjective:      Patient ID: Heather Gomez is a 48 y.o. female.    Vitals:  height is 5' 4" (1.626 m) and weight is 82.6 kg (182 lb). Her tympanic temperature is 97.8 °F (36.6 °C). Her blood pressure is 150/86 (abnormal) and her pulse is 115 (abnormal). Her respiration is 18 and oxygen saturation is 96%.     Chief Complaint: Cough    Pt was seen at ER on 11/17/23. She tested negative for flu and covid. She was prescribe promethazine cough medication but her son knocked it over. She said her symptom are getting better but her cough will not go away.  Patient states she is been here herself wheeze.    Cough  This is a recurrent problem. Episode onset: 8 days ago. The problem has been gradually improving. The problem occurs every few minutes. The cough is Productive of sputum. Associated symptoms include myalgias. Pertinent negatives include no fever, headaches or sore throat. Nothing aggravates the symptoms. Treatments tried: robitussin. The treatment provided mild relief. There is no history of asthma, bronchitis or pneumonia.       Constitution: Negative for fever.   HENT:  Negative for congestion, sinus pain, sinus pressure and sore throat.    Respiratory:  Positive for cough and sputum production.    Musculoskeletal:  Positive for muscle ache.   Skin:  Negative for erythema.   Neurological:  Negative for headaches.      Objective:     Physical Exam   Constitutional: She is oriented to person, place, and time. She appears well-developed. She is cooperative.  Non-toxic appearance. She does not appear ill. No distress.   HENT:   Head: Normocephalic and atraumatic.   Ears:   Right Ear: Hearing, tympanic membrane, external ear and ear canal normal.   Left Ear: Hearing, tympanic membrane, external ear and ear canal normal.   Nose: Nose normal. No mucosal edema, rhinorrhea or nasal deformity. No epistaxis. Right sinus exhibits no maxillary sinus tenderness and no frontal sinus tenderness. Left sinus exhibits " no maxillary sinus tenderness and no frontal sinus tenderness.   Mouth/Throat: Uvula is midline, oropharynx is clear and moist and mucous membranes are normal. No trismus in the jaw. Normal dentition. No uvula swelling. No oropharyngeal exudate, posterior oropharyngeal edema or posterior oropharyngeal erythema.   Eyes: Conjunctivae, EOM and lids are normal. Pupils are equal, round, and reactive to light. Right eye exhibits no discharge. Left eye exhibits no discharge. No scleral icterus.   Neck: Trachea normal and phonation normal. Neck supple. No JVD present. No tracheal deviation present. No thyromegaly present. No edema present. No erythema present. No neck rigidity present.   Cardiovascular: Regular rhythm, normal heart sounds and normal pulses. Tachycardia present.   No murmur heard.Exam reveals no gallop and no friction rub.      Comments: Heart rate 104   Pulmonary/Chest: Effort normal. No stridor. No respiratory distress. She has no decreased breath sounds. She has wheezes. She has no rhonchi. She has no rales. She exhibits no tenderness.         Comments: Patient has slight decrease in air movement throughout all lung fields with some inspiratory and expiratory wheezing heard throughout all lung fields.    Abdominal: Normal appearance. She exhibits no distension. Soft. There is no abdominal tenderness. There is no rebound and no guarding.   Musculoskeletal: Normal range of motion.         General: No swelling, tenderness or deformity. Normal range of motion.      Right lower leg: No edema.      Left lower leg: No edema.      Comments: Negative for Homans sign   Neurological: She is alert and oriented to person, place, and time. She exhibits normal muscle tone. Coordination normal.   Skin: Skin is warm, dry, intact, not diaphoretic, not pale and no rash. Capillary refill takes less than 2 seconds. No erythema jaundice  Psychiatric: Her speech is normal and behavior is normal. Judgment and thought content  normal.   Nursing note and vitals reviewed.    RE-EVALUATION 1551 FOLLOWING NEBULIZER, DECADRON, AND ORAL PREDNISONE.  Patient is sitting up states breathing feels much better.  Lung exam very good air movement and clear to auscultation throughout all lung fields there was no wheezing.  Assessment:     1. Bronchitis    2. Bronchospasm    3. Medication refill        Plan:       Bronchitis  -     dexAMETHasone injection 10 mg  -     albuterol nebulizer solution 2.5 mg  -     ipratropium 0.02 % nebulizer solution 0.5 mg  -     predniSONE tablet 40 mg  -     predniSONE (DELTASONE) 10 MG tablet; Take 1 tablet (10 mg total) by mouth once daily.  Dispense: 4 tablet; Refill: 0  -     albuterol (VENTOLIN HFA) 90 mcg/actuation inhaler; Inhale 2 puffs into the lungs every 6 (six) hours as needed for Wheezing. Rescue  Dispense: 18 g; Refill: 0  -     dextromethorphan-guaiFENesin  mg/5 ml (ROBITUSSIN-DM)  mg/5 mL liquid; Take 5 mLs by mouth every 4 (four) hours as needed (Cough).  Dispense: 236 mL; Refill: 0  -     benzonatate (TESSALON) 100 MG capsule; Take 1 capsule (100 mg total) by mouth 3 (three) times daily as needed for Cough.  Dispense: 21 capsule; Refill: 0  -     albuterol (PROVENTIL) 2.5 mg /3 mL (0.083 %) nebulizer solution; Take 3 mLs (2.5 mg total) by nebulization every 8 (eight) hours as needed for Wheezing or Shortness of Breath (Cough). Rescue  Dispense: 90 each; Refill: 1  -     ipratropium (ATROVENT) 0.02 % nebulizer solution; Take 2.5 mLs (500 mcg total) by nebulization 3 (three) times daily as needed for Wheezing. Rescue  Dispense: 90 each; Refill: 1    Bronchospasm  -     dexAMETHasone injection 10 mg  -     albuterol nebulizer solution 2.5 mg  -     ipratropium 0.02 % nebulizer solution 0.5 mg  -     predniSONE tablet 40 mg  -     predniSONE (DELTASONE) 10 MG tablet; Take 1 tablet (10 mg total) by mouth once daily.  Dispense: 4 tablet; Refill: 0  -     albuterol (VENTOLIN HFA) 90 mcg/actuation  inhaler; Inhale 2 puffs into the lungs every 6 (six) hours as needed for Wheezing. Rescue  Dispense: 18 g; Refill: 0  -     dextromethorphan-guaiFENesin  mg/5 ml (ROBITUSSIN-DM)  mg/5 mL liquid; Take 5 mLs by mouth every 4 (four) hours as needed (Cough).  Dispense: 236 mL; Refill: 0  -     benzonatate (TESSALON) 100 MG capsule; Take 1 capsule (100 mg total) by mouth 3 (three) times daily as needed for Cough.  Dispense: 21 capsule; Refill: 0  -     albuterol (PROVENTIL) 2.5 mg /3 mL (0.083 %) nebulizer solution; Take 3 mLs (2.5 mg total) by nebulization every 8 (eight) hours as needed for Wheezing or Shortness of Breath (Cough). Rescue  Dispense: 90 each; Refill: 1  -     ipratropium (ATROVENT) 0.02 % nebulizer solution; Take 2.5 mLs (500 mcg total) by nebulization 3 (three) times daily as needed for Wheezing. Rescue  Dispense: 90 each; Refill: 1    Medication refill  -     albuterol (PROVENTIL) 2.5 mg /3 mL (0.083 %) nebulizer solution; Take 3 mLs (2.5 mg total) by nebulization every 8 (eight) hours as needed for Wheezing or Shortness of Breath (Cough). Rescue  Dispense: 90 each; Refill: 1  -     ipratropium (ATROVENT) 0.02 % nebulizer solution; Take 2.5 mLs (500 mcg total) by nebulization 3 (three) times daily as needed for Wheezing. Rescue  Dispense: 90 each; Refill: 1      Follow up if symptoms worsen or fail to improve, for F/U with PCP or ED. There are no Patient Instructions on file for this visit.

## 2023-12-23 DIAGNOSIS — G47.00 INSOMNIA, UNSPECIFIED TYPE: ICD-10-CM

## 2023-12-26 DIAGNOSIS — E11.10 TYPE 2 DIABETES MELLITUS WITH KETOACIDOSIS WITHOUT COMA, WITHOUT LONG-TERM CURRENT USE OF INSULIN: ICD-10-CM

## 2023-12-26 RX ORDER — BLOOD-GLUCOSE SENSOR
3 EACH MISCELLANEOUS CONTINUOUS PRN
Qty: 3 EACH | Status: SHIPPED | OUTPATIENT
Start: 2023-12-26 | End: 2024-12-25

## 2023-12-27 RX ORDER — ZOLPIDEM TARTRATE 10 MG/1
10 TABLET ORAL NIGHTLY PRN
Qty: 30 TABLET | Refills: 3 | Status: SHIPPED | OUTPATIENT
Start: 2023-12-27 | End: 2024-06-26

## 2024-01-03 ENCOUNTER — TELEPHONE (OUTPATIENT)
Dept: ENDOCRINOLOGY | Facility: CLINIC | Age: 49
End: 2024-01-03
Payer: COMMERCIAL

## 2024-01-03 ENCOUNTER — PATIENT MESSAGE (OUTPATIENT)
Dept: ENDOCRINOLOGY | Facility: CLINIC | Age: 49
End: 2024-01-03
Payer: COMMERCIAL

## 2024-01-03 DIAGNOSIS — E05.90 SUBCLINICAL HYPERTHYROIDISM: Primary | ICD-10-CM

## 2024-01-25 RX ORDER — PRAVASTATIN SODIUM 40 MG/1
TABLET ORAL
Qty: 90 TABLET | Refills: 3 | Status: SHIPPED | OUTPATIENT
Start: 2024-01-25

## 2024-02-02 ENCOUNTER — OFFICE VISIT (OUTPATIENT)
Dept: FAMILY MEDICINE | Facility: CLINIC | Age: 49
End: 2024-02-02
Payer: COMMERCIAL

## 2024-02-02 VITALS
SYSTOLIC BLOOD PRESSURE: 126 MMHG | TEMPERATURE: 98 F | OXYGEN SATURATION: 98 % | HEIGHT: 64 IN | BODY MASS INDEX: 29.7 KG/M2 | HEART RATE: 108 BPM | DIASTOLIC BLOOD PRESSURE: 78 MMHG | WEIGHT: 173.94 LBS

## 2024-02-02 DIAGNOSIS — I15.2 HYPERTENSION ASSOCIATED WITH DIABETES: ICD-10-CM

## 2024-02-02 DIAGNOSIS — E05.90 SUBCLINICAL HYPERTHYROIDISM: ICD-10-CM

## 2024-02-02 DIAGNOSIS — F41.9 ANXIETY: ICD-10-CM

## 2024-02-02 DIAGNOSIS — Z79.4 TYPE 2 DIABETES MELLITUS WITH MICROALBUMINURIA, WITH LONG-TERM CURRENT USE OF INSULIN: Primary | ICD-10-CM

## 2024-02-02 DIAGNOSIS — E11.59 HYPERTENSION ASSOCIATED WITH DIABETES: ICD-10-CM

## 2024-02-02 DIAGNOSIS — G47.30 SLEEP APNEA, UNSPECIFIED TYPE: ICD-10-CM

## 2024-02-02 DIAGNOSIS — I10 HYPERTENSION, UNSPECIFIED TYPE: ICD-10-CM

## 2024-02-02 DIAGNOSIS — E11.29 TYPE 2 DIABETES MELLITUS WITH MICROALBUMINURIA, WITH LONG-TERM CURRENT USE OF INSULIN: Primary | ICD-10-CM

## 2024-02-02 DIAGNOSIS — R80.9 TYPE 2 DIABETES MELLITUS WITH MICROALBUMINURIA, WITH LONG-TERM CURRENT USE OF INSULIN: Primary | ICD-10-CM

## 2024-02-02 PROBLEM — I82.431 ACUTE DEEP VEIN THROMBOSIS (DVT) OF POPLITEAL VEIN OF RIGHT LOWER EXTREMITY: Status: RESOLVED | Noted: 2017-06-29 | Resolved: 2024-02-02

## 2024-02-02 PROCEDURE — 3074F SYST BP LT 130 MM HG: CPT | Mod: CPTII,S$GLB,, | Performed by: FAMILY MEDICINE

## 2024-02-02 PROCEDURE — 3078F DIAST BP <80 MM HG: CPT | Mod: CPTII,S$GLB,, | Performed by: FAMILY MEDICINE

## 2024-02-02 PROCEDURE — 3008F BODY MASS INDEX DOCD: CPT | Mod: CPTII,S$GLB,, | Performed by: FAMILY MEDICINE

## 2024-02-02 PROCEDURE — 90471 IMMUNIZATION ADMIN: CPT | Mod: S$GLB,,, | Performed by: FAMILY MEDICINE

## 2024-02-02 PROCEDURE — 90677 PCV20 VACCINE IM: CPT | Mod: S$GLB,,, | Performed by: FAMILY MEDICINE

## 2024-02-02 PROCEDURE — 90715 TDAP VACCINE 7 YRS/> IM: CPT | Mod: S$GLB,,, | Performed by: FAMILY MEDICINE

## 2024-02-02 PROCEDURE — 99215 OFFICE O/P EST HI 40 MIN: CPT | Mod: 25,S$GLB,, | Performed by: FAMILY MEDICINE

## 2024-02-02 PROCEDURE — 90472 IMMUNIZATION ADMIN EACH ADD: CPT | Mod: S$GLB,,, | Performed by: FAMILY MEDICINE

## 2024-02-02 PROCEDURE — 1159F MED LIST DOCD IN RCRD: CPT | Mod: CPTII,S$GLB,, | Performed by: FAMILY MEDICINE

## 2024-02-02 PROCEDURE — 99999 PR PBB SHADOW E&M-EST. PATIENT-LVL V: CPT | Mod: PBBFAC,,, | Performed by: FAMILY MEDICINE

## 2024-02-02 PROCEDURE — 1160F RVW MEDS BY RX/DR IN RCRD: CPT | Mod: CPTII,S$GLB,, | Performed by: FAMILY MEDICINE

## 2024-02-02 RX ORDER — IRBESARTAN AND HYDROCHLOROTHIAZIDE 300; 12.5 MG/1; MG/1
1 TABLET, FILM COATED ORAL DAILY
Qty: 90 TABLET | Refills: 3 | Status: SHIPPED | OUTPATIENT
Start: 2024-02-02 | End: 2024-05-09 | Stop reason: SDUPTHER

## 2024-02-02 NOTE — Clinical Note
Jose M Cifuentes, I saw this pt for Dr. Landa and was going over her health history.  I see that she has undetectable TSH and workup prior showing Graves despite normal free T4.  She did   mention several symptoms during our visit including tachycardia ( noted also during examination), heat intolerance, sweating at work a lot.  Just wanted to reach out in case any thought about trying thyroid suppressive therapy.  She does have some issues like anxiety and insomnia which may be completely unrelated but on further discussion with the above I was wondering perhaps if there is any potential relation also to hyperthyroidism.  Thanks for your help. Vimal

## 2024-02-02 NOTE — PROGRESS NOTES
Subjective:      Patient ID: Heather Gomez is a 48 y.o. female.    Chief Complaint:  No chief complaint on file.    History of Present Illness  Heather Gomez is here for follow up of DM, Thyroid Nodule and Subclinical Hyperthyroidism.  Previously seen by me 11/2023.  This is a MyChart video visit.    The patient location is: [place]  The chief complaint leading to consultation is: DM  Visit type: Virtual visit with synchronous audio and video  Total time spent with patient: see below  Each patient to whom he or she provides medical services by telemedicine is:  (1) informed of the relationship between the physician and patient and the respective role of any other health care provider with respect to management of the patient; and (2) notified that he or she may decline to receive medical services by telemedicine and may withdraw from such care at any time.    With regards to Diabetes:    Diagnosed: 2013 4/2021 C peptide 3.12 with glucose of 180    FH of DM:   Mother, father, 4 sisters   Has 2 children - no history of DM     DM Education: 2/2022    Known complications:  DKA : None  RN - Denies  - Eye Exam: 7/2022  PN : Denies   - Podiatry: None  Nephropathy   CAD : Denies  Denies history of pancreatitis & personal/family history of medullary thyroid cancer.     Diet/Exercise:   Eats 1 meal a day.   Snacks : Denies.   Drinks : water, rarely a soft drink  Exercise: None - active lifestyle.  Recent illness, injury, steroids: Denies      Current Regimen:  Metformin 1000mg twice daily  Mounjaro 15 mg weekly   Lantus 14 units twice a day   Novolog 4 units before meals    Reports compliance.     Other medications tried:  Metformin - unable to tolerate due to GI upset   Invokana - discontinued due to abdominal pain, nausea, yeast infections    Actos - discontinued during hospital stay   Januvia - discontinued during hospital stay   Novolog stopped 7/2023  Glipizide - changes to Novolog     Glucose  Monitor:   6 times a day testing  Log reviewed: Dexcom G6 sensor downloaded and reviewed, scanned in media tab      Hypoglycemia:  Rare   Knows how to correct with 15 grams of carbs- juice, coke, or a peppermint.      Diabetes Management Status    Hemoglobin A1C   Date Value Ref Range Status   11/15/2023 5.9 (H) 4.0 - 5.6 % Final     Comment:     ADA Screening Guidelines:  5.7-6.4%  Consistent with prediabetes  >or=6.5%  Consistent with diabetes    High levels of fetal hemoglobin interfere with the HbA1C  assay. Heterozygous hemoglobin variants (HbS, HgC, etc)do  not significantly interfere with this assay.   However, presence of multiple variants may affect accuracy.     07/14/2023 5.8 (H) 4.0 - 5.6 % Final     Comment:     ADA Screening Guidelines:  5.7-6.4%  Consistent with prediabetes  >or=6.5%  Consistent with diabetes    High levels of fetal hemoglobin interfere with the HbA1C  assay. Heterozygous hemoglobin variants (HbS, HgC, etc)do  not significantly interfere with this assay.   However, presence of multiple variants may affect accuracy.     03/01/2023 6.8 (H) 4.0 - 5.6 % Final     Comment:     ADA Screening Guidelines:  5.7-6.4%  Consistent with prediabetes  >or=6.5%  Consistent with diabetes    High levels of fetal hemoglobin interfere with the HbA1C  assay. Heterozygous hemoglobin variants (HbS, HgC, etc)do  not significantly interfere with this assay.   However, presence of multiple variants may affect accuracy.         Statin: Taking  ACE/ARB: Taking  Screening or Prevention Patient's value Goal Complete/Controlled?   HgA1C Testing and Control   Lab Results   Component Value Date    HGBA1C 5.9 (H) 11/15/2023      Annually/Less than 8% Yes   Lipid profile : 07/14/2023 Annually Yes   LDL control Lab Results   Component Value Date    LDLCALC 98.8 07/14/2023    Annually/Less than 100 mg/dl  Yes   Nephropathy screening Lab Results   Component Value Date    LABMICR 34.0 02/09/2022     Lab Results   Component  Value Date    PROTEINUA Negative 06/27/2022    Annually No   Blood pressure BP Readings from Last 1 Encounters:   02/02/24 126/78    Less than 140/90 No   Dilated retinal exam : 08/22/2022 Annually Yes   Foot exam   : 02/09/2022 Annually No     With regards to thyroid nodule:    Thyroid US:   3/1/2023  Impression:  1.  Thyroid gland is enlarged with homogenous echotexture.  2.  1 nodule in the right thyroid described above.  3.  1 nodule in the left thyroid described above.  4.  1 nodule in the isthmus described above.  RECOMMENDATIONS:  Follow-up ultrasound in 2 years is recommended.       FNA: None    Signs or Symptoms:   Difficulty breathing: Denies  Difficulty swallowing: Denies  Voice Changes: Hoarseness but not all the time  FH of thyroid nodules but unsure if it was cancer (mother)  Personal history of radiation treatment or exposure: Denies      With regards to Subclinical Hyperthyroidism:    FH of thyroid disease: 2 sisters, mother     2/2022  Thyroid Ab negative.    NM Thyroid Scan  4/6/2022  The 24 hour uptake is elevated at 48.3 % (normal 10-30%).  Homogenous distribution of the radionuclide throughout the thyroid gland without hyper or hypo functional nodules.  Impression:  1. Elevated 24 hour radioiodine uptake by the thyroid.  2. Uniform uptake in the thyroid gland bilaterally.  Overall findings are in keeping with Graves disease..    Lab Results   Component Value Date    TSH <0.015 (L) 01/31/2024    C3KLJXS 89 07/24/2018    FREET4 1.37 01/31/2024       Current Symptoms:   Denies palpations, but reports chest tightness with exertion  Denies weight loss  Reports constipation  Denies Diarrhea  Reports Hair loss  Denies Brittle nails  Denies Tremor   Reports Anxiety  Reports heat intolerance  Reports diaphoresis     Biotin Use: Yes - held for labs    Current medication:  None    Any recent (3-6 months) iodine or contrast: Denies    Smoke: Denies    Thyroid tenderness: Denies    Graves Eye Clinical  Activity: 3/7=Active  Eye pain? Denies  Eye pain with movement? Denies  Eyelid erythema? Denies  Erythema of conjunctiva? Denies  Caruncle inflammation? Denies  Eyelid swelling? Denies       With regards to Vitamin D Deficiency:    Vit D, 25-Hydroxy   Date Value Ref Range Status   11/15/2023 30 30 - 96 ng/mL Final     Comment:     Vitamin D deficiency.........<10 ng/mL                              Vitamin D insufficiency......10-29 ng/mL       Vitamin D sufficiency........> or equal to 30 ng/mL  Vitamin D toxicity............>100 ng/mL         Current Meds: OTC D3 2000iu daily.     Review of Systems  As above       Visit Vitals  LMP 06/17/2019       There is no height or weight on file to calculate BMI.    Lab Review:   Lab Results   Component Value Date    HGBA1C 5.9 (H) 11/15/2023    HGBA1C 5.8 (H) 07/14/2023    HGBA1C 6.8 (H) 03/01/2023       Lab Results   Component Value Date    CHOL 177 07/14/2023    HDL 41 07/14/2023    LDLCALC 98.8 07/14/2023    TRIG 186 (H) 07/14/2023    CHOLHDL 23.2 07/14/2023     Lab Results   Component Value Date     11/15/2023    K 4.2 11/15/2023     11/15/2023    CO2 27 11/15/2023     (H) 11/15/2023    BUN 16 11/15/2023    CREATININE 0.7 11/15/2023    CALCIUM 9.3 11/15/2023    PROT 6.8 11/15/2023    ALBUMIN 3.3 (L) 11/15/2023    BILITOT 0.2 11/15/2023    ALKPHOS 71 11/15/2023    AST 19 11/15/2023    ALT 23 11/15/2023    ANIONGAP 7 (L) 11/15/2023    ESTGFRAFRICA >60.0 05/18/2022    EGFRNONAA >60.0 05/18/2022    TSH <0.015 (L) 01/31/2024     Vit D, 25-Hydroxy   Date Value Ref Range Status   11/15/2023 30 30 - 96 ng/mL Final     Comment:     Vitamin D deficiency.........<10 ng/mL                              Vitamin D insufficiency......10-29 ng/mL       Vitamin D sufficiency........> or equal to 30 ng/mL  Vitamin D toxicity............>100 ng/mL       Assessment and Plan     1. Type 2 diabetes mellitus with ketoacidosis without coma, without long-term current use of  insulin  ondansetron (ZOFRAN-ODT) 4 MG TbDL    LANTUS SOLOSTAR U-100 INSULIN glargine 100 units/mL SubQ pen    NOVOLOG FLEXPEN U-100 INSULIN 100 unit/mL (3 mL) InPn pen    DISCONTINUED: ondansetron (ZOFRAN-ODT) 4 MG TbDL      2. Subclinical hyperthyroidism  methIMAzole (TAPAZOLE) 5 MG Tab    CBC Auto Differential    Comprehensive Metabolic Panel    TSH    T4, Free      3. Thyroid nodule        4. Vitamin D deficiency              Type 2 diabetes mellitus with ketoacidosis without coma, without long-term current use of insulin  -- Sugar clinic in 4 and 8 weeks. Labs prior to follow up in 12 weeks.  -- A1c goal <7%.  -- Medications discussed:  MFM   GLP1-DPP4   RAE   SGLT2   Reviewed potential adverse effects of SGLT-2 inhibitors, including genital mycotic infections, slightly increased risk of UTI, hypersensitivity, hypotension, and hyperkalemia. Advised to maintain water intake of 8-10 cups per day. Advised we need to check chemistry panel at baseline and 2 weeks after starting. Discussed FDA warning reports of ketoacidosis associated with SGLT-2 inhibitors. Advised to seek immediate medical attention and stop the medication if symptoms such as difficulty breathing, nausea, vomiting, abdominal pain, confusion, and unusual fatigue/sleepiness. Discussed possible precipitating factors including major illness/reduced food and fluid intake (advised to stop under these circumstances), and reduced insulin dose. Discussed possible effects of increased fracture risk/decreased bone density. Discussed reports of increased risk of leg and foot amputations with canagliflozin and need to seek urgent care if developed new pain or tenderness, sores or ulcers, or infections in legs/feet.   Insulin   -- Reviewed logs/CGM:  Glucose improving!  PP hyperglycemia.   Reach out to me sooner for any glucose <70 or consistently >180.  -- Medication Changes:   Metformin 1000mg twice daily  Mounjaro 15 mg weekly   Lantus 11 units twice a day    Novolog 5 units before meals  -- Reviewed goals of therapy are to get the best control we can without hypoglycemia.  -- Reviewed patient's current insulin regimen. Clarified proper insulin dose and timing in relation to meals, etc. Insulin injection sites and proper rotation instructed.    -- Advised frequent self blood glucose monitoring.  Patient encouraged to document glucose results and bring them to every clinic visit.  -- Hypoglycemia precautions discussed. Instructed on precautions before driving.    -- Call for Bg repeatedly < 90 or > 180.   -- Close adherence to lifestyle changes recommended.   -- Periodic follow ups for eye evaluations, foot care and dental care suggested.    Thyroid nodule  -- Denies compressive symptoms.  -- Repeat thyroid US 3/2025.    Subclinical hyperthyroidism  -- Ab- Graves disease.  -- Reviewed possible options of methimazole, I131 therapy and surgery.  -- Graves' disease:   Extensive counseling today regarding the diagnosis of Graves' disease, the various options for treatment including thionamide, surgery, or I-131 therapy. We discussed the pros and cons of each treatment option, including contraindications to LORENZO if considering pregnancy in next 6-12 months. Reviewed that with methimazole, the course is ~18 months of treatment and then evaluate for remission. Surgery is most definitive, I-131 takes 2-6 months. Would need lifelong thyroid hormone after surgery or I-131 Rx.   -- Thionamide monitoring: I have reviewed the risk of agranulocytosis that can occur in 1 of 500 patients who are taking either PTU or methimazole. I have also reviewed the even rarer risk of hepatic dysfunction. A baseline CBC with differential and Liver function tests are available. The instruction sheet was given to the patient that describes these risks, warning symptoms, and instructions to stop the medication, contact the covering endocrinology fellow, and check blood tests.   --  TREATMENT  Methimazole 5mg daily  -- Labs in 4 weeks.     Vitamin D deficiency  -- CONTINUE OTC Vit D3 2000iu daily.      Follow up in about 4 weeks (around 3/7/2024).    I spent 30 minutes face-to-face with the patient, over half of the visit was spent on counseling and/or coordinating the care of the patient.    Counseling includes:  Diagnostic results, impressions, recommendations   Prognosis   Risk and benefits of management/treatment options   Instructions for management treatment and or follow-up   Importance of compliance with management   Risk factor reduction   Patient education

## 2024-02-02 NOTE — PROGRESS NOTES
(Portions of this note were dictated using voice recognition software and may contain dictation related errors in spelling/grammar/syntax not found on text review)    CC:   Chief Complaint   Patient presents with    Follow-up       HPI: 48 y.o. female new to me, patient of Dr. Landa.  Medical history below including     diabetes followed by endocrinology on MDD insulin plus metformin and Mounjaro,    history of DVT/PE 2017, was on Xarelto at that time but currently finished    hypertension on Avalide 300/12.5 mg    HLD on pravastatin 40 mg    sleep apnea followed by sleep medicine, recommended auto PAP although has not been able to  yet secondary to expense.  Additionally she takes Ambien for sleep every night     anxiety on sertraline:  Does feel like this generally helps, does find herself getting anxious periodically     .  Additionally fully suppressed TSH with normal free T4, followed by endocrinology.  Thyroid gland enlargement with 3 nodules noted on 03/01/2023 ultrasound, antibodies negative, uptake scan was elevated (2022, keeping with Graves disease, following labs, not on any current medication management.  On further discussion she does state that she gets tachycardia, sweating, heat intolerance.  She denies diarrhea and states that she takes Linzess for chronic constipation.  She has lost weight but does not know if it is from the titration of Mounjaro.  Has difficulty sleeping as above.  Does feel like her mood is generally better with Zoloft but does feel like she gets anxious.    Past Medical History:   Diagnosis Date    Anticoagulant long-term use     DVT, popliteal, acute     GERD (gastroesophageal reflux disease)     History of pulmonary embolus (PE)     HLD (hyperlipidemia)     Hypertension     Sleep apnea 07/25/2023    Type 2 diabetes mellitus, controlled     Vitamin D deficiency 04/04/2019       Past Surgical History:   Procedure Laterality Date    BREAST BIOPSY Right 04/22/2019      SECTION      x2    COLONOSCOPY N/A 2022    Procedure: COLONOSCOPY suprep;  Surgeon: Michael Tirado MD;  Location: [facility] ENDO;  Service: Endoscopy;  Laterality: N/A;    ENDOMETRIAL ABLATION      HYSTERECTOMY      LAPAROSCOPY-ASSISTED VAGINAL HYSTERECTOMY Bilateral 2019    Procedure: HYSTERECTOMY, VAGINAL, LAPAROSCOPY-ASSISTED;  Surgeon: Lashell Clayton MD;  Location: [facility] OR;  Service: OB/GYN;  Laterality: Bilateral;  video    UPPER GASTROINTESTINAL ENDOSCOPY              Family History   Problem Relation Age of Onset    Diabetes Mother     Hypertension Mother     Hyperlipidemia Mother     Hyperthyroidism Mother     Stroke Father     Hyperlipidemia Father     Hypertension Father     Diabetes Father     Breast cancer Sister        Social History     Tobacco Use    Smoking status: Never    Smokeless tobacco: Never   Substance Use Topics    Alcohol use: No    Drug use: No       Lab Results   Component Value Date    WBC 8.60 2020    HGB 12.9 2020    HCT 40.0 2020    MCV 94 2020     2020    CHOL 177 2023    TRIG 186 (H) 2023    HDL 41 2023    ALT 23 11/15/2023    AST 19 11/15/2023    BILITOT 0.2 11/15/2023    ALKPHOS 71 11/15/2023     11/15/2023    K 4.2 11/15/2023     11/15/2023    CREATININE 0.7 11/15/2023    ESTGFRAFRICA >60.0 2022    EGFRNONAA >60.0 2022    EGFRNORACEVR >60.0 11/15/2023    CALCIUM 9.3 11/15/2023    ALBUMIN 3.3 (L) 11/15/2023    BUN 16 11/15/2023    CO2 27 11/15/2023    TSH <0.015 (L) 2024    INR 1.0 2019    HGBA1C 5.9 (H) 11/15/2023    MICALBCREAT 37.4 (H) 2022    LDLCALC 98.8 2023     (H) 11/15/2023    [id number] 30 11/15/2023                 Vital signs reviewed  Vitals:    24 1405   BP: 126/78   BP Location: Right arm   Patient Position: Sitting   BP Method: Medium (Manual)   Pulse: 108   Temp: 97.8 °F (36.6 °C)   TempSrc: Oral   SpO2: 98%  "  Weight: 78.9 kg (173 lb 15.1 oz)   Height: 5' 4" (1.626 m)       Wt Readings from Last 4 Encounters:   02/02/24 78.9 kg (173 lb 15.1 oz)   11/24/23 82.6 kg (182 lb)   11/17/23 83.1 kg (183 lb 3.2 oz)   08/12/23 79.4 kg (175 lb)       PE:   APPEARANCE: Well nourished, well developed, in no acute distress.    HEAD: Normocephalic, atraumatic.  EYES: PERRL. EOMI.   Conjunctivae noninjected.  EARS: TM's intact. Light reflex normal. No retraction or perforation  NOSE: Mucosa pink. Airway clear.  MOUTH & THROAT: No tonsillar enlargement. No pharyngeal erythema or exudate.   NECK: Supple with no cervical lymphadenopathy.    CHEST: Good inspiratory effort. Lungs clear to auscultation with no wheezes or crackles.  CARDIOVASCULAR: Normal S1, S2. No rubs, murmurs, or gallops.  ABDOMEN: Bowel sounds normal. Not distended. Soft. No tenderness or masses. No organomegaly.  EXTREMITIES: No edema       IMPRESSION  1. Type 2 diabetes mellitus with microalbuminuria, with long-term current use of insulin    2. Anxiety    3. Sleep apnea, unspecified type    4. Subclinical hyperthyroidism    5. Hypertension associated with diabetes    6. Hypertension, unspecified type            PLAN  Reviewed prior medical documentation in the chart, prior available labs, consultation notes, immunization record, health screening record as noted below.      Blood pressure stable.  Refill Avalide    Continue diabetes follow up with endocrinology.  Has a rare hypoglycemic episode but not commonly.  Notify Endocrine if persistent hypoglycemia in case her rapid insulin needs to be down titrated.    Hypothyroidism:  I will message regarding her thyroid status given some symptoms that could be related to hypothyroidism secondary to Graves disease from prior workup, although fT4 is normal despite undetectable TSH.    Continue with sleep medicine follow-up as directed     Anxiety: Continue Zoloft      SCREENINGS      Immunizations:   COVID: Booster " eligible  Flu declines  Tdap today  Pneumococcal today          Age/demographic appropriate health maintenance:  Mammogram 05/22/2023  Colonoscopy 2022: 3 mm cecal polyp, otherwise normal.  Repeat colonoscopy in 5 years (tubular adenoma)

## 2024-02-08 ENCOUNTER — OFFICE VISIT (OUTPATIENT)
Dept: ENDOCRINOLOGY | Facility: CLINIC | Age: 49
End: 2024-02-08
Payer: COMMERCIAL

## 2024-02-08 DIAGNOSIS — E55.9 VITAMIN D DEFICIENCY: ICD-10-CM

## 2024-02-08 DIAGNOSIS — E04.1 THYROID NODULE: ICD-10-CM

## 2024-02-08 DIAGNOSIS — E05.90 SUBCLINICAL HYPERTHYROIDISM: ICD-10-CM

## 2024-02-08 DIAGNOSIS — E11.10 TYPE 2 DIABETES MELLITUS WITH KETOACIDOSIS WITHOUT COMA, WITHOUT LONG-TERM CURRENT USE OF INSULIN: Primary | ICD-10-CM

## 2024-02-08 PROCEDURE — 95251 CONT GLUC MNTR ANALYSIS I&R: CPT | Mod: NDTC,,, | Performed by: NURSE PRACTITIONER

## 2024-02-08 PROCEDURE — 99214 OFFICE O/P EST MOD 30 MIN: CPT | Mod: 95,,, | Performed by: NURSE PRACTITIONER

## 2024-02-08 PROCEDURE — 1159F MED LIST DOCD IN RCRD: CPT | Mod: CPTII,95,, | Performed by: NURSE PRACTITIONER

## 2024-02-08 PROCEDURE — 1160F RVW MEDS BY RX/DR IN RCRD: CPT | Mod: CPTII,95,, | Performed by: NURSE PRACTITIONER

## 2024-02-08 RX ORDER — ONDANSETRON 4 MG/1
4 TABLET, ORALLY DISINTEGRATING ORAL EVERY 8 HOURS PRN
Qty: 30 TABLET | Refills: 1 | Status: SHIPPED | OUTPATIENT
Start: 2024-02-08 | End: 2024-03-09 | Stop reason: SDUPTHER

## 2024-02-08 RX ORDER — INSULIN ASPART 100 [IU]/ML
5 INJECTION, SOLUTION INTRAVENOUS; SUBCUTANEOUS
Qty: 20 ML | Refills: 3 | Status: SHIPPED | OUTPATIENT
Start: 2024-02-08 | End: 2024-03-20 | Stop reason: SDUPTHER

## 2024-02-08 RX ORDER — METHIMAZOLE 5 MG/1
5 TABLET ORAL DAILY
Qty: 30 TABLET | Refills: 6 | Status: SHIPPED | OUTPATIENT
Start: 2024-02-08 | End: 2024-03-12

## 2024-02-08 RX ORDER — ONDANSETRON 4 MG/1
4 TABLET, ORALLY DISINTEGRATING ORAL EVERY 8 HOURS PRN
Qty: 30 TABLET | Refills: 1 | Status: SHIPPED | OUTPATIENT
Start: 2024-02-08 | End: 2024-02-08

## 2024-02-08 RX ORDER — INSULIN GLARGINE 100 [IU]/ML
11 INJECTION, SOLUTION SUBCUTANEOUS 2 TIMES DAILY
Qty: 20 ML | Refills: 3 | Status: SHIPPED | OUTPATIENT
Start: 2024-02-08 | End: 2024-02-27 | Stop reason: SDUPTHER

## 2024-02-08 NOTE — Clinical Note
Upload Dexcom  Labs in 4 weeks: CBC, CMP. TSH, Free T4 Sugar clinic in 4 weeks, 8 weeks VV in 12 weeks with labs prior: CMP, A1c, urine (can move the labs that are scheduled for me this month)

## 2024-02-08 NOTE — ASSESSMENT & PLAN NOTE
-- Sugar clinic in 4 and 8 weeks. Labs prior to follow up in 12 weeks.  -- A1c goal <7%.  -- Medications discussed:  MFM   GLP1-DPP4   RAE   SGLT2   Reviewed potential adverse effects of SGLT-2 inhibitors, including genital mycotic infections, slightly increased risk of UTI, hypersensitivity, hypotension, and hyperkalemia. Advised to maintain water intake of 8-10 cups per day. Advised we need to check chemistry panel at baseline and 2 weeks after starting. Discussed FDA warning reports of ketoacidosis associated with SGLT-2 inhibitors. Advised to seek immediate medical attention and stop the medication if symptoms such as difficulty breathing, nausea, vomiting, abdominal pain, confusion, and unusual fatigue/sleepiness. Discussed possible precipitating factors including major illness/reduced food and fluid intake (advised to stop under these circumstances), and reduced insulin dose. Discussed possible effects of increased fracture risk/decreased bone density. Discussed reports of increased risk of leg and foot amputations with canagliflozin and need to seek urgent care if developed new pain or tenderness, sores or ulcers, or infections in legs/feet.   Insulin   -- Reviewed logs/CGM:  Glucose improving!  PP hyperglycemia.   Reach out to me sooner for any glucose <70 or consistently >180.  -- Medication Changes:   Metformin 1000mg twice daily  Mounjaro 15 mg weekly   Lantus 11 units twice a day   Novolog 5 units before meals  -- Reviewed goals of therapy are to get the best control we can without hypoglycemia.  -- Reviewed patient's current insulin regimen. Clarified proper insulin dose and timing in relation to meals, etc. Insulin injection sites and proper rotation instructed.    -- Advised frequent self blood glucose monitoring.  Patient encouraged to document glucose results and bring them to every clinic visit.  -- Hypoglycemia precautions discussed. Instructed on precautions before driving.    -- Call for Bg  repeatedly < 90 or > 180.   -- Close adherence to lifestyle changes recommended.   -- Periodic follow ups for eye evaluations, foot care and dental care suggested.

## 2024-02-08 NOTE — ASSESSMENT & PLAN NOTE
-- Ab- Graves disease.  -- Reviewed possible options of methimazole, I131 therapy and surgery.  -- Graves' disease:   Extensive counseling today regarding the diagnosis of Graves' disease, the various options for treatment including thionamide, surgery, or I-131 therapy. We discussed the pros and cons of each treatment option, including contraindications to LORENZO if considering pregnancy in next 6-12 months. Reviewed that with methimazole, the course is ~18 months of treatment and then evaluate for remission. Surgery is most definitive, I-131 takes 2-6 months. Would need lifelong thyroid hormone after surgery or I-131 Rx.   -- Thionamide monitoring: I have reviewed the risk of agranulocytosis that can occur in 1 of 500 patients who are taking either PTU or methimazole. I have also reviewed the even rarer risk of hepatic dysfunction. A baseline CBC with differential and Liver function tests are available. The instruction sheet was given to the patient that describes these risks, warning symptoms, and instructions to stop the medication, contact the covering endocrinology fellow, and check blood tests.   -- TREATMENT  Methimazole 5mg daily  -- Labs in 4 weeks.

## 2024-02-15 ENCOUNTER — PATIENT MESSAGE (OUTPATIENT)
Dept: FAMILY MEDICINE | Facility: CLINIC | Age: 49
End: 2024-02-15
Payer: COMMERCIAL

## 2024-02-15 DIAGNOSIS — M25.531 RIGHT WRIST PAIN: ICD-10-CM

## 2024-02-15 DIAGNOSIS — M17.11 PRIMARY OSTEOARTHRITIS OF RIGHT KNEE: ICD-10-CM

## 2024-02-15 RX ORDER — INDOMETHACIN 50 MG/1
50 CAPSULE ORAL 2 TIMES DAILY WITH MEALS
Qty: 60 CAPSULE | Refills: 2 | Status: SHIPPED | OUTPATIENT
Start: 2024-02-15 | End: 2024-05-14 | Stop reason: SDUPTHER

## 2024-02-16 ENCOUNTER — TELEPHONE (OUTPATIENT)
Dept: ENDOCRINOLOGY | Facility: CLINIC | Age: 49
End: 2024-02-16
Payer: COMMERCIAL

## 2024-02-27 DIAGNOSIS — E11.10 TYPE 2 DIABETES MELLITUS WITH KETOACIDOSIS WITHOUT COMA, WITHOUT LONG-TERM CURRENT USE OF INSULIN: ICD-10-CM

## 2024-02-27 RX ORDER — INSULIN GLARGINE 100 [IU]/ML
11 INJECTION, SOLUTION SUBCUTANEOUS 2 TIMES DAILY
Qty: 20 ML | Refills: 3 | Status: SHIPPED | OUTPATIENT
Start: 2024-02-27

## 2024-03-09 DIAGNOSIS — E11.10 TYPE 2 DIABETES MELLITUS WITH KETOACIDOSIS WITHOUT COMA, WITHOUT LONG-TERM CURRENT USE OF INSULIN: ICD-10-CM

## 2024-03-11 RX ORDER — ONDANSETRON 4 MG/1
4 TABLET, ORALLY DISINTEGRATING ORAL EVERY 8 HOURS PRN
Qty: 30 TABLET | Refills: 1 | Status: SHIPPED | OUTPATIENT
Start: 2024-03-11 | End: 2024-05-07 | Stop reason: SDUPTHER

## 2024-03-12 ENCOUNTER — PATIENT MESSAGE (OUTPATIENT)
Dept: ENDOCRINOLOGY | Facility: CLINIC | Age: 49
End: 2024-03-12
Payer: COMMERCIAL

## 2024-03-12 ENCOUNTER — PATIENT OUTREACH (OUTPATIENT)
Dept: ADMINISTRATIVE | Facility: HOSPITAL | Age: 49
End: 2024-03-12
Payer: COMMERCIAL

## 2024-03-12 DIAGNOSIS — E05.90 SUBCLINICAL HYPERTHYROIDISM: ICD-10-CM

## 2024-03-12 RX ORDER — METHIMAZOLE 5 MG/1
10 TABLET ORAL DAILY
Qty: 180 TABLET | Refills: 3 | Status: SHIPPED | OUTPATIENT
Start: 2024-03-12 | End: 2024-04-05

## 2024-03-12 NOTE — PROGRESS NOTES
Population Health Chart Review & Patient Outreach Details      Additional Pop Health Notes:        Urine scheduled       Updates Requested / Reviewed:      Updated Care Coordination Note, Care Everywhere, Care Team Updated, and Immunizations Reconciliation Completed or Queried: [place]         Health Maintenance Topics Overdue:      [facility] Score: 1     Foot Exam                       Health Maintenance Topic(s) Outreach Outcomes & Actions Taken:    Lab(s) - Outreach Outcomes & Actions Taken  :

## 2024-03-13 DIAGNOSIS — E11.10 TYPE 2 DIABETES MELLITUS WITH KETOACIDOSIS WITHOUT COMA, WITHOUT LONG-TERM CURRENT USE OF INSULIN: ICD-10-CM

## 2024-03-13 RX ORDER — BLOOD-GLUCOSE TRANSMITTER
1 EACH MISCELLANEOUS CONTINUOUS PRN
Qty: 1 EACH | Status: SHIPPED | OUTPATIENT
Start: 2024-03-13 | End: 2025-03-13

## 2024-03-20 DIAGNOSIS — E11.10 TYPE 2 DIABETES MELLITUS WITH KETOACIDOSIS WITHOUT COMA, WITHOUT LONG-TERM CURRENT USE OF INSULIN: ICD-10-CM

## 2024-03-20 RX ORDER — INSULIN ASPART 100 [IU]/ML
5 INJECTION, SOLUTION INTRAVENOUS; SUBCUTANEOUS
Qty: 20 ML | Refills: 3 | Status: SHIPPED | OUTPATIENT
Start: 2024-03-20

## 2024-04-03 ENCOUNTER — PATIENT OUTREACH (OUTPATIENT)
Dept: ADMINISTRATIVE | Facility: HOSPITAL | Age: 49
End: 2024-04-03
Payer: COMMERCIAL

## 2024-04-03 NOTE — PROGRESS NOTES
Population Health Chart Review & Patient Outreach Details      Additional Pop Health Notes:    Microalbumin linked to lab appointment       Updates Requested / Reviewed:      Updated Care Coordination Note, Care Everywhere, Care Team Updated, and Immunizations Reconciliation Completed or Queried: [place]         Health Maintenance Topics Overdue:      [facility] Score: 1     Foot Exam                       Health Maintenance Topic(s) Outreach Outcomes & Actions Taken:    Lab(s) - Outreach Outcomes & Actions Taken  : Overdue Lab(s) Scheduled

## 2024-04-05 ENCOUNTER — PATIENT MESSAGE (OUTPATIENT)
Dept: ENDOCRINOLOGY | Facility: CLINIC | Age: 49
End: 2024-04-05
Payer: COMMERCIAL

## 2024-04-05 DIAGNOSIS — E11.10 TYPE 2 DIABETES MELLITUS WITH KETOACIDOSIS WITHOUT COMA, WITHOUT LONG-TERM CURRENT USE OF INSULIN: Primary | ICD-10-CM

## 2024-04-05 DIAGNOSIS — E05.90 SUBCLINICAL HYPERTHYROIDISM: ICD-10-CM

## 2024-04-05 RX ORDER — METHIMAZOLE 5 MG/1
10 TABLET ORAL 2 TIMES DAILY
Qty: 360 TABLET | Refills: 3 | Status: SHIPPED | OUTPATIENT
Start: 2024-04-05 | End: 2025-04-05

## 2024-04-05 NOTE — TELEPHONE ENCOUNTER
Diagnosis: AB- Graves     Current Medication:  ATD   Start 2/8/2024  Increased on 3/12  Methimazole 10mg daily      Lab Results   Component Value Date    TSH <0.010 (L) 04/04/2024    X4ZMRRH 89 07/24/2018    FREET4 1.92 (H) 04/04/2024       Medication Change  Methimazole 10mg twice daily    Labs in 6 weeks.

## 2024-04-12 ENCOUNTER — PATIENT OUTREACH (OUTPATIENT)
Dept: ADMINISTRATIVE | Facility: HOSPITAL | Age: 49
End: 2024-04-12
Payer: COMMERCIAL

## 2024-04-12 NOTE — PROGRESS NOTES
Non-compliant GAP report chart review - Chart review completed for the following HM test if overdue  (Mammogram, Colonoscopy, Cervical Cancer Screening,  Diabetic lab testing, and/or Dilated EYE EXAM)      Care Everywhere and Media reports - updates requested and reviewed.      Microalbumin linked        Health Maintenance Due   Topic Date Due    Hepatitis C Screening  Never done    HIV Screening  Never done    Diabetes Urine Screening  02/09/2023    Foot Exam  02/09/2023    Eye Exam  08/22/2023    Influenza Vaccine (1) Never done    COVID-19 Vaccine (3 - 2023-24 season) 09/01/2023    Mammogram  05/22/2024

## 2024-04-17 RX ORDER — BLOOD-GLUCOSE SENSOR
EACH MISCELLANEOUS
Qty: 3 EACH | Status: SHIPPED | OUTPATIENT
Start: 2024-04-17

## 2024-04-19 RX ORDER — TIRZEPATIDE 12.5 MG/.5ML
12.5 INJECTION, SOLUTION SUBCUTANEOUS
Qty: 4 PEN | Refills: 1 | Status: SHIPPED | OUTPATIENT
Start: 2024-04-19

## 2024-04-29 NOTE — PROGRESS NOTES
Subjective:      Patient ID: Heather Gomez is a 48 y.o. female.    Chief Complaint:  No chief complaint on file.    History of Present Illness  Heather Gomez is here for follow up of DM and Hyperthyroidism.  Previously seen by me 2/8/2024.  This is a MyChart video visit.    The patient location is: [place]  The chief complaint leading to consultation is: DM  Visit type: Virtual visit with synchronous audio and video  Total time spent with patient: see below  Each patient to whom he or she provides medical services by telemedicine is:  (1) informed of the relationship between the physician and patient and the respective role of any other health care provider with respect to management of the patient; and (2) notified that he or she may decline to receive medical services by telemedicine and may withdraw from such care at any time.    With regards to Diabetes:    Diagnosed: 2013 4/2021 C peptide 3.12 with glucose of 180    FH of DM:   Mother, father, 4 sisters   Has 2 children - no history of DM     DM Education: 2/2022    Known complications:  DKA : None  RN - Denies  - Eye Exam: 7/2022  PN : Denies   - Podiatry: None  Nephropathy   CAD : Denies  Denies history of pancreatitis & personal/family history of medullary thyroid cancer.     Diet/Exercise:   Eats 1 meal a day.   Snacks : occasionally ~ 1am.   Drinks : water, rarely a soft drink  Exercise: None - active lifestyle.  Recent illness, injury, steroids: Denies      Current Regimen:  Metformin 1000mg twice daily  Mounjaro 15 mg weekly   Lantus 10 units twice a day   Novolog 5 units before meals    Was off Mounjaro for about 1 month due to backorder.   Reports compliance.     Other medications tried:  Metformin - unable to tolerate due to GI upset   Invokana - discontinued due to abdominal pain, nausea, yeast infections    Actos - discontinued during hospital stay   Januvia - discontinued during hospital stay   Novolog stopped  7/2023  Glipizide - changes to Novolog     Glucose Monitor:   6 times a day testing  Log reviewed: Dexcom G6 sensor downloaded and reviewed, scanned in media tab      Hypoglycemia:  Rare   Knows how to correct with 15 grams of carbs- juice, coke, or a peppermint.      Diabetes Management Status    Hemoglobin A1C   Date Value Ref Range Status   11/15/2023 5.9 (H) 4.0 - 5.6 % Final     Comment:     ADA Screening Guidelines:  5.7-6.4%  Consistent with prediabetes  >or=6.5%  Consistent with diabetes    High levels of fetal hemoglobin interfere with the HbA1C  assay. Heterozygous hemoglobin variants (HbS, HgC, etc)do  not significantly interfere with this assay.   However, presence of multiple variants may affect accuracy.     07/14/2023 5.8 (H) 4.0 - 5.6 % Final     Comment:     ADA Screening Guidelines:  5.7-6.4%  Consistent with prediabetes  >or=6.5%  Consistent with diabetes    High levels of fetal hemoglobin interfere with the HbA1C  assay. Heterozygous hemoglobin variants (HbS, HgC, etc)do  not significantly interfere with this assay.   However, presence of multiple variants may affect accuracy.     03/01/2023 6.8 (H) 4.0 - 5.6 % Final     Comment:     ADA Screening Guidelines:  5.7-6.4%  Consistent with prediabetes  >or=6.5%  Consistent with diabetes    High levels of fetal hemoglobin interfere with the HbA1C  assay. Heterozygous hemoglobin variants (HbS, HgC, etc)do  not significantly interfere with this assay.   However, presence of multiple variants may affect accuracy.         Statin: Taking  ACE/ARB: Taking  Screening or Prevention Patient's value Goal Complete/Controlled?   HgA1C Testing and Control   Lab Results   Component Value Date    HGBA1C 5.9 (H) 11/15/2023      Annually/Less than 8% Yes   Lipid profile : 07/14/2023 Annually Yes   LDL control Lab Results   Component Value Date    LDLCALC 98.8 07/14/2023    Annually/Less than 100 mg/dl  Yes   Nephropathy screening Lab Results   Component Value Date     LABMICR 34.0 02/09/2022     Lab Results   Component Value Date    PROTEINUA Negative 06/27/2022    Annually No   Blood pressure BP Readings from Last 1 Encounters:   02/02/24 126/78    Less than 140/90 No   Dilated retinal exam : 08/22/2022 Annually Yes   Foot exam   : 02/09/2022 Annually No     With regards to Thyroid Nodule:      Thyroid US:   3/1/2023  Impression:  1.  Thyroid gland is enlarged with homogenous echotexture.  2.  1 nodule in the right thyroid described above.  3.  1 nodule in the left thyroid described above.  4.  1 nodule in the isthmus described above.  RECOMMENDATIONS:  Follow-up ultrasound in 2 years is recommended.       FNA: None    Signs or Symptoms:   Difficulty breathing: Denies  Difficulty swallowing: Denies  Voice Changes: Hoarseness but not all the time  FH of thyroid nodules but unsure if it was cancer (mother)  Personal history of radiation treatment or exposure: Denies      With regards to Hyperthyroidism:      FH of thyroid disease: 2 sisters, mother     2/2022  Thyroid Ab negative.    NM Thyroid Scan  4/6/2022  The 24 hour uptake is elevated at 48.3 % (normal 10-30%).  Homogenous distribution of the radionuclide throughout the thyroid gland without hyper or hypo functional nodules.  Impression:  1. Elevated 24 hour radioiodine uptake by the thyroid.  2. Uniform uptake in the thyroid gland bilaterally.  Overall findings are in keeping with Graves disease..    Lab Results   Component Value Date    TSH <0.010 (L) 04/04/2024    H8PBMTF 89 07/24/2018    FREET4 1.92 (H) 04/04/2024       Current Symptoms:   Denies palpations  Denies weight loss  Reports constipation (chronic issue)   Denies Diarrhea  Denies Hair loss  Denies Brittle nails  Denies Tremor   Reports Anxiety  Reports heat intolerance  Reports diaphoresis     Biotin Use: Yes - held for labs    Current medication:  ATD   Start 2/8/2024  Increased on 4/5  Methimazole 10mg twice daily    Any recent (3-6 months) iodine or  contrast: Denies    Smoke: Denies    Thyroid tenderness: Denies    Graves Eye Clinical Activity: 3/7=Active  Eye pain? Denies  Eye pain with movement? Denies  Eyelid erythema? Denies  Erythema of conjunctiva? Denies  Caruncle inflammation? Denies  Eyelid swelling? Denies       With regards to Vitamin D Deficiency:      Vit D, 25-Hydroxy   Date Value Ref Range Status   11/15/2023 30 30 - 96 ng/mL Final     Comment:     Vitamin D deficiency.........<10 ng/mL                              Vitamin D insufficiency......10-29 ng/mL       Vitamin D sufficiency........> or equal to 30 ng/mL  Vitamin D toxicity............>100 ng/mL         Current Meds: OTC D3 2000iu daily.     Review of Systems  As above       Visit Vitals  LMP 06/17/2019       There is no height or weight on file to calculate BMI.    Lab Review:   Lab Results   Component Value Date    HGBA1C 5.9 (H) 11/15/2023    HGBA1C 5.8 (H) 07/14/2023    HGBA1C 6.8 (H) 03/01/2023       Lab Results   Component Value Date    CHOL 177 07/14/2023    HDL 41 07/14/2023    LDLCALC 98.8 07/14/2023    TRIG 186 (H) 07/14/2023    CHOLHDL 23.2 07/14/2023     Lab Results   Component Value Date     05/06/2024    K 3.5 05/06/2024     05/06/2024    CO2 26 05/06/2024     (H) 05/06/2024    BUN 15 05/06/2024    CREATININE 0.5 05/06/2024    CALCIUM 9.7 05/06/2024    PROT 6.8 05/06/2024    ALBUMIN 3.4 (L) 05/06/2024    BILITOT 0.2 05/06/2024    ALKPHOS 63 05/06/2024    AST 13 05/06/2024    ALT 17 05/06/2024    ANIONGAP 9 05/06/2024    ESTGFRAFRICA >60.0 05/18/2022    EGFRNONAA >60.0 05/18/2022    TSH <0.010 (L) 04/04/2024     Vit D, 25-Hydroxy   Date Value Ref Range Status   11/15/2023 30 30 - 96 ng/mL Final     Comment:     Vitamin D deficiency.........<10 ng/mL                              Vitamin D insufficiency......10-29 ng/mL       Vitamin D sufficiency........> or equal to 30 ng/mL  Vitamin D toxicity............>100 ng/mL       Assessment and Plan     1. Type 2  diabetes mellitus with ketoacidosis without coma, without long-term current use of insulin        2. Thyroid nodule        3. Subclinical hyperthyroidism        4. Vitamin D deficiency              Type 2 diabetes mellitus with ketoacidosis without coma, without long-term current use of insulin  -- A1c goal <7%.  -- Medications discussed:  MFM   GLP1-DPP4   RAE   SGLT2   Reviewed potential adverse effects of SGLT-2 inhibitors, including genital mycotic infections, slightly increased risk of UTI, hypersensitivity, hypotension, and hyperkalemia. Advised to maintain water intake of 8-10 cups per day. Advised we need to check chemistry panel at baseline and 2 weeks after starting. Discussed FDA warning reports of ketoacidosis associated with SGLT-2 inhibitors. Advised to seek immediate medical attention and stop the medication if symptoms such as difficulty breathing, nausea, vomiting, abdominal pain, confusion, and unusual fatigue/sleepiness. Discussed possible precipitating factors including major illness/reduced food and fluid intake (advised to stop under these circumstances), and reduced insulin dose. Discussed possible effects of increased fracture risk/decreased bone density. Discussed reports of increased risk of leg and foot amputations with canagliflozin and need to seek urgent care if developed new pain or tenderness, sores or ulcers, or infections in legs/feet.   Insulin   -- Reviewed logs/CGM:  Glucose relatively controlled.  PP hyperglycemia but inconsistent.   Reach out to me sooner for any glucose <70 or consistently >180.  -- Medication Changes:   Metformin 1000mg twice daily  Mounjaro 15 mg weekly   Lantus 10 units twice a day   Novolog 5 units before meals  -- Reviewed goals of therapy are to get the best control we can without hypoglycemia.  -- Reviewed patient's current insulin regimen. Clarified proper insulin dose and timing in relation to meals, etc. Insulin injection sites and proper rotation  instructed.    -- Advised frequent self blood glucose monitoring.  Patient encouraged to document glucose results and bring them to every clinic visit.  -- Hypoglycemia precautions discussed. Instructed on precautions before driving.    -- Call for Bg repeatedly < 90 or > 180.   -- Close adherence to lifestyle changes recommended.   -- Periodic follow ups for eye evaluations, foot care and dental care suggested.    Thyroid nodule  -- Denies compressive symptoms.  -- Repeat thyroid US 3/2025.    Subclinical hyperthyroidism  -- Ab- Graves disease.  -- Reviewed possible options of methimazole, I131 therapy and surgery.  -- Graves' disease:   Extensive counseling today regarding the diagnosis of Graves' disease, the various options for treatment including thionamide, surgery, or I-131 therapy. We discussed the pros and cons of each treatment option, including contraindications to LORENZO if considering pregnancy in next 6-12 months. Reviewed that with methimazole, the course is ~18 months of treatment and then evaluate for remission. Surgery is most definitive, I-131 takes 2-6 months. Would need lifelong thyroid hormone after surgery or I-131 Rx.   -- Thionamide monitoring: I have reviewed the risk of agranulocytosis that can occur in 1 of 500 patients who are taking either PTU or methimazole. I have also reviewed the even rarer risk of hepatic dysfunction. A baseline CBC with differential and Liver function tests are available. The instruction sheet was given to the patient that describes these risks, warning symptoms, and instructions to stop the medication, contact the covering endocrinology fellow, and check blood tests.   -- TREATMENT  ATD   Start 2/8/2024  Increased on 4/5  Methimazole 10mg twice daily  -- Labs scheduled 6 weeks from dose change.    Vitamin D deficiency  -- CONTINUE OTC Vit D3 2000iu daily.        No follow-ups on file.    I spent 30 minutes face-to-face with the patient, over half of the visit was  spent on counseling and/or coordinating the care of the patient.    Counseling includes:  Diagnostic results, impressions, recommendations   Prognosis   Risk and benefits of management/treatment options   Instructions for management treatment and or follow-up   Importance of compliance with management   Risk factor reduction   Patient education

## 2024-05-04 DIAGNOSIS — G47.00 INSOMNIA, UNSPECIFIED TYPE: ICD-10-CM

## 2024-05-06 ENCOUNTER — OFFICE VISIT (OUTPATIENT)
Dept: ENDOCRINOLOGY | Facility: CLINIC | Age: 49
End: 2024-05-06
Payer: COMMERCIAL

## 2024-05-06 DIAGNOSIS — E05.90 SUBCLINICAL HYPERTHYROIDISM: ICD-10-CM

## 2024-05-06 DIAGNOSIS — E04.1 THYROID NODULE: ICD-10-CM

## 2024-05-06 DIAGNOSIS — E55.9 VITAMIN D DEFICIENCY: ICD-10-CM

## 2024-05-06 DIAGNOSIS — E11.10 TYPE 2 DIABETES MELLITUS WITH KETOACIDOSIS WITHOUT COMA, WITHOUT LONG-TERM CURRENT USE OF INSULIN: Primary | ICD-10-CM

## 2024-05-06 PROCEDURE — 95251 CONT GLUC MNTR ANALYSIS I&R: CPT | Mod: NDTC,,, | Performed by: NURSE PRACTITIONER

## 2024-05-06 PROCEDURE — 1160F RVW MEDS BY RX/DR IN RCRD: CPT | Mod: CPTII,95,, | Performed by: NURSE PRACTITIONER

## 2024-05-06 PROCEDURE — 99214 OFFICE O/P EST MOD 30 MIN: CPT | Mod: 95,,, | Performed by: NURSE PRACTITIONER

## 2024-05-06 PROCEDURE — 1159F MED LIST DOCD IN RCRD: CPT | Mod: CPTII,95,, | Performed by: NURSE PRACTITIONER

## 2024-05-06 RX ORDER — ZOLPIDEM TARTRATE 10 MG/1
10 TABLET ORAL NIGHTLY PRN
Qty: 30 TABLET | Refills: 3 | Status: SHIPPED | OUTPATIENT
Start: 2024-05-06 | End: 2024-11-04

## 2024-05-06 NOTE — ASSESSMENT & PLAN NOTE
-- A1c goal <7%.  -- Medications discussed:  MFM   GLP1-DPP4   RAE   SGLT2   Reviewed potential adverse effects of SGLT-2 inhibitors, including genital mycotic infections, slightly increased risk of UTI, hypersensitivity, hypotension, and hyperkalemia. Advised to maintain water intake of 8-10 cups per day. Advised we need to check chemistry panel at baseline and 2 weeks after starting. Discussed FDA warning reports of ketoacidosis associated with SGLT-2 inhibitors. Advised to seek immediate medical attention and stop the medication if symptoms such as difficulty breathing, nausea, vomiting, abdominal pain, confusion, and unusual fatigue/sleepiness. Discussed possible precipitating factors including major illness/reduced food and fluid intake (advised to stop under these circumstances), and reduced insulin dose. Discussed possible effects of increased fracture risk/decreased bone density. Discussed reports of increased risk of leg and foot amputations with canagliflozin and need to seek urgent care if developed new pain or tenderness, sores or ulcers, or infections in legs/feet.   Insulin   -- Reviewed logs/CGM:  Glucose relatively controlled.  PP hyperglycemia but inconsistent.   Reach out to me sooner for any glucose <70 or consistently >180.  -- Medication Changes:   Metformin 1000mg twice daily  Mounjaro 15 mg weekly   Lantus 10 units twice a day   Novolog 5 units before meals  -- Reviewed goals of therapy are to get the best control we can without hypoglycemia.  -- Reviewed patient's current insulin regimen. Clarified proper insulin dose and timing in relation to meals, etc. Insulin injection sites and proper rotation instructed.    -- Advised frequent self blood glucose monitoring.  Patient encouraged to document glucose results and bring them to every clinic visit.  -- Hypoglycemia precautions discussed. Instructed on precautions before driving.    -- Call for Bg repeatedly < 90 or > 180.   -- Close adherence  to lifestyle changes recommended.   -- Periodic follow ups for eye evaluations, foot care and dental care suggested.

## 2024-05-06 NOTE — TELEPHONE ENCOUNTER
Requested Prescriptions     Pending Prescriptions Disp Refills    zolpidem (AMBIEN) 10 mg Tab 30 tablet 3     Sig: Take 1 tablet (10 mg total) by mouth nightly as needed.     Lov 5/6/24

## 2024-05-06 NOTE — ASSESSMENT & PLAN NOTE
-- Ab- Graves disease.  -- Reviewed possible options of methimazole, I131 therapy and surgery.  -- Graves' disease:   Extensive counseling today regarding the diagnosis of Graves' disease, the various options for treatment including thionamide, surgery, or I-131 therapy. We discussed the pros and cons of each treatment option, including contraindications to LORENZO if considering pregnancy in next 6-12 months. Reviewed that with methimazole, the course is ~18 months of treatment and then evaluate for remission. Surgery is most definitive, I-131 takes 2-6 months. Would need lifelong thyroid hormone after surgery or I-131 Rx.   -- Thionamide monitoring: I have reviewed the risk of agranulocytosis that can occur in 1 of 500 patients who are taking either PTU or methimazole. I have also reviewed the even rarer risk of hepatic dysfunction. A baseline CBC with differential and Liver function tests are available. The instruction sheet was given to the patient that describes these risks, warning symptoms, and instructions to stop the medication, contact the covering endocrinology fellow, and check blood tests.   -- TREATMENT  ATD   Start 2/8/2024  Increased on 4/5  Methimazole 10mg twice daily  -- Labs scheduled 6 weeks from dose change.

## 2024-05-07 ENCOUNTER — PATIENT OUTREACH (OUTPATIENT)
Dept: ADMINISTRATIVE | Facility: HOSPITAL | Age: 49
End: 2024-05-07
Payer: COMMERCIAL

## 2024-05-07 DIAGNOSIS — E11.10 TYPE 2 DIABETES MELLITUS WITH KETOACIDOSIS WITHOUT COMA, WITHOUT LONG-TERM CURRENT USE OF INSULIN: ICD-10-CM

## 2024-05-07 RX ORDER — ONDANSETRON 4 MG/1
4 TABLET, ORALLY DISINTEGRATING ORAL EVERY 8 HOURS PRN
Qty: 30 TABLET | Refills: 1 | Status: SHIPPED | OUTPATIENT
Start: 2024-05-07

## 2024-05-07 NOTE — PROGRESS NOTES
Population Health Chart Review & Patient Outreach Details      Additional Pop Health Notes:    Microalbumin completed       Updates Requested / Reviewed:      Updated Care Coordination Note, Care Everywhere, Care Team Updated, and Immunizations Reconciliation Completed or Queried: [place]         Health Maintenance Topics Overdue:      [facility] Score: 1     Foot Exam                       Health Maintenance Topic(s) Outreach Outcomes & Actions Taken:    Lab(s) - Outreach Outcomes & Actions Taken  : completed

## 2024-05-09 DIAGNOSIS — I10 HYPERTENSION, UNSPECIFIED TYPE: ICD-10-CM

## 2024-05-09 RX ORDER — IRBESARTAN AND HYDROCHLOROTHIAZIDE 300; 12.5 MG/1; MG/1
1 TABLET, FILM COATED ORAL DAILY
Qty: 90 TABLET | Refills: 3 | Status: SHIPPED | OUTPATIENT
Start: 2024-05-09 | End: 2025-05-09

## 2024-05-14 DIAGNOSIS — M17.11 PRIMARY OSTEOARTHRITIS OF RIGHT KNEE: ICD-10-CM

## 2024-05-14 DIAGNOSIS — M25.531 RIGHT WRIST PAIN: ICD-10-CM

## 2024-05-14 RX ORDER — INDOMETHACIN 50 MG/1
50 CAPSULE ORAL 2 TIMES DAILY WITH MEALS
Qty: 60 CAPSULE | Refills: 2 | Status: SHIPPED | OUTPATIENT
Start: 2024-05-14

## 2024-05-28 ENCOUNTER — PATIENT MESSAGE (OUTPATIENT)
Dept: ENDOCRINOLOGY | Facility: CLINIC | Age: 49
End: 2024-05-28
Payer: COMMERCIAL

## 2024-05-28 DIAGNOSIS — E05.90 SUBCLINICAL HYPERTHYROIDISM: ICD-10-CM

## 2024-05-28 DIAGNOSIS — E11.10 TYPE 2 DIABETES MELLITUS WITH KETOACIDOSIS WITHOUT COMA, WITHOUT LONG-TERM CURRENT USE OF INSULIN: Primary | ICD-10-CM

## 2024-05-30 DIAGNOSIS — Z12.31 OTHER SCREENING MAMMOGRAM: ICD-10-CM

## 2024-06-04 DIAGNOSIS — E11.10 TYPE 2 DIABETES MELLITUS WITH KETOACIDOSIS WITHOUT COMA, WITHOUT LONG-TERM CURRENT USE OF INSULIN: ICD-10-CM

## 2024-06-05 RX ORDER — METFORMIN HYDROCHLORIDE 500 MG/1
1000 TABLET, EXTENDED RELEASE ORAL 2 TIMES DAILY WITH MEALS
Qty: 360 TABLET | Refills: 3 | Status: SHIPPED | OUTPATIENT
Start: 2024-06-05 | End: 2025-06-05

## 2024-07-02 DIAGNOSIS — E11.10 TYPE 2 DIABETES MELLITUS WITH KETOACIDOSIS WITHOUT COMA, WITHOUT LONG-TERM CURRENT USE OF INSULIN: ICD-10-CM

## 2024-07-02 DIAGNOSIS — G47.00 INSOMNIA, UNSPECIFIED TYPE: ICD-10-CM

## 2024-07-02 RX ORDER — INSULIN ASPART 100 [IU]/ML
5 INJECTION, SOLUTION INTRAVENOUS; SUBCUTANEOUS
Qty: 20 ML | Refills: 3 | Status: SHIPPED | OUTPATIENT
Start: 2024-07-02 | End: 2024-07-02 | Stop reason: SDUPTHER

## 2024-07-02 RX ORDER — INSULIN ASPART 100 [IU]/ML
5 INJECTION, SOLUTION INTRAVENOUS; SUBCUTANEOUS
Qty: 20 ML | Refills: 3 | Status: SHIPPED | OUTPATIENT
Start: 2024-07-02

## 2024-07-02 RX ORDER — ZOLPIDEM TARTRATE 10 MG/1
10 TABLET ORAL NIGHTLY PRN
Qty: 30 TABLET | Refills: 3 | Status: SHIPPED | OUTPATIENT
Start: 2024-07-02 | End: 2024-07-02 | Stop reason: SDUPTHER

## 2024-07-03 RX ORDER — ZOLPIDEM TARTRATE 10 MG/1
10 TABLET ORAL NIGHTLY PRN
Qty: 30 TABLET | Refills: 3 | Status: SHIPPED | OUTPATIENT
Start: 2024-07-03 | End: 2025-01-01

## 2024-07-17 DIAGNOSIS — E11.9 TYPE 2 DIABETES MELLITUS WITHOUT COMPLICATION: ICD-10-CM

## 2024-07-24 ENCOUNTER — PATIENT MESSAGE (OUTPATIENT)
Dept: ENDOCRINOLOGY | Facility: CLINIC | Age: 49
End: 2024-07-24
Payer: COMMERCIAL

## 2024-07-24 DIAGNOSIS — E05.90 SUBCLINICAL HYPERTHYROIDISM: ICD-10-CM

## 2024-07-24 RX ORDER — METHIMAZOLE 5 MG/1
5 TABLET ORAL 2 TIMES DAILY
Qty: 180 TABLET | Refills: 3 | Status: SHIPPED | OUTPATIENT
Start: 2024-07-24 | End: 2025-07-24

## 2024-07-24 NOTE — PROGRESS NOTES
Latest Reference Range & Units 05/28/24 08:00 07/24/24 08:35   TSH 0.400 - 4.000 uIU/mL <0.010 (L) 5.003 (H)   Free T4 0.71 - 1.51 ng/dL 0.84 0.79   (L): Data is abnormally low  (H): Data is abnormally high    You are currently on   Methimazole 10 mg twice daily    Please change to   Methimazole 5 mg twice daily.

## 2024-07-25 DIAGNOSIS — F41.9 ANXIETY: ICD-10-CM

## 2024-07-25 RX ORDER — SERTRALINE HYDROCHLORIDE 50 MG/1
TABLET, FILM COATED ORAL
Qty: 90 TABLET | Refills: 3 | Status: SHIPPED | OUTPATIENT
Start: 2024-07-25

## 2024-09-05 DIAGNOSIS — E11.10 TYPE 2 DIABETES MELLITUS WITH KETOACIDOSIS WITHOUT COMA, WITHOUT LONG-TERM CURRENT USE OF INSULIN: ICD-10-CM

## 2024-09-05 RX ORDER — BLOOD-GLUCOSE SENSOR
EACH MISCELLANEOUS
Qty: 3 EACH | Status: SHIPPED | OUTPATIENT
Start: 2024-09-05

## 2024-09-05 RX ORDER — TIRZEPATIDE 15 MG/.5ML
15 INJECTION, SOLUTION SUBCUTANEOUS
Qty: 12 PEN | Refills: 1 | Status: SHIPPED | OUTPATIENT
Start: 2024-09-05

## 2024-09-17 ENCOUNTER — PATIENT OUTREACH (OUTPATIENT)
Dept: ADMINISTRATIVE | Facility: HOSPITAL | Age: 49
End: 2024-09-17
Payer: COMMERCIAL

## 2024-09-17 ENCOUNTER — OFFICE VISIT (OUTPATIENT)
Dept: OPTOMETRY | Facility: CLINIC | Age: 49
End: 2024-09-17
Payer: COMMERCIAL

## 2024-09-17 DIAGNOSIS — H47.323 OPTIC NERVE DRUSEN, BILATERAL: ICD-10-CM

## 2024-09-17 DIAGNOSIS — H52.7 REFRACTIVE ERROR: ICD-10-CM

## 2024-09-17 DIAGNOSIS — H16.223 KERATOCONJUNCTIVITIS SICCA OF BOTH EYES NOT DUE TO SJOGREN'S SYNDROME: Primary | ICD-10-CM

## 2024-09-17 DIAGNOSIS — E11.65 TYPE 2 DIABETES MELLITUS WITH HYPERGLYCEMIA, WITH LONG-TERM CURRENT USE OF INSULIN: ICD-10-CM

## 2024-09-17 DIAGNOSIS — Z79.4 TYPE 2 DIABETES MELLITUS WITH HYPERGLYCEMIA, WITH LONG-TERM CURRENT USE OF INSULIN: ICD-10-CM

## 2024-09-17 DIAGNOSIS — E11.9 TYPE 2 DIABETES MELLITUS WITHOUT RETINOPATHY: ICD-10-CM

## 2024-09-17 PROCEDURE — 99214 OFFICE O/P EST MOD 30 MIN: CPT | Mod: S$GLB,,, | Performed by: OPTOMETRIST

## 2024-09-17 PROCEDURE — 3044F HG A1C LEVEL LT 7.0%: CPT | Mod: CPTII,S$GLB,, | Performed by: OPTOMETRIST

## 2024-09-17 PROCEDURE — 92015 DETERMINE REFRACTIVE STATE: CPT | Mod: S$GLB,,, | Performed by: OPTOMETRIST

## 2024-09-17 PROCEDURE — 3061F NEG MICROALBUMINURIA REV: CPT | Mod: CPTII,S$GLB,, | Performed by: OPTOMETRIST

## 2024-09-17 PROCEDURE — 3066F NEPHROPATHY DOC TX: CPT | Mod: CPTII,S$GLB,, | Performed by: OPTOMETRIST

## 2024-09-17 PROCEDURE — 99999 PR PBB SHADOW E&M-EST. PATIENT-LVL III: CPT | Mod: PBBFAC,,, | Performed by: OPTOMETRIST

## 2024-09-17 PROCEDURE — 1159F MED LIST DOCD IN RCRD: CPT | Mod: CPTII,S$GLB,, | Performed by: OPTOMETRIST

## 2024-09-17 RX ORDER — CYCLOSPORINE OPHTHALMIC SOLUTION 1 MG/ML
1 SOLUTION/ DROPS OPHTHALMIC 2 TIMES DAILY
Qty: 2 ML | Refills: 11 | Status: SHIPPED | OUTPATIENT
Start: 2024-09-17 | End: 2025-09-17

## 2024-09-17 NOTE — PROGRESS NOTES
HPI    LACHO: 12/08/2023    MRX: 1 yrs old    Gtt's: Systane PRN OU     Heather Gomez is a 49 y.o. female who comes in for diabetic eye exam.   Pt. reports today that her vision has changed for distance and near and   prescription glasses are no longer helping to correct vision.  Pt was using xiidra for severe dryness but it did not seem to help. Pt   also reports pain around the right eye. Pt. Denies any other ocular health   complains today.  Pt d/c latanoprost due to side effects.     (+)blurred vision, Sometimes (--)Headaches(--)diplopia  (--)flashes(--)floaters(--)pain  (+)Itching(+)tearing(+)burning(+)Dryness  (--)Photophobia    (+)DM  Hemoglobin A1C       Date                     Value               Ref Range             Status                05/06/2024               5.9 (H)             4.0 - 5.6 %           Final                  11/15/2023               5.9 (H)             4.0 - 5.6 %           Final                  07/14/2023               5.8 (H)             4.0 - 5.6 %           Final                Last edited by Antonette Carranza, OD on 9/17/2024  9:08 AM.            Assessment /Plan     For exam results, see Encounter Report.    Keratoconjunctivitis sicca of both eyes not due to Sjogren's syndrome  -     cycloSPORINE (VEVYE) 0.1 % Drop; Place 1 drop into both eyes 2 (two) times daily.  Dispense: 2 mL; Refill: 11    Type 2 diabetes mellitus with hyperglycemia, with long-term current use of insulin  -     Ambulatory referral/consult to Ophthalmology    Type 2 diabetes mellitus without retinopathy    Optic nerve drusen, bilateral    Refractive error      Educated pt on findings. Pt has tried Xiidra in the past but d/c use due to side effects. Pt has also tried restasis with minimal relief. Will Rx Vevye, 1 gtt OU BID. Recommend additional ATs prn. If symptoms worsen or dont improve, RTC. Monitor.      2-3. No retinopathy noted, OU. Continue proper BS control and annual diabetic eye exams. Monitor yearly.       4. Educated pt on findings. Optic nerve drusen OU. RNFL changes noted on past testing. Pt was started on latanoprost but d/c use due to side effects. Will order repeat RNFL OCT and 24-2 HVF. Will call with results. If progression noted, will start on an alternative IOP lowering gtt/ Monitor.     5. Updated SRx. Mild change from habitual. Monitor yearly.        RTC in 1-2 months for RNFL OCT and 24-2 HVF or sooner if needed.

## 2024-09-25 DIAGNOSIS — H16.223 KERATOCONJUNCTIVITIS SICCA OF BOTH EYES NOT DUE TO SJOGREN'S SYNDROME: ICD-10-CM

## 2024-09-26 ENCOUNTER — PATIENT MESSAGE (OUTPATIENT)
Dept: OPTOMETRY | Facility: CLINIC | Age: 49
End: 2024-09-26
Payer: COMMERCIAL

## 2024-09-26 RX ORDER — CYCLOSPORINE OPHTHALMIC SOLUTION 1 MG/ML
1 SOLUTION/ DROPS OPHTHALMIC 2 TIMES DAILY
Qty: 2 ML | Refills: 11 | Status: SHIPPED | OUTPATIENT
Start: 2024-09-26 | End: 2024-09-27 | Stop reason: SDUPTHER

## 2024-09-27 ENCOUNTER — PATIENT MESSAGE (OUTPATIENT)
Dept: OPTOMETRY | Facility: CLINIC | Age: 49
End: 2024-09-27
Payer: COMMERCIAL

## 2024-09-27 DIAGNOSIS — H16.223 KERATOCONJUNCTIVITIS SICCA OF BOTH EYES NOT DUE TO SJOGREN'S SYNDROME: ICD-10-CM

## 2024-09-27 RX ORDER — CYCLOSPORINE OPHTHALMIC SOLUTION 1 MG/ML
1 SOLUTION/ DROPS OPHTHALMIC 2 TIMES DAILY
Qty: 2 ML | Refills: 11 | Status: SHIPPED | OUTPATIENT
Start: 2024-09-27 | End: 2025-09-27

## 2024-10-04 DIAGNOSIS — H47.323 OPTIC NERVE DRUSEN, BILATERAL: Primary | ICD-10-CM

## 2024-10-10 ENCOUNTER — PATIENT MESSAGE (OUTPATIENT)
Dept: ENDOCRINOLOGY | Facility: CLINIC | Age: 49
End: 2024-10-10
Payer: COMMERCIAL

## 2024-10-15 DIAGNOSIS — H47.323 OPTIC NERVE DRUSEN, BILATERAL: Primary | ICD-10-CM

## 2024-10-15 DIAGNOSIS — H16.223 KERATOCONJUNCTIVITIS SICCA OF BOTH EYES NOT DUE TO SJOGREN'S SYNDROME: ICD-10-CM

## 2024-10-15 RX ORDER — TIMOLOL MALEATE 5 MG/ML
1 SOLUTION/ DROPS OPHTHALMIC EVERY MORNING
Qty: 5 ML | Refills: 1 | Status: SHIPPED | OUTPATIENT
Start: 2024-10-15 | End: 2025-10-15

## 2024-10-15 RX ORDER — CYCLOSPORINE OPHTHALMIC SOLUTION 1 MG/ML
1 SOLUTION/ DROPS OPHTHALMIC 2 TIMES DAILY
Qty: 2 ML | Refills: 11 | Status: SHIPPED | OUTPATIENT
Start: 2024-10-15 | End: 2025-10-15

## 2024-10-16 ENCOUNTER — PATIENT MESSAGE (OUTPATIENT)
Dept: ENDOCRINOLOGY | Facility: CLINIC | Age: 49
End: 2024-10-16
Payer: COMMERCIAL

## 2024-10-16 DIAGNOSIS — E11.10 TYPE 2 DIABETES MELLITUS WITH KETOACIDOSIS WITHOUT COMA, WITHOUT LONG-TERM CURRENT USE OF INSULIN: ICD-10-CM

## 2024-10-16 DIAGNOSIS — I10 HYPERTENSION, UNSPECIFIED TYPE: ICD-10-CM

## 2024-10-16 DIAGNOSIS — F41.9 ANXIETY: ICD-10-CM

## 2024-10-16 DIAGNOSIS — E11.10 TYPE 2 DIABETES MELLITUS WITH KETOACIDOSIS WITHOUT COMA, WITHOUT LONG-TERM CURRENT USE OF INSULIN: Primary | ICD-10-CM

## 2024-10-16 RX ORDER — BLOOD-GLUCOSE SENSOR
EACH MISCELLANEOUS
Qty: 2 EACH | Refills: 11 | Status: SHIPPED | OUTPATIENT
Start: 2024-10-16

## 2024-10-16 NOTE — PROGRESS NOTES
PA denied for Dexcom as insurance prefers Freestyle Mor 2 or 3  [facility] is phasing out Freestyle Mor 3 as the new Mor 3 plus sensors are out  New RX sent to patient's pharmacy and will message patient to let her know

## 2024-10-17 RX ORDER — SERTRALINE HYDROCHLORIDE 50 MG/1
TABLET, FILM COATED ORAL
Qty: 90 TABLET | Refills: 3 | Status: SHIPPED | OUTPATIENT
Start: 2024-10-17

## 2024-10-17 RX ORDER — IRBESARTAN AND HYDROCHLOROTHIAZIDE 300; 12.5 MG/1; MG/1
1 TABLET, FILM COATED ORAL DAILY
Qty: 90 TABLET | Refills: 3 | Status: SHIPPED | OUTPATIENT
Start: 2024-10-17 | End: 2025-10-17

## 2024-10-17 RX ORDER — TIRZEPATIDE 15 MG/.5ML
15 INJECTION, SOLUTION SUBCUTANEOUS
Qty: 12 PEN | Refills: 1 | Status: SHIPPED | OUTPATIENT
Start: 2024-10-17

## 2024-10-17 RX ORDER — PRAVASTATIN SODIUM 40 MG/1
TABLET ORAL
Qty: 90 TABLET | Refills: 3 | Status: SHIPPED | OUTPATIENT
Start: 2024-10-17

## 2024-10-17 RX ORDER — INSULIN ASPART 100 [IU]/ML
5 INJECTION, SOLUTION INTRAVENOUS; SUBCUTANEOUS
Qty: 20 ML | Refills: 3 | Status: SHIPPED | OUTPATIENT
Start: 2024-10-17

## 2024-10-17 RX ORDER — METFORMIN HYDROCHLORIDE 500 MG/1
1000 TABLET, EXTENDED RELEASE ORAL 2 TIMES DAILY WITH MEALS
Qty: 360 TABLET | Refills: 3 | Status: SHIPPED | OUTPATIENT
Start: 2024-10-17 | End: 2025-10-17

## 2024-10-21 DIAGNOSIS — E11.10 TYPE 2 DIABETES MELLITUS WITH KETOACIDOSIS WITHOUT COMA, WITHOUT LONG-TERM CURRENT USE OF INSULIN: ICD-10-CM

## 2024-10-21 RX ORDER — ONDANSETRON 4 MG/1
4 TABLET, ORALLY DISINTEGRATING ORAL EVERY 8 HOURS PRN
Qty: 30 TABLET | Refills: 1 | Status: SHIPPED | OUTPATIENT
Start: 2024-10-21

## 2024-10-30 DIAGNOSIS — E11.10 TYPE 2 DIABETES MELLITUS WITH KETOACIDOSIS WITHOUT COMA, WITHOUT LONG-TERM CURRENT USE OF INSULIN: Primary | ICD-10-CM

## 2024-10-30 RX ORDER — INSULIN ASPART 100 [IU]/ML
INJECTION, SOLUTION INTRAVENOUS; SUBCUTANEOUS
Qty: 15 ML | Refills: 3 | Status: SHIPPED | OUTPATIENT
Start: 2024-10-30

## 2024-10-31 ENCOUNTER — PATIENT MESSAGE (OUTPATIENT)
Dept: SLEEP MEDICINE | Facility: CLINIC | Age: 49
End: 2024-10-31
Payer: COMMERCIAL

## 2024-10-31 DIAGNOSIS — G47.33 OSA (OBSTRUCTIVE SLEEP APNEA): Primary | ICD-10-CM

## 2024-10-31 DIAGNOSIS — G47.00 INSOMNIA, UNSPECIFIED TYPE: ICD-10-CM

## 2024-10-31 RX ORDER — ZOLPIDEM TARTRATE 10 MG/1
10 TABLET ORAL NIGHTLY PRN
Qty: 30 TABLET | Refills: 0 | Status: SHIPPED | OUTPATIENT
Start: 2024-10-31 | End: 2025-05-01

## 2024-11-04 ENCOUNTER — OFFICE VISIT (OUTPATIENT)
Dept: OPTOMETRY | Facility: CLINIC | Age: 49
End: 2024-11-04
Payer: COMMERCIAL

## 2024-11-04 DIAGNOSIS — H16.223 KERATOCONJUNCTIVITIS SICCA OF BOTH EYES NOT DUE TO SJOGREN'S SYNDROME: ICD-10-CM

## 2024-11-04 DIAGNOSIS — H47.323 OPTIC NERVE DRUSEN, BILATERAL: Primary | ICD-10-CM

## 2024-11-04 PROCEDURE — 92012 INTRM OPH EXAM EST PATIENT: CPT | Mod: S$GLB,,, | Performed by: OPTOMETRIST

## 2024-11-04 PROCEDURE — 3066F NEPHROPATHY DOC TX: CPT | Mod: CPTII,S$GLB,, | Performed by: OPTOMETRIST

## 2024-11-04 PROCEDURE — 3044F HG A1C LEVEL LT 7.0%: CPT | Mod: CPTII,S$GLB,, | Performed by: OPTOMETRIST

## 2024-11-04 PROCEDURE — 99999 PR PBB SHADOW E&M-EST. PATIENT-LVL III: CPT | Mod: PBBFAC,,, | Performed by: OPTOMETRIST

## 2024-11-04 PROCEDURE — 3061F NEG MICROALBUMINURIA REV: CPT | Mod: CPTII,S$GLB,, | Performed by: OPTOMETRIST

## 2024-11-12 NOTE — PROGRESS NOTES
Subjective:      Patient ID: Heather Gomez is a 49 y.o. female.    Chief Complaint:  Diabetes    History of Present Illness  Heather Gomez is here for follow up of DM and Hyperthyroidism.  Previously seen by me 5/2024.  This is a MyChart video visit.    The patient location is: [place]  The chief complaint leading to consultation is: DM  Visit type: Virtual visit with synchronous audio and video  Total time spent with patient: see below  Each patient to whom he or she provides medical services by telemedicine is:  (1) informed of the relationship between the physician and patient and the respective role of any other health care provider with respect to management of the patient; and (2) notified that he or she may decline to receive medical services by telemedicine and may withdraw from such care at any time.    With regards to Diabetes:    Diagnosed: 2013 4/2021 C peptide 3.12 with glucose of 180    FH of DM:   Mother, father, 4 sisters   Has 2 children - no history of DM     DM Education: 2/2022    Known complications:  DKA : None  RN - Denies  - Eye Exam: 11/2024  PN : Denies   - Podiatry: None  Nephropathy   CAD : Denies  Denies history of pancreatitis & personal/family history of medullary thyroid cancer.     Diet/Exercise:   Eats 1 meal a day.   Snacks : occasionally ~ 1am.   Drinks : water, rarely a soft drink  Exercise: None - active lifestyle.  Recent illness, injury, steroids: Denies      Current Regimen:  Metformin 1000mg twice daily  Mounjaro 15 mg weekly   Lantus 8 units twice a day   Novolog 5 units before meals    Occasionally missing Lantus in the evening.     Other medications tried:  Metformin - unable to tolerate due to GI upset   Invokana - discontinued due to abdominal pain, nausea, yeast infections    Actos - discontinued during hospital stay   Januvia - discontinued during hospital stay   Novolog stopped 7/2023  Glipizide - changes to Novolog     Glucose Monitor: Mor 3  - issues with it falling off   Dexcom G7 no longer covered 2024  6 times a day testing  Log reviewed: SMBG checks - needs to pickup more strips      Hypoglycemia:  Unsure    Knows how to correct with 15 grams of carbs- juice, coke, or a peppermint.      Diabetes Management Status    Hemoglobin A1C   Date Value Ref Range Status   05/06/2024 5.9 (H) 4.0 - 5.6 % Final     Comment:     ADA Screening Guidelines:  5.7-6.4%  Consistent with prediabetes  >or=6.5%  Consistent with diabetes    High levels of fetal hemoglobin interfere with the HbA1C  assay. Heterozygous hemoglobin variants (HbS, HgC, etc)do  not significantly interfere with this assay.   However, presence of multiple variants may affect accuracy.     11/15/2023 5.9 (H) 4.0 - 5.6 % Final     Comment:     ADA Screening Guidelines:  5.7-6.4%  Consistent with prediabetes  >or=6.5%  Consistent with diabetes    High levels of fetal hemoglobin interfere with the HbA1C  assay. Heterozygous hemoglobin variants (HbS, HgC, etc)do  not significantly interfere with this assay.   However, presence of multiple variants may affect accuracy.     07/14/2023 5.8 (H) 4.0 - 5.6 % Final     Comment:     ADA Screening Guidelines:  5.7-6.4%  Consistent with prediabetes  >or=6.5%  Consistent with diabetes    High levels of fetal hemoglobin interfere with the HbA1C  assay. Heterozygous hemoglobin variants (HbS, HgC, etc)do  not significantly interfere with this assay.   However, presence of multiple variants may affect accuracy.         Statin: Taking  ACE/ARB: Taking  Screening or Prevention Patient's value Goal Complete/Controlled?   HgA1C Testing and Control   Lab Results   Component Value Date    HGBA1C 5.9 (H) 05/06/2024      Annually/Less than 8% Yes   Lipid profile : 09/23/2024 Annually Yes   LDL control Lab Results   Component Value Date    LDLCALC 118.2 09/23/2024    Annually/Less than 100 mg/dl  Yes   Nephropathy screening Lab Results   Component Value Date    LABMICR 21.0  05/06/2024     Lab Results   Component Value Date    PROTEINUA Negative 06/27/2022    Annually No   Blood pressure BP Readings from Last 1 Encounters:   02/02/24 126/78    Less than 140/90 No   Dilated retinal exam : 09/17/2024 Annually Yes   Foot exam   : 02/09/2022 Annually No     With regards to Thyroid Nodule:      Thyroid US:   3/1/2023  Impression:  1.  Thyroid gland is enlarged with homogenous echotexture.  2.  1 nodule in the right thyroid described above.  3.  1 nodule in the left thyroid described above.  4.  1 nodule in the isthmus described above.  RECOMMENDATIONS:  Follow-up ultrasound in 2 years is recommended.       FNA: None    Signs or Symptoms:   Difficulty breathing: Denies  Difficulty swallowing: Denies  Voice Changes: Hoarseness but not all the time  FH of thyroid nodules but unsure if it was cancer (mother)  Personal history of radiation treatment or exposure: Denies      With regards to Hyperthyroidism:      FH of thyroid disease: 2 sisters, mother     2/2022  Thyroid Ab negative.    NM Thyroid Scan  4/6/2022  The 24 hour uptake is elevated at 48.3 % (normal 10-30%).  Homogenous distribution of the radionuclide throughout the thyroid gland without hyper or hypo functional nodules.  Impression:  1. Elevated 24 hour radioiodine uptake by the thyroid.  2. Uniform uptake in the thyroid gland bilaterally.  Overall findings are in keeping with Graves disease..    Lab Results   Component Value Date    TSH 4.629 (H) 09/04/2024    J1GPCYC 89 07/24/2018    FREET4 0.87 09/04/2024       Current Symptoms:   Denies palpations  Denies weight loss  Reports constipation (chronic issue)   Denies Diarrhea  Denies Hair loss  Denies Brittle nails  Denies Tremor   Denies Anxiety  Denies heat intolerance  Denies diaphoresis     Biotin Use: Denies    Current medication:  ATD   Start 2/8/2024  Reduced 7/2024  Methimazole 5mg twice daily    Any recent (3-6 months) iodine or contrast: Denies    Smoke: Denies    Thyroid  tenderness: Denies    Graves Eye Clinical Activity: 3/7=Active  Eye pain? Denies  Eye pain with movement? Denies  Eyelid erythema? Denies  Erythema of conjunctiva? Denies  Caruncle inflammation? Denies  Eyelid swelling? Denies       With regards to Vitamin D Deficiency:      Vit D, 25-Hydroxy   Date Value Ref Range Status   11/15/2023 30 30 - 96 ng/mL Final     Comment:     Vitamin D deficiency.........<10 ng/mL                              Vitamin D insufficiency......10-29 ng/mL       Vitamin D sufficiency........> or equal to 30 ng/mL  Vitamin D toxicity............>100 ng/mL         Current Meds: OTC D3 2000iu daily.     Review of Systems  As above       Visit Vitals  LMP 06/17/2019       There is no height or weight on file to calculate BMI.    Lab Review:   Lab Results   Component Value Date    HGBA1C 5.9 (H) 05/06/2024    HGBA1C 5.9 (H) 11/15/2023    HGBA1C 5.8 (H) 07/14/2023       Lab Results   Component Value Date    CHOL 201 (H) 09/23/2024    HDL 55 09/23/2024    LDLCALC 118.2 09/23/2024    TRIG 139 09/23/2024    CHOLHDL 27.4 09/23/2024     Lab Results   Component Value Date     05/28/2024    K 3.5 05/28/2024     05/28/2024    CO2 25 05/28/2024     (H) 05/28/2024    BUN 15 05/28/2024    CREATININE 0.7 05/28/2024    CALCIUM 9.6 05/28/2024    PROT 6.8 05/28/2024    ALBUMIN 3.6 05/28/2024    BILITOT 0.4 05/28/2024    ALKPHOS 65 05/28/2024    AST 15 05/28/2024    ALT 14 05/28/2024    ANIONGAP 11 05/28/2024    ESTGFRAFRICA >60.0 05/18/2022    EGFRNONAA >60.0 05/18/2022    TSH 4.629 (H) 09/04/2024     Vit D, 25-Hydroxy   Date Value Ref Range Status   11/15/2023 30 30 - 96 ng/mL Final     Comment:     Vitamin D deficiency.........<10 ng/mL                              Vitamin D insufficiency......10-29 ng/mL       Vitamin D sufficiency........> or equal to 30 ng/mL  Vitamin D toxicity............>100 ng/mL       Assessment and Plan     1. Type 2 diabetes mellitus with ketoacidosis without coma,  without long-term current use of insulin  insulin aspart U-100 (NOVOLOG) 100 unit/mL (3 mL) InPn pen    LANTUS SOLOSTAR U-100 INSULIN 100 unit/mL (3 mL) InPn pen    metFORMIN (GLUCOPHAGE-XR) 500 MG ER 24hr tablet    tirzepatide (MOUNJARO) 15 mg/0.5 mL PnIj    Comprehensive Metabolic Panel    Hemoglobin A1C      2. Thyroid nodule  US Soft Tissue Head Neck      3. Subclinical hyperthyroidism  methIMAzole (TAPAZOLE) 5 MG Tab    TSH    T4, Free      4. Vitamin D deficiency  Vitamin D            Type 2 diabetes mellitus with ketoacidosis without coma, without long-term current use of insulin  -- Labs prior to follow up.  -- A1c goal <7%.  -- Medications discussed:  MFM   GLP1-DPP4   RAE   SGLT2   Reviewed potential adverse effects of SGLT-2 inhibitors, including genital mycotic infections, slightly increased risk of UTI, hypersensitivity, hypotension, and hyperkalemia. Advised to maintain water intake of 8-10 cups per day. Advised we need to check chemistry panel at baseline and 2 weeks after starting. Discussed FDA warning reports of ketoacidosis associated with SGLT-2 inhibitors. Advised to seek immediate medical attention and stop the medication if symptoms such as difficulty breathing, nausea, vomiting, abdominal pain, confusion, and unusual fatigue/sleepiness. Discussed possible precipitating factors including major illness/reduced food and fluid intake (advised to stop under these circumstances), and reduced insulin dose. Discussed possible effects of increased fracture risk/decreased bone density. Discussed reports of increased risk of leg and foot amputations with canagliflozin and need to seek urgent care if developed new pain or tenderness, sores or ulcers, or infections in legs/feet.   Insulin   -- Reviewed logs/CGM:  NO data to review.  Issues with Mor falling off - discussed SKINTAC.  Dexcom no longer covered 2024.  Instructed to send glucose logs in 7 days.    Reach out to me sooner for any glucose <70 or  consistently >180.  -- Medication Changes:   Metformin 1000mg twice daily  Mounjaro 15 mg weekly   Lantus 8 units twice a day   Novolog 5 units before meals  -- Reviewed goals of therapy are to get the best control we can without hypoglycemia.  -- Reviewed patient's current insulin regimen. Clarified proper insulin dose and timing in relation to meals, etc. Insulin injection sites and proper rotation instructed.    -- Advised frequent self blood glucose monitoring.  Patient encouraged to document glucose results and bring them to every clinic visit.  -- Hypoglycemia precautions discussed. Instructed on precautions before driving.    -- Call for Bg repeatedly < 90 or > 180.   -- Close adherence to lifestyle changes recommended.   -- Periodic follow ups for eye evaluations, foot care and dental care suggested.    Thyroid nodule  -- Denies compressive symptoms.  -- Repeat thyroid US 3/2025.    Subclinical hyperthyroidism  -- Ab- Graves disease.  -- Reviewed possible options of methimazole, I131 therapy and surgery.  -- Graves' disease:   Extensive counseling today regarding the diagnosis of Graves' disease, the various options for treatment including thionamide, surgery, or I-131 therapy. We discussed the pros and cons of each treatment option, including contraindications to LORENZO if considering pregnancy in next 6-12 months. Reviewed that with methimazole, the course is ~18 months of treatment and then evaluate for remission. Surgery is most definitive, I-131 takes 2-6 months. Would need lifelong thyroid hormone after surgery or I-131 Rx.   -- Thionamide monitoring: I have reviewed the risk of agranulocytosis that can occur in 1 of 500 patients who are taking either PTU or methimazole. I have also reviewed the even rarer risk of hepatic dysfunction. A baseline CBC with differential and Liver function tests are available. The instruction sheet was given to the patient that describes these risks, warning symptoms, and  instructions to stop the medication, contact the covering endocrinology fellow, and check blood tests.   -- TREATMENT  ATD   Start 2/8/2024  Methimazole 5mg daily   -- Labs scheduled in 6 weeks.    Vitamin D deficiency  -- CONTINUE OTC Vit D3 2000iu daily.          Follow up in about 4 months (around 3/18/2025).    I spent 30 minutes face-to-face with the patient, over half of the visit was spent on counseling and/or coordinating the care of the patient.    Counseling includes:  Diagnostic results, impressions, recommendations   Prognosis   Risk and benefits of management/treatment options   Instructions for management treatment and or follow-up   Importance of compliance with management   Risk factor reduction   Patient education    Visit today included increased complexity associated with the care of the problems addressed and managing the longitudinal care of the patient due to the serious and/or complex managed problems.

## 2024-11-18 ENCOUNTER — OFFICE VISIT (OUTPATIENT)
Dept: ENDOCRINOLOGY | Facility: CLINIC | Age: 49
End: 2024-11-18
Payer: COMMERCIAL

## 2024-11-18 DIAGNOSIS — E04.1 THYROID NODULE: ICD-10-CM

## 2024-11-18 DIAGNOSIS — E11.10 TYPE 2 DIABETES MELLITUS WITH KETOACIDOSIS WITHOUT COMA, WITHOUT LONG-TERM CURRENT USE OF INSULIN: Primary | ICD-10-CM

## 2024-11-18 DIAGNOSIS — E05.90 SUBCLINICAL HYPERTHYROIDISM: ICD-10-CM

## 2024-11-18 DIAGNOSIS — E55.9 VITAMIN D DEFICIENCY: ICD-10-CM

## 2024-11-18 PROCEDURE — 3044F HG A1C LEVEL LT 7.0%: CPT | Mod: CPTII,95,, | Performed by: NURSE PRACTITIONER

## 2024-11-18 PROCEDURE — 1160F RVW MEDS BY RX/DR IN RCRD: CPT | Mod: CPTII,95,, | Performed by: NURSE PRACTITIONER

## 2024-11-18 PROCEDURE — 3066F NEPHROPATHY DOC TX: CPT | Mod: CPTII,95,, | Performed by: NURSE PRACTITIONER

## 2024-11-18 PROCEDURE — 1159F MED LIST DOCD IN RCRD: CPT | Mod: CPTII,95,, | Performed by: NURSE PRACTITIONER

## 2024-11-18 PROCEDURE — G2211 COMPLEX E/M VISIT ADD ON: HCPCS | Mod: 95,,, | Performed by: NURSE PRACTITIONER

## 2024-11-18 PROCEDURE — 99214 OFFICE O/P EST MOD 30 MIN: CPT | Mod: 95,,, | Performed by: NURSE PRACTITIONER

## 2024-11-18 PROCEDURE — 3061F NEG MICROALBUMINURIA REV: CPT | Mod: CPTII,95,, | Performed by: NURSE PRACTITIONER

## 2024-11-18 RX ORDER — INSULIN ASPART 100 [IU]/ML
5 INJECTION, SOLUTION INTRAVENOUS; SUBCUTANEOUS
Qty: 15 ML | Refills: 3 | Status: SHIPPED | OUTPATIENT
Start: 2024-11-18

## 2024-11-18 RX ORDER — METFORMIN HYDROCHLORIDE 500 MG/1
1000 TABLET, EXTENDED RELEASE ORAL 2 TIMES DAILY WITH MEALS
Qty: 360 TABLET | Refills: 3 | Status: SHIPPED | OUTPATIENT
Start: 2024-11-18 | End: 2025-11-18

## 2024-11-18 RX ORDER — METHIMAZOLE 5 MG/1
5 TABLET ORAL DAILY
Qty: 90 TABLET | Refills: 3 | Status: SHIPPED | OUTPATIENT
Start: 2024-11-18 | End: 2025-11-18

## 2024-11-18 RX ORDER — INSULIN GLARGINE 100 [IU]/ML
8 INJECTION, SOLUTION SUBCUTANEOUS 2 TIMES DAILY
Qty: 18 ML | Refills: 3 | Status: SHIPPED | OUTPATIENT
Start: 2024-11-18

## 2024-11-18 RX ORDER — TIRZEPATIDE 15 MG/.5ML
15 INJECTION, SOLUTION SUBCUTANEOUS
Qty: 12 PEN | Refills: 3 | Status: SHIPPED | OUTPATIENT
Start: 2024-11-18

## 2024-11-18 NOTE — ASSESSMENT & PLAN NOTE
-- Labs prior to follow up.  -- A1c goal <7%.  -- Medications discussed:  MFM   GLP1-DPP4   RAE   SGLT2   Reviewed potential adverse effects of SGLT-2 inhibitors, including genital mycotic infections, slightly increased risk of UTI, hypersensitivity, hypotension, and hyperkalemia. Advised to maintain water intake of 8-10 cups per day. Advised we need to check chemistry panel at baseline and 2 weeks after starting. Discussed FDA warning reports of ketoacidosis associated with SGLT-2 inhibitors. Advised to seek immediate medical attention and stop the medication if symptoms such as difficulty breathing, nausea, vomiting, abdominal pain, confusion, and unusual fatigue/sleepiness. Discussed possible precipitating factors including major illness/reduced food and fluid intake (advised to stop under these circumstances), and reduced insulin dose. Discussed possible effects of increased fracture risk/decreased bone density. Discussed reports of increased risk of leg and foot amputations with canagliflozin and need to seek urgent care if developed new pain or tenderness, sores or ulcers, or infections in legs/feet.   Insulin   -- Reviewed logs/CGM:  NO data to review.  Issues with Mor falling off - discussed SKINTAC.  Dexcom no longer covered 2024.  Instructed to send glucose logs in 7 days.    Reach out to me sooner for any glucose <70 or consistently >180.  -- Medication Changes:   Metformin 1000mg twice daily  Mounjaro 15 mg weekly   Lantus 8 units twice a day   Novolog 5 units before meals  -- Reviewed goals of therapy are to get the best control we can without hypoglycemia.  -- Reviewed patient's current insulin regimen. Clarified proper insulin dose and timing in relation to meals, etc. Insulin injection sites and proper rotation instructed.    -- Advised frequent self blood glucose monitoring.  Patient encouraged to document glucose results and bring them to every clinic visit.  -- Hypoglycemia precautions discussed.  Instructed on precautions before driving.    -- Call for Bg repeatedly < 90 or > 180.   -- Close adherence to lifestyle changes recommended.   -- Periodic follow ups for eye evaluations, foot care and dental care suggested.

## 2024-11-18 NOTE — Clinical Note
I'm sure the answer is no but figured I would ask. She is having trouble with Mor adhesive - would this change anything with her Dexcom PA to get it covered?

## 2024-11-18 NOTE — Clinical Note
Labs in 6 weeks Thyroid US prior to follow up VV in 4 months Orders Placed This Encounter     US Soft Tissue Head Neck     Comprehensive Metabolic Panel     Hemoglobin A1C     TSH     T4, Free     Vitamin D

## 2024-11-18 NOTE — ASSESSMENT & PLAN NOTE
-- Ab- Graves disease.  -- Reviewed possible options of methimazole, I131 therapy and surgery.  -- Graves' disease:   Extensive counseling today regarding the diagnosis of Graves' disease, the various options for treatment including thionamide, surgery, or I-131 therapy. We discussed the pros and cons of each treatment option, including contraindications to LORENZO if considering pregnancy in next 6-12 months. Reviewed that with methimazole, the course is ~18 months of treatment and then evaluate for remission. Surgery is most definitive, I-131 takes 2-6 months. Would need lifelong thyroid hormone after surgery or I-131 Rx.   -- Thionamide monitoring: I have reviewed the risk of agranulocytosis that can occur in 1 of 500 patients who are taking either PTU or methimazole. I have also reviewed the even rarer risk of hepatic dysfunction. A baseline CBC with differential and Liver function tests are available. The instruction sheet was given to the patient that describes these risks, warning symptoms, and instructions to stop the medication, contact the covering endocrinology fellow, and check blood tests.   -- TREATMENT  ATD   Start 2/8/2024  Methimazole 5mg daily   -- Labs scheduled in 6 weeks.

## 2024-11-19 ENCOUNTER — PATIENT MESSAGE (OUTPATIENT)
Dept: ENDOCRINOLOGY | Facility: CLINIC | Age: 49
End: 2024-11-19
Payer: COMMERCIAL

## 2024-11-25 ENCOUNTER — TELEPHONE (OUTPATIENT)
Dept: ENDOCRINOLOGY | Facility: CLINIC | Age: 49
End: 2024-11-25
Payer: COMMERCIAL

## 2024-11-27 NOTE — PROGRESS NOTES
HPI    CC: IOP check, h/o drusen OU    LACHO: 09/17/2024     (-) Changes in vision   (-) Pain  (-) Irritation   (-) Itching   (-) Flashes  (-) Floaters  (+) Glasses wearer   (-) CL wearer  (+) Uses eye gtts, Xiidra prn OU         Last edited by Antonette Carranza, OD on 11/27/2024 12:37 PM.            Assessment /Plan     For exam results, see Encounter Report.    Optic nerve drusen, bilateral    Keratoconjunctivitis sicca of both eyes not due to Sjogren's syndrome      Educated pt on findings. Optic nerve drusen OU. RNFL changes noted on past testing. HVF defects noted OS>>OD. Pt was started on latanoprost but d/c use due to side effects. Repeated RNFL OCT and 24-2 HVF. See results below. Pt still not using latanoprost. Will keep pt off gtt for now. Mild changes noted OS --- if continued will re discuss adding latanoprost.  Monitor 6 months with repeat testing and IOP check.     RNFL OCT (compared to 08/2022)  OD: Significant RNFL thinning T(42 ---> 43), TS(85 ---> 86), NS (53 ---> 58), N (31 ---> 29), and G (62 ---> 61); Borderline RNFL thinning TI (98 ---> 101)  OS: Significant RNFL thinning G (63 ---> 60), T (36 ---> 36), TS (108 ---> 91), NS (69 ---> 50) and TI (85 --->88)    24-2 HVF   OD: Reliable testing (FL: 1/11 FP: 5% FN: 13% GHT: ONL VFI: 96%).Outside rim defect.    OS: Reliable testing (FL: 0/13 FP: 2% FN: 7% GHT: ONL VFI: 83%). Superior temporal and inferior nasal defect.     2. Educated pt on findings. Continue xiidra 1 gtt OU BID. ATs prn. Monitor.       RTC in 6 months for RNFL OCT, 24-2 HVF, and IOP check or sooner if needed.

## 2024-12-02 DIAGNOSIS — M17.11 PRIMARY OSTEOARTHRITIS OF RIGHT KNEE: ICD-10-CM

## 2024-12-02 DIAGNOSIS — M25.531 RIGHT WRIST PAIN: ICD-10-CM

## 2024-12-02 RX ORDER — INDOMETHACIN 50 MG/1
50 CAPSULE ORAL 2 TIMES DAILY WITH MEALS
Qty: 60 CAPSULE | Refills: 2 | Status: SHIPPED | OUTPATIENT
Start: 2024-12-02

## 2024-12-05 DIAGNOSIS — G47.00 INSOMNIA, UNSPECIFIED TYPE: ICD-10-CM

## 2024-12-05 RX ORDER — ZOLPIDEM TARTRATE 10 MG/1
10 TABLET ORAL NIGHTLY PRN
Qty: 30 TABLET | Refills: 1 | Status: SHIPPED | OUTPATIENT
Start: 2024-12-05 | End: 2025-06-05

## 2024-12-09 ENCOUNTER — OFFICE VISIT (OUTPATIENT)
Dept: FAMILY MEDICINE | Facility: CLINIC | Age: 49
End: 2024-12-09
Payer: COMMERCIAL

## 2024-12-09 VITALS
HEIGHT: 64 IN | SYSTOLIC BLOOD PRESSURE: 124 MMHG | BODY MASS INDEX: 29.84 KG/M2 | TEMPERATURE: 98 F | HEART RATE: 106 BPM | DIASTOLIC BLOOD PRESSURE: 86 MMHG | WEIGHT: 174.81 LBS | OXYGEN SATURATION: 98 %

## 2024-12-09 DIAGNOSIS — R50.9 FEVER, UNSPECIFIED FEVER CAUSE: Primary | ICD-10-CM

## 2024-12-09 DIAGNOSIS — H66.003 ACUTE SUPPURATIVE OTITIS MEDIA OF BOTH EARS WITHOUT SPONTANEOUS RUPTURE OF TYMPANIC MEMBRANES, RECURRENCE NOT SPECIFIED: ICD-10-CM

## 2024-12-09 DIAGNOSIS — H92.01 OTALGIA OF RIGHT EAR: ICD-10-CM

## 2024-12-09 LAB
CTP QC/QA: YES
CTP QC/QA: YES
MOLECULAR STREP A: NEGATIVE
POC MOLECULAR INFLUENZA A AGN: NEGATIVE
POC MOLECULAR INFLUENZA B AGN: NEGATIVE

## 2024-12-09 PROCEDURE — 3061F NEG MICROALBUMINURIA REV: CPT | Mod: CPTII,S$GLB,,

## 2024-12-09 PROCEDURE — 87502 INFLUENZA DNA AMP PROBE: CPT | Mod: QW,S$GLB,,

## 2024-12-09 PROCEDURE — 87651 STREP A DNA AMP PROBE: CPT | Mod: QW,S$GLB,,

## 2024-12-09 PROCEDURE — 3066F NEPHROPATHY DOC TX: CPT | Mod: CPTII,S$GLB,,

## 2024-12-09 PROCEDURE — 99999 PR PBB SHADOW E&M-EST. PATIENT-LVL V: CPT | Mod: PBBFAC,,,

## 2024-12-09 PROCEDURE — 99214 OFFICE O/P EST MOD 30 MIN: CPT | Mod: S$GLB,,,

## 2024-12-09 PROCEDURE — 1159F MED LIST DOCD IN RCRD: CPT | Mod: CPTII,S$GLB,,

## 2024-12-09 PROCEDURE — 3074F SYST BP LT 130 MM HG: CPT | Mod: CPTII,S$GLB,,

## 2024-12-09 PROCEDURE — 3079F DIAST BP 80-89 MM HG: CPT | Mod: CPTII,S$GLB,,

## 2024-12-09 PROCEDURE — 3008F BODY MASS INDEX DOCD: CPT | Mod: CPTII,S$GLB,,

## 2024-12-09 PROCEDURE — 1160F RVW MEDS BY RX/DR IN RCRD: CPT | Mod: CPTII,S$GLB,,

## 2024-12-09 PROCEDURE — 3044F HG A1C LEVEL LT 7.0%: CPT | Mod: CPTII,S$GLB,,

## 2024-12-09 RX ORDER — AMOXICILLIN AND CLAVULANATE POTASSIUM 875; 125 MG/1; MG/1
1 TABLET, FILM COATED ORAL EVERY 12 HOURS
Qty: 14 TABLET | Refills: 0 | Status: SHIPPED | OUTPATIENT
Start: 2024-12-09 | End: 2024-12-16

## 2024-12-09 NOTE — PROGRESS NOTES
Subjective     Patient ID: Heather Gomez is a 49 y.o. female.    Chief Complaint: Generalized Body Aches and Sore Throat      Patient is a 49 year old AA female with HTN, HLD, DM, and other co-morbidities, that presents to clinic alone today as a new patient to me, established with Dr. Landa, for c/o fever, sore throat, and ear pain that started yesterday. T-max up to 101 this morning at home. Took tylenol prior to appointment. Denies CP, SOB, cough, n/v/d, abdominal pain, dysuria. Endorses headaches, sore throat worse on the right side, ear pain that os worse on right side and right sided facial swelling and tenderness. Took home Covid test and was negative. Has not received flu shot this season yet. Son recently treated for ear infection. Pain 4/10 currently.     Sore Throat   This is a new problem. The current episode started yesterday. The problem has been gradually worsening. The pain is worse on the right side. The maximum temperature recorded prior to her arrival was 101 - 101.9 F. The pain is at a severity of 4/10. The pain is mild. Associated symptoms include ear pain, headaches and swollen glands. Pertinent negatives include no congestion, coughing, diarrhea, ear discharge, hoarse voice, neck pain, shortness of breath, trouble swallowing or vomiting. She has tried acetaminophen for the symptoms. The treatment provided mild relief.     Review of Systems   Constitutional:  Negative for activity change and unexpected weight change.   HENT:  Positive for ear pain, postnasal drip and sore throat. Negative for nasal congestion, ear discharge, hearing loss, hoarse voice, rhinorrhea, sinus pressure/congestion and trouble swallowing.    Eyes:  Negative for discharge and visual disturbance.   Respiratory:  Negative for cough, chest tightness, shortness of breath and wheezing.    Cardiovascular:  Negative for chest pain and palpitations.   Gastrointestinal:  Negative for blood in stool, diarrhea, nausea  and vomiting.   Genitourinary:  Negative for difficulty urinating, dysuria, hematuria and menstrual problem.   Musculoskeletal:  Positive for arthralgias. Negative for joint swelling and neck pain.   Integumentary:  Negative for rash.   Neurological:  Positive for headaches. Negative for dizziness and weakness.   Psychiatric/Behavioral:  Negative for confusion and dysphoric mood.      Past Medical History:   Diagnosis Date    Anticoagulant long-term use     DVT, popliteal, acute     GERD (gastroesophageal reflux disease)     History of pulmonary embolus (PE)     HLD (hyperlipidemia)     Hypertension     Sleep apnea 2023    Type 2 diabetes mellitus, controlled     Vitamin D deficiency 2019       Past Surgical History:   Procedure Laterality Date    BREAST BIOPSY Right 2019     SECTION      x2    COLONOSCOPY N/A 2022    Procedure: COLONOSCOPY suprep;  Surgeon: Michael Tirado MD;  Location: [facility] ENDO;  Service: Endoscopy;  Laterality: N/A;    ENDOMETRIAL ABLATION      HYSTERECTOMY      LAPAROSCOPY-ASSISTED VAGINAL HYSTERECTOMY Bilateral 2019    Procedure: HYSTERECTOMY, VAGINAL, LAPAROSCOPY-ASSISTED;  Surgeon: Lashell Clayton MD;  Location: [facility] OR;  Service: OB/GYN;  Laterality: Bilateral;  video    UPPER GASTROINTESTINAL ENDOSCOPY              Family History   Problem Relation Name Age of Onset    Diabetes Mother Theola white     Hypertension Mother Theola white     Hyperlipidemia Mother Theola white     Hyperthyroidism Mother Theola white     Stroke Father Popeye white     Hyperlipidemia Father Popeye white     Hypertension Father Popeye white     Diabetes Father Popeye white     Breast cancer Sister Sidra CORLEY        Social History     Socioeconomic History    Marital status:    Tobacco Use    Smoking status: Never    Smokeless tobacco: Never   Substance and Sexual Activity    Alcohol use: No    Drug use: No    Sexual activity: Yes     Partners: Male      Birth control/protection: See Surgical Hx     Social Drivers of Health     Financial Resource Strain: Low Risk  (2/8/2024)    Overall Financial Resource Strain (CARDIA)     Difficulty of Paying Living Expenses: Not very hard   Food Insecurity: Food Insecurity Present (2/8/2024)    Hunger Vital Sign     Worried About Running Out of Food in the Last Year: Sometimes true     Ran Out of Food in the Last Year: Never true   Transportation Needs: No Transportation Needs (2/8/2024)    PRAPARE - Transportation     Lack of Transportation (Medical): No     Lack of Transportation (Non-Medical): No   Physical Activity: Inactive (2/8/2024)    Exercise Vital Sign     Days of Exercise per Week: 0 days     Minutes of Exercise per Session: 0 min   Stress: No Stress Concern Present (2/8/2024)    [employer] - Occupational Stress Questionnaire     Feeling of Stress : Not at all   Housing Stability: High Risk (2/8/2024)    Housing Stability Vital Sign     Unable to Pay for Housing in the Last Year: Yes     Number of Places Lived in the Last Year: 1     Unstable Housing in the Last Year: No       Current Outpatient Medications   Medication Sig Dispense Refill    blood-glucose sensor (FREESTYLE ITZEL 3 PLUS SENSOR) Rosa Apply sensor to the back of upper arm and change every 15 days 2 each 11    cycloSPORINE (VEVYE) 0.1 % Drop Place 1 drop into both eyes 2 (two) times daily. 2 mL 11    indomethacin (INDOCIN) 50 MG capsule Take 1 capsule (50 mg total) by mouth 2 (two) times daily with meals. 60 capsule 2    insulin aspart U-100 (NOVOLOG) 100 unit/mL (3 mL) InPn pen Inject 5 Units into the skin 3 (three) times daily with meals. Plus correction scale Max TDD 30units. 15 mL 3    irbesartan-hydrochlorothiazide (AVALIDE) 300-12.5 mg per tablet Take 1 tablet by mouth once daily. 90 tablet 3    LANTUS SOLOSTAR U-100 INSULIN 100 unit/mL (3 mL) InPn pen Inject 8 Units into the skin 2 (two) times a day. Increase per  "providers instruction Max TDD 30units. 18 mL 3    metFORMIN (GLUCOPHAGE-XR) 500 MG ER 24hr tablet Take 2 tablets (1,000 mg total) by mouth 2 (two) times daily with meals. 360 tablet 3    methIMAzole (TAPAZOLE) 5 MG Tab Take 1 tablet (5 mg total) by mouth once daily. 90 tablet 3    ondansetron (ZOFRAN-ODT) 4 MG TbDL Take 1 tablet (4 mg total) by mouth every 8 (eight) hours as needed (Nausea). 30 tablet 1    pravastatin (PRAVACHOL) 40 MG tablet Take 1 tablet by mouth once daily 90 tablet 3    sertraline (ZOLOFT) 50 MG tablet Take 1 tablet by mouth once daily 90 tablet 3    timolol maleate 0.5% (TIMOPTIC) 0.5 % Drop Place 1 drop into both eyes every morning. 5 mL 1    zolpidem (AMBIEN) 10 mg Tab Take 1 tablet (10 mg total) by mouth nightly as needed. 30 tablet 1    albuterol (PROVENTIL) 2.5 mg /3 mL (0.083 %) nebulizer solution Take 3 mLs (2.5 mg total) by nebulization every 8 (eight) hours as needed for Wheezing or Shortness of Breath (Cough). Rescue 90 each 1    amoxicillin-clavulanate 875-125mg (AUGMENTIN) 875-125 mg per tablet Take 1 tablet by mouth every 12 (twelve) hours. for 7 days 14 tablet 0    ipratropium (ATROVENT) 0.02 % nebulizer solution Take 2.5 mLs (500 mcg total) by nebulization 3 (three) times daily as needed for Wheezing. Rescue 90 each 1    latanoprost 0.005 % ophthalmic solution Place 1 drop into both eyes every evening. 2.5 mL 5    tirzepatide (MOUNJARO) 12.5 mg/0.5 mL PnIj Inject 12.5 mg into the skin every 7 days. 4 Pen 1    tirzepatide (MOUNJARO) 15 mg/0.5 mL PnIj Inject 1 PEN (15 mg) into the skin every 7 days. 12 Pen 3     No current facility-administered medications for this visit.       Review of patient's allergies indicates:  No Known Allergies        Objective     Vitals:    12/09/24 0921   BP: 124/86   Pulse: 106   Temp: 97.8 °F (36.6 °C)   SpO2: 98%   Weight: 79.3 kg (174 lb 13.2 oz)   Height: 5' 4" (1.626 m)   PainSc:   4   PainLoc: Generalized      Physical Exam  Vitals and nursing " note reviewed.   Constitutional:       General: She is not in acute distress.     Appearance: Normal appearance. She is obese. She is not ill-appearing.   HENT:      Head: Normocephalic and atraumatic.      Comments: Right sided preauricular region TTP with swelling     Right Ear: Tenderness present. A middle ear effusion is present. No mastoid tenderness. Tympanic membrane is injected and bulging. Tympanic membrane is not perforated.      Left Ear: A middle ear effusion is present. No mastoid tenderness. Tympanic membrane is injected and bulging. Tympanic membrane is not perforated.      Ears:      Comments: Bilateral OM with effusion, right TM bulging with quarter full suppurative fluid behind TM; no perforation.      Nose: Nose normal.      Mouth/Throat:      Lips: Pink.      Mouth: Mucous membranes are moist.      Pharynx: Oropharynx is clear. Posterior oropharyngeal erythema present. No oropharyngeal exudate.   Eyes:      General: No scleral icterus.        Right eye: No discharge.         Left eye: No discharge.      Extraocular Movements: Extraocular movements intact.      Conjunctiva/sclera: Conjunctivae normal.   Cardiovascular:      Rate and Rhythm: Regular rhythm. Tachycardia present.      Pulses: Normal pulses.      Heart sounds: Normal heart sounds. No murmur heard.  Pulmonary:      Effort: Pulmonary effort is normal. No respiratory distress.      Breath sounds: Normal breath sounds. No wheezing.   Abdominal:      General: Abdomen is flat. Bowel sounds are normal. There is no distension.      Palpations: Abdomen is soft. There is no mass.      Tenderness: There is no abdominal tenderness.   Musculoskeletal:         General: Normal range of motion.      Cervical back: Normal range of motion.      Right lower leg: No edema.      Left lower leg: No edema.   Lymphadenopathy:      Head:      Right side of head: Preauricular adenopathy present.      Left side of head: Preauricular adenopathy present.    Skin:     General: Skin is warm and dry.   Neurological:      General: No focal deficit present.      Mental Status: She is alert and oriented to person, place, and time.   Psychiatric:         Mood and Affect: Mood normal.         Behavior: Behavior normal. Behavior is cooperative.         Cognition and Memory: Cognition and memory normal.              Assessment and Plan     1. Fever, unspecified fever cause  - New problem; started yesterday with T-Max up to 101 today. Took tylenol. +sore throat, +ear pain.   -     POCT Influenza A/B Molecular  -     POCT Strep A, Molecular  - flu and strep swab both negative today in clinic.     2. Otalgia of right ear  - New problem; started yesterday with T-Max up to 101 today. Took tylenol. +sore throat, +ear pain.   - Start taking antibiotics as prescribed. Take tylenol or ibuprofen as needed for pain or fever. RTC in 1-2 weeks if s/s worsen.   -     amoxicillin-clavulanate 875-125mg (AUGMENTIN) 875-125 mg per tablet; Take 1 tablet by mouth every 12 (twelve) hours. for 7 days  Dispense: 14 tablet; Refill: 0    3. Acute suppurative otitis media of both ears without spontaneous rupture of tympanic membranes, recurrence not specified  - New problem; started yesterday with T-Max up to 101 today. Took tylenol. +sore throat, +ear pain.   - Start taking antibiotics as prescribed. Take tylenol or ibuprofen as needed for pain or fever. RTC in 1-2 weeks if s/s worsen.   -     amoxicillin-clavulanate 875-125mg (AUGMENTIN) 875-125 mg per tablet; Take 1 tablet by mouth every 12 (twelve) hours. for 7 days  Dispense: 14 tablet; Refill: 0           Follow up in about 2 weeks (around 12/23/2024), or if symptoms worsen or fail to improve. Follow up with PCP.     30 minutes of total time spent on the encounter, which includes face to face time and non-face to face time preparing to see the patient (eg, review of tests), Obtaining and/or reviewing separately obtained history, Documenting  clinical information in the electronic or other health record, Independently interpreting results (not separately reported) and communicating results to the patient/family/caregiver, or Care coordination (not separately reported).      Karuna Hemphill, MSN, APRN, FNP-C  Family Medicine  Office #[phone]

## 2025-01-08 ENCOUNTER — PATIENT MESSAGE (OUTPATIENT)
Dept: GASTROENTEROLOGY | Facility: CLINIC | Age: 50
End: 2025-01-08
Payer: COMMERCIAL

## 2025-01-08 ENCOUNTER — PATIENT MESSAGE (OUTPATIENT)
Dept: ENDOCRINOLOGY | Facility: CLINIC | Age: 50
End: 2025-01-08
Payer: COMMERCIAL

## 2025-01-08 DIAGNOSIS — H16.223 KERATOCONJUNCTIVITIS SICCA OF BOTH EYES NOT DUE TO SJOGREN'S SYNDROME: ICD-10-CM

## 2025-01-08 RX ORDER — CYCLOSPORINE OPHTHALMIC SOLUTION 1 MG/ML
1 SOLUTION/ DROPS OPHTHALMIC 2 TIMES DAILY
Qty: 2 ML | Refills: 11 | Status: SHIPPED | OUTPATIENT
Start: 2025-01-08 | End: 2026-01-08

## 2025-01-08 NOTE — TELEPHONE ENCOUNTER
Component      Latest Ref Rng 1/7/2025   Sodium      136 - 145 mmol/L 141    Potassium      3.5 - 5.1 mmol/L 3.5    Chloride      95 - 110 mmol/L 107    CO2      23 - 29 mmol/L 28    Glucose      70 - 110 mg/dL 128 (H)    BUN      6 - 20 mg/dL 14    Creatinine      0.5 - 1.4 mg/dL 0.7    Calcium      8.7 - 10.5 mg/dL 9.2    PROTEIN TOTAL      6.0 - 8.4 g/dL 7.1    Albumin      3.5 - 5.2 g/dL 3.6    BILIRUBIN TOTAL      0.1 - 1.0 mg/dL 0.2    ALP      40 - 150 U/L 70    AST      10 - 40 U/L 14    ALT      10 - 44 U/L 15    eGFR      >60 mL/min/1.73 m^2 >60.0    Anion Gap      8 - 16 mmol/L 6 (L)    Hemoglobin A1C External      4.0 - 5.6 % 6.0 (H)    Estimated Avg Glucose      68 - 131 mg/dL 126    TSH      0.400 - 4.000 uIU/mL 1.145    Free T4      0.71 - 1.51 ng/dL 0.89    Vitamin D      30 - 96 ng/mL 48       Legend:  (H) High  (L) Low

## 2025-01-21 ENCOUNTER — OFFICE VISIT (OUTPATIENT)
Dept: SLEEP MEDICINE | Facility: CLINIC | Age: 50
End: 2025-01-21
Payer: COMMERCIAL

## 2025-01-21 DIAGNOSIS — G47.00 INSOMNIA, UNSPECIFIED TYPE: ICD-10-CM

## 2025-01-21 DIAGNOSIS — G47.33 OSA (OBSTRUCTIVE SLEEP APNEA): Primary | ICD-10-CM

## 2025-01-21 NOTE — PROGRESS NOTES
The patient location is: [place]  The chief complaint leading to consultation is: insomnia/MAEVE    Visit type: audiovisual    Face to Face time with patient: 17 minutes of total time spent on the encounter, which includes face to face time and non-face to face time preparing to see the patient (eg, review of tests), Obtaining and/or reviewing separately obtained history, Documenting clinical information in the electronic or other health record, Independently interpreting results (not separately reported) and communicating results to the patient/family/caregiver, or Care coordination (not separately reported).Each patient to whom he or she provides medical services by telemedicine is:  (1) informed of the relationship between the physician and patient and the respective role of any other health care provider with respect to management of the patient; and (2) notified that he or she may decline to receive medical services by telemedicine and may withdraw from such care at any time.     She was setup apap 5-12cm 11/26/24. In bed earlier than intends to fall asleep. She is sometimes falling asleep w/o mask on or puts it on late. Has to be at work now 0530 so home 1pish and gets in bedroom. Sometimes in bedroom again 7-8pm. Not taking ambien qhs, and when she takes it only 1/2 tab. She thinks the machine is helping sleep.     Interrogation 11/26/24-12/26/24 avg 3:05h/n AHI 1.0, 90 %tile 8.9cm. dreamwear mask      HX :She has never had a sleep study. Difficultly falling asleep, sometimes up days at a time. May feel drowsy when driving. Tried cold air in bedroom/ambien 10mg and trazadone ?dose but had side effects (memory) or were ineffective. Feels exhausted at work.  Denies witnessed apneic pauses. Denies snoring or witnessed apneic pauses. Occasional am headaches. Up until 5a then sleeps few hours then awakens for unknown reason.   Bp stable  HgBA1c 6.5, sees endo    BT:10-11pm  WT- 8a, when can't sleep gets up walks the  house/lights are on, listens to relaxing music/deep breathing exercises but ear bud in causes head ache  3/2023: Lunesta 3mg helped her get 2 wks of sleep then stopped and she began adding melatonin and supplement called CARY to it which didn't help. Sometimes up all night then sleepy right when has to get up for work. Even bought a sleep # bed. Never had sleep study yet. Honestly she is in bed in evening time watching tv.     SH: . /on computer    HST 7/2023  AHI 19(RDI 29) only 3hr TST side and back    ASSESSMENT:   1. Insomnia NEC. Multi-factorial - excess time in bed, poor sleep hygiene, and likely paradoxical insomnia play a role.  HTN  DM  MAEVE, moderate. Adherent with PAP, benefiting AHI <5. Motivated to improve mask on time      PLAN:   Reserve bedroom for sleep only and delay until 9:30-10:30pm.  REFER again to CBT-I,continue 1/2 tab ambien 10mg, avoid clock watching  Continue to improve mask on time apap 5-12cm. Rtc 3-mos re-eval  DME [facility] supplies

## 2025-01-28 ENCOUNTER — OFFICE VISIT (OUTPATIENT)
Dept: GASTROENTEROLOGY | Facility: CLINIC | Age: 50
End: 2025-01-28
Payer: COMMERCIAL

## 2025-01-28 ENCOUNTER — PATIENT MESSAGE (OUTPATIENT)
Dept: GASTROENTEROLOGY | Facility: CLINIC | Age: 50
End: 2025-01-28

## 2025-01-28 DIAGNOSIS — K59.09 CHRONIC CONSTIPATION: Primary | ICD-10-CM

## 2025-01-28 PROCEDURE — 1159F MED LIST DOCD IN RCRD: CPT | Mod: CPTII,95,, | Performed by: NURSE PRACTITIONER

## 2025-01-28 PROCEDURE — 1160F RVW MEDS BY RX/DR IN RCRD: CPT | Mod: CPTII,95,, | Performed by: NURSE PRACTITIONER

## 2025-01-28 PROCEDURE — 98006 SYNCH AUDIO-VIDEO EST MOD 30: CPT | Mod: 95,,, | Performed by: NURSE PRACTITIONER

## 2025-01-28 PROCEDURE — 3044F HG A1C LEVEL LT 7.0%: CPT | Mod: CPTII,95,, | Performed by: NURSE PRACTITIONER

## 2025-01-28 NOTE — PATIENT INSTRUCTIONS
I would like for you to buy a small box of dulcolax tablets and 2 bottles of magnesium citrate (can be purchased from any pharmacy or [facility]). When you have time to stay home near the toilet take 2 Dulcolax tablets. Then in 1 hour drink 1 bottle of magnesium citrate and repeat 2nd bottle in 2-3 hours to clean out your colon.      After that is complete, start Linzess 145 mcg daily.

## 2025-01-28 NOTE — PROGRESS NOTES
Subjective:       Patient ID: Heather Gomez is a 49 y.o. female.    Chief Complaint: Constipation    The patient location is: [place], [place]  The chief complaint leading to consultation is: constipation    Visit type: audiovisual    Face to Face time with patient: 20 minutes  45 minutes of total time spent on the encounter, which includes face to face time and non-face to face time preparing to see the patient (eg, review of tests), Obtaining and/or reviewing separately obtained history, Documenting clinical information in the electronic or other health record, Independently interpreting results (not separately reported) and communicating results to the patient/family/caregiver, or Care coordination (not separately reported).         Each patient to whom he or she provides medical services by telemedicine is:  (1) informed of the relationship between the physician and patient and the respective role of any other health care provider with respect to management of the patient; and (2) notified that he or she may decline to receive medical services by telemedicine and may withdraw from such care at any time.    Notes:     48 y/o female with history of constipation. Last seen by GI NP Katharine Gomez 2/2022. Patient reports constipation previously controlled with Linzess daily. Request medication refill. Refill request denied by previous provider as she had not been seen in almost 3 years.     Constipation  This is a chronic problem. The current episode started more than 1 year ago. The problem has been waxing and waning since onset. Her stool frequency is 1 time per week or less. The stool is described as firm and pellet like. The patient is not on a high fiber diet. She Does not exercise regularly. There has Been adequate water intake. Associated symptoms include abdominal pain, bloating and nausea. She has tried laxatives for the symptoms. The treatment provided mild relief.       Past Medical History:    Diagnosis Date    Anticoagulant long-term use     DVT, popliteal, acute     GERD (gastroesophageal reflux disease)     History of pulmonary embolus (PE)     HLD (hyperlipidemia)     Hypertension     Sleep apnea 2023    Type 2 diabetes mellitus, controlled     Vitamin D deficiency 2019       Past Surgical History:   Procedure Laterality Date    BREAST BIOPSY Right 2019     SECTION      x2    COLONOSCOPY N/A 2022    Procedure: COLONOSCOPY suprep;  Surgeon: Michael Tirado MD;  Location: [facility] ENDO;  Service: Endoscopy;  Laterality: N/A;    ENDOMETRIAL ABLATION      HYSTERECTOMY      LAPAROSCOPY-ASSISTED VAGINAL HYSTERECTOMY Bilateral 2019    Procedure: HYSTERECTOMY, VAGINAL, LAPAROSCOPY-ASSISTED;  Surgeon: Lashell Clayton MD;  Location: [facility] OR;  Service: OB/GYN;  Laterality: Bilateral;  video    UPPER GASTROINTESTINAL ENDOSCOPY              Family History   Problem Relation Name Age of Onset    Diabetes Mother Theola white     Hypertension Mother Theola white     Hyperlipidemia Mother Theola white     Hyperthyroidism Mother Theola white     Stroke Father Popeye white     Hyperlipidemia Father Popeye white     Hypertension Father Popeye white     Diabetes Father Popeye white     Breast cancer Sister Sidra CORLEY        Social History     Socioeconomic History    Marital status:    Tobacco Use    Smoking status: Never    Smokeless tobacco: Never   Substance and Sexual Activity    Alcohol use: No    Drug use: No    Sexual activity: Yes     Partners: Male     Birth control/protection: See Surgical Hx     Social Drivers of Health     Financial Resource Strain: Low Risk  (2024)    Overall Financial Resource Strain (CARDIA)     Difficulty of Paying Living Expenses: Not very hard   Food Insecurity: Food Insecurity Present (2024)    Hunger Vital Sign     Worried About Running Out of Food in the Last Year: Sometimes true     Ran Out of Food in the Last Year:  Never true   Transportation Needs: No Transportation Needs (2/8/2024)    PRAPARE - Transportation     Lack of Transportation (Medical): No     Lack of Transportation (Non-Medical): No   Physical Activity: Inactive (2/8/2024)    Exercise Vital Sign     Days of Exercise per Week: 0 days     Minutes of Exercise per Session: 0 min   Stress: No Stress Concern Present (2/8/2024)    [employer] - Occupational Stress Questionnaire     Feeling of Stress : Not at all   Housing Stability: High Risk (2/8/2024)    Housing Stability Vital Sign     Unable to Pay for Housing in the Last Year: Yes     Number of Places Lived in the Last Year: 1     Unstable Housing in the Last Year: No       Review of Systems   Constitutional:  Negative for appetite change.   Respiratory:  Negative for shortness of breath.    Cardiovascular:  Negative for chest pain.   Gastrointestinal:  Positive for abdominal pain, bloating, constipation and nausea. Negative for blood in stool.   Hematological:  Negative for adenopathy. Does not bruise/bleed easily.   Psychiatric/Behavioral:  Negative for dysphoric mood.          Objective:     There were no vitals filed for this visit.       Physical Exam  Constitutional:       General: She is not in acute distress.     Appearance: Normal appearance. She is not ill-appearing.   HENT:      Head: Normocephalic.   Eyes:      Conjunctiva/sclera: Conjunctivae normal.   Pulmonary:      Effort: Pulmonary effort is normal. No respiratory distress.   Skin:     Coloration: Skin is not jaundiced or pale.   Neurological:      Mental Status: She is alert and oriented to person, place, and time.   Psychiatric:         Mood and Affect: Mood normal.         Behavior: Behavior normal.               Assessment:         ICD-10-CM ICD-9-CM   1. Chronic constipation  K59.09 564.00       Plan:       Chronic constipation  -     Dul/mag citrate clean out then resume Linzess daily  -     linaCLOtide (LINZESS)  145 mcg Cap capsule; Take 1 capsule (145 mcg total) by mouth once daily.  Dispense: 30 capsule; Refill: 5      Follow up in about 6 weeks (around 3/11/2025) for medication management, If symptoms worsen or fail to improve.     Patient's Medications   New Prescriptions    LINACLOTIDE (LINZESS) 145 MCG CAP CAPSULE    Take 1 capsule (145 mcg total) by mouth once daily.   Previous Medications    ALBUTEROL (PROVENTIL) 2.5 MG /3 ML (0.083 %) NEBULIZER SOLUTION    Take 3 mLs (2.5 mg total) by nebulization every 8 (eight) hours as needed for Wheezing or Shortness of Breath (Cough). Rescue    BLOOD-GLUCOSE SENSOR (FREESTYLE ITZEL 3 PLUS SENSOR) PAUL    Apply sensor to the back of upper arm and change every 15 days    CYCLOSPORINE (VEVYE) 0.1 % DROP    Place 1 drop into both eyes 2 (two) times daily.    INDOMETHACIN (INDOCIN) 50 MG CAPSULE    Take 1 capsule (50 mg total) by mouth 2 (two) times daily with meals.    INSULIN ASPART U-100 (NOVOLOG) 100 UNIT/ML (3 ML) INPN PEN    Inject 5 Units into the skin 3 (three) times daily with meals. Plus correction scale Max TDD 30units.    IPRATROPIUM (ATROVENT) 0.02 % NEBULIZER SOLUTION    Take 2.5 mLs (500 mcg total) by nebulization 3 (three) times daily as needed for Wheezing. Rescue    IRBESARTAN-HYDROCHLOROTHIAZIDE (AVALIDE) 300-12.5 MG PER TABLET    Take 1 tablet by mouth once daily.    LANTUS SOLOSTAR U-100 INSULIN 100 UNIT/ML (3 ML) INPN PEN    Inject 8 Units into the skin 2 (two) times a day. Increase per providers instruction Max TDD 30units.    LATANOPROST 0.005 % OPHTHALMIC SOLUTION    Place 1 drop into both eyes every evening.    METFORMIN (GLUCOPHAGE-XR) 500 MG ER 24HR TABLET    Take 2 tablets (1,000 mg total) by mouth 2 (two) times daily with meals.    METHIMAZOLE (TAPAZOLE) 5 MG TAB    Take 1 tablet (5 mg total) by mouth once daily.    ONDANSETRON (ZOFRAN-ODT) 4 MG TBDL    Take 1 tablet (4 mg total) by mouth every 8 (eight) hours as needed (Nausea).    PRAVASTATIN  (PRAVACHOL) 40 MG TABLET    Take 1 tablet by mouth once daily    SERTRALINE (ZOLOFT) 50 MG TABLET    Take 1 tablet by mouth once daily    TIMOLOL MALEATE 0.5% (TIMOPTIC) 0.5 % DROP    Place 1 drop into both eyes every morning.    TIRZEPATIDE (MOUNJARO) 15 MG/0.5 ML PNIJ    Inject 1 PEN (15 mg) into the skin every 7 days.    ZOLPIDEM (AMBIEN) 10 MG TAB    Take 1 tablet (10 mg total) by mouth nightly as needed.   Modified Medications    No medications on file   Discontinued Medications    TIRZEPATIDE (MOUNJARO) 12.5 MG/0.5 ML PNIJ    Inject 12.5 mg into the skin every 7 days.

## 2025-01-29 ENCOUNTER — TELEPHONE (OUTPATIENT)
Dept: GASTROENTEROLOGY | Facility: CLINIC | Age: 50
End: 2025-01-29
Payer: COMMERCIAL

## 2025-01-29 DIAGNOSIS — K59.09 CHRONIC CONSTIPATION: ICD-10-CM

## 2025-01-29 NOTE — TELEPHONE ENCOUNTER
RX was sent to [facility] in [place].    ----- Message from ONDINA Membreno sent at 1/29/2025  8:27 AM CST -----  Rx sent to [facility] [place] to fill.  ----- Message -----  From: Madeleine Rivera MA  Sent: 1/29/2025   8:24 AM CST  To: ONDINA Membreno      ----- Message -----  From: Carri Long  Sent: 1/28/2025   4:54 PM CST  To: [place] Staff    Type:  Needs Medical Advice    Who Called: deacon W/ [employer]   Pharmacy name and phone #:  [facility]  Would the patient rather a call back or a response via [facility]? Call   Best Call Back Number: [facility] [phone]  FAX # [phone] TICKET # [id number]  Additional Information: pt pharm benefits needs a PA

## 2025-02-05 RX ORDER — LUBIPROSTONE 8 UG/1
8 CAPSULE ORAL 2 TIMES DAILY WITH MEALS
Qty: 60 CAPSULE | Refills: 5 | Status: SHIPPED | OUTPATIENT
Start: 2025-02-05

## 2025-02-10 ENCOUNTER — PATIENT MESSAGE (OUTPATIENT)
Dept: SLEEP MEDICINE | Facility: CLINIC | Age: 50
End: 2025-02-10
Payer: COMMERCIAL

## 2025-02-10 DIAGNOSIS — E11.10 TYPE 2 DIABETES MELLITUS WITH KETOACIDOSIS WITHOUT COMA, WITHOUT LONG-TERM CURRENT USE OF INSULIN: ICD-10-CM

## 2025-02-10 RX ORDER — ONDANSETRON 4 MG/1
4 TABLET, ORALLY DISINTEGRATING ORAL EVERY 12 HOURS PRN
Qty: 30 TABLET | Refills: 1 | Status: SHIPPED | OUTPATIENT
Start: 2025-02-10

## 2025-02-11 DIAGNOSIS — G47.00 INSOMNIA, UNSPECIFIED TYPE: ICD-10-CM

## 2025-02-11 RX ORDER — ZOLPIDEM TARTRATE 10 MG/1
TABLET ORAL
Qty: 30 TABLET | Refills: 1 | Status: SHIPPED | OUTPATIENT
Start: 2025-02-11

## 2025-02-11 NOTE — TELEPHONE ENCOUNTER
Requested Prescriptions     Pending Prescriptions Disp Refills    zolpidem (AMBIEN) 10 mg Tab 30 tablet 1     Sig: Take 1 tablet (10 mg total) by mouth nightly as needed.     Lov 1/21/25

## 2025-02-25 DIAGNOSIS — E11.10 TYPE 2 DIABETES MELLITUS WITH KETOACIDOSIS WITHOUT COMA, WITHOUT LONG-TERM CURRENT USE OF INSULIN: ICD-10-CM

## 2025-02-25 RX ORDER — PRAVASTATIN SODIUM 40 MG/1
TABLET ORAL
Qty: 90 TABLET | Refills: 3 | Status: SHIPPED | OUTPATIENT
Start: 2025-02-25

## 2025-03-11 DIAGNOSIS — G47.00 INSOMNIA, UNSPECIFIED TYPE: ICD-10-CM

## 2025-03-11 RX ORDER — ZOLPIDEM TARTRATE 10 MG/1
TABLET ORAL
Qty: 30 TABLET | Refills: 1 | Status: SHIPPED | OUTPATIENT
Start: 2025-03-11

## 2025-03-18 NOTE — PROGRESS NOTES
Subjective:      Patient ID: Heather Gomez is a 49 y.o. female.    Chief Complaint:  Type 2 diabetes mellitus with ketoacidosis without coma, wi    History of Present Illness  Heather Gomez is here for follow up of DM and Hyperthyroidism.  Previously seen by me 11/2024.  This is a MyChart video visit.    The patient location is: [place]  The chief complaint leading to consultation is: DM  Visit type: Virtual visit with synchronous audio and video  Total time spent with patient: see below  Each patient to whom he or she provides medical services by telemedicine is:  (1) informed of the relationship between the physician and patient and the respective role of any other health care provider with respect to management of the patient; and (2) notified that he or she may decline to receive medical services by telemedicine and may withdraw from such care at any time.    With regards to Diabetes:    Diagnosed: 2013 4/2021 C peptide 3.12 with glucose of 180    FH of DM:   Mother, father, 4 sisters   Has 2 children - no history of DM     DM Education: 2/2022    Known complications:  DKA : None  RN - Denies  - Eye Exam: 11/2024  PN : Denies   - Podiatry: None  Nephropathy   CAD : Denies  Denies history of pancreatitis & personal/family history of medullary thyroid cancer.     Diet/Exercise:   Eats 1 meal a day.   Snacks : occasionally ~ 1am.   Drinks : water, rarely a soft drink  Exercise: None - active lifestyle.  Recent illness, injury, steroids: Denies      Current Regimen:  Metformin 1000mg twice daily  Mounjaro 15 mg weekly on Sunday   Lantus 8 units twice a day   Novolog 5 units before meals    Reports compliance.     Other medications tried:  Metformin - unable to tolerate due to GI upset   Invokana - discontinued due to abdominal pain, nausea, yeast infections    Actos - discontinued during hospital stay   Januvia - discontinued during hospital stay   Novolog stopped 7/2023  Glipizide - changes to  Novolog     Glucose Monitor: [place] 3   Dexcom G7 no longer covered   6 times a day testing  Log reviewed: oral recall  Fastin-190  PP hyperglycemia     Hypoglycemia:  Denies   Knows how to correct with 15 grams of carbs- juice, coke, or a peppermint.      Diabetes Management Status    Hemoglobin A1C   Date Value Ref Range Status   2025 6.0 (H) 4.0 - 5.6 % Final     Comment:     ADA Screening Guidelines:  5.7-6.4%  Consistent with prediabetes  >or=6.5%  Consistent with diabetes    High levels of fetal hemoglobin interfere with the HbA1C  assay. Heterozygous hemoglobin variants (HbS, HgC, etc)do  not significantly interfere with this assay.   However, presence of multiple variants may affect accuracy.     2024 5.9 (H) 4.0 - 5.6 % Final     Comment:     ADA Screening Guidelines:  5.7-6.4%  Consistent with prediabetes  >or=6.5%  Consistent with diabetes    High levels of fetal hemoglobin interfere with the HbA1C  assay. Heterozygous hemoglobin variants (HbS, HgC, etc)do  not significantly interfere with this assay.   However, presence of multiple variants may affect accuracy.     11/15/2023 5.9 (H) 4.0 - 5.6 % Final     Comment:     ADA Screening Guidelines:  5.7-6.4%  Consistent with prediabetes  >or=6.5%  Consistent with diabetes    High levels of fetal hemoglobin interfere with the HbA1C  assay. Heterozygous hemoglobin variants (HbS, HgC, etc)do  not significantly interfere with this assay.   However, presence of multiple variants may affect accuracy.         Statin: Taking  ACE/ARB: Taking  Screening or Prevention Patient's value Goal Complete/Controlled?   HgA1C Testing and Control   Lab Results   Component Value Date    HGBA1C 6.0 (H) 2025      Annually/Less than 8% Yes   Lipid profile : 2024 Annually Yes   LDL control Lab Results   Component Value Date    LDLCALC 118.2 2024    Annually/Less than 100 mg/dl  Yes   Nephropathy screening Lab Results   Component Value Date     LABMICR 21.0 05/06/2024     Lab Results   Component Value Date    PROTEINUA Negative 06/27/2022    Annually No   Blood pressure BP Readings from Last 1 Encounters:   12/09/24 124/86    Less than 140/90 No   Dilated retinal exam : 09/17/2024 Annually Yes   Foot exam   Most Recent Foot Exam Date: Not Found Annually No     With regards to Thyroid Nodule:      Thyroid US:   3/1/2023  Impression:  1.  Thyroid gland is enlarged with homogenous echotexture.  2.  1 nodule in the right thyroid described above.  3.  1 nodule in the left thyroid described above.  4.  1 nodule in the isthmus described above.  RECOMMENDATIONS:  Follow-up ultrasound in 2 years is recommended.       FNA: None    Signs or Symptoms:   Difficulty breathing: Denies  Difficulty swallowing: Denies  Voice Changes: Denies  FH of thyroid nodules but unsure if it was cancer (mother)  Personal history of radiation treatment or exposure: Denies      With regards to Hyperthyroidism:      FH of thyroid disease: 2 sisters, mother     2/2022  Thyroid Ab negative.    NM Thyroid Scan  4/6/2022  The 24 hour uptake is elevated at 48.3 % (normal 10-30%).  Homogenous distribution of the radionuclide throughout the thyroid gland without hyper or hypo functional nodules.  Impression:  1. Elevated 24 hour radioiodine uptake by the thyroid.  2. Uniform uptake in the thyroid gland bilaterally.  Overall findings are in keeping with Graves disease..    Lab Results   Component Value Date    TSH 1.145 01/07/2025    U3IUEGZ 89 07/24/2018    FREET4 0.89 01/07/2025       Current Symptoms:   Denies palpations  Denies weight loss  Reports constipation (chronic issue)   Denies Diarrhea  Denies Hair loss  Denies Brittle nails  Denies Tremor   Denies Anxiety  Denies heat intolerance  Denies diaphoresis     Biotin Use: Denies    Current medication:  ATD   Start 2/8/2024  Methimazole 5mg daily    Any recent (3-6 months) iodine or contrast: Denies    Smoke: Denies    Thyroid tenderness:  Denies    Graves Eye Clinical Activity: 3/7=Active  Eye pain? Denies  Eye pain with movement? Denies  Eyelid erythema? Denies  Erythema of conjunctiva? Denies  Caruncle inflammation? Denies  Eyelid swelling? Denies       With regards to Vitamin D Deficiency:      Vit D, 25-Hydroxy   Date Value Ref Range Status   01/07/2025 48 30 - 96 ng/mL Final     Comment:     Vitamin D deficiency.........<10 ng/mL                              Vitamin D insufficiency......10-29 ng/mL       Vitamin D sufficiency........> or equal to 30 ng/mL  Vitamin D toxicity............>100 ng/mL         Current Meds: OTC D3 2000iu daily.     Review of Systems  As above       Visit Vitals  LMP 06/17/2019       There is no height or weight on file to calculate BMI.    Lab Review:   Lab Results   Component Value Date    HGBA1C 6.0 (H) 01/07/2025    HGBA1C 5.9 (H) 05/06/2024    HGBA1C 5.9 (H) 11/15/2023       Lab Results   Component Value Date    CHOL 201 (H) 09/23/2024    HDL 55 09/23/2024    LDLCALC 118.2 09/23/2024    TRIG 139 09/23/2024    CHOLHDL 27.4 09/23/2024     Lab Results   Component Value Date     01/07/2025    K 3.5 01/07/2025     01/07/2025    CO2 28 01/07/2025     (H) 01/07/2025    BUN 14 01/07/2025    CREATININE 0.7 01/07/2025    CALCIUM 9.2 01/07/2025    PROT 7.1 01/07/2025    ALBUMIN 3.6 01/07/2025    BILITOT 0.2 01/07/2025    ALKPHOS 70 01/07/2025    AST 14 01/07/2025    ALT 15 01/07/2025    ANIONGAP 6 (L) 01/07/2025    ESTGFRAFRICA >60.0 05/18/2022    EGFRNONAA >60.0 05/18/2022    TSH 1.145 01/07/2025     Vit D, 25-Hydroxy   Date Value Ref Range Status   01/07/2025 48 30 - 96 ng/mL Final     Comment:     Vitamin D deficiency.........<10 ng/mL                              Vitamin D insufficiency......10-29 ng/mL       Vitamin D sufficiency........> or equal to 30 ng/mL  Vitamin D toxicity............>100 ng/mL       Assessment and Plan     1. Type 2 diabetes mellitus with hyperglycemia, with long-term current  use of insulin  tirzepatide (MOUNJARO) 12.5 mg/0.5 mL PnIj    LANTUS SOLOSTAR U-100 INSULIN 100 unit/mL (3 mL) InPn pen    insulin aspart U-100 (NOVOLOG) 100 unit/mL (3 mL) InPn pen    Comprehensive Metabolic Panel    Hemoglobin A1C    Vitamin D    Microalbumin/Creatinine Ratio, Urine      2. Type 2 diabetes mellitus with ketoacidosis without coma, without long-term current use of insulin        3. Thyroid nodule  US Soft Tissue Head Neck      4. Subclinical hyperthyroidism  TSH    T4, Free      5. Vitamin D deficiency        6. Hypertension associated with diabetes        7. Mixed hyperlipidemia              Type 2 diabetes mellitus with hyperglycemia, with long-term current use of insulin  -- PIVV in 4 weeks. VV in 3 months with labs prior.  -- A1c goal <7%.  -- Medications discussed:  MFM   GLP1-DPP4   RAE   SGLT2   Reviewed potential adverse effects of SGLT-2 inhibitors, including genital mycotic infections, slightly increased risk of UTI, hypersensitivity, hypotension, and hyperkalemia. Advised to maintain water intake of 8-10 cups per day. Advised we need to check chemistry panel at baseline and 2 weeks after starting. Discussed FDA warning reports of ketoacidosis associated with SGLT-2 inhibitors. Advised to seek immediate medical attention and stop the medication if symptoms such as difficulty breathing, nausea, vomiting, abdominal pain, confusion, and unusual fatigue/sleepiness. Discussed possible precipitating factors including major illness/reduced food and fluid intake (advised to stop under these circumstances), and reduced insulin dose. Discussed possible effects of increased fracture risk/decreased bone density. Discussed reports of increased risk of leg and foot amputations with canagliflozin and need to seek urgent care if developed new pain or tenderness, sores or ulcers, or infections in legs/feet.   Insulin   -- Reviewed logs/CGM:  Recent hyperglycemia with unclear etiology.  Link [place] to [facility]  account.  Reach out to me sooner for any glucose <70 or consistently >180.  -- Reduce Mounjaro due to nausea.  -- Medication Changes:   Metformin 1000mg twice daily  Mounjaro 12.5 mg weekly   Lantus 10 units twice a day   Novolog 7 units plus correction scale before meals only  Add correction scale as needed.  Blood sugar 150 to 200 add 1 units  Blood sugar 201 to 250 add 2 units  Blood sugar 251 to 300 add 3 units  Blood sugar 301 to 350 add 4 units  Blood sugar greater than 350 add 5 units      -- Reviewed goals of therapy are to get the best control we can without hypoglycemia.  -- Reviewed patient's current insulin regimen. Clarified proper insulin dose and timing in relation to meals, etc. Insulin injection sites and proper rotation instructed.    -- Advised frequent self blood glucose monitoring.  Patient encouraged to document glucose results and bring them to every clinic visit.  -- Hypoglycemia precautions discussed. Instructed on precautions before driving.    -- Call for Bg repeatedly < 90 or > 180.   -- Close adherence to lifestyle changes recommended.   -- Periodic follow ups for eye evaluations, foot care and dental care suggested.    Thyroid nodule  -- Denies compressive symptoms.  -- Repeat thyroid US now.    Subclinical hyperthyroidism  -- Ab- Graves disease.  -- Reviewed possible options of methimazole, I131 therapy and surgery.  -- Graves' disease:   Extensive counseling today regarding the diagnosis of Graves' disease, the various options for treatment including thionamide, surgery, or I-131 therapy. We discussed the pros and cons of each treatment option, including contraindications to LORENZO if considering pregnancy in next 6-12 months. Reviewed that with methimazole, the course is ~18 months of treatment and then evaluate for remission. Surgery is most definitive, I-131 takes 2-6 months. Would need lifelong thyroid hormone after surgery or I-131 Rx.   -- Thionamide monitoring: I have reviewed the  risk of agranulocytosis that can occur in 1 of 500 patients who are taking either PTU or methimazole. I have also reviewed the even rarer risk of hepatic dysfunction. A baseline CBC with differential and Liver function tests are available. The instruction sheet was given to the patient that describes these risks, warning symptoms, and instructions to stop the medication, contact the covering endocrinology fellow, and check blood tests.   -- TREATMENT  ATD   Start 2/8/2024  Methimazole 5mg daily   -- Labs scheduled prior to follow up.    Vitamin D deficiency  -- CONTINUE OTC Vit D3 2000iu daily.    Hypertension associated with diabetes  -- On ARB.  -- Blood pressure goals discussed with patient.    Hyperlipidemia  -- On statin per ADA recommendations.      Follow up in about 3 months (around 6/24/2025).    I spent 30 minutes face-to-face with the patient, over half of the visit was spent on counseling and/or coordinating the care of the patient.    Counseling includes:  Diagnostic results, impressions, recommendations   Prognosis   Risk and benefits of management/treatment options   Instructions for management treatment and or follow-up   Importance of compliance with management   Risk factor reduction   Patient education    Visit today included increased complexity associated with the care of the problems addressed and managing the longitudinal care of the patient due to the serious and/or complex managed problems.

## 2025-03-23 ENCOUNTER — PATIENT MESSAGE (OUTPATIENT)
Dept: ENDOCRINOLOGY | Facility: CLINIC | Age: 50
End: 2025-03-23
Payer: COMMERCIAL

## 2025-03-24 ENCOUNTER — PATIENT MESSAGE (OUTPATIENT)
Dept: ENDOCRINOLOGY | Facility: CLINIC | Age: 50
End: 2025-03-24

## 2025-03-24 ENCOUNTER — OFFICE VISIT (OUTPATIENT)
Dept: ENDOCRINOLOGY | Facility: CLINIC | Age: 50
End: 2025-03-24
Payer: COMMERCIAL

## 2025-03-24 DIAGNOSIS — Z79.4 TYPE 2 DIABETES MELLITUS WITH HYPERGLYCEMIA, WITH LONG-TERM CURRENT USE OF INSULIN: Primary | ICD-10-CM

## 2025-03-24 DIAGNOSIS — E11.10 TYPE 2 DIABETES MELLITUS WITH KETOACIDOSIS WITHOUT COMA, WITHOUT LONG-TERM CURRENT USE OF INSULIN: ICD-10-CM

## 2025-03-24 DIAGNOSIS — E78.2 MIXED HYPERLIPIDEMIA: ICD-10-CM

## 2025-03-24 DIAGNOSIS — E11.65 TYPE 2 DIABETES MELLITUS WITH HYPERGLYCEMIA, WITH LONG-TERM CURRENT USE OF INSULIN: Primary | ICD-10-CM

## 2025-03-24 DIAGNOSIS — E05.90 SUBCLINICAL HYPERTHYROIDISM: ICD-10-CM

## 2025-03-24 DIAGNOSIS — E04.1 THYROID NODULE: ICD-10-CM

## 2025-03-24 DIAGNOSIS — E11.59 HYPERTENSION ASSOCIATED WITH DIABETES: ICD-10-CM

## 2025-03-24 DIAGNOSIS — E55.9 VITAMIN D DEFICIENCY: ICD-10-CM

## 2025-03-24 DIAGNOSIS — I15.2 HYPERTENSION ASSOCIATED WITH DIABETES: ICD-10-CM

## 2025-03-24 PROCEDURE — G2211 COMPLEX E/M VISIT ADD ON: HCPCS | Mod: 95,,, | Performed by: NURSE PRACTITIONER

## 2025-03-24 PROCEDURE — 1160F RVW MEDS BY RX/DR IN RCRD: CPT | Mod: CPTII,95,, | Performed by: NURSE PRACTITIONER

## 2025-03-24 PROCEDURE — 98006 SYNCH AUDIO-VIDEO EST MOD 30: CPT | Mod: 95,,, | Performed by: NURSE PRACTITIONER

## 2025-03-24 PROCEDURE — 1159F MED LIST DOCD IN RCRD: CPT | Mod: CPTII,95,, | Performed by: NURSE PRACTITIONER

## 2025-03-24 PROCEDURE — 3044F HG A1C LEVEL LT 7.0%: CPT | Mod: CPTII,95,, | Performed by: NURSE PRACTITIONER

## 2025-03-24 RX ORDER — TIRZEPATIDE 12.5 MG/.5ML
12.5 INJECTION, SOLUTION SUBCUTANEOUS
Qty: 4 PEN | Refills: 3 | Status: SHIPPED | OUTPATIENT
Start: 2025-03-24

## 2025-03-24 RX ORDER — INSULIN GLARGINE 100 [IU]/ML
10 INJECTION, SOLUTION SUBCUTANEOUS 2 TIMES DAILY
Qty: 18 ML | Refills: 3 | Status: SHIPPED | OUTPATIENT
Start: 2025-03-24

## 2025-03-24 RX ORDER — INSULIN ASPART 100 [IU]/ML
7 INJECTION, SOLUTION INTRAVENOUS; SUBCUTANEOUS
Qty: 20 ML | Refills: 3 | Status: SHIPPED | OUTPATIENT
Start: 2025-03-24

## 2025-03-24 NOTE — ASSESSMENT & PLAN NOTE
-- PIVV in 4 weeks. VV in 3 months with labs prior.  -- A1c goal <7%.  -- Medications discussed:  MFM   GLP1-DPP4   RAE   SGLT2   Reviewed potential adverse effects of SGLT-2 inhibitors, including genital mycotic infections, slightly increased risk of UTI, hypersensitivity, hypotension, and hyperkalemia. Advised to maintain water intake of 8-10 cups per day. Advised we need to check chemistry panel at baseline and 2 weeks after starting. Discussed FDA warning reports of ketoacidosis associated with SGLT-2 inhibitors. Advised to seek immediate medical attention and stop the medication if symptoms such as difficulty breathing, nausea, vomiting, abdominal pain, confusion, and unusual fatigue/sleepiness. Discussed possible precipitating factors including major illness/reduced food and fluid intake (advised to stop under these circumstances), and reduced insulin dose. Discussed possible effects of increased fracture risk/decreased bone density. Discussed reports of increased risk of leg and foot amputations with canagliflozin and need to seek urgent care if developed new pain or tenderness, sores or ulcers, or infections in legs/feet.   Insulin   -- Reviewed logs/CGM:  Recent hyperglycemia with unclear etiology.  Link [place] to [facility] [place] account.  Reach out to me sooner for any glucose <70 or consistently >180.  -- Reduce Mounjaro due to nausea.  -- Medication Changes:   Metformin 1000mg twice daily  Mounjaro 12.5 mg weekly   Lantus 10 units twice a day   Novolog 7 units plus correction scale before meals only  Add correction scale as needed.  Blood sugar 150 to 200 add 1 units  Blood sugar 201 to 250 add 2 units  Blood sugar 251 to 300 add 3 units  Blood sugar 301 to 350 add 4 units  Blood sugar greater than 350 add 5 units      -- Reviewed goals of therapy are to get the best control we can without hypoglycemia.  -- Reviewed patient's current insulin regimen. Clarified proper insulin dose and timing in relation to meals, etc.  Insulin injection sites and proper rotation instructed.    -- Advised frequent self blood glucose monitoring.  Patient encouraged to document glucose results and bring them to every clinic visit.  -- Hypoglycemia precautions discussed. Instructed on precautions before driving.    -- Call for Bg repeatedly < 90 or > 180.   -- Close adherence to lifestyle changes recommended.   -- Periodic follow ups for eye evaluations, foot care and dental care suggested.

## 2025-03-24 NOTE — ASSESSMENT & PLAN NOTE
-- Ab- Graves disease.  -- Reviewed possible options of methimazole, I131 therapy and surgery.  -- Graves' disease:   Extensive counseling today regarding the diagnosis of Graves' disease, the various options for treatment including thionamide, surgery, or I-131 therapy. We discussed the pros and cons of each treatment option, including contraindications to LORENZO if considering pregnancy in next 6-12 months. Reviewed that with methimazole, the course is ~18 months of treatment and then evaluate for remission. Surgery is most definitive, I-131 takes 2-6 months. Would need lifelong thyroid hormone after surgery or I-131 Rx.   -- Thionamide monitoring: I have reviewed the risk of agranulocytosis that can occur in 1 of 500 patients who are taking either PTU or methimazole. I have also reviewed the even rarer risk of hepatic dysfunction. A baseline CBC with differential and Liver function tests are available. The instruction sheet was given to the patient that describes these risks, warning symptoms, and instructions to stop the medication, contact the covering endocrinology fellow, and check blood tests.   -- TREATMENT  ATD   Start 2/8/2024  Methimazole 5mg daily   -- Labs scheduled prior to follow up.

## 2025-03-24 NOTE — Clinical Note
VV in 3 months with labs prior - NO MONDAYS Please call patient to get [place] [facility] and send me her report Orders Placed This Encounter     US Soft Tissue Head Neck     Comprehensive Metabolic Panel     Hemoglobin A1C     TSH     T4, Free     Vitamin D     Microalbumin/Creatinine Ratio, Urine

## 2025-04-02 ENCOUNTER — PATIENT MESSAGE (OUTPATIENT)
Dept: ENDOCRINOLOGY | Facility: CLINIC | Age: 50
End: 2025-04-02
Payer: COMMERCIAL

## 2025-04-23 ENCOUNTER — OFFICE VISIT (OUTPATIENT)
Dept: URGENT CARE | Facility: CLINIC | Age: 50
End: 2025-04-23
Payer: COMMERCIAL

## 2025-04-23 VITALS
RESPIRATION RATE: 16 BRPM | OXYGEN SATURATION: 96 % | SYSTOLIC BLOOD PRESSURE: 115 MMHG | TEMPERATURE: 99 F | BODY MASS INDEX: 29.84 KG/M2 | HEIGHT: 64 IN | WEIGHT: 174.81 LBS | HEART RATE: 109 BPM | DIASTOLIC BLOOD PRESSURE: 65 MMHG

## 2025-04-23 DIAGNOSIS — H60.91 OTITIS EXTERNA OF RIGHT EAR, UNSPECIFIED CHRONICITY, UNSPECIFIED TYPE: ICD-10-CM

## 2025-04-23 DIAGNOSIS — I88.9 SUBMANDIBULAR LYMPHADENITIS: Primary | ICD-10-CM

## 2025-04-23 DIAGNOSIS — R05.9 COUGH, UNSPECIFIED TYPE: ICD-10-CM

## 2025-04-23 DIAGNOSIS — J02.9 SORE THROAT: ICD-10-CM

## 2025-04-23 LAB
CTP QC/QA: YES
CTP QC/QA: YES
MOLECULAR STREP A: NEGATIVE
SARS CORONAVIRUS 2 ANTIGEN: NEGATIVE

## 2025-04-23 RX ORDER — AMOXICILLIN AND CLAVULANATE POTASSIUM 875; 125 MG/1; MG/1
1 TABLET, FILM COATED ORAL EVERY 12 HOURS
Qty: 20 TABLET | Refills: 0 | Status: SHIPPED | OUTPATIENT
Start: 2025-04-23 | End: 2025-04-23

## 2025-04-23 RX ORDER — CIPROFLOXACIN AND DEXAMETHASONE 3; 1 MG/ML; MG/ML
4 SUSPENSION/ DROPS AURICULAR (OTIC) 2 TIMES DAILY
Qty: 7.5 ML | Refills: 0 | Status: SHIPPED | OUTPATIENT
Start: 2025-04-23

## 2025-04-23 RX ORDER — CIPROFLOXACIN AND DEXAMETHASONE 3; 1 MG/ML; MG/ML
4 SUSPENSION/ DROPS AURICULAR (OTIC) 2 TIMES DAILY
Qty: 7.5 ML | Refills: 0 | Status: SHIPPED | OUTPATIENT
Start: 2025-04-23 | End: 2025-04-23

## 2025-04-23 RX ORDER — AMOXICILLIN AND CLAVULANATE POTASSIUM 875; 125 MG/1; MG/1
1 TABLET, FILM COATED ORAL EVERY 12 HOURS
Qty: 20 TABLET | Refills: 0 | Status: SHIPPED | OUTPATIENT
Start: 2025-04-23

## 2025-04-23 NOTE — PROGRESS NOTES
"Subjective:      Patient ID: Heather Gomez is a 49 y.o. female.    Vitals:  height is 5' 4" (1.626 m) and weight is 79.3 kg (174 lb 13.2 oz). Her oral temperature is 98.6 °F (37 °C). Her blood pressure is 115/65 and her pulse is 109. Her respiration is 16 and oxygen saturation is 96%.     Chief Complaint: Cough    Pt presents with right ear pain, cough, and sore throat. Symptoms started yesterday.  NO FEVER CHILLS NAUSEA VOMITING OR DIARRHEA    Cough  This is a new problem. The current episode started yesterday. The cough is Non-productive. Associated symptoms include ear pain and a sore throat. Pertinent negatives include no chills or fever. Treatments tried: theraflu.       Constitution: Negative for chills, sweating, fatigue and fever.   HENT:  Positive for ear pain and sore throat.    Respiratory:  Positive for cough.    Skin:  Negative for erythema.      Objective:     Physical Exam   Constitutional: She is oriented to person, place, and time. She appears well-developed. She is cooperative.  Non-toxic appearance. She does not appear ill. No distress.   HENT:   Head: Normocephalic and atraumatic.   Ears:   Right Ear: Hearing and tympanic membrane normal.   Left Ear: Hearing, tympanic membrane, external ear and ear canal normal.      Comments: RIGHT CANAL NEAR THE MOST DISTAL AREA IS ERYTHEMATOUS AND TENDER WITH OTOSCOPE EXAM.  SHE DOES HAVE SOME MILD PREAURICULAR TENDERNESS PALPATION BUT NO ERYTHEMA.  HE DOES HAVE SOME PALPABLE RIGHT SUBMANDIBULAR ADENOPATHY THAT IS NONTENDER  Nose: Nose normal. No mucosal edema, rhinorrhea, nasal deformity or congestion. No epistaxis. Right sinus exhibits no maxillary sinus tenderness and no frontal sinus tenderness. Left sinus exhibits no maxillary sinus tenderness and no frontal sinus tenderness.   Mouth/Throat: Uvula is midline, oropharynx is clear and moist and mucous membranes are normal. No trismus in the jaw. Normal dentition. No uvula swelling. No " oropharyngeal exudate, posterior oropharyngeal edema or posterior oropharyngeal erythema.   Eyes: Conjunctivae, EOM and lids are normal. Pupils are equal, round, and reactive to light. Right eye exhibits no discharge. Left eye exhibits no discharge. No scleral icterus.   Neck: Trachea normal and phonation normal. Neck supple. No JVD present. No tracheal deviation present. No thyromegaly present. No edema present. No erythema present. No neck rigidity present.   Cardiovascular: Normal rate, regular rhythm, normal heart sounds and normal pulses.   No murmur heard.Exam reveals no gallop and no friction rub.   Pulmonary/Chest: Effort normal and breath sounds normal. No stridor. No respiratory distress. She has no decreased breath sounds. She has no wheezes. She has no rhonchi. She has no rales. She exhibits no tenderness.   Abdominal: Normal appearance. She exhibits no distension. Soft. There is no abdominal tenderness. There is no rebound and no guarding.   Musculoskeletal: Normal range of motion.         General: No deformity. Normal range of motion.   Neurological: She is alert and oriented to person, place, and time. She exhibits normal muscle tone. Coordination normal.   Skin: Skin is warm, dry, intact, not diaphoretic, not pale and no rash. Capillary refill takes less than 2 seconds. No erythema   Psychiatric: Her speech is normal and behavior is normal. Judgment and thought content normal.   Nursing note and vitals reviewed.    Results for orders placed or performed in visit on 04/23/25   SARS Coronavirus 2 Antigen, POCT Manual Read    Collection Time: 04/23/25  6:42 PM   Result Value Ref Range    SARS Coronavirus 2 Antigen Negative Negative, Presumptive Negative     Acceptable Yes    POCT Strep A, Molecular    Collection Time: 04/23/25  7:31 PM   Result Value Ref Range    Molecular Strep A, POC Negative Negative     Acceptable Yes      *Note: Due to a large number of results and/or  encounters for the requested time period, some results have not been displayed. A complete set of results can be found in Results Review.    No results found.     Assessment:     1. Submandibular lymphadenitis    2. Otitis externa of right ear, unspecified chronicity, unspecified type    3. Sore throat    4. Cough, unspecified type        Plan:       Submandibular lymphadenitis    Otitis externa of right ear, unspecified chronicity, unspecified type  -     Discontinue: amoxicillin-clavulanate 875-125mg (AUGMENTIN) 875-125 mg per tablet; Take 1 tablet by mouth every 12 (twelve) hours.  Dispense: 20 tablet; Refill: 0  -     Discontinue: ciprofloxacin-dexAMETHasone 0.3-0.1% (CIPRODEX) 0.3-0.1 % DrpS; Place 4 drops into the left ear 2 (two) times daily.  Dispense: 7.5 mL; Refill: 0  -     amoxicillin-clavulanate 875-125mg (AUGMENTIN) 875-125 mg per tablet; Take 1 tablet by mouth every 12 (twelve) hours.  Dispense: 20 tablet; Refill: 0  -     ciprofloxacin-dexAMETHasone 0.3-0.1% (CIPRODEX) 0.3-0.1 % DrpS; Place 4 drops into the left ear 2 (two) times daily.  Dispense: 7.5 mL; Refill: 0    Sore throat  -     POCT Strep A, Molecular    Cough, unspecified type  -     SARS Coronavirus 2 Antigen, POCT Manual Read    Follow up if symptoms worsen or fail to improve, for F/U with PCP or ED. There are no Patient Instructions on file for this visit.

## 2025-05-18 DIAGNOSIS — F41.9 ANXIETY: ICD-10-CM

## 2025-05-19 RX ORDER — SERTRALINE HYDROCHLORIDE 50 MG/1
TABLET, FILM COATED ORAL
Qty: 90 TABLET | Refills: 3 | Status: SHIPPED | OUTPATIENT
Start: 2025-05-19

## 2025-05-28 ENCOUNTER — PATIENT MESSAGE (OUTPATIENT)
Dept: ENDOCRINOLOGY | Facility: CLINIC | Age: 50
End: 2025-05-28
Payer: COMMERCIAL

## 2025-05-28 ENCOUNTER — HOSPITAL ENCOUNTER (OUTPATIENT)
Dept: ENDOCRINOLOGY | Facility: CLINIC | Age: 50
Discharge: HOME | End: 2025-05-28
Attending: NURSE PRACTITIONER
Payer: COMMERCIAL

## 2025-05-28 DIAGNOSIS — E04.1 THYROID NODULE: ICD-10-CM

## 2025-05-28 DIAGNOSIS — E04.1 THYROID NODULE: Primary | ICD-10-CM

## 2025-05-28 PROCEDURE — 76536 US EXAM OF HEAD AND NECK: CPT | Mod: S$GLB,,, | Performed by: INTERNAL MEDICINE

## 2025-05-28 NOTE — TELEPHONE ENCOUNTER
Thyroid US  5/2025  Impression:  1. Thyroid gland is normal in size with heterogenous echotexture.  2. 3 nodules in the right thyroid described above.  None meet criteria for fine-needle aspiration.  3. Isthmus nodule is Skin creased in size since the prior study.  It meets criteria for fine-needle aspiration.  4. Mixed cystic and solid nodule in the left thyroid described above. It does not meet criteria for fine-needle aspiration.  5. No abnormal lymph nodes are identified.  RECOMMENDATIONS:  FNA of isthmus thyroid nodule.  Follow-up ultrasound in 1 year is recommended.

## 2025-05-29 ENCOUNTER — TELEPHONE (OUTPATIENT)
Dept: ENDOCRINOLOGY | Facility: CLINIC | Age: 50
End: 2025-05-29
Payer: COMMERCIAL

## 2025-05-29 DIAGNOSIS — G47.00 INSOMNIA, UNSPECIFIED TYPE: ICD-10-CM

## 2025-05-29 DIAGNOSIS — I10 HYPERTENSION, UNSPECIFIED TYPE: ICD-10-CM

## 2025-05-29 RX ORDER — IRBESARTAN AND HYDROCHLOROTHIAZIDE 300; 12.5 MG/1; MG/1
1 TABLET, FILM COATED ORAL DAILY
Qty: 90 TABLET | Refills: 3 | Status: SHIPPED | OUTPATIENT
Start: 2025-05-29 | End: 2026-05-29

## 2025-05-30 RX ORDER — ZOLPIDEM TARTRATE 10 MG/1
TABLET ORAL
Qty: 30 TABLET | Refills: 3 | Status: SHIPPED | OUTPATIENT
Start: 2025-05-30

## 2025-06-13 ENCOUNTER — OFFICE VISIT (OUTPATIENT)
Dept: FAMILY MEDICINE | Facility: CLINIC | Age: 50
End: 2025-06-13
Payer: COMMERCIAL

## 2025-06-13 VITALS
DIASTOLIC BLOOD PRESSURE: 78 MMHG | TEMPERATURE: 98 F | OXYGEN SATURATION: 97 % | HEART RATE: 105 BPM | BODY MASS INDEX: 30.22 KG/M2 | WEIGHT: 177 LBS | SYSTOLIC BLOOD PRESSURE: 122 MMHG | HEIGHT: 64 IN

## 2025-06-13 DIAGNOSIS — E66.811 CLASS 1 OBESITY DUE TO EXCESS CALORIES WITH SERIOUS COMORBIDITY AND BODY MASS INDEX (BMI) OF 30.0 TO 30.9 IN ADULT: ICD-10-CM

## 2025-06-13 DIAGNOSIS — E11.59 HYPERTENSION ASSOCIATED WITH DIABETES: ICD-10-CM

## 2025-06-13 DIAGNOSIS — Z79.4 TYPE 2 DIABETES MELLITUS WITH HYPERGLYCEMIA, WITH LONG-TERM CURRENT USE OF INSULIN: ICD-10-CM

## 2025-06-13 DIAGNOSIS — I15.2 HYPERTENSION ASSOCIATED WITH DIABETES: ICD-10-CM

## 2025-06-13 DIAGNOSIS — E05.90 SUBCLINICAL HYPERTHYROIDISM: ICD-10-CM

## 2025-06-13 DIAGNOSIS — E78.2 MIXED HYPERLIPIDEMIA: ICD-10-CM

## 2025-06-13 DIAGNOSIS — E11.65 TYPE 2 DIABETES MELLITUS WITH HYPERGLYCEMIA, WITH LONG-TERM CURRENT USE OF INSULIN: ICD-10-CM

## 2025-06-13 DIAGNOSIS — E55.9 VITAMIN D DEFICIENCY: ICD-10-CM

## 2025-06-13 DIAGNOSIS — F41.1 GAD (GENERALIZED ANXIETY DISORDER): ICD-10-CM

## 2025-06-13 DIAGNOSIS — Z00.00 ANNUAL PHYSICAL EXAM: Primary | ICD-10-CM

## 2025-06-13 DIAGNOSIS — E66.09 CLASS 1 OBESITY DUE TO EXCESS CALORIES WITH SERIOUS COMORBIDITY AND BODY MASS INDEX (BMI) OF 30.0 TO 30.9 IN ADULT: ICD-10-CM

## 2025-06-13 PROBLEM — J02.9 SORE THROAT: Status: RESOLVED | Noted: 2023-08-12 | Resolved: 2025-06-13

## 2025-06-13 PROCEDURE — 99999 PR PBB SHADOW E&M-EST. PATIENT-LVL IV: CPT | Mod: PBBFAC,,, | Performed by: FAMILY MEDICINE

## 2025-06-13 NOTE — PROGRESS NOTES
(Portions of this note were dictated using voice recognition software and may contain dictation related errors in spelling/grammar/syntax not found on text review)    CC: Annual exam      HPI: 49 y.o. female presented for routine annual examination and labs. She has medical history significant for hypertension, type 2 diabetes mellitus, hyperlipidemia, gastroesophageal reflux disease.      HTN: She takes irbesartan-HCTZ 300 mg-12.5 mg, reports blood pressure has been stable     DM: She is followed by endo, hba1c has been stable, she takes mounjaro 12.5 mg weekly, metformin and insulin novolog and Lantuss, scheduled for labs in July    NIK: She is on Zoloft, symptoms controlled    She does not smoke, has no toxic habits    She does not do exercise, reports having difficulty in weight loss despite being on mounjaro    No other concerns.       Past Medical History:   Diagnosis Date    Anticoagulant long-term use     DVT, popliteal, acute     GERD (gastroesophageal reflux disease)     History of pulmonary embolus (PE)     HLD (hyperlipidemia)     Hypertension     Sleep apnea 2023    Type 2 diabetes mellitus, controlled     Vitamin D deficiency 2019       Past Surgical History:   Procedure Laterality Date    BREAST BIOPSY Right 2019     SECTION      x2    COLONOSCOPY N/A 2022    Procedure: COLONOSCOPY suprep;  Surgeon: Michael Tirado MD;  Location: [facility];  Service: Endoscopy;  Laterality: N/A;    ENDOMETRIAL ABLATION      HYSTERECTOMY      LAPAROSCOPY-ASSISTED VAGINAL HYSTERECTOMY Bilateral 2019    Procedure: HYSTERECTOMY, VAGINAL, LAPAROSCOPY-ASSISTED;  Surgeon: Lashell Clayton MD;  Location: [facility] OR;  Service: OB/GYN;  Laterality: Bilateral;  video    UPPER GASTROINTESTINAL ENDOSCOPY              Family History   Problem Relation Name Age of Onset    Diabetes Mother Theola white     Hypertension Mother Theola white     Hyperlipidemia Mother Theola white      Hyperthyroidism Mother Yassine finch     Stroke Father Popeye finch     Hyperlipidemia Father Popeye finch     Hypertension Father Popeye finch     Diabetes Father Popeye finch     Breast cancer Sister Sidra CORLEY        Social History[1]    Lab Results   Component Value Date    WBC 6.12 03/12/2024    HGB 11.1 (L) 03/12/2024    HCT 34.6 (L) 03/12/2024    MCV 87 03/12/2024     03/12/2024    CHOL 201 (H) 09/23/2024    TRIG 139 09/23/2024    HDL 55 09/23/2024    ALT 15 01/07/2025    AST 14 01/07/2025    BILITOT 0.2 01/07/2025    ALKPHOS 70 01/07/2025     01/07/2025    K 3.5 01/07/2025     01/07/2025    CREATININE 0.7 01/07/2025    ESTGFRAFRICA >60.0 05/18/2022    EGFRNONAA >60.0 05/18/2022    CALCIUM 9.2 01/07/2025    ALBUMIN 3.6 01/07/2025    BUN 14 01/07/2025    CO2 28 01/07/2025    TSH 1.145 01/07/2025    INR 1.0 07/08/2019    HGBA1C 6.0 (H) 01/07/2025    MICALBCREAT 13.2 05/06/2024    LDLCALC 118.2 09/23/2024     (H) 01/07/2025    [id number] 48 01/07/2025             Vital signs reviewed  PE:   APPEARANCE: Well nourished, well developed, in no acute distress.    HEAD: Normocephalic, atraumatic.  EYES: EOMI.  Conjunctivae noninjected.  NOSE: Mucosa pink. Airway clear.  NECK: Supple with no cervical lymphadenopathy.    CHEST: Good inspiratory effort. Lungs clear to auscultation with no wheezes or crackles.  CARDIOVASCULAR: Normal S1, S2. No rubs, murmurs, or gallops.  ABDOMEN: Bowel sounds normal. Not distended. Soft. No tenderness or masses. No organomegaly.  EXTREMITIES: No edema, cyanosis, or clubbing.    Review of Systems   Constitutional:  Negative for chills, fatigue and fever.   HENT: Negative.     Respiratory:  Negative for cough, shortness of breath and wheezing.    Cardiovascular:  Negative for chest pain, palpitations and leg swelling.   Gastrointestinal: Negative.    Genitourinary: Negative.    Neurological: Negative.    Psychiatric/Behavioral: Negative.     All other systems  reviewed and are negative.      IMPRESSION  1. Annual physical exam    2. Hypertension associated with diabetes    3. Mixed hyperlipidemia    4. Type 2 diabetes mellitus with hyperglycemia, with long-term current use of insulin    5. Vitamin D deficiency    6. Subclinical hyperthyroidism    7. NIK (generalized anxiety disorder)    8. Class 1 obesity due to excess calories with serious comorbidity and body mass index (BMI) of 30.0 to 30.9 in adult            PLAN      1. Annual physical exam (Primary)    - Lipid Panel; Future  - CBC Auto Differential; Future  - Iron and TIBC; Future      2. Hypertension associated with diabetes    Controlled    Continue current medications      3. Mixed hyperlipidemia    On statin      4. Type 2 diabetes mellitus with hyperglycemia, with long-term current use of insulin    Controlled    Followed by endocrinology    Continue current medications      5. Vitamin D deficiency    On vitamin D supplements      6. Subclinical hyperthyroidism    Stable    Continue tapazole      7. NIK (generalized anxiety disorder)    Stable    On Zoloft      8. Class 1 obesity due to excess calories with serious comorbidity and body mass index (BMI) of 30.0 to 30.9 in adult    Counseling provided on healthy lifestyle, encouraged to do moderate intensity regular exercise 30 minutes every day 5 days a week, to include vegetables and fruits and cut back on saturated fats and carbohydrates.          SCREENINGS        Age/demographic appropriate health maintenance:    Mammogram scheduled    UTD with colonoscopy      Health Maintenance Due   Topic Date Due    Foot Exam  02/09/2023    Mammogram  05/22/2024    Diabetes Urine Screening  05/06/2025    Hemoglobin A1c  07/07/2025           Spent adequate time in obtaining history and explaining differentials     35 minutes spent during this visit of which greater than 50% devoted to face-face counseling and coordination of care regarding diagnosis and management plan        Larry Landa   6/13/2025       [1]   Social History  Tobacco Use    Smoking status: Never    Smokeless tobacco: Never   Substance Use Topics    Alcohol use: No    Drug use: No

## 2025-06-19 ENCOUNTER — HOSPITAL ENCOUNTER (OUTPATIENT)
Dept: ENDOCRINOLOGY | Facility: CLINIC | Age: 50
Discharge: HOME | End: 2025-06-19
Attending: NURSE PRACTITIONER
Payer: COMMERCIAL

## 2025-06-19 DIAGNOSIS — E04.1 THYROID NODULE: ICD-10-CM

## 2025-06-19 PROCEDURE — 88173 CYTOPATH EVAL FNA REPORT: CPT | Mod: TC

## 2025-06-19 PROCEDURE — 10005 FNA BX W/US GDN 1ST LES: CPT | Mod: S$GLB,,, | Performed by: INTERNAL MEDICINE

## 2025-06-20 LAB
ESTROGEN SERPL-MCNC: NORMAL PG/ML
INSULIN SERPL-ACNC: NORMAL U[IU]/ML
LAB AP CLINICAL INFORMATION: NORMAL
LAB AP GROSS DESCRIPTION: NORMAL
LAB AP NON-GYN INTERPRETATION SPECIMEN 1: NORMAL
LAB AP PERFORMING LOCATION(S): NORMAL

## 2025-06-23 ENCOUNTER — PATIENT MESSAGE (OUTPATIENT)
Dept: ENDOCRINOLOGY | Facility: CLINIC | Age: 50
End: 2025-06-23
Payer: COMMERCIAL

## 2025-06-23 DIAGNOSIS — E04.1 THYROID NODULE: Primary | ICD-10-CM

## 2025-07-10 ENCOUNTER — RESULTS FOLLOW-UP (OUTPATIENT)
Dept: FAMILY MEDICINE | Facility: CLINIC | Age: 50
End: 2025-07-10
Payer: COMMERCIAL

## 2025-07-10 NOTE — PROGRESS NOTES
Subjective:      Patient ID: Heather Gomez is a 49 y.o. female.    Chief Complaint:  Diabetes and Thyroid Nodule    History of Present Illness  Heather Gomez is here for follow up of DM, Hyperthyroidism and Thyroid Nodule.  Previously seen by me 3/2025.  This is a MyChart video visit.    The patient location is: [place]  The chief complaint leading to consultation is: DM  Visit type: Virtual visit with synchronous audio and video  Total time spent with patient: see below  Each patient to whom he or she provides medical services by telemedicine is:  (1) informed of the relationship between the physician and patient and the respective role of any other health care provider with respect to management of the patient; and (2) notified that he or she may decline to receive medical services by telemedicine and may withdraw from such care at any time.    With regards to Diabetes:    Diagnosed: 2013 4/2021 C peptide 3.12 with glucose of 180    FH of DM:   Mother, father, 4 sisters   Has 2 children - no history of DM     DM Education: 2/2022    Known complications:  DKA : None  RN - Denies  - Eye Exam: 11/2024  PN : Denies   - Podiatry: None  Nephropathy   CAD : Denies  Denies history of pancreatitis & personal/family history of medullary thyroid cancer.     Diet/Exercise:   Eats 1 meal a day.   Snacks : occasionally ~ 1am.   Drinks : water, rarely a soft drink  Exercise: None - active lifestyle.  Recent illness, injury, steroids: Denies      Current Regimen:  Metformin 1000mg twice daily  Mounjaro 12.5 mg weekly   Lantus 10 units twice a day   Novolog 7 units plus correction scale before meals only  Add correction scale as needed.  Blood sugar 150 to 200 add 1 units  Blood sugar 201 to 250 add 2 units  Blood sugar 251 to 300 add 3 units  Blood sugar 301 to 350 add 4 units  Blood sugar greater than 350 add 5 units    Reports compliance.     Other medications tried:  Metformin - unable to tolerate due to  GI upset   Invokana - discontinued due to abdominal pain, nausea, yeast infections    Actos - discontinued during hospital stay   Januvia - discontinued during hospital stay   Novolog stopped 7/2023  Glipizide - changes to Novolog     Glucose Monitor: Mor 3   Dexcom G7 no longer covered 2024  6 times a day testing  Sensor reviewed     Hypoglycemia:  Denies   Knows how to correct with 15 grams of carbs- juice, coke, or a peppermint.      Diabetes Management Status    Hemoglobin A1C   Date Value Ref Range Status   01/07/2025 6.0 (H) 4.0 - 5.6 % Final     Comment:     ADA Screening Guidelines:  5.7-6.4%  Consistent with prediabetes  >or=6.5%  Consistent with diabetes    High levels of fetal hemoglobin interfere with the HbA1C  assay. Heterozygous hemoglobin variants (HbS, HgC, etc)do  not significantly interfere with this assay.   However, presence of multiple variants may affect accuracy.     05/06/2024 5.9 (H) 4.0 - 5.6 % Final     Comment:     ADA Screening Guidelines:  5.7-6.4%  Consistent with prediabetes  >or=6.5%  Consistent with diabetes    High levels of fetal hemoglobin interfere with the HbA1C  assay. Heterozygous hemoglobin variants (HbS, HgC, etc)do  not significantly interfere with this assay.   However, presence of multiple variants may affect accuracy.     11/15/2023 5.9 (H) 4.0 - 5.6 % Final     Comment:     ADA Screening Guidelines:  5.7-6.4%  Consistent with prediabetes  >or=6.5%  Consistent with diabetes    High levels of fetal hemoglobin interfere with the HbA1C  assay. Heterozygous hemoglobin variants (HbS, HgC, etc)do  not significantly interfere with this assay.   However, presence of multiple variants may affect accuracy.       Hemoglobin A1c   Date Value Ref Range Status   07/10/2025 6.4 (H) 4.0 - 5.6 % Final     Comment:     ADA Screening Guidelines:  5.7-6.4%  Consistent with prediabetes  >=6.5%  Consistent with diabetes    High levels of fetal hemoglobin interfere with the HbA1C  assay.  Heterozygous hemoglobin variants (HbS, HgC, etc)do  not significantly interfere with this assay.   However, presence of multiple variants may affect accuracy.       Statin: Taking  ACE/ARB: Taking  Screening or Prevention Patient's value Goal Complete/Controlled?   HgA1C Testing and Control   Lab Results   Component Value Date    HGBA1C 6.4 (H) 07/10/2025      Annually/Less than 8% Yes   Lipid profile : 07/10/2025 Annually Yes   LDL control Lab Results   Component Value Date    LDLCALC 109.6 07/10/2025    Annually/Less than 100 mg/dl  Yes   Nephropathy screening Lab Results   Component Value Date    LABMICR <5.0 07/10/2025     Lab Results   Component Value Date    PROTEINUA Negative 06/27/2022    Annually No   Blood pressure BP Readings from Last 1 Encounters:   06/13/25 122/78    Less than 140/90 No   Dilated retinal exam : 09/17/2024 Annually Yes   Foot exam   Most Recent Foot Exam Date: Not Found Annually No     With regards to Thyroid Nodule:      Thyroid US:   5/2025     FNA:   -6/2025  Isthmus: Unsatisfactory     Signs or Symptoms:   Difficulty breathing: Denies  Difficulty swallowing: Denies  Voice Changes: Denies  FH of thyroid nodules but unsure if it was cancer (mother)  Personal history of radiation treatment or exposure: Denies      With regards to Hyperthyroidism:      FH of thyroid disease: 2 sisters, mother     2/2022  Thyroid Ab negative.    NM Thyroid Scan  4/6/2022  The 24 hour uptake is elevated at 48.3 % (normal 10-30%).  Homogenous distribution of the radionuclide throughout the thyroid gland without hyper or hypo functional nodules.  Impression:  1. Elevated 24 hour radioiodine uptake by the thyroid.  2. Uniform uptake in the thyroid gland bilaterally.  Overall findings are in keeping with Graves disease..    Lab Results   Component Value Date    TSH 1.299 07/10/2025    I6GUOPM 89 07/24/2018    FREET4 1.16 07/10/2025    FREET4 1.16 07/10/2025       Current Symptoms:   Denies palpations  Denies  weight loss  Reports constipation (chronic issue)   Denies Diarrhea  Denies Hair loss  Denies Brittle nails  Denies Tremor   Denies Anxiety  Reports heat intolerance  Denies diaphoresis     Biotin Use: Denies    Current medication:  ATD   Start 2/8/2024  Methimazole 5mg daily    Any recent (3-6 months) iodine or contrast: Denies    Smoke: Denies    Thyroid tenderness: Denies    Graves Eye Clinical Activity: 3/7=Active  Eye pain? Denies  Eye pain with movement? Denies  Eyelid erythema? Denies  Erythema of conjunctiva? Denies  Caruncle inflammation? Denies  Eyelid swelling? Denies       With regards to Vitamin D Deficiency:      Vitamin D   Date Value Ref Range Status   07/10/2025 41 30 - 96 ng/mL Final     Comment:     Vitamin D deficiency.........<10 ng/mL                              Vitamin D insufficiency......10-29 ng/mL       Vitamin D sufficiency........> or equal to 30 ng/mL  Vitamin D toxicity............>100 ng/mL       Vit D, 25-Hydroxy   Date Value Ref Range Status   01/07/2025 48 30 - 96 ng/mL Final     Comment:     Vitamin D deficiency.........<10 ng/mL                              Vitamin D insufficiency......10-29 ng/mL       Vitamin D sufficiency........> or equal to 30 ng/mL  Vitamin D toxicity............>100 ng/mL         Current Meds: OTC D3 2000iu daily.     Review of Systems  As above       Visit Vitals  LMP 06/17/2019       There is no height or weight on file to calculate BMI.    Lab Review:   Lab Results   Component Value Date    HGBA1C 6.4 (H) 07/10/2025    HGBA1C 6.0 (H) 01/07/2025    HGBA1C 5.9 (H) 05/06/2024       Lab Results   Component Value Date    CHOL 186 07/10/2025    HDL 49 07/10/2025    LDLCALC 109.6 07/10/2025    TRIG 137 07/10/2025    CHOLHDL 26.3 07/10/2025     Lab Results   Component Value Date     07/10/2025    K 4.2 07/10/2025     07/10/2025    CO2 29 07/10/2025     (H) 07/10/2025    BUN 10 07/10/2025    CREATININE 0.7 07/10/2025    CALCIUM 9.8 07/10/2025     PROT 7.8 07/10/2025    ALBUMIN 3.9 07/10/2025    BILITOT 0.7 07/10/2025    ALKPHOS 65 07/10/2025    AST 18 07/10/2025    ALT 19 07/10/2025    ANIONGAP 7 (L) 07/10/2025    ESTGFRAFRICA >60.0 05/18/2022    EGFRNONAA >60.0 05/18/2022    TSH 1.299 07/10/2025     Vitamin D   Date Value Ref Range Status   07/10/2025 41 30 - 96 ng/mL Final     Comment:     Vitamin D deficiency.........<10 ng/mL                              Vitamin D insufficiency......10-29 ng/mL       Vitamin D sufficiency........> or equal to 30 ng/mL  Vitamin D toxicity............>100 ng/mL       Vit D, 25-Hydroxy   Date Value Ref Range Status   01/07/2025 48 30 - 96 ng/mL Final     Comment:     Vitamin D deficiency.........<10 ng/mL                              Vitamin D insufficiency......10-29 ng/mL       Vitamin D sufficiency........> or equal to 30 ng/mL  Vitamin D toxicity............>100 ng/mL       Assessment and Plan     1. Type 2 diabetes mellitus with hyperglycemia, with long-term current use of insulin  Comprehensive Metabolic Panel    Hemoglobin A1C      2. Subclinical hyperthyroidism  methIMAzole (TAPAZOLE) 5 MG Tab    TSH      3. Type 2 diabetes mellitus with ketoacidosis without coma, without long-term current use of insulin  metFORMIN (GLUCOPHAGE-XR) 500 MG ER 24hr tablet      4. Thyroid nodule        5. Vitamin D deficiency              Type 2 diabetes mellitus with hyperglycemia, with long-term current use of insulin  -- VV in 4 months with labs prior.  -- A1c goal <7%.  -- Medications discussed:  MFM   GLP1-DPP4   RAE   SGLT2   Reviewed potential adverse effects of SGLT-2 inhibitors, including genital mycotic infections, slightly increased risk of UTI, hypersensitivity, hypotension, and hyperkalemia. Advised to maintain water intake of 8-10 cups per day. Advised we need to check chemistry panel at baseline and 2 weeks after starting. Discussed FDA warning reports of ketoacidosis associated with SGLT-2 inhibitors. Advised to seek  immediate medical attention and stop the medication if symptoms such as difficulty breathing, nausea, vomiting, abdominal pain, confusion, and unusual fatigue/sleepiness. Discussed possible precipitating factors including major illness/reduced food and fluid intake (advised to stop under these circumstances), and reduced insulin dose. Discussed possible effects of increased fracture risk/decreased bone density. Discussed reports of increased risk of leg and foot amputations with canagliflozin and need to seek urgent care if developed new pain or tenderness, sores or ulcers, or infections in legs/feet.   Insulin   -- Reviewed logs/CGM:  Glucose well controlled.  Reach out to me sooner for any glucose <70 or consistently >180.  -- Medication Changes:   Metformin 1000mg twice daily  Mounjaro 12.5 mg weekly   Lantus 10 units twice a day   Novolog 7 units plus correction scale before meals only  Add correction scale as needed.  Blood sugar 150 to 200 add 1 units  Blood sugar 201 to 250 add 2 units  Blood sugar 251 to 300 add 3 units  Blood sugar 301 to 350 add 4 units  Blood sugar greater than 350 add 5 units      -- Reviewed goals of therapy are to get the best control we can without hypoglycemia.  -- Reviewed patient's current insulin regimen. Clarified proper insulin dose and timing in relation to meals, etc. Insulin injection sites and proper rotation instructed.    -- Advised frequent self blood glucose monitoring.  Patient encouraged to document glucose results and bring them to every clinic visit.  -- Hypoglycemia precautions discussed. Instructed on precautions before driving.    -- Call for Bg repeatedly < 90 or > 180.   -- Close adherence to lifestyle changes recommended.   -- Periodic follow ups for eye evaluations, foot care and dental care suggested.    Subclinical hyperthyroidism  -- Ab- Graves disease.  -- Reviewed possible options of methimazole, I131 therapy and surgery.  -- Graves' disease:   Extensive  counseling today regarding the diagnosis of Graves' disease, the various options for treatment including thionamide, surgery, or I-131 therapy. We discussed the pros and cons of each treatment option, including contraindications to LORENZO if considering pregnancy in next 6-12 months. Reviewed that with methimazole, the course is ~18 months of treatment and then evaluate for remission. Surgery is most definitive, I-131 takes 2-6 months. Would need lifelong thyroid hormone after surgery or I-131 Rx.   -- Thionamide monitoring: I have reviewed the risk of agranulocytosis that can occur in 1 of 500 patients who are taking either PTU or methimazole. I have also reviewed the even rarer risk of hepatic dysfunction. A baseline CBC with differential and Liver function tests are available.   -- TREATMENT  ATD   Start 2/8/2024  Methimazole 5mg daily   -- Labs scheduled prior to follow up.    Thyroid nodule  -- I have reviewed management options including observation, FNA or surgery.  -- FNA procedure explained in depth.  -- Discussed indications for repeat FNA as well as surgical indications (abnormal FNA, compressive symptoms or interval change).  -- Discussed possible results of FNA- Benign, Malignant, FLUS, or insufficient cells.  Discussed results auto released to patients, but advised the ordering provider will also contact to discuss.   -- If FNA is negative then plan RTO in 1 year with TSH and thyroid US prior.  -- All of the patients questions were answered.  -- FNA:   -6/2025  Isthmus: Unsatisfactory   -Repeat FNA scheduled.     Vitamin D deficiency  -- CONTINUE OTC Vit D3 2000iu daily.        Follow up in about 4 months (around 11/15/2025).    I spent 30 minutes face-to-face with the patient, over half of the visit was spent on counseling and/or coordinating the care of the patient.    Counseling includes:  Diagnostic results, impressions, recommendations   Prognosis   Risk and benefits of management/treatment options    Instructions for management treatment and or follow-up   Importance of compliance with management   Risk factor reduction   Patient education    Visit today included increased complexity associated with the care of the problems addressed and managing the longitudinal care of the patient due to the serious and/or complex managed problems.

## 2025-07-15 ENCOUNTER — OFFICE VISIT (OUTPATIENT)
Dept: ENDOCRINOLOGY | Facility: CLINIC | Age: 50
End: 2025-07-15
Payer: COMMERCIAL

## 2025-07-15 DIAGNOSIS — E11.10 TYPE 2 DIABETES MELLITUS WITH KETOACIDOSIS WITHOUT COMA, WITHOUT LONG-TERM CURRENT USE OF INSULIN: ICD-10-CM

## 2025-07-15 DIAGNOSIS — E55.9 VITAMIN D DEFICIENCY: ICD-10-CM

## 2025-07-15 DIAGNOSIS — Z79.4 TYPE 2 DIABETES MELLITUS WITH HYPERGLYCEMIA, WITH LONG-TERM CURRENT USE OF INSULIN: Primary | ICD-10-CM

## 2025-07-15 DIAGNOSIS — E05.90 SUBCLINICAL HYPERTHYROIDISM: ICD-10-CM

## 2025-07-15 DIAGNOSIS — E11.65 TYPE 2 DIABETES MELLITUS WITH HYPERGLYCEMIA, WITH LONG-TERM CURRENT USE OF INSULIN: Primary | ICD-10-CM

## 2025-07-15 DIAGNOSIS — E04.1 THYROID NODULE: ICD-10-CM

## 2025-07-15 PROCEDURE — 3044F HG A1C LEVEL LT 7.0%: CPT | Mod: CPTII,95,, | Performed by: NURSE PRACTITIONER

## 2025-07-15 PROCEDURE — G2211 COMPLEX E/M VISIT ADD ON: HCPCS | Mod: 95,,, | Performed by: NURSE PRACTITIONER

## 2025-07-15 PROCEDURE — 3066F NEPHROPATHY DOC TX: CPT | Mod: CPTII,95,, | Performed by: NURSE PRACTITIONER

## 2025-07-15 PROCEDURE — 3061F NEG MICROALBUMINURIA REV: CPT | Mod: CPTII,95,, | Performed by: NURSE PRACTITIONER

## 2025-07-15 PROCEDURE — 98006 SYNCH AUDIO-VIDEO EST MOD 30: CPT | Mod: 95,,, | Performed by: NURSE PRACTITIONER

## 2025-07-15 PROCEDURE — 95251 CONT GLUC MNTR ANALYSIS I&R: CPT | Mod: NDTC,,, | Performed by: NURSE PRACTITIONER

## 2025-07-15 PROCEDURE — 1159F MED LIST DOCD IN RCRD: CPT | Mod: CPTII,95,, | Performed by: NURSE PRACTITIONER

## 2025-07-15 PROCEDURE — 1160F RVW MEDS BY RX/DR IN RCRD: CPT | Mod: CPTII,95,, | Performed by: NURSE PRACTITIONER

## 2025-07-15 RX ORDER — METHIMAZOLE 5 MG/1
5 TABLET ORAL DAILY
Qty: 90 TABLET | Refills: 3 | Status: SHIPPED | OUTPATIENT
Start: 2025-07-15 | End: 2026-07-15

## 2025-07-15 RX ORDER — METFORMIN HYDROCHLORIDE 500 MG/1
1000 TABLET, EXTENDED RELEASE ORAL 2 TIMES DAILY WITH MEALS
Qty: 360 TABLET | Refills: 3 | Status: SHIPPED | OUTPATIENT
Start: 2025-07-15 | End: 2026-07-15

## 2025-07-15 NOTE — Clinical Note
Upload Mor Push back repeat FNA to 8 weeks from now VV in 4 months with labs prior Orders Placed This Encounter     Comprehensive Metabolic Panel     Hemoglobin A1C     TSH

## 2025-07-15 NOTE — ASSESSMENT & PLAN NOTE
-- VV in 4 months with labs prior.  -- A1c goal <7%.  -- Medications discussed:  MFM   GLP1-DPP4   RAE   SGLT2   Reviewed potential adverse effects of SGLT-2 inhibitors, including genital mycotic infections, slightly increased risk of UTI, hypersensitivity, hypotension, and hyperkalemia. Advised to maintain water intake of 8-10 cups per day. Advised we need to check chemistry panel at baseline and 2 weeks after starting. Discussed FDA warning reports of ketoacidosis associated with SGLT-2 inhibitors. Advised to seek immediate medical attention and stop the medication if symptoms such as difficulty breathing, nausea, vomiting, abdominal pain, confusion, and unusual fatigue/sleepiness. Discussed possible precipitating factors including major illness/reduced food and fluid intake (advised to stop under these circumstances), and reduced insulin dose. Discussed possible effects of increased fracture risk/decreased bone density. Discussed reports of increased risk of leg and foot amputations with canagliflozin and need to seek urgent care if developed new pain or tenderness, sores or ulcers, or infections in legs/feet.   Insulin   -- Reviewed logs/CGM:  Glucose well controlled.  Reach out to me sooner for any glucose <70 or consistently >180.  -- Medication Changes:   Metformin 1000mg twice daily  Mounjaro 12.5 mg weekly   Lantus 10 units twice a day   Novolog 7 units plus correction scale before meals only  Add correction scale as needed.  Blood sugar 150 to 200 add 1 units  Blood sugar 201 to 250 add 2 units  Blood sugar 251 to 300 add 3 units  Blood sugar 301 to 350 add 4 units  Blood sugar greater than 350 add 5 units      -- Reviewed goals of therapy are to get the best control we can without hypoglycemia.  -- Reviewed patient's current insulin regimen. Clarified proper insulin dose and timing in relation to meals, etc. Insulin injection sites and proper rotation instructed.    -- Advised frequent self blood glucose  monitoring.  Patient encouraged to document glucose results and bring them to every clinic visit.  -- Hypoglycemia precautions discussed. Instructed on precautions before driving.    -- Call for Bg repeatedly < 90 or > 180.   -- Close adherence to lifestyle changes recommended.   -- Periodic follow ups for eye evaluations, foot care and dental care suggested.

## 2025-07-15 NOTE — ASSESSMENT & PLAN NOTE
-- Ab- Graves disease.  -- Reviewed possible options of methimazole, I131 therapy and surgery.  -- Graves' disease:   Extensive counseling today regarding the diagnosis of Graves' disease, the various options for treatment including thionamide, surgery, or I-131 therapy. We discussed the pros and cons of each treatment option, including contraindications to LORENZO if considering pregnancy in next 6-12 months. Reviewed that with methimazole, the course is ~18 months of treatment and then evaluate for remission. Surgery is most definitive, I-131 takes 2-6 months. Would need lifelong thyroid hormone after surgery or I-131 Rx.   -- Thionamide monitoring: I have reviewed the risk of agranulocytosis that can occur in 1 of 500 patients who are taking either PTU or methimazole. I have also reviewed the even rarer risk of hepatic dysfunction. A baseline CBC with differential and Liver function tests are available.   -- TREATMENT  ATD   Start 2/8/2024  Methimazole 5mg daily   -- Labs scheduled prior to follow up.

## 2025-07-15 NOTE — ASSESSMENT & PLAN NOTE
-- I have reviewed management options including observation, FNA or surgery.  -- FNA procedure explained in depth.  -- Discussed indications for repeat FNA as well as surgical indications (abnormal FNA, compressive symptoms or interval change).  -- Discussed possible results of FNA- Benign, Malignant, FLUS, or insufficient cells.  Discussed results auto released to patients, but advised the ordering provider will also contact to discuss.   -- If FNA is negative then plan RTO in 1 year with TSH and thyroid US prior.  -- All of the patients questions were answered.  -- FNA:   -6/2025  Isthmus: Unsatisfactory   -Repeat FNA scheduled.

## 2025-08-15 DIAGNOSIS — E11.10 TYPE 2 DIABETES MELLITUS WITH KETOACIDOSIS WITHOUT COMA, WITHOUT LONG-TERM CURRENT USE OF INSULIN: ICD-10-CM

## 2025-08-17 DIAGNOSIS — Z79.4 TYPE 2 DIABETES MELLITUS WITH HYPERGLYCEMIA, WITH LONG-TERM CURRENT USE OF INSULIN: ICD-10-CM

## 2025-08-17 DIAGNOSIS — E11.10 TYPE 2 DIABETES MELLITUS WITH KETOACIDOSIS WITHOUT COMA, WITHOUT LONG-TERM CURRENT USE OF INSULIN: ICD-10-CM

## 2025-08-17 DIAGNOSIS — E11.65 TYPE 2 DIABETES MELLITUS WITH HYPERGLYCEMIA, WITH LONG-TERM CURRENT USE OF INSULIN: ICD-10-CM

## 2025-08-18 RX ORDER — TIRZEPATIDE 12.5 MG/.5ML
12.5 INJECTION, SOLUTION SUBCUTANEOUS
Qty: 4 PEN | Refills: 3 | Status: SHIPPED | OUTPATIENT
Start: 2025-08-18

## 2025-08-18 RX ORDER — PRAVASTATIN SODIUM 40 MG/1
TABLET ORAL
Qty: 90 TABLET | Refills: 3 | Status: SHIPPED | OUTPATIENT
Start: 2025-08-18

## 2025-08-18 RX ORDER — ONDANSETRON 4 MG/1
4 TABLET, ORALLY DISINTEGRATING ORAL EVERY 12 HOURS PRN
Qty: 30 TABLET | Refills: 1 | Status: SHIPPED | OUTPATIENT
Start: 2025-08-18

## 2025-09-04 DIAGNOSIS — E05.90 SUBCLINICAL HYPERTHYROIDISM: ICD-10-CM

## 2025-09-04 RX ORDER — METHIMAZOLE 5 MG/1
5 TABLET ORAL DAILY
Qty: 90 TABLET | Refills: 1 | Status: SHIPPED | OUTPATIENT
Start: 2025-09-04 | End: 2026-09-04

## (undated) DEVICE — GLOVE 6.5 PROTEXIS PI BLUE

## (undated) DEVICE — APPLICATOR ARISTA FLEX XL

## (undated) DEVICE — CONTAINER PATHOLOGY W/LID 32OZ

## (undated) DEVICE — TUBING INSUFFLATION 10

## (undated) DEVICE — SEE MEDLINE ITEM 156955

## (undated) DEVICE — TIP RUMI BLUE DISPOSABLE 5/BX

## (undated) DEVICE — SYR 50CC LL

## (undated) DEVICE — SEE MEDLINE ITEM 154981

## (undated) DEVICE — DRESSING TELFA N ADH 3X8

## (undated) DEVICE — SEE MEDLINE ITEM 157116

## (undated) DEVICE — SEE MEDLINE ITEM 157117

## (undated) DEVICE — SUT VICRYL 1 CT-1 27 UNDIE

## (undated) DEVICE — PAD PREP 50/CA

## (undated) DEVICE — ADHESIVE DERMABOND ADVANCED

## (undated) DEVICE — SUPPORT ULNA NERVE PROTECTOR

## (undated) DEVICE — TRAY FOLEY 16FR INFECTION CONT

## (undated) DEVICE — Device

## (undated) DEVICE — GLOVE BIOGEL ECLIPSE SZ 7.5

## (undated) DEVICE — PACK BASIC

## (undated) DEVICE — UNDERGLOVES BIOGEL PI SIZE 8

## (undated) DEVICE — KIT ANTIFOG

## (undated) DEVICE — SEE MEDLINE ITEM 152622

## (undated) DEVICE — DRAPE LAVH LAPAROSCOPY W/FLUID

## (undated) DEVICE — SUT CTD VICRYL 0 UND BR CT

## (undated) DEVICE — SEE MEDLINE ITEM 157131

## (undated) DEVICE — COVER OVERHEAD SURG LT BLUE

## (undated) DEVICE — GLOVE PROTEXIS HYDROGEL SZ6.5

## (undated) DEVICE — SYR 10CC LUER LOCK

## (undated) DEVICE — TUBING ARTRL PRESS MNTR M-F 4

## (undated) DEVICE — SEALER LIGASURE LAP 37CM 5MM

## (undated) DEVICE — GLOVE SURG BIOGEL LATEX SZ 7.5

## (undated) DEVICE — MANIFOLD 4 PORT

## (undated) DEVICE — IRRIGATOR ENDOSCOPY DISP.

## (undated) DEVICE — GLOVE 8 PROTEXIS PI BLUE

## (undated) DEVICE — DEVICE ABLATION NOVASURE DISP

## (undated) DEVICE — SEE MEDLINE ITEM 146355

## (undated) DEVICE — DRAPE SURGICAL STERI IRRG PCH

## (undated) DEVICE — BLADE SURG CARBON STEEL SZ11

## (undated) DEVICE — OCCLUDER COLPO-PNEUMO STERILE

## (undated) DEVICE — TROCAR KII FIOS 5MM X 100MM

## (undated) DEVICE — TROCAR ENDOPATH XCEL 11MM 10CM

## (undated) DEVICE — ELECTRODE REM PLYHSV RETURN 9

## (undated) DEVICE — CATH URETHRAL 16FR RED

## (undated) DEVICE — APPLICATOR CHLORAPREP ORN 26ML

## (undated) DEVICE — JELLY LUBRICANT STERILE 4 OZ